# Patient Record
Sex: FEMALE | Race: WHITE | HISPANIC OR LATINO | Employment: FULL TIME | URBAN - METROPOLITAN AREA
[De-identification: names, ages, dates, MRNs, and addresses within clinical notes are randomized per-mention and may not be internally consistent; named-entity substitution may affect disease eponyms.]

---

## 2017-02-09 DIAGNOSIS — K21.9 GASTROESOPHAGEAL REFLUX DISEASE WITHOUT ESOPHAGITIS: ICD-10-CM

## 2017-02-09 RX ORDER — ESOMEPRAZOLE MAGNESIUM 40 MG/1
CAPSULE, DELAYED RELEASE ORAL
Qty: 30 CAPSULE | Refills: 0 | Status: SHIPPED | OUTPATIENT
Start: 2017-02-09 | End: 2017-03-01

## 2017-02-22 ENCOUNTER — OFFICE VISIT (OUTPATIENT)
Dept: HEPATOLOGY | Facility: CLINIC | Age: 62
End: 2017-02-22
Payer: COMMERCIAL

## 2017-02-22 VITALS
RESPIRATION RATE: 18 BRPM | TEMPERATURE: 97 F | WEIGHT: 190.5 LBS | DIASTOLIC BLOOD PRESSURE: 70 MMHG | HEART RATE: 79 BPM | HEIGHT: 66 IN | OXYGEN SATURATION: 98 % | BODY MASS INDEX: 30.62 KG/M2 | SYSTOLIC BLOOD PRESSURE: 142 MMHG

## 2017-02-22 DIAGNOSIS — K76.0 NAFLD (NONALCOHOLIC FATTY LIVER DISEASE): Primary | ICD-10-CM

## 2017-02-22 PROCEDURE — 3078F DIAST BP <80 MM HG: CPT | Mod: S$GLB,,, | Performed by: INTERNAL MEDICINE

## 2017-02-22 PROCEDURE — 99999 PR PBB SHADOW E&M-EST. PATIENT-LVL III: CPT | Mod: PBBFAC,,, | Performed by: INTERNAL MEDICINE

## 2017-02-22 PROCEDURE — 99214 OFFICE O/P EST MOD 30 MIN: CPT | Mod: S$GLB,,, | Performed by: INTERNAL MEDICINE

## 2017-02-22 PROCEDURE — 3077F SYST BP >= 140 MM HG: CPT | Mod: S$GLB,,, | Performed by: INTERNAL MEDICINE

## 2017-02-22 PROCEDURE — 1160F RVW MEDS BY RX/DR IN RCRD: CPT | Mod: S$GLB,,, | Performed by: INTERNAL MEDICINE

## 2017-02-22 RX ORDER — PENICILLIN V POTASSIUM 500 MG/1
TABLET, FILM COATED ORAL
Refills: 0 | COMMUNITY
Start: 2017-02-14 | End: 2017-03-01

## 2017-02-22 NOTE — PATIENT INSTRUCTIONS
Return to clinic in 1 year.      Please schedule fasting lab at [facility] within 1 month.      Suggest weight loss goal of 10-15 lbs for next appointment.  Also need good control of blood pressure and cholesterol.

## 2017-02-22 NOTE — PROGRESS NOTES
Hepatology Consult Note    Referring provider: No ref. provider found    Chief complaint:   No chief complaint on file.      HPI:  Rachel Conroy is a 61 y.o. female that presents to hepatology clinic for follow-up of abnormal LFTs and fatty infiltration seen on ultrasound.  She is accompanied by her daughter.  The patient is Telugu-speaking and elected for daughter to provided translation.    The patient states that she has been clinically well since her last office visit on 10/24/16.  The patient has no clinical symptoms or stigmata of chronic liver disease including jaundice, ascites formation, pruritus or GI bleeding.      Work-up shows fibroscan consistent with F0-F1 fibrosis and serologic w/u without evidence of other chronic etiology.  She reports that she has been taking Devil's claw - (hook root) for one month.  No medications that she can correlate to the time when LFTs began to rise.  She has not been on statin for management of cholesterol.  States that from last check with mildly elevated triglycerides she was recommended diet and exercise for management.      Patient Active Problem List   Diagnosis    Osteoarthritis of right knee    Migraine headache    Nail fungal infection    Hyperlipidemia    HTN (hypertension)    Muscle pain, fibromyalgia    Fatigue    Unspecified vitamin D deficiency    IBS (irritable bowel syndrome)       Past Medical History   Diagnosis Date    Arthritis     Hyperlipidemia     Hypertension     Irritable bowel syndrome     Joint pain     Knee injury     Migraine headache     Muscle pain, fibromyalgia 3/19/2014    Other physical therapy     Plantar fasciitis     Sleep apnea     Urinary incontinence        Past Surgical History   Procedure Laterality Date    Hysterectomy         Family History   Problem Relation Age of Onset    Arthritis Mother     Alzheimer's disease Mother     Migraines Neg Hx     Melanoma Neg Hx     Psoriasis Neg Hx     Lupus  Neg Hx     Eczema Neg Hx     Acne Neg Hx     Colon cancer Neg Hx     Stomach cancer Neg Hx     Esophageal cancer Neg Hx     Liver disease Neg Hx     Crohn's disease Neg Hx     Ulcerative colitis Neg Hx     Celiac disease Neg Hx        Social History     Social History    Marital status:      Spouse name: N/A    Number of children: N/A    Years of education: N/A     Occupational History    [facility] [employer]      Social History Main Topics    Smoking status: Never Smoker    Smokeless tobacco: Not on file    Alcohol use No    Drug use: No    Sexual activity: No     Other Topics Concern    Are You Pregnant Or Think You May Be? No    Breast-Feeding No     Social History Narrative   Patient has no tobacco, alcohol or illicit drug use currently or remotely.     Current Outpatient Prescriptions   Medication Sig Dispense Refill    cholecalciferol, vitamin D3, 50,000 unit capsule Take 1 capsule (50,000 Units total) by mouth once a week. 12 capsule 0    clobetasol (CLOBEX) 0.05 % shampoo APPLY EXTERNALLY TO THE AFFECTED AREA EVERY 7 DAYS 118 mL 0    diclofenac sodium (VOLTAREN) 1 % Gel Apply 2 g topically 4 (four) times daily. To painful area on the foot. 500 g 5    dicyclomine (BENTYL) 10 MG capsule Take 1 capsule (10 mg total) by mouth before meals as needed (TID PRN). 90 capsule 2    divalproex (DEPAKOTE) 250 MG EC tablet       esomeprazole (NEXIUM) 40 MG capsule TAKE 1 CAPSULE BY MOUTH EVERY MORNING 30-45 MINUTES BEFORE BREAKFAST 30 capsule 0    gabapentin (NEURONTIN) 100 MG capsule Take 1 capsule (100 mg total) by mouth every evening. 90 capsule 1    losartan (COZAAR) 100 MG tablet TAKE 1 TABLET (100 MG TOTAL) BY MOUTH ONCE DAILY. 90 tablet 3    sumatriptan (IMITREX) 100 MG tablet Take 0.5-1 tablets ( mg total) by mouth every 2 (two) hours as needed (up to 200 mg/d). 9 tablet 5     No current facility-administered medications for this visit.        Review of patient's allergies  "indicates:   Allergen Reactions    Tramadol Itching       Review of Systems   Constitutional: Negative for chills, fever and weight loss.   Eyes: Negative.    Respiratory: Negative for cough and shortness of breath.    Cardiovascular: Negative for chest pain and leg swelling.   Gastrointestinal: Positive for abdominal pain and heartburn. Negative for diarrhea, nausea and vomiting.   Genitourinary: Negative for urgency.   Musculoskeletal: Positive for joint pain and myalgias.   Skin: Negative for rash.   Neurological: Negative for focal weakness and headaches.   Endo/Heme/Allergies: Does not bruise/bleed easily.   Psychiatric/Behavioral: Negative for depression.       Vitals:    02/22/17 1422   BP: (!) 142/70   Pulse: 79   Resp: 18   Temp: 96.8 °F (36 °C)   TempSrc: Oral   SpO2: 98%   Weight: 86.4 kg (190 lb 7.6 oz)   Height: 5' 6" (1.676 m)       Physical Exam   Constitutional: She is oriented to person, place, and time. She appears well-developed and well-nourished. No distress.   HENT:   Head: Normocephalic and atraumatic.   Mouth/Throat: Oropharynx is clear and moist.   Eyes: EOM are normal. Pupils are equal, round, and reactive to light. No scleral icterus.   Neck: Normal range of motion. Neck supple. No thyromegaly present.   Cardiovascular: Normal rate, regular rhythm and normal heart sounds.  Exam reveals no gallop and no friction rub.    No murmur heard.  Pulmonary/Chest: Effort normal. No respiratory distress. She has no wheezes. She has no rales.   Abdominal: Soft. Bowel sounds are normal. She exhibits no distension. There is no tenderness.   Musculoskeletal: Normal range of motion. She exhibits no edema.   Lymphadenopathy:     She has no cervical adenopathy.   Neurological: She is alert and oriented to person, place, and time.   Skin: Skin is warm and dry. No rash noted.   Psychiatric: She has a normal mood and affect. Her behavior is normal.   Vitals reviewed.      Lab Results   Component Value Date    " ALT 55 (H) 10/24/2016    AST 31 10/24/2016    ALKPHOS 129 10/24/2016    BILITOT 0.4 10/24/2016       Lab Results   Component Value Date    WBC 5.61 06/02/2016    HGB 13.2 06/02/2016    HCT 39.7 06/02/2016    MCV 85 06/02/2016     06/02/2016       Lab Results   Component Value Date     10/24/2016    K 3.9 10/24/2016     10/24/2016    CO2 24 10/24/2016    BUN 11 10/24/2016    CREATININE 0.7 10/24/2016    CALCIUM 9.1 10/24/2016    ANIONGAP 10 10/24/2016    ESTGFRAFRICA >60.0 10/24/2016    EGFRNONAA >60.0 10/24/2016       Imaging: Abd U/S 11/2016 report reviewed; hepatomegaly with fatty infiltration     Assessment:  61 y.o. female with obesity, hypertension and hyperlipidemia with serologic w/u and imaging consistent with NAFLD.  Although there has been a minor prolonged elevation of ALT in the setting of steatosis, fibroscan does not show evidence of significant fibrosis and this is reassuring.      NAFLD:  At this time patient appears to have NAFLD with evidence of steatohepatitis and progressive fibrosis.  Therefore do not recommend biopsy.  Recommendations for weight loss and good blood pressure and cholesterol control.  Goal of 10-15 lb weight low with increased physical activity.  We will recheck fasting lipid panel within the next month and if triglycerides not improved then consideration of treatment.  She is interested in fish oil and there is no contraindication at this time.  As a non-diabetic, the patient may also benefit from vitamin E and this can be recommended following repeat labs if ALT remains elevated.  Reviewed medications with patient and daughter and no culprit medication identified.  She was encouraged to avoid herbal and OTC medications unless discussed with a physician.      Hepatitis B core positive: Patient with prior exposure to hepatitis B.  No evidence of active disease but does have risk for reactivation if immunosuppressed in the future.  Nothing to do at this time.       Patient with immunity to hepatitis A.      RTC in 1 year     Patient has asked that daughter's cell phone be used for discussion of lab and testing results [phone] (Luna) given limited English.

## 2017-02-22 NOTE — MR AVS SNAPSHOT
[facility]  [address]  [place] [address]  Phone: [phone]  Fax: [phone]                  Rachel Conroy   2017 2:20 PM   Office Visit    Descripción:  Female : 1955   Personal Médico:  Debra Finnegan MD   Departamento:  [facility]           Diagnósticos de Esta Visita        Comentarios    NAFLD (nonalcoholic fatty liver disease)    -  Primario            Lista de tareas           Citas próximas        Personal Médico Departamento Tfno del dpto    3/14/2017 3:00 PM MD [facility] [phone]      Metas (5 Years of Data)     Ninguna      Follow-Up and Disposition     Return in about 1 year (around 2018).      [facility]     [facility] - Asistencia   Enfermeras registradas de [facility] pueden ayudarle a reservar meghan yany, proveer educación para la rosemary, asesoría clínica, y otros servicios de asesoramiento.   Llame para wendy servicio gratuito a [phone].             Medicamentos           Mensaje sobre Medicamentos     Verificar los cambios y / o adiciones a saha régimen de medicación son los mismos que discutir con saha médico. Si cualquiera de estos cambios o adiciones son incorrectos, por favor notifique a saha proveedor de atención médica.        DEJAR de nixon estos medicamentos     divalproex (DEPAKOTE) 250 MG EC tablet            Verifique que la siguiente lista de medicamentos es meghan representación exacta de los medicamentos que está tomando actualmente. Si no hay ningunos reportados, la lista puede estar en arevalo. Si no es correcta, por favor póngase en contacto con saha proveedor de atención médica. Lleve esta lista con usted en shannan de emergencia.           Medicamentos Actuales     clobetasol (CLOBEX) 0.05 % shampoo APPLY EXTERNALLY TO THE AFFECTED AREA EVERY 7 DAYS    esomeprazole (NEXIUM) 40 MG capsule TAKE 1 CAPSULE BY MOUTH EVERY MORNING 30-45 MINUTES BEFORE  "BREAKFAST    losartan (COZAAR) 100 MG tablet TAKE 1 TABLET (100 MG TOTAL) BY MOUTH ONCE DAILY.    penicillin v potassium (VEETID) 500 MG tablet     sumatriptan (IMITREX) 100 MG tablet Take 0.5-1 tablets ( mg total) by mouth every 2 (two) hours as needed (up to 200 mg/d).    cholecalciferol, vitamin D3, 50,000 unit capsule Take 1 capsule (50,000 Units total) by mouth once a week.    diclofenac sodium (VOLTAREN) 1 % Gel Apply 2 g topically 4 (four) times daily. To painful area on the foot.    dicyclomine (BENTYL) 10 MG capsule Take 1 capsule (10 mg total) by mouth before meals as needed (TID PRN).    gabapentin (NEURONTIN) 100 MG capsule Take 1 capsule (100 mg total) by mouth every evening.           Información de referencia clínica           Nellie signos vitales vannesa     PS Pulso Temperatura Resp [place] [employer]    142/70 (BP Location: Left arm, Patient Position: Sitting, BP Method: Automatic) 79 96.8 °F (36 °C) (Oral) 18 5' 6" (1.676 m) 86.4 kg (190 lb 7.6 oz)    SpO2 BMI ([facility])                98% 30.74 kg/m2          Blood Pressure          Most Recent Value    BP  (!)  142/70      Alergias     A partir del:  2/22/2017        Tramadol      Vacunas     Administradas en la fecha de la visita:  2/22/2017        None      Orders Placed During Today's Visit     Exámenes/Procedimientos futuros Se espera el Vence    Hepatic function panel  2/22/2017 4/23/2018    Lipid panel  2/22/2017 4/23/2018      Instrucciones    Return to clinic in 1 year.      Please schedule fasting lab at [facility] within 1 month.      Suggest weight loss goal of 10-15 lbs for next appointment.  Also need good control of blood pressure and cholesterol.                Language Assistance Services     ATTENTION: Language assistance services are available, free of charge. Please call [phone].      ATENCIÓN: Si habla español, tiene a saha disposición servicios gratuitos de asistencia lingüística. Llame al [phone].     CHÚ Ý: N?u b?n " nói Ti?ng Vi?t, có các d?ch v? h? tr? ngôn ng? mi?n phí dành cho b?n. G?i s? [phone].         [facility] cumple con las leyes federales aplicables de derechos civiles y no discrimina por motivos de laura, color, origen nacional, edad, discapacidad, o sexo.                 Rachel Conroy   2017 2:20 PM   Office Visit    Description:  Female : 1955   Provider:  Debra Finnegan MD   Department:  [facility]           Diagnoses this Visit        Comments    NAFLD (nonalcoholic fatty liver disease)    -  Primary            To Do List           Future Appointments        Provider Department Dept Phone    3/14/2017 3:00 PM MD [facility] [phone]      Goals     None      Follow-Up and Disposition     Return in about 1 year (around 2018).      [facility]     [facility] -  Assistance  Registered nurses in the [facility] On Call Center provide clinical advisement, health education, appointment booking, and other advisory services.  Call for this free service at [phone].             Medications           Message regarding Medications     Verify the changes and/or additions to your medication regime listed below are the same as discussed with your clinician today.  If any of these changes or additions are incorrect, please notify your healthcare provider.        STOP taking these medications     divalproex (DEPAKOTE) 250 MG EC tablet            Verify that the below list of medications is an accurate representation of the medications you are currently taking.  If none reported, the list may be blank. If incorrect, please contact your healthcare provider. Carry this list with you in case of emergency.           Current Medications     clobetasol (CLOBEX) 0.05 % shampoo APPLY EXTERNALLY TO THE AFFECTED AREA EVERY 7 DAYS    esomeprazole (NEXIUM) 40 MG capsule TAKE 1 CAPSULE BY MOUTH EVERY MORNING 30-45 MINUTES  "BEFORE BREAKFAST    losartan (COZAAR) 100 MG tablet TAKE 1 TABLET (100 MG TOTAL) BY MOUTH ONCE DAILY.    penicillin v potassium (VEETID) 500 MG tablet     sumatriptan (IMITREX) 100 MG tablet Take 0.5-1 tablets ( mg total) by mouth every 2 (two) hours as needed (up to 200 mg/d).    cholecalciferol, vitamin D3, 50,000 unit capsule Take 1 capsule (50,000 Units total) by mouth once a week.    diclofenac sodium (VOLTAREN) 1 % Gel Apply 2 g topically 4 (four) times daily. To painful area on the foot.    dicyclomine (BENTYL) 10 MG capsule Take 1 capsule (10 mg total) by mouth before meals as needed (TID PRN).    gabapentin (NEURONTIN) 100 MG capsule Take 1 capsule (100 mg total) by mouth every evening.           Clinical Reference Information           Your Vitals Were     BP Pulse Temp Resp Height Weight    142/70 (BP Location: Left arm, Patient Position: Sitting, BP Method: Automatic) 79 96.8 °F (36 °C) (Oral) 18 5' 6" (1.676 m) 86.4 kg (190 lb 7.6 oz)    SpO2 BMI                98% 30.74 kg/m2          Blood Pressure          Most Recent Value    BP  (!)  142/70      Allergies as of 2/22/2017     Tramadol      Immunizations Administered on Date of Encounter - 2/22/2017     None      Orders Placed During Today's Visit     Future Labs/Procedures Expected by Expires    Hepatic function panel  2/22/2017 4/23/2018    Lipid panel  2/22/2017 4/23/2018      Instructions    Return to clinic in 1 year.      Please schedule fasting lab at [facility] within 1 month.      Suggest weight loss goal of 10-15 lbs for next appointment.  Also need good control of blood pressure and cholesterol.                Language Assistance Services     ATTENTION: Language assistance services are available, free of charge. Please call [phone].      ATENCIÓN: Si wenceslaola leo, tiene a saha disposición servicios gratuitos de asistencia lingüística. Llame al [phone].     CHÚ Ý: N?u b?n nói Ti?ng Vi?t, có các d?ch v? h? tr? ngôn " ng? mi?n phí dành cho b?n. G?i s? [phone].         [facility] complies with applicable Federal civil rights laws and does not discriminate on the basis of race, color, national origin, age, disability, or sex.

## 2017-03-01 ENCOUNTER — OFFICE VISIT (OUTPATIENT)
Dept: FAMILY MEDICINE | Facility: CLINIC | Age: 62
End: 2017-03-01
Payer: COMMERCIAL

## 2017-03-01 VITALS
TEMPERATURE: 100 F | SYSTOLIC BLOOD PRESSURE: 150 MMHG | DIASTOLIC BLOOD PRESSURE: 94 MMHG | HEART RATE: 106 BPM | OXYGEN SATURATION: 96 % | HEIGHT: 68 IN | RESPIRATION RATE: 17 BRPM | WEIGHT: 190.69 LBS | BODY MASS INDEX: 28.9 KG/M2

## 2017-03-01 DIAGNOSIS — J01.00 ACUTE NON-RECURRENT MAXILLARY SINUSITIS: Primary | ICD-10-CM

## 2017-03-01 PROCEDURE — 99999 PR PBB SHADOW E&M-EST. PATIENT-LVL III: CPT | Mod: PBBFAC,,, | Performed by: FAMILY MEDICINE

## 2017-03-01 PROCEDURE — 1160F RVW MEDS BY RX/DR IN RCRD: CPT | Mod: S$GLB,,, | Performed by: FAMILY MEDICINE

## 2017-03-01 PROCEDURE — 99214 OFFICE O/P EST MOD 30 MIN: CPT | Mod: S$GLB,,, | Performed by: FAMILY MEDICINE

## 2017-03-01 PROCEDURE — 3077F SYST BP >= 140 MM HG: CPT | Mod: S$GLB,,, | Performed by: FAMILY MEDICINE

## 2017-03-01 PROCEDURE — 3080F DIAST BP >= 90 MM HG: CPT | Mod: S$GLB,,, | Performed by: FAMILY MEDICINE

## 2017-03-01 RX ORDER — AMOXICILLIN AND CLAVULANATE POTASSIUM 875; 125 MG/1; MG/1
1 TABLET, FILM COATED ORAL 2 TIMES DAILY
Qty: 20 TABLET | Refills: 0 | Status: SHIPPED | OUTPATIENT
Start: 2017-03-01 | End: 2017-03-11

## 2017-03-01 RX ORDER — FLUTICASONE PROPIONATE 50 MCG
1 SPRAY, SUSPENSION (ML) NASAL DAILY
Qty: 1 BOTTLE | Refills: 0 | Status: SHIPPED | OUTPATIENT
Start: 2017-03-01 | End: 2017-04-11

## 2017-03-01 NOTE — PROGRESS NOTES
"Subjective:       Rachel Conroy is a 61 y.o. female here for evaluation of a cough. Onset of symptoms was several days ago. Symptoms have been unchanged since that time. The cough is dry and is aggravated by reclining position. Associated symptoms include: fever. Patient does not have a history of asthma. Patient does not have a history of environmental allergens. Patient has not traveled recently. Patient does not have a history of smoking. Patient has not had a previous chest x-ray. Patient has not had a PPD done.  +rhinorrhea  Tried - mucinex, advil, nyquil - hasn't helped.  Patient feeling very uncomfortable with facial pains.  Son is here with her for help w/ translation - language Pashto    Review of Systems  Pertinent items are noted in HPI.      Objective:      Oxygen saturation 98% on room air    BP (!) 150/94 (BP Location: Left arm, Patient Position: Sitting, BP Method: Manual)  Pulse 106  Temp 99.5 °F (37.5 °C) (Oral)   Resp 17  Ht 5' 8" (1.727 m)  Wt 86.5 kg (190 lb 11.2 oz)  SpO2 96%  BMI 29 kg/m2    General Appearance:    Alert, cooperative, no distress, appears stated age. Patient wincing the whole time due to her nasal congestion she says and body aches   Head:    Normocephalic, without obvious abnormality, atraumatic   Eyes:    PERRL, conjunctiva/corneas clear, EOM's intact   Ears:    Normal TM's and external ear canals, both ears   Nose:   Nares normal, septum midline, mucosa normal, +clear drainage, bilateral maxillary sinus tenderness   Throat:   Lips, mucosa, and tongue normal; teeth and gums normal   Neck:   Supple, symmetrical, trachea midline, no adenopathy       Lungs:     Clear to auscultation bilaterally, respirations unlabored        Heart:    Regular rate and rhythm, S1 and S2 normal, no murmur, rub   or gallop                                             Assessment:      Sinusitis      Plan:      Antibiotics per medication orders.  Antitussives per medication " orders.  Avoid exposure to tobacco smoke and fumes.  Call if shortness of breath worsens, blood in sputum, change in character of cough, development of fever or chills, inability to maintain nutrition and hydration. Avoid exposure to tobacco smoke and fumes.     Over the counter remedies:   -Increase your water intake to 64-80 oz daily   -Tylenol 500 mg 2 tablets or Ibuprofen 200 mg 2 tablets every 4-6 hours as needed for fever, headaches, sore throat, ear pain, bodyaches, and/or  nasal/sinus inflammation   -Warm salt water gargles with 1/2 cup water and 1 tablespoon salt every 4 hours   -Warm tea with honey and lemon   -Follow up with PCP in 1 week if symptoms do not resolve

## 2017-03-01 NOTE — MR AVS SNAPSHOT
[facility]  [address]  [place] [address]  Phone: [phone]                  Rachel Conroy   3/1/2017 11:20 AM   Office Visit    Descripción:  Female : 1955   Personal Médico:  Erica Mondragon MD   Departamento:  [facility]           Razón de la yany     Fever     Generalized Body Aches     Headache     Cough     Nasal Congestion           Diagnósticos de Esta Visita        Comentarios    Acute non-recurrent maxillary sinusitis    -  Primario            Lista de tareas           Citas próximas        Personal Médico Departamento Tfno del dpto    3/14/2017 3:00 PM Miranda Mcclellan MD [facility] - Rheumatology [phone]    3/22/2017 7:30 AM [facility], [place] DRAW STATION [facility] [phone]      Metas (5 Years of Data)     Ninguna      Recetas para recoger        Disp Refills Start End    fluticasone (FLONASE) 50 mcg/actuation nasal spray 1 Bottle 0 3/1/2017     1 spray by Each Nare route once daily. - Each Nare    [facility]: [place]  [facility] [facility] [address] No. de tlfo: #: [phone]       amoxicillin-clavulanate 875-125mg (AUGMENTIN) 875-125 mg per tablet 20 tablet 0 3/1/2017 3/11/2017    Take 1 tablet by mouth 2 (two) times daily. - Oral    [facility]: [place] [address] [facility] [address] No. de tlfo: #: [phone]         [facility]     [facility] [place] - Asistencia   Enfermeras registradas de [facility] pueden ayudarle a reservar meghan yany, proveer educación para la rosemary, asesoría clínica, y otros servicios de asesoramiento.   Llame para wendy servicio gratuito a [phone].             Medicamentos           Mensaje sobre Medicamentos     Verificar los cambios y / o adiciones a tovar régimen de medicación son los mismos que discutir con tovar médico. Si cualquiera de  estos cambios o adiciones son incorrectos, por favor notifique a saha proveedor de atención médica.        EMPEZAR a nixon estos medicamentos NUEVOS        Refills    fluticasone (FLONASE) 50 mcg/actuation nasal spray 0    Si spray by Each Nare route once daily.    Categoría: Normal    Vía: Each Nare    amoxicillin-clavulanate 875-125mg (AUGMENTIN) 875-125 mg per tablet 0    Sig: Take 1 tablet by mouth 2 (two) times daily.    Categoría: Normal    Vía: Oral      DEJAR de nixon estos medicamentos     penicillin v potassium (VEETID) 500 MG tablet     gabapentin (NEURONTIN) 100 MG capsule Take 1 capsule (100 mg total) by mouth every evening.    esomeprazole (NEXIUM) 40 MG capsule TAKE 1 CAPSULE BY MOUTH EVERY MORNING 30-45 MINUTES BEFORE BREAKFAST    dicyclomine (BENTYL) 10 MG capsule Take 1 capsule (10 mg total) by mouth before meals as needed (TID PRN).    diclofenac sodium (VOLTAREN) 1 % Gel Apply 2 g topically 4 (four) times daily. To painful area on the foot.    cholecalciferol, vitamin D3, 50,000 unit capsule Take 1 capsule (50,000 Units total) by mouth once a week.           Verifique que la siguiente lista de medicamentos es meghan representación exacta de los medicamentos que está tomando actualmente. Si no hay ningunos reportados, la lista puede estar en arevalo. Si no es correcta, por favor póngase en contacto con saha proveedor de atención médica. Lleve esta lista con usted en shannan de emergencia.           Medicamentos Actuales     amoxicillin-clavulanate 875-125mg (AUGMENTIN) 875-125 mg per tablet Take 1 tablet by mouth 2 (two) times daily.    clobetasol (CLOBEX) 0.05 % shampoo APPLY EXTERNALLY TO THE AFFECTED AREA EVERY 7 DAYS    fluticasone (FLONASE) 50 mcg/actuation nasal spray 1 spray by Each Nare route once daily.    losartan (COZAAR) 100 MG tablet TAKE 1 TABLET (100 MG TOTAL) BY MOUTH ONCE DAILY.    sumatriptan (IMITREX) 100 MG tablet Take 0.5-1 tablets ( mg total) by mouth every 2 (two) hours as  needed (up to 200 mg/d).           Información de referencia clínica           Nellie signos vitales vannesa     PS                   150/94 (BP Location: Left arm, Patient Position: Sitting, BP Method: Manual)           Blood Pressure          Most Recent Value    BP  (!)  150/94      Alergias     A partir del:  3/1/2017        Tramadol      Vacunas     Administradas en la fecha de la visita:  3/1/2017        None      Language Assistance Services     ATTENTION: Language assistance services are available, free of charge. Please call [phone].      ATENCIÓN: Si habla español, tiene a saha disposición servicios gratuitos de asistencia lingüística. Llame al [phone].     CHÚ Ý: N?u b?n nói Ti?ng Vi?t, có các d?ch v? h? tr? ngôn ng? mi?n phí dành cho b?n. G?i s? [phone].         [facility] cumple con las leyes federales aplicables de derechos civiles y no discrimina por motivos de laura, color, origen nacional, edad, discapacidad, o sexo.                 Rachel Anne Conroy   3/1/2017 11:20 AM   Office Visit    Description:  Female : 1955   Provider:  Erica Mondragon MD   Department:  [facility]           Reason for Visit     Fever     Generalized Body Aches     Headache     Cough     Nasal Congestion           Diagnoses this Visit        Comments    Acute non-recurrent maxillary sinusitis    -  Primary            To Do List           Future Appointments        Provider Department Dept Phone    3/14/2017 3:00 PM Miranda Mcclellan MD [facility] - Rheumatology [phone]    3/22/2017 7:30 AM LAB, [facility] DRAW STATION [facility] [phone]      Goals     None       These Medications        Disp Refills Start End    fluticasone (FLONASE) 50 mcg/actuation nasal spray 1 Bottle 0 3/1/2017     1 spray by Each Nare route once daily. - Each Nare    Pharmacy: [id number] [id number] AT [facility] OF  [address] Ph #: [phone]       amoxicillin-clavulanate 875-125mg (AUGMENTIN) 875-125 mg per tablet 20 tablet 0 3/1/2017 3/11/2017    Take 1 tablet by mouth 2 (two) times daily. - Oral    Pharmacy: [facility] [address] [place] [address] [address] AT [facility] OF [address] Ph #: [phone]         [facility]     [facility] Care Line -  Assistance  Registered nurses in the [facility] On Call Center provide clinical advisement, health education, appointment booking, and other advisory services.  Call for this free service at [phone].             Medications           Message regarding Medications     Verify the changes and/or additions to your medication regime listed below are the same as discussed with your clinician today.  If any of these changes or additions are incorrect, please notify your healthcare provider.        START taking these NEW medications        Refills    fluticasone (FLONASE) 50 mcg/actuation nasal spray 0    Si spray by Each Nare route once daily.    Class: Normal    Route: Each Nare    amoxicillin-clavulanate 875-125mg (AUGMENTIN) 875-125 mg per tablet 0    Sig: Take 1 tablet by mouth 2 (two) times daily.    Class: Normal    Route: Oral      STOP taking these medications     penicillin v potassium (VEETID) 500 MG tablet     gabapentin (NEURONTIN) 100 MG capsule Take 1 capsule (100 mg total) by mouth every evening.    esomeprazole (NEXIUM) 40 MG capsule TAKE 1 CAPSULE BY MOUTH EVERY MORNING 30-45 MINUTES BEFORE BREAKFAST    dicyclomine (BENTYL) 10 MG capsule Take 1 capsule (10 mg total) by mouth before meals as needed (TID PRN).    diclofenac sodium (VOLTAREN) 1 % Gel Apply 2 g topically 4 (four) times daily. To painful area on the foot.    cholecalciferol, vitamin D3, 50,000 unit capsule Take 1 capsule (50,000 Units total) by mouth once a week.           Verify that the below list of medications is an accurate representation of the  medications you are currently taking.  If none reported, the list may be blank. If incorrect, please contact your healthcare provider. Carry this list with you in case of emergency.           Current Medications     amoxicillin-clavulanate 875-125mg (AUGMENTIN) 875-125 mg per tablet Take 1 tablet by mouth 2 (two) times daily.    clobetasol (CLOBEX) 0.05 % shampoo APPLY EXTERNALLY TO THE AFFECTED AREA EVERY 7 DAYS    fluticasone (FLONASE) 50 mcg/actuation nasal spray 1 spray by Each Nare route once daily.    losartan (COZAAR) 100 MG tablet TAKE 1 TABLET (100 MG TOTAL) BY MOUTH ONCE DAILY.    sumatriptan (IMITREX) 100 MG tablet Take 0.5-1 tablets ( mg total) by mouth every 2 (two) hours as needed (up to 200 mg/d).           Clinical Reference Information           Your Vitals Were     BP                   150/94 (BP Location: Left arm, Patient Position: Sitting, BP Method: Manual)           Blood Pressure          Most Recent Value    BP  (!)  150/94      Allergies as of 3/1/2017     Tramadol      Immunizations Administered on Date of Encounter - 3/1/2017     None      Language Assistance Services     ATTENTION: Language assistance services are available, free of charge. Please call [phone].      ATENCIÓN: Si habla leo, tiene a saha disposición servicios gratuitos de asistencia lingüística. Llame al [phone].     COMFORT Ý: N?u b?n nói Ti?ng Vi?t, có các d?ch v? h? tr? ngôn ng? mi?n phí dành cho b?n. G?i s? [phone].         [facility] complies with applicable Federal civil rights laws and does not discriminate on the basis of race, color, national origin, age, disability, or sex.

## 2017-04-11 ENCOUNTER — OFFICE VISIT (OUTPATIENT)
Dept: GASTROENTEROLOGY | Facility: CLINIC | Age: 62
End: 2017-04-11
Payer: COMMERCIAL

## 2017-04-11 VITALS
HEIGHT: 66 IN | SYSTOLIC BLOOD PRESSURE: 131 MMHG | WEIGHT: 189.63 LBS | DIASTOLIC BLOOD PRESSURE: 78 MMHG | BODY MASS INDEX: 30.47 KG/M2 | HEART RATE: 87 BPM

## 2017-04-11 DIAGNOSIS — R10.84 GENERALIZED ABDOMINAL PAIN: ICD-10-CM

## 2017-04-11 DIAGNOSIS — K21.9 GASTROESOPHAGEAL REFLUX DISEASE WITHOUT ESOPHAGITIS: ICD-10-CM

## 2017-04-11 DIAGNOSIS — K58.0 IRRITABLE BOWEL SYNDROME WITH DIARRHEA: Primary | ICD-10-CM

## 2017-04-11 PROCEDURE — 1160F RVW MEDS BY RX/DR IN RCRD: CPT | Mod: S$GLB,,, | Performed by: NURSE PRACTITIONER

## 2017-04-11 PROCEDURE — 99214 OFFICE O/P EST MOD 30 MIN: CPT | Mod: S$GLB,,, | Performed by: NURSE PRACTITIONER

## 2017-04-11 PROCEDURE — 99999 PR PBB SHADOW E&M-EST. PATIENT-LVL III: CPT | Mod: PBBFAC,,, | Performed by: NURSE PRACTITIONER

## 2017-04-11 PROCEDURE — 3078F DIAST BP <80 MM HG: CPT | Mod: S$GLB,,, | Performed by: NURSE PRACTITIONER

## 2017-04-11 PROCEDURE — 3075F SYST BP GE 130 - 139MM HG: CPT | Mod: S$GLB,,, | Performed by: NURSE PRACTITIONER

## 2017-04-11 RX ORDER — IBUPROFEN 400 MG/1
400 TABLET ORAL EVERY 4 HOURS
COMMUNITY
End: 2017-10-11

## 2017-04-11 RX ORDER — PENICILLIN V POTASSIUM 500 MG/1
500 TABLET, FILM COATED ORAL
COMMUNITY
End: 2017-10-11

## 2017-04-11 RX ORDER — DICYCLOMINE HYDROCHLORIDE 10 MG/1
10 CAPSULE ORAL
Qty: 90 CAPSULE | Refills: 2 | Status: SHIPPED | OUTPATIENT
Start: 2017-04-11 | End: 2017-10-11

## 2017-04-11 RX ORDER — ESOMEPRAZOLE MAGNESIUM 40 MG/1
40 CAPSULE, DELAYED RELEASE ORAL
Qty: 30 CAPSULE | Refills: 2 | Status: SHIPPED | OUTPATIENT
Start: 2017-04-11 | End: 2017-04-17 | Stop reason: SDUPTHER

## 2017-04-11 NOTE — MR AVS SNAPSHOT
[facility]  [address]  [place] [address]  Phone: [phone]  Fax: [phone]                  Rachel Conroy   2017 1:30 PM   Office Visit    Descripción:  Female : 1955   Personal Médico:  Yelena Morton NP   Departamento:  [facility]           Diagnósticos de Esta Visita        Comentarios    Gastroesophageal reflux disease without esophagitis    -  Primario     Irritable bowel syndrome with diarrhea         Generalized abdominal pain                Lista de tareas           Metas (5 Years of Data)     Ninguna      Follow-Up and Disposition     Return in about 2 months (around 2017) for GERD, IBS-D.      Recetas para recoger        Disp Refills Start End    dicyclomine (BENTYL) 10 MG capsule 90 capsule 2 2017     Take 1 capsule (10 mg total) by mouth before meals as needed (TID PRN). - Oral    [facility]arm[facility]: [facility]  [facility] AT [facility] & [address] No. de tlfo: #: [phone]       esomeprazole (NEXIUM) 40 MG capsule 30 capsule 2 2017    Take 1 capsule (40 mg total) by mouth before breakfast. 30-45 minutes before breakfast - Oral    [facility]arm[facility]: [facility] [id number] [facility], [address] AT [facility] & [address] No. de tlfo: #: [phone]         [facility]     [facility] [place] [facility]eras - Asistencia   Enfermeras registradas de [facility] pueden ayudarle a reservar meghan yany, proveer educación para la rosemary, asesoría clínica, y otros servicios de asesoramiento.   Llame para wendy servicio gratuito a [phone].             Medicamentos           Mensaje sobre Medicamentos     Verificar los cambios y / o adiciones a tovar régimen de medicación son los mismos que discutir con tovar médico. Si cualquiera de estos cambios o adiciones son incorrectos, por favor notifique a tovar proveedor de atención médica.        EMPEZAR a  "nixon estos medicamentos NUEVOS        Refills    dicyclomine (BENTYL) 10 MG capsule 2    Sig: Take 1 capsule (10 mg total) by mouth before meals as needed (TID PRN).    Categoría: Normal    Vía: Oral    esomeprazole (NEXIUM) 40 MG capsule 2    Sig: Take 1 capsule (40 mg total) by mouth before breakfast. 30-45 minutes before breakfast    Categoría: Normal    Vía: Oral      DEJAR de nixon estos medicamentos     fluticasone (FLONASE) 50 mcg/actuation nasal spray 1 spray by Each Nare route once daily.    sumatriptan (IMITREX) 100 MG tablet Take 0.5-1 tablets ( mg total) by mouth every 2 (two) hours as needed (up to 200 mg/d).           Verifique que la siguiente lista de medicamentos es meghan representación exacta de los medicamentos que está tomando actualmente. Si no hay ningunos reportados, la lista puede estar en arevalo. Si no es correcta, por favor póngase en contacto con saha proveedor de atención médica. Lleve esta lista con usted en shannan de emergencia.           Medicamentos Actuales     clobetasol (CLOBEX) 0.05 % shampoo APPLY EXTERNALLY TO THE AFFECTED AREA EVERY 7 DAYS    ibuprofen (ADVIL,MOTRIN) 400 MG tablet Take 400 mg by mouth every 4 (four) hours.    losartan (COZAAR) 100 MG tablet TAKE 1 TABLET (100 MG TOTAL) BY MOUTH ONCE DAILY.    penicillin v potassium (VEETID) 500 MG tablet Take 500 mg by mouth every 8 (eight) hours.    dicyclomine (BENTYL) 10 MG capsule Take 1 capsule (10 mg total) by mouth before meals as needed (TID PRN).    esomeprazole (NEXIUM) 40 MG capsule Take 1 capsule (40 mg total) by mouth before breakfast. 30-45 minutes before breakfast           Información de referencia clínica           Nellie signos vitales vannesa     PS [place] Pes[place] BMI [place] [place]       131/78 87 5' 6" (1.676 m) 86 kg (189 lb 9.5 oz) 30.6 kg/m2       Blood Pressure          Most Recent Value    BP  131/78      Alergias     A partir del:  4/11/2017        Tramadol      Vacunas     Administradas en la fecha de la " visita:  4/11/2017        None      Instrucciones    Take Bentyl/dicyclomine 1-3 times every day for abdominal discomfort and urgent stools.  Take Nexium once daily (30-45 minutes before breakfast) for acid reflux and abdominal discomfort.  Take a daily probiotic to help normalized your bowel movements and to help with reflux, and abdominal discomfort.  Avoid known food triggers.    Avoid NSAIDS (see handout below):    NSAIDS are Non Steroidal Anti Inflammatory Drugs taken for pain.   You should stay away from these medications (unless they are advised due to cardiac health issues) as they may cause irritation and/or ulceration in the lining of your stomach.  Ask your primary care physician for an alternative medication.    Powders - BC Powder, Goody powder, Stanback    Ibuprofen which is also known as : Advil, Advil Childrens, Advil Josemanuel Strength, Advil Liquigel, Advil Migraine, Advil Pediatric, Childrens Ibuprofen Berry, Genpril, IBU, Midol IB, Midol Maximum Strength Cramp Formula, Dolgesic, Motrin Childrens, Motrin IB, Motrin Infant Drops, Motrin Josemanuel Strength, Motrin Migraine Pain, Nuprin, Migraine Liqui-gels, Ibu-Tab 200, Cap-Profen, Tab-Profen, Profen, Ibuprohm, Childrens Elixsure, IB Pro, Vicoprofen, Combunox, A-G Profen, Actiprofen, Addaprin, Advil Infants Concentrated Drops, Caldolor, Haltran, Q-Profen, Ibifon 600, Ibren, Menadol, Midol Cramps & Bodyaches, Rufen, Saleto-200, [place], Ultraprin, Uni-Pro, Wal-Profen., Famotidine and Ibuprofen can be found as : Duexis.    Aspirin which has the brand name : Arthritis Pain, Aspergum, Aspir-Low, Aspirin Lite Coat, Samir Aspirin, Bufferin, Easprin, Ecotrin, Empirin, Fasprin, Genacote, Halfprin, [place] Aspirin, [place] Aspirin, Stanback Analgesic, Tri-Buffered Aspirin, YSP Aspirin, Zorprin.    Ketoprofen which is  known as : Orudis KT, Oruvail, Actron.    Sulindac which is sold as : Clinoril.    Naproxen is one of the most known drug from NSAIDs list, and  is sold as : Aleve, Naprosyn, EC-Naprosyn, Naprelan, Anaprox, All Day Pain Relief, Aflaxen, All Day Relief, Anaprox-DS, Comfort Pac  With Naproxen,  Aleve Caplet, Aleve Easy Open Arthritis, Aleve Gelcap,  Anaprox-DS, EC-Naprosyn, Leader Naproxen Sodium, Midol Extended Relief, Naprelan 375?, Naprelan 500?, Naprelan 750?, Prevacid NapraPac 375,  Prevacid NapraPac, Naprapac, Vimovo.    Etodolac is sold under the brand name : Lodine XL, Lodine.  Fenoprofen which can be found on the market as : Nalfon, Nalfon 200.  Diclofenac which is sold as : Arthrotec, Cataflam, Voltaren, Cambia, Voltaren-XR, Zipsor.  Flurbiprofen sold as : Asaid.  Ketorolac is sold under the brand name : Sprix, Toradol, Toradol IM, Toradol IV/IM.  Piroxicam is found on the market as : Feldene.  Indomethacin which is known as : Indocin SR, Indocin, Indo-[place], Indomethegan, Indocin IV.  Mefenamic Acid sold as : Ponstel.  Meloxicam is sold under the brand name : Mobic.  Nabumetone which is sold as : Relafen.  Oxaprozin which has the brand name : Daypro.  Ketoprofen which has the brand name : Actron, Orudis KT, Orudis, Oruvail.  Meclofenamate sold as : Meclomen.  Tolmetin which can be found on the marked as : Tolectin, Tolectin 600, Tolectin DS.  Salsalate which is sold as : Disalcid.           Language Assistance Services     ATTENTION: Language assistance services are available, free of charge. Please call [phone].      ATENCIÓN: Si wenceslaola leo, tiene a saha disposición servicios gratuitos de asistencia lingüística. Llame al [phone].     CHÚ Ý: N?u b?n nói Ti?ng Vi?t, có các d?ch v? h? tr? ngôn ng? mi?n phí dành cho b?n. G?i s? [phone].         [facility] cumple con las leyes federales aplicables de derechos civiles y no discrimina por motivos de laura, color, origen nacional, edad, discapacidad, o sexo.                 Rachel Conroy   2017 1:30 PM   Office Visit    Description:  Female :  1955   Provider:  Yelena Morton NP   Department:  [facility]           Diagnoses this Visit        Comments    Gastroesophageal reflux disease without esophagitis    -  Primary     Irritable bowel syndrome with diarrhea         Generalized abdominal pain                To Do List           Goals     None      Follow-Up and Disposition     Return in about 2 months (around 6/11/2017) for GERD, IBS-D.       These Medications        Disp Refills Start End    dicyclomine (BENTYL) 10 MG capsule 90 capsule 2 4/11/2017     Take 1 capsule (10 mg total) by mouth before meals as needed (TID PRN). - Oral    Pharmacy: [id number] - [place], [place] - [address] AT [facility] Ph #: [phone]       esomeprazole (NEXIUM) 40 MG capsule 30 capsule 2 4/11/2017 4/11/2018    Take 1 capsule (40 mg total) by mouth before breakfast. 30-45 minutes before breakfast - Oral    Pharmacy: [id number] - [place], [place] - [address] AT [facility] Ph #: [phone]         [facility]     [facility] On [facility] Nurse Care Line - 24/7 Assistance  Unless otherwise directed by your provider, please contact [facility], our nurse care line that is available for 24/7 assistance.     Registered nurses in the [facility] provide: appointment scheduling, clinical advisement, health education, and other advisory services.  Call: [phone] (toll free)               Medications           Message regarding Medications     Verify the changes and/or additions to your medication regime listed below are the same as discussed with your clinician today.  If any of these changes or additions are incorrect, please notify your healthcare provider.        START taking these NEW medications        Refills    dicyclomine (BENTYL) 10 MG capsule 2    Sig: Take 1 capsule (10 mg total) by mouth before meals as needed (TID PRN).    Class: Normal     "Route: Oral    esomeprazole (NEXIUM) 40 MG capsule 2    Sig: Take 1 capsule (40 mg total) by mouth before breakfast. 30-45 minutes before breakfast    Class: Normal    Route: Oral      STOP taking these medications     fluticasone (FLONASE) 50 mcg/actuation nasal spray 1 spray by Each Nare route once daily.    sumatriptan (IMITREX) 100 MG tablet Take 0.5-1 tablets ( mg total) by mouth every 2 (two) hours as needed (up to 200 mg/d).           Verify that the below list of medications is an accurate representation of the medications you are currently taking.  If none reported, the list may be blank. If incorrect, please contact your healthcare provider. Carry this list with you in case of emergency.           Current Medications     clobetasol (CLOBEX) 0.05 % shampoo APPLY EXTERNALLY TO THE AFFECTED AREA EVERY 7 DAYS    ibuprofen (ADVIL,MOTRIN) 400 MG tablet Take 400 mg by mouth every 4 (four) hours.    losartan (COZAAR) 100 MG tablet TAKE 1 TABLET (100 MG TOTAL) BY MOUTH ONCE DAILY.    penicillin v potassium (VEETID) 500 MG tablet Take 500 mg by mouth every 8 (eight) hours.    dicyclomine (BENTYL) 10 MG capsule Take 1 capsule (10 mg total) by mouth before meals as needed (TID PRN).    esomeprazole (NEXIUM) 40 MG capsule Take 1 capsule (40 mg total) by mouth before breakfast. 30-45 minutes before breakfast           Clinical Reference Information           Your Vitals Were     BP Pulse Height Weight BMI       131/78 87 5' 6" (1.676 m) 86 kg (189 lb 9.5 oz) 30.6 kg/m2       Blood Pressure          Most Recent Value    BP  131/78      Allergies as of 4/11/2017     Tramadol      Immunizations Administered on Date of Encounter - 4/11/2017     None      Instructions    Take Bentyl/dicyclomine 1-3 times every day for abdominal discomfort and urgent stools.  Take Nexium once daily (30-45 minutes before breakfast) for acid reflux and abdominal discomfort.  Take a daily probiotic to help normalized your bowel movements " and to help with reflux, and abdominal discomfort.  Avoid known food triggers.    Avoid NSAIDS (see handout below):    NSAIDS are Non Steroidal Anti Inflammatory Drugs taken for pain.   You should stay away from these medications (unless they are advised due to cardiac health issues) as they may cause irritation and/or ulceration in the lining of your stomach.  Ask your primary care physician for an alternative medication.    Powders - BC Powder, Goody powder, Stanback    Ibuprofen which is also known as : Advil, Advil Childrens, Advil Josemanuel Strength, Advil Liquigel, Advil Migraine, Advil Pediatric, Childrens Ibuprofen Berry, Genpril, IBU, Midol IB, Midol Maximum Strength Cramp Formula, Dolgesic, Motrin Childrens, Motrin IB, Motrin Infant Drops, Motrin Josemanuel Strength, Motrin Migraine Pain, Nuprin, Migraine Liqui-gels, Ibu-Tab 200, Cap-Profen, Tab-Profen, Profen, Ibuprohm, Childrens Elixsure, IB Pro, Vicoprofen, Combunox, A-G Profen, Actiprofen, Addaprin, Advil Infants Concentrated Drops, Caldolor, Haltran, Q-Profen, Ibifon 600, Ibren, Menadol, Midol Cramps & Bodyaches, Rufen, Saleto-200, [place], Ultraprin, Uni-Pro, Wal-Profen., Famotidine and Ibuprofen can be found as : Duexis.    Aspirin which has the brand name : Arthritis Pain, Aspergum, Aspir-Low, Aspirin Lite Coat, Samir Aspirin, Bufferin, Easprin, Ecotrin, Empirin, Fasprin, Genacote, Halfprin, [place] Aspirin, [place] Aspirin, Stanback Analgesic, Tri-Buffered Aspirin, YSP Aspirin, Zorprin.    Ketoprofen which is  known as : Orudis KT, Oruvail, Actron.    Sulindac which is sold as : Clinoril.    Naproxen is one of the most known drug from NSAIDs list, and is sold as : Aleve, Naprosyn, EC-Naprosyn, Naprelan, Anaprox, All Day Pain Relief, Aflaxen, All Day Relief, Anaprox-DS, Comfort Pac  With Naproxen,  Aleve Caplet, Aleve Easy Open Arthritis, Aleve Gelcap,  Anaprox-DS, EC-Naprosyn, Leader Naproxen Sodium, Midol Extended Relief, Naprelan 375?, Naprelan  500?, Naprelan 750?, Prevacid NapraPac 375,  Prevacid NapraPac, Naprapac, Vimovo.    Etodolac is sold under the brand name : Lodine XL, Lodine.  Fenoprofen which can be found on the market as : Nalfon, Nalfon 200.  Diclofenac which is sold as : Arthrotec, Cataflam, Voltaren, Cambia, Voltaren-XR, Zipsor.  Flurbiprofen sold as : Asaid.  Ketorolac is sold under the brand name : Sprix, Toradol, Toradol IM, Toradol IV/IM.  Piroxicam is found on the market as : Feldene.  Indomethacin which is known as : Indocin SR, Indocin, Indo-[place], Indomethegan, Indocin IV.  Mefenamic Acid sold as : Ponstel.  Meloxicam is sold under the brand name : Mobic.  Nabumetone which is sold as : Relafen.  Oxaprozin which has the brand name : Daypro.  Ketoprofen which has the brand name : Actron, Orudis KT, Orudis, Oruvail.  Meclofenamate sold as : Meclomen.  Tolmetin which can be found on the marked as : Tolectin, Tolectin 600, Tolectin DS.  Salsalate which is sold as : Disalcid.           Language Assistance Services     ATTENTION: Language assistance services are available, free of charge. Please call [phone].      ATENCIÓN: Si habla español, tiene a saha disposición servicios gratuitos de asistencia lingüística. Llame al [phone].     CHÚ Ý: N?u b?n nói Ti?ng Vi?t, có các d?ch v? h? tr? ngôn ng? mi?n phí dành cho b?n. G?i s? [phone].         [facility] complies with applicable Federal civil rights laws and does not discriminate on the basis of race, color, national origin, age, disability, or sex.

## 2017-04-11 NOTE — PROGRESS NOTES
[facility] Note    Reason for Visit:  The primary encounter diagnosis was Irritable bowel syndrome with diarrhea. Diagnoses of Gastroesophageal reflux disease without esophagitis and Generalized abdominal pain were also pertinent to this visit.    PCP:   Tera Blanca       Referring MD:  No referring provider defined for this encounter.    HPI:  This is a 61 y.o. female here for f/u evaluation of abdominal pain and GERD. Ms. Conroy speaks Setswana and the  with international services was in clinic today, but pt declines and states she wants her daughter to interpret instead. She has a hx of IBS and was previously seen  by  in 2011 and also seen by me on 5/10/2016 and 9/26/2016.  She currently reports urgent, postprandial formed stools occurring twice per day. She can have loose stools about once weekly, worse with trigger foods (greasy/fried foods, coffee, pizza, beans) or increased stress. She takes bentyl 3-4 days per week with mild relief in abd pain and urgent stools. She is no longer taking a daily probiotic and did not try Ibgard as previously advised.     Abdominal pain : +bloating and olimpia mainly after dinner for most of her life with worsening in 8/2015. Worse with stress. Worse with greasy, fried food, coffee, pizza, beans. Better with lemonade and teas. + generalized abd pain w/ diarrhea. Better after BM. almost daily. Mild relief w/ bentyl as above. Better with daily PPI.  Reflux +hx GERD. Taking omeprazole once daily with good control during the day- but with retrosternal pyrosis nightly. Then stopped taking it for 1.5 months with worsening of abd discomfort and GERD.   Dysphagia/odynophagia - denies  Bowel habits - usually 2  Urgent, postprandial, formed BM/day. Will have 4 loose stools/day occurring 1 times per week jumana after eating trigger foods, eating unhealthy and during times of increased stress.   GI bleeding - denies hematochezia, hematemesis,  "melena, BRBPR, black/tarry stools, and coffee ground emesis  NSAID usage - advil 2 days per for week body aches and arthritis and ibuprofen once daily last week for tooth ache. Has not avoided as previously advised because "advil works better than tylenol."    ROS:  Constitutional: No fevers, no chills, No weight loss, no fatigue,   ENT: No allergies  CV: No chest pain, no palpitations, no perif. edema, no sob on exertion  Pulm: No cough, No shortness of breath, no wheezes, no sputum  Ophtho: No vision changes  GI: see HPI; also no nausea, no vomiting, no change in appetite  Derm: No rash  Heme: No lymphadenopathy, No bruising  MSK: + arthritis, no muscle pain, no muscle weakness  : No dysuria, No hematuria  Endo: No hot or cold intolerance  Neuro: No syncope, No seizure,       Medical History:  has a past medical history of Arthritis; Hyperlipidemia; Hypertension; Irritable bowel syndrome; Joint pain; Knee injury; Migraine headache; Muscle pain, fibromyalgia (3/19/2014); Other physical therapy; Plantar fasciitis; Sleep apnea; and Urinary incontinence.    Surgical History:  has a past surgical history that includes Hysterectomy.    Family History: family history includes Alzheimer's disease in her mother; Arthritis in her mother. There is no history of Migraines, Melanoma, Psoriasis, Lupus, Eczema, Acne, Colon cancer, Stomach cancer, Esophageal cancer, Liver disease, Crohn's disease, Ulcerative colitis, or Celiac disease..     Social History:  reports that she has never smoked. She does not have any smokeless tobacco history on file. She reports that she does not drink alcohol or use illicit drugs.    Review of patient's allergies indicates:   Allergen Reactions    Tramadol Itching       Current Outpatient Prescriptions   Medication Sig    clobetasol (CLOBEX) 0.05 % shampoo APPLY EXTERNALLY TO THE AFFECTED AREA EVERY 7 DAYS    ibuprofen (ADVIL,MOTRIN) 400 MG tablet Take 400 mg by mouth every 4 (four) hours.    " "losartan (COZAAR) 100 MG tablet TAKE 1 TABLET (100 MG TOTAL) BY MOUTH ONCE DAILY.    penicillin v potassium (VEETID) 500 MG tablet Take 500 mg by mouth every 8 (eight) hours.    dicyclomine (BENTYL) 10 MG capsule Take 1 capsule (10 mg total) by mouth before meals as needed (TID PRN).    esomeprazole (NEXIUM) 40 MG capsule Take 1 capsule (40 mg total) by mouth before breakfast. 30-45 minutes before breakfast     No current facility-administered medications for this visit.          Objective Findings:    Vital Signs:  /78  Pulse 87  Ht 5' 6" (1.676 m)  Wt 86 kg (189 lb 9.5 oz)  BMI 30.6 kg/m2  Body mass index is 30.6 kg/(m^2).    Physical Exam:  General Appearance: Well appearing in no acute distress  Head:   Normocephalic, without obvious abnormality  Eyes:    No scleral icterus, EOMI  ENT: Neck supple, Lips, mucosa, and tongue normal; teeth and gums normal  Lungs: CTA bilaterally in anterior and posterior fields, no wheezes, no crackles.  Heart: Regular rate and rhythm, S1, S2 normal, no murmurs heard  Abdomen: Soft, non tender, non distended with positive bowel sounds in all four quadrants. No hepatosplenomegaly, ascites, or mass  Extremities: no clubbing, cyanosis or edema  Skin: No rash to exposed areas  Neurologic: A&Ox4      Labs:  Lab Results   Component Value Date    WBC 5.61 2016    HGB 13.2 2016    HCT 39.7 2016     2016    CHOL 225 (H) 2016    TRIG 178 (H) 2016    HDL 66 2016    ALT 55 (H) 10/24/2016    AST 31 10/24/2016     10/24/2016    K 3.9 10/24/2016     10/24/2016    CREATININE 0.7 10/24/2016    BUN 11 10/24/2016    CO2 24 10/24/2016    TSH 1.226 2016    INR 0.9 10/24/2016    HGBA1C 5.4 2016       Imagin abd us-enlarged fatty liver  Endoscopy:      2011 EGD  - HH, gastritis. bx=WNL  2011 Colonoscopy Dr. Zhu- WNL. Repeat in 10 years  2009 EGD Dr. Zhu- erythematous gastropathy, HH. Bx=chronic, " non active gastritis  2006 EGD Dr. Humphries- LEVY  2006 colonoscopy Dr.Head- WNL. Random bx=WNL    Assessment:  1. Irritable bowel syndrome with diarrhea    2. Gastroesophageal reflux disease without esophagitis    3. Generalized abdominal pain           Recommendations:  1. IBS-D w/ bloating and abd pain prior to loose stools- avoid NSAIDs as previously advised. Handout provided once again. Pt to discuss with prescriber alternatives to treat pain. Take bentyl 1-3 times daily, may titrate if constipation ensues.  Avoid known triggers. Take a daily probiotic.  2. GERD-take RX 40 mg Nexium once daily, 30-45 min b/f breakfast- RX refilled. GERD healthy lifestyle as per handout provided.  Return in about 2 months (around 6/11/2017) for GERD, IBS-D.      Order summary:  Orders Placed This Encounter    dicyclomine (BENTYL) 10 MG capsule    esomeprazole (NEXIUM) 40 MG capsule     Visit time: 30 minutes with greater than 50% of the time spent in face to face pt  discussing the aforementioned diagnoses, recommendations,and answering pt questions.      Thank you so much for allowing me to participate in the care of Rachel Morton, APRN, FNP-C

## 2017-04-11 NOTE — PATIENT INSTRUCTIONS
Take Bentyl/dicyclomine 1-3 times every day for abdominal discomfort and urgent stools.  Take Nexium once daily (30-45 minutes before breakfast) for acid reflux and abdominal discomfort.  Take a daily probiotic to help normalized your bowel movements and to help with reflux, and abdominal discomfort.  Avoid known food triggers.    Avoid NSAIDS (see handout below):    NSAIDS are Non Steroidal Anti Inflammatory Drugs taken for pain.   You should stay away from these medications (unless they are advised due to cardiac health issues) as they may cause irritation and/or ulceration in the lining of your stomach.  Ask your primary care physician for an alternative medication.    Powders - BC Powder, Goody powder, Stanback    Ibuprofen which is also known as : Advil, Advil Childrens, Advil Josemanuel Strength, Advil Liquigel, Advil Migraine, Advil Pediatric, Childrens Ibuprofen Berry, Genpril, IBU, Midol IB, Midol Maximum Strength Cramp Formula, Dolgesic, Motrin Childrens, Motrin IB, Motrin Infant Drops, Motrin Josemanuel Strength, Motrin Migraine Pain, Nuprin, Migraine Liqui-gels, Ibu-Tab 200, Cap-Profen, Tab-Profen, Profen, Ibuprohm, Childrens Elixsure, IB Pro, Vicoprofen, Combunox, A-G Profen, Actiprofen, Addaprin, Advil Infants Concentrated Drops, Caldolor, Haltran, Q-Profen, Ibifon 600, Ibren, Menadol, Midol Cramps & Bodyaches, Rufen, Saleto-200, Steve, Ultraprin, Uni-Pro, Wal-Profen., Famotidine and Ibuprofen can be found as : Duexis.    Aspirin which has the brand name : Arthritis Pain, Aspergum, Aspir-Low, Aspirin Lite Coat, Samir Aspirin, Bufferin, Easprin, Ecotrin, Empirin, Fasprin, Genacote, Halfprin, [place] Aspirin, Schuyler Aspirin, Stanback Analgesic, Tri-Buffered Aspirin, YSP Aspirin, Zorprin.    Ketoprofen which is  known as : Orudis KT, Oruvail, Actron.    Sulindac which is sold as : Clinoril.    Naproxen is one of the most known drug from NSAIDs list, and is sold as : Aleve, Naprosyn, EC-Naprosyn, Naprelan,  Anaprox, All Day Pain Relief, Aflaxen, All Day Relief, Anaprox-DS, Comfort Pac  With Naproxen,  Aleve Caplet, Aleve Easy Open Arthritis, Aleve Gelcap,  Anaprox-DS, EC-Naprosyn, Leader Naproxen Sodium, Midol Extended Relief, Naprelan 375?, Naprelan 500?, Naprelan 750?, Prevacid NapraPac 375,  Prevacid NapraPac, Naprapac, Vimovo.    Etodolac is sold under the brand name : Lodine XL, Lodine.  Fenoprofen which can be found on the market as : Nalfon, Nalfon 200.  Diclofenac which is sold as : Arthrotec, Cataflam, Voltaren, Cambia, Voltaren-XR, Zipsor.  Flurbiprofen sold as : Asaid.  Ketorolac is sold under the brand name : Sprix, Toradol, Toradol IM, Toradol IV/IM.  Piroxicam is found on the market as : Feldene.  Indomethacin which is known as : Indocin SR, Indocin, Indo-Rodrick, Indomethegan, Indocin IV.  Mefenamic Acid sold as : Ponstel.  Meloxicam is sold under the brand name : Mobic.  Nabumetone which is sold as : Relafen.  Oxaprozin which has the brand name : Daypro.  Ketoprofen which has the brand name : Actron, Orudis KT, Orudis, Oruvail.  Meclofenamate sold as : Meclomen.  Tolmetin which can be found on the marked as : Tolectin, Tolectin 600, Tolectin DS.  Salsalate which is sold as : Disalcid.

## 2017-04-14 ENCOUNTER — PATIENT MESSAGE (OUTPATIENT)
Dept: GASTROENTEROLOGY | Facility: CLINIC | Age: 62
End: 2017-04-14

## 2017-04-17 DIAGNOSIS — K21.9 GASTROESOPHAGEAL REFLUX DISEASE WITHOUT ESOPHAGITIS: ICD-10-CM

## 2017-04-17 RX ORDER — ESOMEPRAZOLE MAGNESIUM 40 MG/1
40 CAPSULE, DELAYED RELEASE ORAL
Qty: 90 CAPSULE | Refills: 0 | Status: SHIPPED | OUTPATIENT
Start: 2017-04-17 | End: 2017-04-18 | Stop reason: SDUPTHER

## 2017-04-17 RX ORDER — LOSARTAN POTASSIUM 100 MG/1
TABLET ORAL
Qty: 90 TABLET | Refills: 0 | Status: SHIPPED | OUTPATIENT
Start: 2017-04-17 | End: 2017-07-25 | Stop reason: SDUPTHER

## 2017-04-18 DIAGNOSIS — K21.9 GASTROESOPHAGEAL REFLUX DISEASE WITHOUT ESOPHAGITIS: ICD-10-CM

## 2017-04-18 RX ORDER — ESOMEPRAZOLE MAGNESIUM 40 MG/1
40 CAPSULE, DELAYED RELEASE ORAL
Qty: 90 CAPSULE | Refills: 0 | Status: SHIPPED | OUTPATIENT
Start: 2017-04-18 | End: 2017-07-25 | Stop reason: SDUPTHER

## 2017-05-05 ENCOUNTER — OFFICE VISIT (OUTPATIENT)
Dept: INTERNAL MEDICINE | Facility: CLINIC | Age: 62
End: 2017-05-05
Payer: COMMERCIAL

## 2017-05-05 VITALS
TEMPERATURE: 98 F | SYSTOLIC BLOOD PRESSURE: 142 MMHG | DIASTOLIC BLOOD PRESSURE: 94 MMHG | BODY MASS INDEX: 30.78 KG/M2 | HEART RATE: 68 BPM | WEIGHT: 190.69 LBS

## 2017-05-05 DIAGNOSIS — M79.18 DIFFUSE MYOFASCIAL PAIN SYNDROME: ICD-10-CM

## 2017-05-05 DIAGNOSIS — I10 ESSENTIAL HYPERTENSION: Primary | ICD-10-CM

## 2017-05-05 PROCEDURE — 1160F RVW MEDS BY RX/DR IN RCRD: CPT | Mod: S$GLB,,, | Performed by: INTERNAL MEDICINE

## 2017-05-05 PROCEDURE — 99213 OFFICE O/P EST LOW 20 MIN: CPT | Mod: S$GLB,,, | Performed by: INTERNAL MEDICINE

## 2017-05-05 PROCEDURE — 3077F SYST BP >= 140 MM HG: CPT | Mod: S$GLB,,, | Performed by: INTERNAL MEDICINE

## 2017-05-05 PROCEDURE — 3080F DIAST BP >= 90 MM HG: CPT | Mod: S$GLB,,, | Performed by: INTERNAL MEDICINE

## 2017-05-05 PROCEDURE — 99999 PR PBB SHADOW E&M-EST. PATIENT-LVL III: CPT | Mod: PBBFAC,,, | Performed by: INTERNAL MEDICINE

## 2017-05-05 RX ORDER — AMLODIPINE BESYLATE 5 MG/1
5 TABLET ORAL DAILY
Qty: 30 TABLET | Refills: 6 | Status: SHIPPED | OUTPATIENT
Start: 2017-05-05 | End: 2017-12-06

## 2017-05-05 NOTE — PROGRESS NOTES
Subjective:       Patient ID: Rachel Conroy is a 61 y.o. female.    Chief Complaint: Follow-up and Hypertension    HPIMiss Rachel here today for follow up HTN. Overall doing well. She still complains of diffuse arthralgias/myalgias. She has dx of fibromyalgia and has been sen by Rheumatology for this. Their recommendations were reiterated. Nevertheless, I will obtain inflammatory markers, ESR/CRP to r/o other etiologies for her complaints.  Regarding her HTN, she is compliant with her medications. Brings a BP diary that shows BP averaging 150's/90's. She is on Losartan 100 mgs daily and discussed other medications for this.She notes headaches frequently - has hx of migraines, but these are not typical for her and likely due to her BP's. On exam, her BP was elevated and decided to add amlodipine 5 mgs daily to her regimen. Risks/benefits of this med were discussed. I asked she continue monitoring her BP's and will see her back in 1 month for follow up.  Review of Systems   All other systems reviewed and are negative.      Objective:      Physical Exam   Constitutional: She is oriented to person, place, and time. She appears well-developed and well-nourished. No distress.   HENT:   Head: Normocephalic and atraumatic.   Right Ear: External ear normal.   Left Ear: External ear normal.   Mouth/Throat: Oropharynx is clear and moist. No oropharyngeal exudate.   Eyes: Conjunctivae and EOM are normal. Pupils are equal, round, and reactive to light. Right eye exhibits no discharge. Left eye exhibits no discharge.   Neck: Normal range of motion. Neck supple. No JVD present. No thyromegaly present.   Cardiovascular: Normal rate, regular rhythm, normal heart sounds and intact distal pulses.    No murmur heard.  Pulmonary/Chest: Effort normal and breath sounds normal. No respiratory distress. She has no wheezes. She exhibits no tenderness.   Lymphadenopathy:     She has no cervical adenopathy.   Neurological: She is alert  and oriented to person, place, and time. No cranial nerve deficit. Coordination normal.   Skin: Skin is warm and dry. She is not diaphoretic. No erythema.   Psychiatric: She has a normal mood and affect. Her behavior is normal. Judgment and thought content normal.   Nursing note and vitals reviewed.      Assessment:       1. Essential hypertension    2. Diffuse myofascial pain syndrome        Plan:    1. Obtain labs - ESR/CRP.         2. Start amlodipine 5 mgs daily.         3. Continue with Losartan 100 mgs daily.         4. Call with blood work results.         5. RTC 1 month.

## 2017-05-12 RX ORDER — CLOBETASOL PROPIONATE 0.05 G/100ML
SHAMPOO TOPICAL
Qty: 118 ML | Refills: 0 | Status: SHIPPED | OUTPATIENT
Start: 2017-05-12 | End: 2017-08-28 | Stop reason: SDUPTHER

## 2017-05-23 DIAGNOSIS — J01.00 ACUTE NON-RECURRENT MAXILLARY SINUSITIS: ICD-10-CM

## 2017-05-23 RX ORDER — FLUTICASONE PROPIONATE 50 MCG
1 SPRAY, SUSPENSION (ML) NASAL DAILY
Qty: 1 BOTTLE | Refills: 0 | Status: SHIPPED | OUTPATIENT
Start: 2017-05-23 | End: 2017-07-11 | Stop reason: SDUPTHER

## 2017-06-01 ENCOUNTER — TELEPHONE (OUTPATIENT)
Dept: INTERNAL MEDICINE | Facility: CLINIC | Age: 62
End: 2017-06-01

## 2017-06-01 ENCOUNTER — LAB VISIT (OUTPATIENT)
Dept: LAB | Facility: HOSPITAL | Age: 62
End: 2017-06-01
Attending: INTERNAL MEDICINE
Payer: COMMERCIAL

## 2017-06-01 ENCOUNTER — OFFICE VISIT (OUTPATIENT)
Dept: INTERNAL MEDICINE | Facility: CLINIC | Age: 62
End: 2017-06-01
Payer: COMMERCIAL

## 2017-06-01 VITALS
BODY MASS INDEX: 31.14 KG/M2 | WEIGHT: 192.88 LBS | HEART RATE: 100 BPM | TEMPERATURE: 98 F | SYSTOLIC BLOOD PRESSURE: 148 MMHG | DIASTOLIC BLOOD PRESSURE: 80 MMHG

## 2017-06-01 DIAGNOSIS — K58.9 IRRITABLE BOWEL SYNDROME, UNSPECIFIED TYPE: ICD-10-CM

## 2017-06-01 DIAGNOSIS — Z00.00 GENERAL MEDICAL EXAM: ICD-10-CM

## 2017-06-01 DIAGNOSIS — K76.0 NAFLD (NONALCOHOLIC FATTY LIVER DISEASE): ICD-10-CM

## 2017-06-01 DIAGNOSIS — I10 ESSENTIAL HYPERTENSION: ICD-10-CM

## 2017-06-01 DIAGNOSIS — E55.9 VITAMIN D DEFICIENCY: ICD-10-CM

## 2017-06-01 DIAGNOSIS — Z00.00 ROUTINE GENERAL MEDICAL EXAMINATION AT A HEALTH CARE FACILITY: Primary | ICD-10-CM

## 2017-06-01 DIAGNOSIS — Z00.00 GENERAL MEDICAL EXAM: Primary | ICD-10-CM

## 2017-06-01 DIAGNOSIS — M79.7 MUSCLE PAIN, FIBROMYALGIA: ICD-10-CM

## 2017-06-01 LAB
25(OH)D3+25(OH)D2 SERPL-MCNC: 17 NG/ML
ALBUMIN SERPL BCP-MCNC: 3.9 G/DL
ALP SERPL-CCNC: 126 U/L
ALT SERPL W/O P-5'-P-CCNC: 105 U/L
ANION GAP SERPL CALC-SCNC: 8 MMOL/L
AST SERPL-CCNC: 53 U/L
BASOPHILS # BLD AUTO: 0.02 K/UL
BASOPHILS NFR BLD: 0.3 %
BILIRUB SERPL-MCNC: 0.6 MG/DL
BUN SERPL-MCNC: 11 MG/DL
CALCIUM SERPL-MCNC: 9 MG/DL
CHLORIDE SERPL-SCNC: 104 MMOL/L
CHOLEST/HDLC SERPL: 2.9 {RATIO}
CO2 SERPL-SCNC: 27 MMOL/L
CREAT SERPL-MCNC: 0.7 MG/DL
DIFFERENTIAL METHOD: ABNORMAL
EOSINOPHIL # BLD AUTO: 0.1 K/UL
EOSINOPHIL NFR BLD: 1.4 %
ERYTHROCYTE [DISTWIDTH] IN BLOOD BY AUTOMATED COUNT: 12.8 %
EST. GFR  (AFRICAN AMERICAN): >60 ML/MIN/1.73 M^2
EST. GFR  (NON AFRICAN AMERICAN): >60 ML/MIN/1.73 M^2
ESTIMATED AVG GLUCOSE: 111 MG/DL
GLUCOSE SERPL-MCNC: 91 MG/DL
HBA1C MFR BLD HPLC: 5.5 %
HCT VFR BLD AUTO: 41.3 %
HDL/CHOLESTEROL RATIO: 34.9 %
HDLC SERPL-MCNC: 238 MG/DL
HDLC SERPL-MCNC: 83 MG/DL
HGB BLD-MCNC: 13.7 G/DL
LDLC SERPL CALC-MCNC: 132.2 MG/DL
LYMPHOCYTES # BLD AUTO: 2.5 K/UL
LYMPHOCYTES NFR BLD: 38.3 %
MCH RBC QN AUTO: 28.9 PG
MCHC RBC AUTO-ENTMCNC: 33.2 %
MCV RBC AUTO: 87 FL
MONOCYTES # BLD AUTO: 0.4 K/UL
MONOCYTES NFR BLD: 6.9 %
NEUTROPHILS # BLD AUTO: 3.4 K/UL
NEUTROPHILS NFR BLD: 52.8 %
NONHDLC SERPL-MCNC: 155 MG/DL
PLATELET # BLD AUTO: 265 K/UL
PMV BLD AUTO: 9.1 FL
POTASSIUM SERPL-SCNC: 3.8 MMOL/L
PROT SERPL-MCNC: 7.3 G/DL
RBC # BLD AUTO: 4.74 M/UL
SODIUM SERPL-SCNC: 139 MMOL/L
TRIGL SERPL-MCNC: 114 MG/DL
TSH SERPL DL<=0.005 MIU/L-ACNC: 0.56 UIU/ML
WBC # BLD AUTO: 6.39 K/UL

## 2017-06-01 PROCEDURE — 99999 PR PBB SHADOW E&M-EST. PATIENT-LVL III: CPT | Mod: PBBFAC,,, | Performed by: INTERNAL MEDICINE

## 2017-06-01 PROCEDURE — 99214 OFFICE O/P EST MOD 30 MIN: CPT | Mod: S$GLB,,, | Performed by: INTERNAL MEDICINE

## 2017-06-01 RX ORDER — ASPIRIN 325 MG
TABLET, DELAYED RELEASE (ENTERIC COATED) ORAL
Qty: 12 CAPSULE | Refills: 0 | Status: SHIPPED | OUTPATIENT
Start: 2017-06-01 | End: 2017-12-06

## 2017-06-01 NOTE — PROGRESS NOTES
Subjective:       Patient ID: Rachel Conroy is a 61 y.o. female.    Chief Complaint: Annual Exam    HPIMiss Rachel here today for her annual exam.  During our last visit, I started her on amlodipine 5 mgs as her BP's continued to be elevated. She bring with her a BP diary that shows much improvement in her pressures. She notes her HA have improved as well. Unfortunately, her fibromyalgia is flaring up these last few weeks. I had obtained inflammatory markers last visit to determine if her pains might be inflammatory in nature. Both ESR and CRP were normal. I also provided her with copies of her blood work obtained earlier that showed elevated LFT's with AST/ALT at 53/103 respectively. Cholesterol had increased to 238 with excellent HDL fraction. TG's were normal. CBC was also normal with normal platelet count. This is important as she has NAFLD w/o evidence of fibrosis at this time. I spent a considerable amount of time discussing this issues and emphasizing on the importance ofd weight loss vis proper nutrition, exercise. She consumes large quantities of starchy foods as per her culture and I cautioned her against thsi and gave her specific recommendations for addressing this. Hopefully she will loose weight and reverse this issue. Vitamin D levels were again low at 17 and I provided rx for supplementation 50 K units weekly x 12. I will repeat levels at that time for comparison.  Review of Systems   All other systems reviewed and are negative.      Objective:      Physical Exam   Constitutional: She is oriented to person, place, and time. She appears well-developed and well-nourished. No distress.   HENT:   Head: Atraumatic.   Right Ear: External ear normal.   Left Ear: External ear normal.   Mouth/Throat: Oropharynx is clear and moist. No oropharyngeal exudate.   Neck: Neck supple. No JVD present. No thyromegaly present.   Cardiovascular: Normal rate, regular rhythm, normal heart sounds and intact distal  pulses.    Pulmonary/Chest: Effort normal and breath sounds normal. No respiratory distress. She has no wheezes.   Abdominal: Soft. Bowel sounds are normal. She exhibits no distension and no mass. There is no tenderness.   Musculoskeletal: She exhibits no edema or tenderness.   Lymphadenopathy:     She has no cervical adenopathy.   Neurological: She is alert and oriented to person, place, and time. No cranial nerve deficit.   Skin: Skin is warm. She is not diaphoretic.   Psychiatric: She has a normal mood and affect. Her behavior is normal. Judgment and thought content normal.   Vitals reviewed.      Assessment:       1. Routine general medical examination at a health care facility    2. Vitamin D deficiency    3. Muscle pain, fibromyalgia    4. Irritable bowel syndrome, unspecified type    5. Essential hypertension     6.     NAFLD.   7.     Hyperlipidemia.   8.     Counseling provided.  Plan:   1. Start vitamin D 3 50 K units weekly x 12.        2. Continue with current meds and monitor BP as before.        3. Loose weight.        4. RTC 3 months.

## 2017-06-22 ENCOUNTER — OFFICE VISIT (OUTPATIENT)
Dept: RHEUMATOLOGY | Facility: CLINIC | Age: 62
End: 2017-06-22
Payer: COMMERCIAL

## 2017-06-22 VITALS
DIASTOLIC BLOOD PRESSURE: 75 MMHG | WEIGHT: 187.69 LBS | RESPIRATION RATE: 18 BRPM | BODY MASS INDEX: 29.46 KG/M2 | SYSTOLIC BLOOD PRESSURE: 133 MMHG | HEIGHT: 67 IN | HEART RATE: 92 BPM

## 2017-06-22 DIAGNOSIS — M17.0 PRIMARY OSTEOARTHRITIS OF BOTH KNEES: Primary | ICD-10-CM

## 2017-06-22 DIAGNOSIS — M79.7 MUSCLE PAIN, FIBROMYALGIA: ICD-10-CM

## 2017-06-22 PROCEDURE — 99999 PR PBB SHADOW E&M-EST. PATIENT-LVL III: CPT | Mod: PBBFAC,,, | Performed by: INTERNAL MEDICINE

## 2017-06-22 PROCEDURE — 99214 OFFICE O/P EST MOD 30 MIN: CPT | Mod: S$GLB,,, | Performed by: INTERNAL MEDICINE

## 2017-06-22 RX ORDER — DICLOFENAC SODIUM 10 MG/G
2 GEL TOPICAL 4 TIMES DAILY
Qty: 1 TUBE | Refills: 11 | Status: SHIPPED | OUTPATIENT
Start: 2017-06-22 | End: 2018-01-19

## 2017-06-22 RX ORDER — GABAPENTIN 100 MG/1
100 CAPSULE ORAL NIGHTLY
Qty: 90 CAPSULE | Refills: 11 | Status: SHIPPED | OUTPATIENT
Start: 2017-06-22 | End: 2017-12-06

## 2017-06-22 NOTE — PROGRESS NOTES
"Subjective:       Patient ID: Rachel Conroy is a 59 y.o. female.    Chief Complaint: Joint Pain    HPI 60 yo  F with PMH of HTN, HL, hiatal hernia with GERD here for evaluation of joint pain. Patient was treated previously by Dr. Blackwood.   Pt with osteoarthritis initially, but on last visit in July 2014, had developed polyarthritis,and I did a work up for this.  From July 2014:  "Tender 16+ joints of hand, elbows, wrists, shoulders  Swollen 8 pips and wrist Right midfoot and right mtps"  ESR, CRP and antibodies were negative, as were xrays    Patient tried plaquenil but it made her sleepy. She was also supposed to be on prednisone 5 mg a day but reports it never helped her.    Reports pain in shoulders, lower back,legs and feet.  Reports pain  Is worse with prolonged sitting.  Reports pain is 7/10.Moving makes pain worse.  Reports she has pain at end of day.  Report that she has swelling in hands that is worse with using hands.  No rashes.  Reports she also has pain in hips.  Reports pain for 3 years.  Reports poor sleep.     Interval history: Patient is here for follow up. She took gabapentin 100mg po qday for a few months and has been out of it for a month.  She never feels well rested.  Reports pain in lower back and diffuse body pain.   Reports pain in shoulders, hands, and knees.  Pain level can be is 8/10 and aching. Being on her feet makes pain worse.        Past Medical History   Diagnosis Date    Arthritis     Hypertension     Migraine headache     Hyperlipidemia     Knee injury     Other physical therapy     Plantar fasciitis     Joint pain     Muscle pain, fibromyalgia 3/19/2014         Review of Systems   Constitutional: Negative for chills, diaphoresis, activity change, appetite change and fatigue.   HENT: Negative for congestion, ear discharge, ear pain, facial swelling, mouth sores, sinus pressure, sneezing, sore throat, tinnitus and trouble swallowing.    Eyes: Negative for " photophobia, pain, discharge, redness, itching and visual disturbance.   Respiratory: Negative for apnea, chest tightness, shortness of breath, wheezing and stridor.    Cardiovascular: Negative for leg swelling.   Gastrointestinal: Negative for nausea, abdominal pain, diarrhea, constipation, blood in stool, abdominal distention and anal bleeding.   Endocrine: Negative for cold intolerance and heat intolerance.   Genitourinary: Negative for dysuria and difficulty urinating.   Musculoskeletal: Positive for arthralgias. Negative for myalgias, back pain, joint swelling, gait problem, neck pain and neck stiffness.   Skin: Negative for color change, pallor, rash and wound.   Neurological: Negative for dizziness, seizures, light-headedness and numbness.   Hematological: Negative for adenopathy. Does not bruise/bleed easily.   Psychiatric/Behavioral: Negative for sleep disturbance. The patient is not nervous/anxious.                Objective:     Physical Exam   Constitutional: She is oriented to person, place, and time.   HENT:   Head: Normocephalic and atraumatic.   Right Ear: External ear normal.   Left Ear: External ear normal.   Nose: Nose normal.   Mouth/Throat: Oropharynx is clear and moist. No oropharyngeal exudate.   Eyes: Conjunctivae and EOM are normal. Pupils are equal, round, and reactive to light. Right eye exhibits no discharge. Left eye exhibits no discharge. No scleral icterus.   Neck: Neck supple. No JVD present. No thyromegaly present.   Cardiovascular: Normal rate, regular rhythm, normal heart sounds and intact distal pulses.  Exam reveals no gallop and no friction rub.    No murmur heard.  Pulmonary/Chest: Effort normal and breath sounds normal. No respiratory distress. She has no wheezes. She has no rales. She exhibits no tenderness.   Abdominal: Soft. Bowel sounds are normal. She exhibits no distension and no mass. There is no tenderness. There is no rebound and no guarding.   Lymphadenopathy:     She  has no cervical adenopathy.   Neurological: She is alert and oriented to person, place, and time. No cranial nerve deficit. Gait normal. Coordination normal.   Skin: Skin is dry. No rash noted. No erythema. No pallor.     Psychiatric: Affect and judgment normal.   Musculoskeletal: She exhibits tenderness. She exhibits no edema.   FROM of all joints including neck,spine, ankles, wrists, knees, and ankles; no joint deformities noted or effusions, erythema or warmth; no tophi or nodules noted; no crepitus; no synovitis noted in hands or feet; no nail pitting or onycholysis  Both shoulders with decreased abduction ( 160 on right and 130 on left)      Labs:  Audra-negative  Rf,ccp-negative          Arthritis survey- (2014)-  No instability seen in the cervical spine with minimal degenerative change seen.   Knees show some mild medial joint space narrowing. Tibial spines are prominent medially.    Hands and feet show no evidence of arthritis. No erosions.             Assessment:     62 yo  F with PMH of HTN, HL, hiatal hernia with GERD here for evaluation for follow up of fibromyalgia.  Of note, she had has work up that was negative for inflammatory arthritis.  She was recently diagnosed with NELL but is not using mask which I told her is causing her fibromyalgia.    Plan:   -encouraged compliance with sleep machine  -did not feel better on baclofen or flexeril  -restart gabapentin 100mg po qhs for 2 weeks  And then increase to 200mg qday  Knee xrays given knee pain   Start diclofenac gel BID  Avoid tylenol given fatty liver  Encouraged weight loss  rtc in 12 weeks      *

## 2017-07-05 ENCOUNTER — HOSPITAL ENCOUNTER (OUTPATIENT)
Dept: RADIOLOGY | Facility: HOSPITAL | Age: 62
Discharge: HOME | End: 2017-07-05
Attending: INTERNAL MEDICINE
Payer: COMMERCIAL

## 2017-07-05 DIAGNOSIS — M17.0 PRIMARY OSTEOARTHRITIS OF BOTH KNEES: ICD-10-CM

## 2017-07-05 PROCEDURE — 73562 X-RAY EXAM OF KNEE 3: CPT | Mod: 50,TC

## 2017-07-05 PROCEDURE — 73562 X-RAY EXAM OF KNEE 3: CPT | Mod: 26,50,, | Performed by: RADIOLOGY

## 2017-07-06 DIAGNOSIS — J01.00 ACUTE NON-RECURRENT MAXILLARY SINUSITIS: ICD-10-CM

## 2017-07-06 RX ORDER — FLUTICASONE PROPIONATE 50 MCG
1 SPRAY, SUSPENSION (ML) NASAL DAILY
Qty: 1 BOTTLE | Refills: 0 | Status: CANCELLED | OUTPATIENT
Start: 2017-07-06

## 2017-07-11 DIAGNOSIS — J01.00 ACUTE NON-RECURRENT MAXILLARY SINUSITIS: ICD-10-CM

## 2017-07-11 RX ORDER — FLUTICASONE PROPIONATE 50 MCG
1 SPRAY, SUSPENSION (ML) NASAL DAILY
Qty: 1 BOTTLE | Refills: 0 | Status: SHIPPED | OUTPATIENT
Start: 2017-07-11 | End: 2018-01-19

## 2017-07-12 ENCOUNTER — PATIENT MESSAGE (OUTPATIENT)
Dept: RHEUMATOLOGY | Facility: CLINIC | Age: 62
End: 2017-07-12

## 2017-07-13 ENCOUNTER — TELEPHONE (OUTPATIENT)
Dept: RHEUMATOLOGY | Facility: CLINIC | Age: 62
End: 2017-07-13

## 2017-07-13 DIAGNOSIS — M25.561 PAIN IN BOTH KNEES, UNSPECIFIED CHRONICITY: Primary | ICD-10-CM

## 2017-07-13 DIAGNOSIS — M25.562 PAIN IN BOTH KNEES, UNSPECIFIED CHRONICITY: Primary | ICD-10-CM

## 2017-07-25 DIAGNOSIS — K21.9 GASTROESOPHAGEAL REFLUX DISEASE WITHOUT ESOPHAGITIS: ICD-10-CM

## 2017-07-25 RX ORDER — ESOMEPRAZOLE MAGNESIUM 40 MG/1
CAPSULE, DELAYED RELEASE ORAL
Qty: 90 CAPSULE | Refills: 0 | Status: SHIPPED | OUTPATIENT
Start: 2017-07-25 | End: 2017-10-23 | Stop reason: SDUPTHER

## 2017-07-25 RX ORDER — LOSARTAN POTASSIUM 100 MG/1
TABLET ORAL
Qty: 90 TABLET | Refills: 0 | Status: SHIPPED | OUTPATIENT
Start: 2017-07-25 | End: 2017-10-23 | Stop reason: SDUPTHER

## 2017-07-27 ENCOUNTER — CLINICAL SUPPORT (OUTPATIENT)
Dept: REHABILITATION | Facility: HOSPITAL | Age: 62
End: 2017-07-27
Attending: INTERNAL MEDICINE
Payer: COMMERCIAL

## 2017-07-27 DIAGNOSIS — M25.561 ARTHRALGIA OF BOTH KNEES: ICD-10-CM

## 2017-07-27 DIAGNOSIS — M79.7 MUSCLE PAIN, FIBROMYALGIA: Primary | ICD-10-CM

## 2017-07-27 DIAGNOSIS — M25.562 ARTHRALGIA OF BOTH KNEES: ICD-10-CM

## 2017-07-27 PROCEDURE — 97161 PT EVAL LOW COMPLEX 20 MIN: CPT | Mod: PN

## 2017-07-27 PROCEDURE — G8979 MOBILITY GOAL STATUS: HCPCS | Mod: CK,PN

## 2017-07-27 PROCEDURE — G8978 MOBILITY CURRENT STATUS: HCPCS | Mod: CL,PN

## 2017-07-27 NOTE — PROGRESS NOTES
Physical Therapy Evaluation    Name: Rachel Conroy  Clinic Number: [id number]    Diagnosis:   Encounter Diagnoses   Name Primary?    Muscle pain, fibromyalgia Yes    Arthralgia of both knees      Physician: Miranda Mcclellan MD  Treatment Orders: PT Eval and Treat    Past Medical History:   Diagnosis Date    Arthritis     Hyperlipidemia     Hypertension     Irritable bowel syndrome     Joint pain     Knee injury     Migraine headache     Muscle pain, fibromyalgia 3/19/2014    Other physical therapy     Plantar fasciitis     Sleep apnea     Urinary incontinence      Current Outpatient Prescriptions   Medication Sig    amlodipine (NORVASC) 5 MG tablet Take 1 tablet (5 mg total) by mouth once daily.    cholecalciferol, vitamin D3, 50,000 unit capsule Mo 1 tableta por semana (cada 7 tejeda) x 12 semanas.    clobetasol (CLOBEX) 0.05 % shampoo APPLY EXTERNALLY TO THE AFFECTED AREA EVERY 7 DAYS    diclofenac sodium 1 % Gel Apply 2 g topically 4 (four) times daily.    dicyclomine (BENTYL) 10 MG capsule Take 1 capsule (10 mg total) by mouth before meals as needed (TID PRN).    esomeprazole (NEXIUM) 40 MG capsule TAKE 1 CAPSULE BY MOUTH 30-45 MINUTES BEFORE BREAKFAST    fluticasone (FLONASE) 50 mcg/actuation nasal spray 1 spray by Each Nare route once daily.    gabapentin (NEURONTIN) 100 MG capsule Take 1 capsule (100 mg total) by mouth every evening.    ibuprofen (ADVIL,MOTRIN) 400 MG tablet Take 400 mg by mouth every 4 (four) hours.    losartan (COZAAR) 100 MG tablet TAKE 1 TABLET (100 MG TOTAL) BY MOUTH ONCE DAILY.    penicillin v potassium (VEETID) 500 MG tablet Take 500 mg by mouth every 8 (eight) hours.     No current facility-administered medications for this visit.      Review of patient's allergies indicates:   Allergen Reactions    Tramadol Itching     Precautions: none    Evaluation Date: 7/27/2017   Visit # authorized: 20  Authorization period: 12/31/2017     Alexey Ramos  is a 61 y.o. female that presents to [facility] outpatient clinic secondary to bilateral osteoarthrosis . Pain started in right knee about 4 years ago and 2 months ago the pain started in left knee. The left knee hurts more when she is sitting for long periods and pain goes into the back. Patient reports that she feels stiff. Patient indicates that she tries to straighten up but she feels pain in her back. Patient indicates increase in weight in of 20 lbs in one year.     *patient's daughter present to translate during initial evaluation*     Patient c/o: intermittent symptoms that occur worse in the morning and and when she sits down   Radicular symptoms: patient indicates numbness and coldness when shes been standing at work for long periods, patient reports pain in knees at night and legs feel cold and painful, no night sweats reported  Onset: gradual   Pain Scale: Rates pain on a scale of 0-10 to be 7 at worst; 5 currently; n/a at best .  Aggravating factors: Siting for long periods, standing   Relieving factors: ice pack and medication, nerve medication (gabapentin)   Previous treatment: Patient indicates that she may have gone to physical therapy about 4 years ago when she had the original pain   Imaging: DJD present in bilateral knees according the report   Past surgical history: none  Functional deficits: cooking, bending over  Prior level of function: Pain was less and patient wouldn't get tired very quickly   Occupation: Cooks, work duties include: standing for long periods   Environment: 1 story home with 1 steps to enter, has none AD  No cultural or spiritual barriers identified to treatment or learning.  Activity level/Participation: Wants to get to the point of activity to lose some weight   Patient's goals: Have more flexibility with knees patient reports that her knees are not able to carry her body,      Objective     Observation: Patient presents with slight valgus as the knees     Range of Motion:    Knee Left active   Flexion WNL   Extension WNL     Knee Right active   Flexion WNL    Extension WNL       Lower Extremity Strength   Right LE  Left LE   Knee extension: 4-/5 Knee extension: 4+/5   Knee flexion: 4/5 Knee flexion: 5/5   Hip flexion: 4-/5 Hip flexion: 4-/5   Hip IR 4/5 Hip IR 4-/5   Hip ER 4/5 Hip ER 4-/5   Hip extension:  3+/5 Hip extension: 3+/5   Hip abduction: 4-/5 Hip abduction: 4-/5   Ankle dorsiflexion: 4+/5 Ankle dorsiflexion: 4+/5   Ankle plantarflexion: 5/5 Ankle plantarflexion: 5/5     Pain with anterior translation     Special Tests:   Right Left   Valgus Stress Test Negative  Negative   Varus Stress test Positive  Negative   Lachman's test Negative Negative       Joint Mobility: Slight crepitus felt with medial and lateral      Patellar sup./inf: WNL   Patellar med/lat:WNL    Palpation:    Crepitus: In patella femoral joint   MCL/LCL: Tenderness felt around the medial knee of the left leg    Popliteal fossa: Tenderness felt in the left knee     Sensation: intact     Flexibility:      Right Left   Ely's Test  Positive Positive    Supine 90/90 30 25   Sinan's Test Negative Negative    León Test NT NT     Functional Limitations Reports - G Codes  Category: Mobility   Intake 66% Current Status CL -    Predicted 49% Goal Status+ CK -     PT Evaluation Completed? Yes  Discussed Plan of Care with patient: Yes      HEP provided: Patient was given the following exercises in written format to be completed twice a day   Standing rectus stretch 5 times 20 seconds  Seated hamstring stretch  LAQ 2 sets of 10 reps   Instructed pt. Regarding: Proper technique with all exercises. Pt demo good understanding of the education provided. Rachel demonstrated good return demonstration of activities.      Assessment     This is a 61 y.o. female referred to outpatient physical therapy and presents with a medical diagnosis of pain in bilateral knees and demonstrates limitations as described in the  problem list. Patient will benefit from physcial therapy services in order to maximize pain free and/or functional use of bilateral knees. Patient reports discomfort present in her back as well that may originate from the pain in her knees due to weakness and decreased confidence in the strength in her legs to support her. Patient demonstrates limitations in flexibility creating additional compression in the knee joint increasing the likelihood of further joint degeneration. Patient will benefit from physcial therapy services in order to maximize pain free functional independence. Patient will benefit from exercises to stretch quad, hip flexors, and hamstrings.The following goals were discussed with the patient and patient is in agreement with them as to be addressed in the treatment plan. Patient was given a HEP consisting of exercises listed above to increase joint mobility with stretches and strengthen supporting muscles around the knee. Patient verbally understood the instructions as they were given and demonstrated proper form and technique during therapy. Patient was advised to perform these exercises free of pain, and to stop performing them if pain occurs. Patient would benefit from skilled PT to address above stated problems, as well as, achieve pt goals within a timely manner. Patient has set realistic goals and has verbalized good understanding and agreement with reported diagnosis, prognosis and treatment. Patient demonstrates no additional cultural, spiritual or educational need and currently has no barriers to learning.      History  Co-morbidities and personal factors that may impact the plan of care Examination  Body Structures and Functions, activity limitations and participation restrictions that may impact the plan of care Clinical Presentation   Decision Making/ Complexity Score   Co-morbidities:   Arthritis   Hyperlipidemia   Hypertension   Irritable bowel syndrome   Migraine headache  fibromyalgia   Sleep apnea     Personal Factors:   Previous therapy   Body Regions: Bilateral knees     Body Systems: Musculoskeletal     Activity limitations: Standing, stooping    Participation Restrictions: none       Stable       Low      Prognosis: Good    Anticipated barriers to physical therapy: none    Medical necessity is demonstrated by the following IMPAIRMENTS/PROBLEM LIST:   1) Increase in pain level limiting function    2) Decrease in flexibility    3) Decreased LE strength    4) Joint laxity    5)Lack of HEP    GOALS: Short Term Goals:  3 weeks  1. Patient will be able to report decreased in resting bilateral knee pain  <   / =  3  /10  to increase tolerance for sitting activities.    2. Patient will be able to report an improvement in ability to stand at work by 50% in order to increase ability to perform work duties.   3. Patient will demonstrate an increased MMT in bilateral knees to 4/5  to increase tolerance for standing and work activities.   4. Patient will be able to demonstrate an increase in 5 degrees of hamstring flexibility on bilateral LE to improve mobility and function .   5. Patient will be able to tolerate HEP to improve ROM and independence with ADL's    Long Term Goals: 6 weeks  1. Patient will be able to report decreased in resting bilateral knee pain  <   / =  1  /10  to increase tolerance for sitting activities.    2. Patient will be able to report an improvement in ability to stand at work by 80% in order to increase ability to perform work duties.   3. Patient will demonstrate an increased MMT in bilateral knees to 4+/5  to increase tolerance for standing and work activities.   4. Patient will be able to demonstrate an increase in 10 degrees of hamstring flexibility on bilateral LE to improve mobility and function   5. Patient will report at CK level (At least 40 percent but less than 60 percent) on LEFS  to demonstrate increase in LE function with every day tasks.   6. Patient  to be Independent with HEP to improve ROM and independence with ADL's        Plan     Pt will be treated by physical therapy 1-3 times a week for 6 weeks for Pt Education, HEP, therapeutic exercises, neuromuscular re-education, joint mobilizations, modalities prn to achieve established goals. Rachel may at times be seen by a PTA as part of the Rehab Team.     Cont PT for  6 weeks.     I certify the need for these services furnished under this plan of treatment and while under my care.______________________________ Physician/Referring Practitioner  Date of Signature      Tony Kirk, PT  7/28/2017

## 2017-07-28 PROBLEM — M25.562 ARTHRALGIA OF BOTH KNEES: Status: ACTIVE | Noted: 2017-07-28

## 2017-07-28 PROBLEM — M25.561 ARTHRALGIA OF BOTH KNEES: Status: ACTIVE | Noted: 2017-07-28

## 2017-07-28 NOTE — PLAN OF CARE
Physical Therapy Evaluation    Name: Rachel Conroy  Clinic Number: [id number]    Diagnosis:   Encounter Diagnoses   Name Primary?    Muscle pain, fibromyalgia Yes    Arthralgia of both knees      Physician: Miranda Mcclellan MD  Treatment Orders: PT Eval and Treat    Past Medical History:   Diagnosis Date    Arthritis     Hyperlipidemia     Hypertension     Irritable bowel syndrome     Joint pain     Knee injury     Migraine headache     Muscle pain, fibromyalgia 3/19/2014    Other physical therapy     Plantar fasciitis     Sleep apnea     Urinary incontinence      Current Outpatient Prescriptions   Medication Sig    amlodipine (NORVASC) 5 MG tablet Take 1 tablet (5 mg total) by mouth once daily.    cholecalciferol, vitamin D3, 50,000 unit capsule Mo 1 tableta por semana (cada 7 tejeda) x 12 semanas.    clobetasol (CLOBEX) 0.05 % shampoo APPLY EXTERNALLY TO THE AFFECTED AREA EVERY 7 DAYS    diclofenac sodium 1 % Gel Apply 2 g topically 4 (four) times daily.    dicyclomine (BENTYL) 10 MG capsule Take 1 capsule (10 mg total) by mouth before meals as needed (TID PRN).    esomeprazole (NEXIUM) 40 MG capsule TAKE 1 CAPSULE BY MOUTH 30-45 MINUTES BEFORE BREAKFAST    fluticasone (FLONASE) 50 mcg/actuation nasal spray 1 spray by Each Nare route once daily.    gabapentin (NEURONTIN) 100 MG capsule Take 1 capsule (100 mg total) by mouth every evening.    ibuprofen (ADVIL,MOTRIN) 400 MG tablet Take 400 mg by mouth every 4 (four) hours.    losartan (COZAAR) 100 MG tablet TAKE 1 TABLET (100 MG TOTAL) BY MOUTH ONCE DAILY.    penicillin v potassium (VEETID) 500 MG tablet Take 500 mg by mouth every 8 (eight) hours.     No current facility-administered medications for this visit.      Review of patient's allergies indicates:   Allergen Reactions    Tramadol Itching     Precautions: none    Evaluation Date: 7/27/2017   Visit # authorized: 20  Authorization period: 12/31/2017     Alexey Ramos  is a 61 y.o. female that presents to [facility] outpatient clinic secondary to bilateral osteoarthrosis . Pain started in right knee about 4 years ago and 2 months ago the pain started in left knee. The left knee hurts more when she is sitting for long periods and pain goes into the back. Patient reports that she feels stiff. Patient indicates that she tries to straighten up but she feels pain in her back. Patient indicates increase in weight in of 20 lbs in one year.     *patient's daughter present to translate during initial evaluation*     Patient c/o: intermittent symptoms that occur worse in the morning and and when she sits down   Radicular symptoms: patient indicates numbness and coldness when shes been standing at work for long periods, patient reports pain in knees at night and legs feel cold and painful, no night sweats reported  Onset: gradual   Pain Scale: Rates pain on a scale of 0-10 to be 7 at worst; 5 currently; n/a at best .  Aggravating factors: Siting for long periods, standing   Relieving factors: ice pack and medication, nerve medication (gabapentin)   Previous treatment: Patient indicates that she may have gone to physical therapy about 4 years ago when she had the original pain   Imaging: DJD present in bilateral knees according the report   Past surgical history: none  Functional deficits: cooking, bending over  Prior level of function: Pain was less and patient wouldn't get tired very quickly   Occupation: Cooks, work duties include: standing for long periods   Environment: 1 story home with 1 steps to enter, has none AD  No cultural or spiritual barriers identified to treatment or learning.  Activity level/Participation: Wants to get to the point of activity to lose some weight   Patient's goals: Have more flexibility with knees patient reports that her knees are not able to carry her body,      Objective     Observation: Patient presents with slight valgus as the knees     Range of Motion:    Knee Left active   Flexion WNL   Extension WNL     Knee Right active   Flexion WNL    Extension WNL       Lower Extremity Strength   Right LE  Left LE   Knee extension: 4-/5 Knee extension: 4+/5   Knee flexion: 4/5 Knee flexion: 5/5   Hip flexion: 4-/5 Hip flexion: 4-/5   Hip IR 4/5 Hip IR 4-/5   Hip ER 4/5 Hip ER 4-/5   Hip extension:  3+/5 Hip extension: 3+/5   Hip abduction: 4-/5 Hip abduction: 4-/5   Ankle dorsiflexion: 4+/5 Ankle dorsiflexion: 4+/5   Ankle plantarflexion: 5/5 Ankle plantarflexion: 5/5     Pain with anterior translation     Special Tests:   Right Left   Valgus Stress Test Negative  Negative   Varus Stress test Positive  Negative   Lachman's test Negative Negative       Joint Mobility: Slight crepitus felt with medial and lateral      Patellar sup./inf: WNL   Patellar med/lat:WNL    Palpation:    Crepitus: In patella femoral joint   MCL/LCL: Tenderness felt around the medial knee of the left leg    Popliteal fossa: Tenderness felt in the left knee     Sensation: intact     Flexibility:      Right Left   Ely's Test  Positive Positive    Supine 90/90 30 25   Sinan's Test Negative Negative    León Test NT NT     Functional Limitations Reports - G Codes  Category: Mobility   Intake 66% Current Status CL -    Predicted 49% Goal Status+ CK -     PT Evaluation Completed? Yes  Discussed Plan of Care with patient: Yes      HEP provided: Patient was given the following exercises in written format to be completed twice a day   Standing rectus stretch 5 times 20 seconds  Seated hamstring stretch  LAQ 2 sets of 10 reps   Instructed pt. Regarding: Proper technique with all exercises. Pt demo good understanding of the education provided. Rachel demonstrated good return demonstration of activities.      Assessment     This is a 61 y.o. female referred to outpatient physical therapy and presents with a medical diagnosis of pain in bilateral knees and demonstrates limitations as described in the  problem list. Patient will benefit from physcial therapy services in order to maximize pain free and/or functional use of bilateral knees. Patient reports discomfort present in her back as well that may originate from the pain in her knees due to weakness and decreased confidence in the strength in her legs to support her. Patient demonstrates limitations in flexibility creating additional compression in the knee joint increasing the likelihood of further joint degeneration. Patient will benefit from physcial therapy services in order to maximize pain free functional independence. Patient will benefit from exercises to stretch quad, hip flexors, and hamstrings.The following goals were discussed with the patient and patient is in agreement with them as to be addressed in the treatment plan. Patient was given a HEP consisting of exercises listed above to increase joint mobility with stretches and strengthen supporting muscles around the knee. Patient verbally understood the instructions as they were given and demonstrated proper form and technique during therapy. Patient was advised to perform these exercises free of pain, and to stop performing them if pain occurs. Patient would benefit from skilled PT to address above stated problems, as well as, achieve pt goals within a timely manner. Patient has set realistic goals and has verbalized good understanding and agreement with reported diagnosis, prognosis and treatment. Patient demonstrates no additional cultural, spiritual or educational need and currently has no barriers to learning.      History  Co-morbidities and personal factors that may impact the plan of care Examination  Body Structures and Functions, activity limitations and participation restrictions that may impact the plan of care Clinical Presentation   Decision Making/ Complexity Score   Co-morbidities:   Arthritis   Hyperlipidemia   Hypertension   Irritable bowel syndrome   Migraine headache  fibromyalgia   Sleep apnea     Personal Factors:   Previous therapy   Body Regions: Bilateral knees     Body Systems: Musculoskeletal     Activity limitations: Standing, stooping    Participation Restrictions: none       Stable       Low      Prognosis: Good    Anticipated barriers to physical therapy: none    Medical necessity is demonstrated by the following IMPAIRMENTS/PROBLEM LIST:   1) Increase in pain level limiting function    2) Decrease in flexibility    3) Decreased LE strength    4) Joint laxity    5)Lack of HEP    GOALS: Short Term Goals:  3 weeks  1. Patient will be able to report decreased in resting bilateral knee pain  <   / =  3  /10  to increase tolerance for sitting activities.    2. Patient will be able to report an improvement in ability to stand at work by 50% in order to increase ability to perform work duties.   3. Patient will demonstrate an increased MMT in bilateral knees to 4/5  to increase tolerance for standing and work activities.   4. Patient will be able to demonstrate an increase in 5 degrees of hamstring flexibility on bilateral LE to improve mobility and function .   5. Patient will be able to tolerate HEP to improve ROM and independence with ADL's    Long Term Goals: 6 weeks  1. Patient will be able to report decreased in resting bilateral knee pain  <   / =  1  /10  to increase tolerance for sitting activities.    2. Patient will be able to report an improvement in ability to stand at work by 80% in order to increase ability to perform work duties.   3. Patient will demonstrate an increased MMT in bilateral knees to 4+/5  to increase tolerance for standing and work activities.   4. Patient will be able to demonstrate an increase in 10 degrees of hamstring flexibility on bilateral LE to improve mobility and function   5. Patient will report at CK level (At least 40 percent but less than 60 percent) on LEFS  to demonstrate increase in LE function with every day tasks.   6. Patient  to be Independent with HEP to improve ROM and independence with ADL's        Plan     Pt will be treated by physical therapy 1-3 times a week for 6 weeks for Pt Education, HEP, therapeutic exercises, neuromuscular re-education, joint mobilizations, modalities prn to achieve established goals. Rachel may at times be seen by a PTA as part of the Rehab Team.     Cont PT for  6 weeks.     I certify the need for these services furnished under this plan of treatment and while under my care._________  Miranda Mcclellan MD 7/28/2017_____________________ Physician/Referring Practitioner  Date of Signature      Tony Kirk PT  7/28/2017

## 2017-08-04 ENCOUNTER — CLINICAL SUPPORT (OUTPATIENT)
Dept: REHABILITATION | Facility: HOSPITAL | Age: 62
End: 2017-08-04
Attending: INTERNAL MEDICINE
Payer: COMMERCIAL

## 2017-08-04 DIAGNOSIS — M25.562 ARTHRALGIA OF BOTH KNEES: ICD-10-CM

## 2017-08-04 DIAGNOSIS — M17.11 OSTEOARTHRITIS OF RIGHT KNEE, UNSPECIFIED OSTEOARTHRITIS TYPE: ICD-10-CM

## 2017-08-04 DIAGNOSIS — M25.561 ARTHRALGIA OF BOTH KNEES: ICD-10-CM

## 2017-08-04 DIAGNOSIS — M79.7 MUSCLE PAIN, FIBROMYALGIA: ICD-10-CM

## 2017-08-04 PROCEDURE — 97110 THERAPEUTIC EXERCISES: CPT | Mod: PN

## 2017-08-04 NOTE — PROGRESS NOTES
Physical Therapy Daily Note   Name: Rachel Conroy  Clinic Number: [id number]    Diagnosis:   Encounter Diagnoses   Name Primary?    Arthralgia of both knees     Muscle pain, fibromyalgia     Osteoarthritis of right knee, unspecified osteoarthritis type      Physician: Miranda Mcclellan MD  Treatment Orders: PT Eval and Treat    Precautions: none  Visit #: 2 of 20   Eval date: 7/27/2017    Subjective   Pt reports: Patient indicates that the stretches at home are going well. Patient indicated discomfort during SLR exercise that was relived     Pain Scale:  6/10       Objective   **patient's daughter present to translate**    Time In: 0800  Time Out:: 0900  Total Treatment time: 50 minutes (1:1 with PT for 50 minutes of treatment session)     Patient began exercise program on bike  for 5 minutes.    Patient performed the following therapeutic exercises for 45 minutes in order to strengthen knees and reduce pain symptoms:  Prone quad stretch 3 times 30 seconds  Supine hamstring stretch 3 times 30 seconds   Quad sets 20 reps with 5 second holds   HL hip flexion 2 sets of 15 reps   SLR 2 sets of 10 reps (unable to tolerate)   SAQ 2 sets of 15 reps  Isometric hip adduction 2 minutes 5 sec holds  Bridges 2 sets of 15 reps   SL clams 2 sets of 10 2 sets of 10 reps   Seated Hamstring curl 2 sets of 15 reps   Standing mini squats 10 reps     Written Home Exercises Provided: Patient educated to continue with previously issued HEP in order to complement therapy sessions. Exercises included in program included standing quad stretch, seated hamstring stretch and LAQs. Patient's program will be added to next visit including quad sets, HL marching, Isometric hip adduction, Bridges, and SL clams   Exercises were reviewed and Rachel was able to demonstrate them prior to the end of the session. Patient received a written copy of exercises to perform at home. Rachel  demonstrated good  understanding of the education provided.     Assessment     Rachel is progressing well towards her goals. Patient demonstrates accuracy in her HEP exercises demonstrated and performed in clinic today. Patient was unable to tolerate SLR activity with fully extended leg and required a shorter lever arm to perform exercise. Patient was able to complete without any problems once it was modified. Patient was able to complete full program today but will probably indicate some soreness following today's session. Patient will be given additional exercises in order to improve strength due to patient's schedule she is unable to come more than 1 day a week. Patient was able to tolerate therapy program today well and was encouraged to engage in HEP program at home. Patient will continue to benefit from skilled PT services in order to address strength and endurance limitations and education on HEP and body mechanics.     Pt's spiritual, cultural and educational needs considered and pt agreeable to plan of care and goals.    Prognosis: Good    Anticipated barriers to physical therapy: none    Medical necessity is demonstrated by the following IMPAIRMENTS/PROBLEM LIST:   1) Increase in pain level limiting function    2) Decrease in flexibility    3) Decreased LE strength    4) Joint laxity    5)Lack of HEP    GOALS: Short Term Goals:  3 weeks  1. Patient will be able to report decreased in resting bilateral knee pain  <   / =  3  /10  to increase tolerance for sitting activities.    2. Patient will be able to report an improvement in ability to stand at work by 50% in order to increase ability to perform work duties.   3. Patient will demonstrate an increased MMT in bilateral knees to 4/5  to increase tolerance for standing and work activities.   4. Patient will be able to demonstrate an increase in 5 degrees of hamstring flexibility on bilateral LE to improve mobility and function .   5. Patient will be able to  tolerate HEP to improve ROM and independence with ADL's    Long Term Goals: 6 weeks  1. Patient will be able to report decreased in resting bilateral knee pain  <   / =  1  /10  to increase tolerance for sitting activities.    2. Patient will be able to report an improvement in ability to stand at work by 80% in order to increase ability to perform work duties.   3. Patient will demonstrate an increased MMT in bilateral knees to 4+/5  to increase tolerance for standing and work activities.   4. Patient will be able to demonstrate an increase in 10 degrees of hamstring flexibility on bilateral LE to improve mobility and function   5. Patient will report at CK level (At least 40 percent but less than 60 percent) on LEFS  to demonstrate increase in LE function with every day tasks.   6. Patient to be Independent with HEP to improve ROM and independence with ADL's    Plan   Continue with established Plan of Care towards PT goals.       Tony CASEY Reagan, PT  8/4/2017

## 2017-08-29 RX ORDER — CLOBETASOL PROPIONATE 0.05 G/100ML
SHAMPOO TOPICAL
Qty: 118 ML | Refills: 0 | Status: SHIPPED | OUTPATIENT
Start: 2017-08-29 | End: 2017-11-14 | Stop reason: SDUPTHER

## 2017-09-04 DIAGNOSIS — E55.9 VITAMIN D DEFICIENCY: ICD-10-CM

## 2017-09-05 RX ORDER — CHOLECALCIFEROL (VITAMIN D3) 1250 MCG
CAPSULE ORAL
Qty: 12 CAPSULE | Refills: 0 | OUTPATIENT
Start: 2017-09-05

## 2017-09-22 ENCOUNTER — OFFICE VISIT (OUTPATIENT)
Dept: RHEUMATOLOGY | Facility: CLINIC | Age: 62
End: 2017-09-22
Payer: COMMERCIAL

## 2017-09-22 VITALS
HEIGHT: 67 IN | HEART RATE: 89 BPM | SYSTOLIC BLOOD PRESSURE: 126 MMHG | RESPIRATION RATE: 18 BRPM | BODY MASS INDEX: 28.88 KG/M2 | DIASTOLIC BLOOD PRESSURE: 78 MMHG | WEIGHT: 184 LBS

## 2017-09-22 DIAGNOSIS — M15.9 PRIMARY OSTEOARTHRITIS INVOLVING MULTIPLE JOINTS: Primary | ICD-10-CM

## 2017-09-22 DIAGNOSIS — G89.29 CHRONIC PAIN OF BOTH KNEES: ICD-10-CM

## 2017-09-22 DIAGNOSIS — M25.561 CHRONIC PAIN OF BOTH KNEES: ICD-10-CM

## 2017-09-22 DIAGNOSIS — M25.562 CHRONIC PAIN OF BOTH KNEES: ICD-10-CM

## 2017-09-22 PROCEDURE — 99999 PR PBB SHADOW E&M-EST. PATIENT-LVL IV: CPT | Mod: PBBFAC,,, | Performed by: INTERNAL MEDICINE

## 2017-09-22 PROCEDURE — 3078F DIAST BP <80 MM HG: CPT | Mod: S$GLB,,, | Performed by: INTERNAL MEDICINE

## 2017-09-22 PROCEDURE — 99214 OFFICE O/P EST MOD 30 MIN: CPT | Mod: S$GLB,,, | Performed by: INTERNAL MEDICINE

## 2017-09-22 PROCEDURE — 3074F SYST BP LT 130 MM HG: CPT | Mod: S$GLB,,, | Performed by: INTERNAL MEDICINE

## 2017-09-22 PROCEDURE — 3008F BODY MASS INDEX DOCD: CPT | Mod: S$GLB,,, | Performed by: INTERNAL MEDICINE

## 2017-09-22 NOTE — PROGRESS NOTES
"Subjective:       Patient ID: Rachel Conroy is a 59 y.o. female.    Chief Complaint: Joint Pain    HPI 60 yo  F with PMH of HTN, HL, hiatal hernia with GERD here for evaluation of joint pain. Patient was treated previously by Dr. Blackwood.   Pt with osteoarthritis initially, but on last visit in July 2014, had developed polyarthritis,and I did a work up for this.  From July 2014:  "Tender 16+ joints of hand, elbows, wrists, shoulders  Swollen 8 pips and wrist Right midfoot and right mtps"  ESR, CRP and antibodies were negative, as were xrays    Patient tried plaquenil but it made her sleepy. She was also supposed to be on prednisone 5 mg a day but reports it never helped her.    Reports pain in shoulders, lower back,legs and feet.  Reports pain  Is worse with prolonged sitting.  Reports pain is 7/10.Moving makes pain worse.  Reports she has pain at end of day.  Report that she has swelling in hands that is worse with using hands.  No rashes.  Reports she also has pain in hips.  Reports pain for 3 years.  Reports poor sleep.     Interval history: Patient is here for follow up. She took gabapentin 100mg po qhsShe never feels well rested.  Reports pain in lower back and diffuse body pain.   Reports pain in shoulders, hands, and knees.  Pain level can be is 8/10 and aching. Being on her feet makes pain worse.        Past Medical History   Diagnosis Date    Arthritis     Hypertension     Migraine headache     Hyperlipidemia     Knee injury     Other physical therapy     Plantar fasciitis     Joint pain     Muscle pain, fibromyalgia 3/19/2014         Review of Systems   Constitutional: Negative for chills, diaphoresis, activity change, appetite change and fatigue.   HENT: Negative for congestion, ear discharge, ear pain, facial swelling, mouth sores, sinus pressure, sneezing, sore throat, tinnitus and trouble swallowing.    Eyes: Negative for photophobia, pain, discharge, redness, itching and visual " disturbance.   Respiratory: Negative for apnea, chest tightness, shortness of breath, wheezing and stridor.    Cardiovascular: Negative for leg swelling.   Gastrointestinal: Negative for nausea, abdominal pain, diarrhea, constipation, blood in stool, abdominal distention and anal bleeding.   Endocrine: Negative for cold intolerance and heat intolerance.   Genitourinary: Negative for dysuria and difficulty urinating.   Musculoskeletal: Positive for arthralgias. Negative for myalgias, back pain, joint swelling, gait problem, neck pain and neck stiffness.   Skin: Negative for color change, pallor, rash and wound.   Neurological: Negative for dizziness, seizures, light-headedness and numbness.   Hematological: Negative for adenopathy. Does not bruise/bleed easily.   Psychiatric/Behavioral: Negative for sleep disturbance. The patient is not nervous/anxious.                Objective:     Physical Exam   Constitutional: She is oriented to person, place, and time.   HENT:   Head: Normocephalic and atraumatic.   Right Ear: External ear normal.   Left Ear: External ear normal.   Nose: Nose normal.   Mouth/Throat: Oropharynx is clear and moist. No oropharyngeal exudate.   Eyes: Conjunctivae and EOM are normal. Pupils are equal, round, and reactive to light. Right eye exhibits no discharge. Left eye exhibits no discharge. No scleral icterus.   Neck: Neck supple. No JVD present. No thyromegaly present.   Cardiovascular: Normal rate, regular rhythm, normal heart sounds and intact distal pulses.  Exam reveals no gallop and no friction rub.    No murmur heard.  Pulmonary/Chest: Effort normal and breath sounds normal. No respiratory distress. She has no wheezes. She has no rales. She exhibits no tenderness.   Abdominal: Soft. Bowel sounds are normal. She exhibits no distension and no mass. There is no tenderness. There is no rebound and no guarding.   Lymphadenopathy:     She has no cervical adenopathy.   Neurological: She is alert  and oriented to person, place, and time. No cranial nerve deficit. Gait normal. Coordination normal.   Skin: Skin is dry. No rash noted. No erythema. No pallor.     Psychiatric: Affect and judgment normal.   Musculoskeletal: She exhibits tenderness. She exhibits no edema.   FROM of all joints including neck,spine, ankles, wrists, knees, and ankles; no joint deformities noted or effusions, erythema or warmth; no tophi or nodules noted; no crepitus; no synovitis noted in hands or feet; no nail pitting or onycholysis  Both shoulders with decreased abduction ( 160 on right and 130 on left)      Labs:  Audra-negative  Rf,ccp-negative          Arthritis survey- (2014)-  No instability seen in the cervical spine with minimal degenerative change seen.   Knees show some mild medial joint space narrowing. Tibial spines are prominent medially.    Hands and feet show no evidence of arthritis. No erosions.             Assessment:     61 yo  F with PMH of HTN, HL, hiatal hernia with GERD here for evaluation for follow up of fibromyalgia.  Of note, she had has work up that was negative for inflammatory arthritis.  She was recently diagnosed with NELL but is not using mask which I told her is causing her fibromyalgia.   Tramadol gives her itching.    Plan:   -encouraged compliance with sleep machine  -did not feel better on baclofen or flexeril  -continue  gabapentin 100mg po qhs  Avoid tylenol given fatty liver  Encouraged weight loss  Ortho referral for her knee pain  PMR referral for diffuse muscular pain   rtc in 12 weeks      *

## 2017-10-11 ENCOUNTER — HOSPITAL ENCOUNTER (OUTPATIENT)
Dept: RADIOLOGY | Facility: HOSPITAL | Age: 62
Discharge: HOME | End: 2017-10-11
Attending: PHYSICAL MEDICINE & REHABILITATION
Payer: COMMERCIAL

## 2017-10-11 ENCOUNTER — INITIAL CONSULT (OUTPATIENT)
Dept: PHYSICAL MEDICINE AND REHAB | Facility: CLINIC | Age: 62
End: 2017-10-11
Payer: COMMERCIAL

## 2017-10-11 ENCOUNTER — OFFICE VISIT (OUTPATIENT)
Dept: GASTROENTEROLOGY | Facility: CLINIC | Age: 62
End: 2017-10-11
Payer: COMMERCIAL

## 2017-10-11 ENCOUNTER — LAB VISIT (OUTPATIENT)
Dept: LAB | Facility: HOSPITAL | Age: 62
End: 2017-10-11
Attending: INTERNAL MEDICINE
Payer: COMMERCIAL

## 2017-10-11 ENCOUNTER — TELEPHONE (OUTPATIENT)
Dept: INTERNAL MEDICINE | Facility: CLINIC | Age: 62
End: 2017-10-11

## 2017-10-11 VITALS
SYSTOLIC BLOOD PRESSURE: 110 MMHG | HEART RATE: 61 BPM | HEIGHT: 67 IN | BODY MASS INDEX: 28.61 KG/M2 | WEIGHT: 182.31 LBS | DIASTOLIC BLOOD PRESSURE: 65 MMHG

## 2017-10-11 VITALS
SYSTOLIC BLOOD PRESSURE: 135 MMHG | BODY MASS INDEX: 28.37 KG/M2 | DIASTOLIC BLOOD PRESSURE: 79 MMHG | HEART RATE: 66 BPM | HEIGHT: 67 IN | WEIGHT: 180.75 LBS

## 2017-10-11 DIAGNOSIS — E55.9 VITAMIN D DEFICIENCY: Primary | ICD-10-CM

## 2017-10-11 DIAGNOSIS — K58.0 IRRITABLE BOWEL SYNDROME WITH DIARRHEA: Primary | ICD-10-CM

## 2017-10-11 DIAGNOSIS — M25.561 ARTHRALGIA OF BOTH KNEES: ICD-10-CM

## 2017-10-11 DIAGNOSIS — G89.29 CHRONIC PAIN OF MULTIPLE JOINTS: ICD-10-CM

## 2017-10-11 DIAGNOSIS — M79.7 MUSCLE PAIN, FIBROMYALGIA: Primary | ICD-10-CM

## 2017-10-11 DIAGNOSIS — Z51.81 ENCOUNTER FOR MONITORING LONG-TERM PROTON PUMP INHIBITOR THERAPY: ICD-10-CM

## 2017-10-11 DIAGNOSIS — K62.5 BRBPR (BRIGHT RED BLOOD PER RECTUM): ICD-10-CM

## 2017-10-11 DIAGNOSIS — Z79.899 ENCOUNTER FOR MONITORING LONG-TERM PROTON PUMP INHIBITOR THERAPY: ICD-10-CM

## 2017-10-11 DIAGNOSIS — M25.562 ARTHRALGIA OF BOTH KNEES: ICD-10-CM

## 2017-10-11 DIAGNOSIS — E55.9 VITAMIN D DEFICIENCY: ICD-10-CM

## 2017-10-11 DIAGNOSIS — M25.50 CHRONIC PAIN OF MULTIPLE JOINTS: ICD-10-CM

## 2017-10-11 DIAGNOSIS — M79.7 MUSCLE PAIN, FIBROMYALGIA: ICD-10-CM

## 2017-10-11 LAB — 25(OH)D3+25(OH)D2 SERPL-MCNC: 38 NG/ML

## 2017-10-11 PROCEDURE — 72080 X-RAY EXAM THORACOLMB 2/> VW: CPT | Mod: TC

## 2017-10-11 PROCEDURE — 82306 VITAMIN D 25 HYDROXY: CPT

## 2017-10-11 PROCEDURE — 99204 OFFICE O/P NEW MOD 45 MIN: CPT | Mod: S$GLB,,, | Performed by: PHYSICAL MEDICINE & REHABILITATION

## 2017-10-11 PROCEDURE — 99214 OFFICE O/P EST MOD 30 MIN: CPT | Mod: S$GLB,,, | Performed by: NURSE PRACTITIONER

## 2017-10-11 PROCEDURE — 36415 COLL VENOUS BLD VENIPUNCTURE: CPT

## 2017-10-11 PROCEDURE — 72080 X-RAY EXAM THORACOLMB 2/> VW: CPT | Mod: 26,,, | Performed by: RADIOLOGY

## 2017-10-11 PROCEDURE — 99999 PR PBB SHADOW E&M-EST. PATIENT-LVL III: CPT | Mod: PBBFAC,,, | Performed by: PHYSICAL MEDICINE & REHABILITATION

## 2017-10-11 PROCEDURE — 99999 PR PBB SHADOW E&M-EST. PATIENT-LVL III: CPT | Mod: PBBFAC,,, | Performed by: NURSE PRACTITIONER

## 2017-10-11 RX ORDER — PREGABALIN 50 MG/1
50 CAPSULE ORAL 3 TIMES DAILY
Qty: 90 CAPSULE | Refills: 5 | Status: SHIPPED | OUTPATIENT
Start: 2017-10-11 | End: 2017-12-06

## 2017-10-11 RX ORDER — DULOXETIN HYDROCHLORIDE 30 MG/1
30 CAPSULE, DELAYED RELEASE ORAL DAILY
Qty: 30 CAPSULE | Refills: 11 | Status: SHIPPED | OUTPATIENT
Start: 2017-10-11 | End: 2018-01-19

## 2017-10-11 NOTE — PROGRESS NOTES
[facility] Note    Reason for Visit:  The primary encounter diagnosis was Irritable bowel syndrome with diarrhea. Diagnoses of BRBPR (bright red blood per rectum) and Encounter for monitoring long-term proton pump inhibitor therapy were also pertinent to this visit.    PCP:   Tera Blanca       Referring MD:  No referring provider defined for this encounter.    HPI:  This is a 62 y.o. female here for f/u evaluation of IBS and GERD. Ms. Conroy speaks [place]. Interpretation via international services offered free of charge but declines and states she wants her daughter to interpret instead. She has a hx of IBS and was previously seen  by  in 2011. Her most recent GI visit was with me in 4/2017.    She currently reports the IBS is worse with increased stress at home and work x 1 month. lately with gen abd pain after diarrhea-3 loose BM/day.  Will have one formed BM/day if not nervous or stressed. knows s/s worse with stress. Never seen psyche for stress/anxiety. Was taking bentyl 2-3 times daily =cosntipation, so decreased to once daily with mild improvement in abd pain.  has been taking it since last GI visit although may have taken in every other day at times. Not on probiotic. Not taking fiber supplement. Has not tried ibgard.     Nexium helps control GERD well.    Small-moderated amt BRBPR onTP only. occurring 2 days per week. Worse with straining or having multiple BMs per day. For the last month.     Abdominal pain : +bloating and olimpia mainly after dinner for most of her life. Worse with stress. Worse with greasy, fried food, coffee, pizza, beans. Better with lemonade and teas. + generalized abd pain w/ diarrhea. Better after BM. almost daily. Mild relief w/ bentyl as above. Better with daily PPI.  Reflux +hx GERD. Taking Nexium with good control.  Dysphagia/odynophagia - denies  GI bleeding - denies hematochezia, hematemesis, melena, BRBPR, black/tarry stools, and  coffee ground emesis    ROS:  Constitutional: No fevers, no chills, No weight loss, no fatigue,   ENT: No allergies  CV: No chest pain, no palpitations, no perif. edema, no sob on exertion  Pulm: No cough, No shortness of breath, no wheezes, no sputum  Ophtho: No vision changes  GI: see HPI; also no nausea, no vomiting, no change in appetite  Derm: No rash  Heme: No lymphadenopathy, No bruising  MSK: + arthritis, no muscle pain, no muscle weakness  : No dysuria, No hematuria  Endo: No hot or cold intolerance  Neuro: No syncope, No seizure,       Medical History:  has a past medical history of Arthritis; Hyperlipidemia; Hypertension; Irritable bowel syndrome; Joint pain; Knee injury; Migraine headache; Muscle pain, fibromyalgia (3/19/2014); Other physical therapy; Plantar fasciitis; Sleep apnea; and Urinary incontinence.    Surgical History:  has a past surgical history that includes Hysterectomy.    Family History: family history includes Alzheimer's disease in her mother; Arthritis in her mother..     Social History:  reports that she has never smoked. She has never used smokeless tobacco. She reports that she does not drink alcohol or use drugs.    Review of patient's allergies indicates:   Allergen Reactions    Tramadol Itching       Current Outpatient Prescriptions   Medication Sig    amlodipine (NORVASC) 5 MG tablet Take 1 tablet (5 mg total) by mouth once daily.    cholecalciferol, vitamin D3, 50,000 unit capsule Mo 1 tableta por semana (cada 7 tejeda) x 12 semanas.    clobetasol (CLOBEX) 0.05 % shampoo APPLY EXTERNALLY TO THE AFFECTED AREA EVERY 7 DAYS    esomeprazole (NEXIUM) 40 MG capsule TAKE 1 CAPSULE BY MOUTH 30-45 MINUTES BEFORE BREAKFAST    fluticasone (FLONASE) 50 mcg/actuation nasal spray 1 spray by Each Nare route once daily.    gabapentin (NEURONTIN) 100 MG capsule Take 1 capsule (100 mg total) by mouth every evening.    losartan (COZAAR) 100 MG tablet TAKE 1 TABLET (100 MG TOTAL) BY  "MOUTH ONCE DAILY.    diclofenac sodium 1 % Gel Apply 2 g topically 4 (four) times daily.    duloxetine (CYMBALTA) 30 MG capsule Take 1 capsule (30 mg total) by mouth once daily.    eluxadoline (VIBERZI) 75 mg Tab Take 75 mg by mouth 2 (two) times daily.    pregabalin (LYRICA) 50 MG capsule Take 1 capsule (50 mg total) by mouth 3 (three) times daily.     No current facility-administered medications for this visit.          Objective Findings:    Vital Signs:  /79   Pulse 66   Ht 5' 7" (1.702 m)   Wt 82 kg (180 lb 12.4 oz)   BMI 28.31 kg/m²   Body mass index is 28.31 kg/m².    Physical Exam:  General Appearance: Well appearing in no acute distress  Head:   Normocephalic, without obvious abnormality  Eyes:    No scleral icterus, EOMI  ENT: Neck supple, Lips, mucosa, and tongue normal; teeth and gums normal  Lungs: CTA bilaterally in anterior and posterior fields, no wheezes, no crackles.  Heart: Regular rate and rhythm, S1, S2 normal, no murmurs heard  Abdomen: Soft, non tender, non distended with positive bowel sounds in all four quadrants. No hepatosplenomegaly, ascites, or mass  Extremities: no clubbing, cyanosis or edema  Skin: No rash to exposed areas  Neurologic: A&Ox4      Labs:  Lab Results   Component Value Date    WBC 6.39 2017    HGB 13.7 2017    HCT 41.3 2017     2017    CHOL 238 (H) 2017    TRIG 114 2017    HDL 83 (H) 2017     (H) 2017    AST 53 (H) 2017     2017    K 3.8 2017     2017    CREATININE 0.7 2017    BUN 11 2017    CO2 27 2017    TSH 0.558 2017    INR 0.9 10/24/2016    HGBA1C 5.5 2017       Imagin2016 liver us w doppler-fatty, enlarged liver  2016 retroperitoneal us-left renal cyst   abd us-enlarged fatty liver  Endoscopy:       EGD  - HH, gastritis. bx=WNL   Colonoscopy Dr. Zhu- LEVY. Repeat in 10 years   EGD Dr." Zhu- erythematous gastropathy, HH. Bx=chronic, non active gastritis  2006 EGD Dr. Humphries- WNL  2006 colonoscopy Dr.Head- WN. Random bx=WNL    Assessment:  1. Irritable bowel syndrome with diarrhea    2. BRBPR (bright red blood per rectum)    3. Encounter for monitoring long-term proton pump inhibitor therapy           Recommendations:  1. IBS-D w/ bloating and abd pain prior to loose stools. Fiber and probiotics as per handouts provided. Still has gall bladder. Try viberzi as Rx. States she does not drink any ETOH.  2. BRBPR-colonoscopy for further evaluation.  3. Long term PPI use-Pt made aware of slightly increased risk of CAP, C diff infections and decreased mag, vit D and Vit B- 12 levels with long term PPI use. Yearly Mag, B 12 and Vit D levels recomended. Routine bone mineral densities per PCP recommended. Pt instructed to contact PCP for s/s of lung infection (fever, chills, CP, SOB, and/or productive cough) or GI infection (profuse, watery diarrhea with or without rectal bleeding, severe abd pain,and/or fever). Vitamin D monitored per PCP. Vit B 12, Mag today. Wean PPI to lowest dose necessary to control s/s as per handout provided.    Return in about 1 month (around 11/11/2017).      Order summary:  Orders Placed This Encounter    Vitamin B12    Magnesium    eluxadoline (VIBERZI) 75 mg Tab    Case request GI: COLONOSCOPY     Visit time: 30 minutes with greater than 50% of the time spent in face to face pt  discussing the aforementioned diagnoses, recommendations,and answering pt questions.      Thank you so much for allowing me to participate in the care of Rachel Morton, APRN, FNP-C

## 2017-10-11 NOTE — LETTER
October 15, 2017      Miranda Mcclellan MD  [address]  [facility] [address]           [facility]  [address]  [facility] [address]  Phone: [phone]          Patient: Rachel Conroy   MR Number: [id number]   YOB: 1955   Date of Visit: 10/11/2017       Dear Dr. Miranda Mcclellan:    Thank you for referring Rachel Conroy to me for evaluation. Attached you will find relevant portions of my assessment and plan of care.    If you have questions, please do not hesitate to call me. I look forward to following Rachel Conroy along with you.    Sincerely,    Pricilla Alcaraz MD    Enclosure  CC:  No Recipients    If you would like to receive this communication electronically, please contact [email or web address] or [phone] to request more information on [employer] Link access.    For providers and/or their staff who would like to refer a patient to [facility], please contact us through our one-stop-shop provider referral line, [facility], at [phone].    If you feel you have received this communication in error or would no longer like to receive these types of communications, please e-mail [email or web address]

## 2017-10-11 NOTE — PATIENT INSTRUCTIONS
HIGH FIBER KEY POINTS:  1. Goal of 20-25 grams of fiber each day for women, 30-35 grams each day for men. Slowly build up to this goal as you may experience gas and bloating at first.  2. Take a fiber supplement to help reach this goal: Metamucil, Citrucel, Fibercon, Konsyl, or Psyllium  3. For Constipation: Finely ground psyllium husk (with or without flavoring or Additives)   - To start: 1 teaspoon once a day in the morning with 8 oz of liquid    followed by an additional 8 ounce glass of water   - After a week: add a second teaspoon in the middle of the day   - After two weeks: add a third teaspoon at bedtime   - Follow each dose with another glass of water. Can add a splash of juice or lemonade for taste.  3. Drink 6-8 glasses of water a day.  4. Try to do plant fiber (fruits and vegetables) over processed fibers (cereals and breads)     HIGH FIBER DIET  Fiber is present in all fruits, vegetables, cereals and grains. Fiber passes through the body undigested. A high fiber diet helps food move through the intestinal tract. The added bulk is helpful in preventing constipation. In persons with diverticulosis it serves to clean out the pouches along the colon wall while preventing new ones from forming. A high fiber diet may reduce the risk of colon cancer, decreases blood cholesterol and prevents high blood sugar in diabetics.    The foods listed below are high in fiber and should be included in your diet. If you are not used to high fiber foods, start with 1 or 2 foods from this list. Every 3-4 days add a new one to your diet until you are eating 4 high fiber foods per day. This should give you 20-35 Gm of fiber/day. It is also important to drink a lot of water when you are on this diet (6-8 glasses a day). Water causes the fiber to swell and increases the benefit.  Add Fiber to Your Diet   Adding fiber to your diet can help relieve constipation by making stools softer and easier to pass. To increase your fiber  intake, your doctor may recommend a bulking agent, such as psyllium. This is a high-fiber supplement available at most grocery and drugstores. Eating more fiber-rich foods will also help. There are two types of fiber:   Insoluble fiber is the main ingredient in bulking agents. Its also found in foods such as wheat bran, whole-grain breads, fresh fruits, and vegetables.   Soluble fiber is found in foods such as oat bran. Although soluble fiber is good for you, it may not ease constipation as much as foods high in insoluble fiber.    FOODS HIGH IN DIETARY FIBER:  BREADS: Made with 100% whole wheat flour; Ramón, wheat or rye crackers; tortillas, bran muffins. Whole grains, such as wheat bran, corn bran, and brown rice.  CEREALS: Whole grain cereal with bran (Chex, Raisin Bran, Corn Bran), oatmeal, rolled oats, granola, wheat flakes, brown rice  NUTS: Any nuts  FRUITS: All fresh fruits along with edible skins, (bananas, citrus fruit, mangoes, pears, prunes, raisins, apples, pineapple, apricot, melon, jams and marmalades), fruit juices (especially prune juice)  VEGETABLES: All types, preferably raw or lightly cooked: especially, celery, eggplant, potatoes,spinach, broccoli, brussel sprouts, winter squash, carrots, cauliflower, soybeans, lentils, fresh and dried beans of all kinds  OTHER: Popcorn, any spices.   Nuts and legumes, especially peanuts, lentils, and kidney beans.  Easy Ways to Add Fiber   The tips below offer some simple ways to add more high-fiber foods to your meals.   Start your day with a high-fiber breakfast. Eat a wheat bran cereal along with a sliced banana. Or, try peanut butter on whole-wheat toast.   Eat carrot sticks for snacks. Theyre easy to prepare, taste great, and are low in calories.   Use whole-grain breads instead of white bread for sandwiches.   Eat fruits for treats. Try an apple and some raisins instead of a candy bar.  Vegetables  Artichoke, cooked 10.3  Asparagus - 2.8  Beans -  2  Broccoli, boiled 1 cup - 5.1  [place] sprouts, cooked 1 cup - 4.1  Carrots - boiled 2.5, raw 4  Celery - 1  Corn - 4  Corn on the Cob - 5.9  Lettuce - 1  Peas (canned) - 4  Peas (dried) - 7.9  Green peas (cooked) - 8.8  Potato, with skin, baked - 3.0  Spinach - 4  Sweet potato - 3.4  Turnip greens, boiled 1 cup - 5.0  Sweet corn, cooked  1 cup  4.0  Tomato paste -1/4 cup -2.7  Yam - 2.7  Legumes, Nuts, and Seeds  Split peas, cooked  1 cup  16.3  Lentils, cooked 1 cup 15.6  Black beans, cooked 1 cup 15.0  Black eyed peas (1/2 cup) 4.2  Chick peas (1/2 cup) 5  Lima beans, cooked 1 cup  13.2  Baked beans, vegetarian, canned, cooked 1 cup 10.4  Sunflower seed kernels 1/4 cup 3.9  Almonds 1 ounce (23 nuts)  3.5  Pistachio nuts 1 ounce (49 nuts) 2.9  Pecans 1 ounce (19 halves) 2.7  Beans (lima,kidney,baked) - 10 (1/2 cup)  Refried beans -12 (1cup)  Lentils - 8  Soybeans (1/2 cup) 5  Fruit  Raspberries  1 cup  8.0  Pear, with skin - 5.5  Apple, with skin 4.4 (Juice = 0)  Banana - 3.1  Orange - 3.1  Strawberries (halves) 1 cup - 3.0  Figs, dried 2 medium - 1.6  Raisins 1 ounce (60 raisins) -1.0  Grapefruit - 1.4  Peach - 2  Kiwi - 5  Brijesh - 3.7  Pineapple - 2  Cereal, Grains, and Pasta  Spaghetti, whole-wheat, cooked 1 cup 6.3  Barley, pearled, cooked 1 cup 6.0  Bran flakes 3/4 cup 5.3  Oat bran muffin 1 medium 5.2  Oatmeal, instant, cooked 1 cup 4.0  Popcorn, air-popped 3 cups 3.5  Brown rice, cooked  1 cup  3.5  Bread, rye 1 slice 1.9, pumpernickel - 2.1  Bagel - 1.6  Bread, whole-wheat or multigrain  1 slice 1.9  Fiber One - 14  100% Bran - 13  Raisin Bran - 3.5  All Bran Extra Fiber - 13         Probiotics      For IBS and bloating, we typically recommend Align, VSL#3, or [employer], which can typically be found without a prescription at the pharmacy. Give this at least  a month to work, but can take up to 3 months to repopulate your intestines, so be patient!     VSL#3 is a potent probiotic which can be  "found in pill form (try [employer] for best price, or [employer].com). $52 for a 2 month supply. The sachets are for ulcerative colitis and require a prescription.    If you go on the internet, a reputable/powerful probiotic appears to be Super Shield from [employer] at: http://www.bluerockholistics.Cloudwear/product/pross.asp  You can also choose PB 8 which can be found at [employer] or online.    For diarrhea from travel or antibiotics, we typically recommend Culturelle or Florastor (especially for diarrhea from C diff infection).         General information:  Pick one that has at least seven strains, and five billion CFU (colony forming units).    Products containing probiotics have flooded the market in recent years. As more people seek natural or non-drug ways to maintain their health, manufacturers have responded by offering probiotics in everything from yogurt to chocolate and granola bars to powders and capsules.    Although probiotics have been around for generations - think of the "live active cultures"in several brands of yogurt - the sheer number of products with probiotics now available may overwhelm even the most conscientious of shoppers. In some respects, the industry has grown faster than the research and scientists and doctors are calling for more studies to help determine which probiotics are beneficial and which might be a waste of money.    What Are Probiotics?  Probiotics are living microscopic organisms, or micro-organisms, that scientific research has shown to benefit your health. Most often they are bacteria, but they may also be other organisms such as yeasts. In some cases they are similar, or the same, as the good bacteria already in your body, particularly those in your gut.    The most common probiotic bacteria come from two groups, Lactobacillus or Bifidobacterium, although it is important to remember that there are many other types of bacteria that are also classified as probiotics. Each " group of bacteria has different species and each species has different strains. This is important to remember because different strains have different benefits for different parts of your body. For example, Lactobacillus casei Shirota has been shown to support the immune system and to help food move through the gut, but Lactobacillus bulgaricus may help relieve symptoms of lactose intolerance, a condition in which people cannot digest the lactose found in most milk and dairy products. In general, not all probiotics are the same, and they dont all work the same way.    Scientists are still sorting out exactly how probiotics work. They may:       Boost your immune system by producing antibodies for certain viruses.  Produce substances that prevent infection.  Prevent harmful bacteria from attaching to the gut wall and growing there.  Send signals to your cells to strengthen the mucus in your intestine and help it act as a barrier against infection.  Inhibit or destroy toxins released by certain bad bacteria that can make you sick.  Produce B vitamins necessary for metabolizing the food you eat, warding off anemia caused by deficiencies in B-6 and B-12, and maintaining healthy skin and a healthy nervous system.      Common Uses  Probiotics are most often used to promote digestive health. Because there are different kinds of probiotics, it is important to find the right one for the specific health benefit you seek. Researchers are still studying which probiotic should be used for which health or disease state. Nevertheless, probiotics have been shown to help regulate the movement of food through the intestine. They also may help treat digestive disease, something of much interest to gastroenterologists. Some of the most common uses for probiotics include the treatment of the following:    Irritable Bowel Syndrome    Irritable bowel syndrome (IBS) is a disorder of movement in the gut. People who have IBS may have  diarrhea, constipation or alternating bouts of both. IBS is not caused by injury or illness. Often the only way doctors can diagnose it is to rule out other conditions through testing.    Probiotics, particularly Bifidobacterium infantis, Sacchromyces boulardii, Lactobacillus plantarum and combination probiotics may help regulate how often people with IBS have bowel movements. Probiotics may also help relieve bloating from gas. Bifidobacterium infantis 70551, Lactobacillus plantarum 299V or Bifidobacterium bifidum [id number] have been shown to help regulate bowel movements and relieve bloating, pain and gas.    Inflammatory Bowel Disease    Though some of the symptoms are the same, inflammatory bowel disease (IBD) is different from IBS because in IBD, the intestines become inflamed. Unlike IBS, IBD is a disorder of the immune system. Symptoms include abdominal cramps, pain, diarrhea, weight loss and blood in your stools. In Crohns disease, ulcers may develop anywhere in your intestine including both the large and small bowels. In ulcerative colitis, inflammation only involves the large intestine. Bouts of inflammation may come and go, but in some cases, prescription medication is needed to keep inflammation in check.        Some studies suggest that probiotics may help decrease inflammation and delay the next bout of disease. Ulcerative colitis seems to respond better to probiotics than Crohns disease does. So far, E. coli Nissle, and a mixture of several strains of Lactobacillus, Bifidobacterium and Streptococcus may be most beneficial. Research is continuing to determine which probiotics are best to treat IBD.    Infectious Diarrhea    Infectious diarrhea is caused by bacteria, viruses or parasites. There is evidence that probiotics such as Lactobacillus rhamnosus and Lactobacillus casei may be particularly helpful in treating diarrhea caused by rotavirus, which often affects babies and small children. Several  strains of Lactobacillus and a strain of the yeast Saccharomyces boulardii may help treat and shorten the course of infectious diarrhea.    Antibiotic-Related Diarrhea  Take Lactobacillus rhamnosus GG and/or Saccharomyces boulardii (Culturelle and/or Florastor) six hours after each dose of antibiotics. Increase the dose to 10 billion CFUs per day and continue for one to two weeks after you stop taking the antibiotic.    Sometimes taking an antibiotic can cause infectious diarrhea by reducing the number of good microorganisms in your gut. Then bacteria that normally do not give you any trouble can grow out of control. One such bacterium is Clostridium difficile, which is a major cause of diarrhea in hospitalized patients and people in long-term care facilities like nursing homes. The trouble with Clostridium difficile is that it tends to come back, but there is evidence that taking probiotics such as Saccharomyces boulardii may help prevent this. There is also evidence that taking probiotics when you first start taking an antibiotic may help prevent antibiotic-related diarrhea in the first place.    Travelers Diarrhea    Its possible to get infectious diarrhea when you travel and your body is exposed to new, normally harmless bacteria (travelers diarrhea). Most studies show that probiotics are not very effective in preventing or treating travelers diarrhea in adults. Taking Saccharomyces boulardii weeks before your trip may help prevent travelers diarrhea, which usually comes from ingesting food or water thats been contaminated with bacteria.    Other Uses    Other potential uses for probiotics include maintaining a healthy mouth, preventing and treating certain skin conditions like eczema, promoting health in the urinary tract and vagina, and preventing allergies (especially in children). There is not as much research about these uses as there is about the benefits of probiotics for your digestive system,  and studies have had mixed results. If you have eczema ?Lactobacillus rhamnosus  and Lactobacillus fermentum VRI-003 PCC have been shown to help treat those itchy, scaly skin rashes -- especially in children.If you have a cold ?Some research suggests that Bifidobacterium animalis lactis Bi-07 and Lactobacillus acidophilus NCFM can help reduce the duration and severity of the common cold and flu by enhancing the bodys production of antibodies. If you have a vaginal infection ?Lactobacillus acidophilus, Lactobacillus rhamnosus GR-1 and Lactobacillus reuteri RC-14 have been shown to help prevent and clear up bacterial vaginosis and urinary tract infections in some individuals. Researchers point to Lactobacillus reuteri RC-14 and Lactobacillus rhamnosus GR-1 as the most effective stains to protect against yeast infections as theyre especially adept at colonizing the vaginal environment and fighting off unwelcome bacteria and fungi. If you have bad breath, gingivitis or periodontitis ?A probiotic lozenge or mouthwash might be your best bet. Lactobacillus reuteri LR-1 or LR-2 promote oral health by binding to teeth and gums, preventing plaque formation in the mouth. Research has demonstrated the ability of Weissella cibaria to freshen breath by inhibiting the production of sulfur compounds in the mouth.    Are Probiotics Safe?  It is generally thought that most probiotics are safe, although it is not yet known if they are safe for people with severely impaired immune systems. They may be taken by people without a diagnosed digestive problem. Their safety is evident since they have a long history of use in dairy foods like yogurt, cheese and milk.    However, you should talk to your doctor before adding these or any other probiotics to your diet. Probiotics might not be appropriate for seniors. Some probiotics may interfere with or interact with medications. Your doctor will be able to help you determine if  probiotics are right for you based on your medical history.    Research about the use of probiotics in children has grown in recent years. Although studies have shown that probiotics may help to treat infectious diarrhea in babies and small children, researchers are unsure whether probiotics are particularly helpful for children with Crohns disease or other types of inflammatory bowel disease. Ask your childs pediatrician about probiotics before giving them to your child.    The exception here is breastfeeding. Breast milk stimulates the growth of normal gut organisms that are important for a babys digestive health and developing immune system. That is one reason why doctors strongly encourage mothers to breastfeed their babies.    Overall, scientists agree that more research is necessary before they can make blanket statements about the safety of probiotics in general or about individual groups and strains. Future studies will show whether probiotics can be used to treat diseases, are safe to use for a long time, and if it is possible to take too many probiotics or mix them in inappropriate ways. These studies will also guide us as to which probiotics to use for different disorders.    Keep in mind that probiotics are considered dietary supplements and are not FDA-regulated like drugs. They are not standardized, meaning they are made in different ways by different companies and have different additives. How well a probiotic works may differ from brand to brand and even from batch to batch within the same brand. Probiotics also vary tremendously in their cost, and cost does not necessarily reflect higher quality.    Side effects may vary, too. The most common are gas and bloating. These are usually mild and temporary. More serious side effects include allergic reactions, either to the probiotics themselves or to other ingredients in the food or supplement.    Choosing a Probiotic  Probiotics are available in  yogurt and other dairy products, chocolate and granola bars, juices, powders, and capsules. You can purchase them at your local supermarket or health food store as well as on the Internet. Here are some tips to help you choose.    Check the label. The more information there is on the label, the better. Ideally, the label will tell you the probiotics group, species and strain, and how many of the microorganisms will still be alive on the use-by date. Although some products guarantee how many organisms were present at the time it was manufactured, often it is less clear how many organisms are present when these products are actually consumed.    Call the . Unfortunately, many labels dont say exactly which strain is in the product; many list only the group and the species, such as Lactobacillus acidophilus or Bifidobacterium lactis. If youre planning to take a probiotic for a specific condition, call the company and find out exactly which strains its products contain and what research they have done to support their health claims. You may be able to find this information on their Web site, as well.    Beware of the Internet. If you order products from the Internet, make sure you know the company from which you are ordering. There are plenty of scammers out there who are willing to send you fake products labeled as probiotics. At best, the ingredients could be harmless, like garlic powder. At worst, they could be laced with powerful herbs, prescription medications or illegal drugs. Some companies may simply take your money and disappear.    Stick to well-established companies and companies you know. The longer a company has been around, the more likely its products have been tested and studied repeatedly and the bigger the reputation the company has to protect. Some manufacturers that have been making products with probiotics for a while are [employer], [employer], [employer], [employer], [employer], [employer], and [employer].    Storage    One final note: Remember to store your probiotic according to package instructions and make sure the product has a sell-by or expiration date. Probiotics are living organisms. Even if they are dried and dormant, like in a powder or capsule, they must be stored properly or they will die. Some require refrigeration whereas others do not. They also have a shelf-life, so make sure you use them before the expiration date on the package.    How to wean Nexium/esomeprazole  If you abruptly stop taking a PPI (if you have been using it for 12 weeks or more), you will likely experience rebound acid reflux as your stomach acid pumps reactivate. Therefore, it is important to wean off of your PPI slowly and let your body readjust. Wean off in this manner:    1. Take your PPI every other day for 2 weeks. Alternately try half dose daily for a week, then every other day for a week.  2. Then take your PPI every 3rd day for 1 week.  3. Then take your PPI just once a week.  4. Try to stop altogether, and use as/if needed    If symptoms return, go back to the lowest dose necessary to control your symptoms (Good control means symptoms of reflux occurring less than twice per week).     If you will be on the medication daily you should follow up in GI clinic once a year.    *You may try to control your symptoms of reflux with a GERD healthy lifestyle and with alternative, more holistic options as detailed below:    GERD Healthy Lifestyle:    Remain upright for at least 3 hours after eating.    Elevate the head of the bead about 6 inches.  Some patients place cinder blocks under the head of the bed for elevation.    Avoid foods that you have noticed make your symptoms worse.    Set a weight loss goal of 10% of your body weight. (For example, if you weigh 150 lbs, your goal should be to loose 15 lbs).    You may take over the counter Zantac/ranitadine as directed, as needed for  breakthrough symptoms.     Change what you eat:  Eat smaller meals  Eat slowly and chew thoroughly until food is almost liquid  Cut down on junk carbohydrates such as sugar and white flour  Use herbs in your cooking  Eat more raw foods (more than 10 ingredients is not a raw food)  Avoid trans fats and partially hydrogenated oils  Eat more fish and switch to grass fed beef  Switch your cooking oil to macadamia nut or olive oil  Watch extremes of salt intake (too high or too low is bad)    Change these habits:  Stop smoking  Eat dinner earlier (3-4 hours before lying down to sleep)  Exercise (but wait 2 hours after eating)  Drink more water (between meals)    Worst Foods for Acid Reflux  Chocolate (milk chocolate worse than dark chocolate)  Soda (all carbonated beverages)  Alcohol (beer, liquor, wine)  Fried foods  Denson, sausage, ribs  Cream sauce  Fatty meats (beef)  Butter, margarine, lard, shortening  Coffee, tea  Mint   High fat nuts  Hot sauces and pepper  Citrus fruit/juices  Maybe bad foods (Everyone is unique)  Tomatoes  Garlic  Onion  Nuts (macadamia nuts)  Apples (especially green)  Cucumber  Green peppers  Spicy food  Some herbal teas      Alternative measures to control GERD    Take these supplements:  Multi vitamin  Probiotic  Fish oil    All natural immediate relief:  Chew 2-3 soft probiotic capsules - Dr. Puente's Probiotics 12 Plus  Chew chewable DGL licorice tablet  Chew papaya tablet with high protein meal - [facility]  Drink 2 ounces of aloe vera juice  Swedish bitters  Prelief- reduce the acid in food to keep it form burning sensitive tissue  Iberogast  [place]  Drink Chamomile Tea  Teaspoon of baking soda in water  Spoonful of vinegar in water      All natural ulcer healers:  Zinc carnosine - 75.5 mg with food twice a day x 8 weeks   Amado Aragon - $8 for 60 pills  DGL (deglycyrrhizinated licorice) - 2 tablets before meals. Heals stomach lining   [employer] brand, Enzymatic  therapy brand.  Aloe Vera juice  - 2 to 8 ounces a day   Tank or [place]

## 2017-10-13 ENCOUNTER — PATIENT MESSAGE (OUTPATIENT)
Dept: GASTROENTEROLOGY | Facility: CLINIC | Age: 62
End: 2017-10-13

## 2017-10-15 NOTE — PROGRESS NOTES
Subjective:       Patient ID: Rachel Conroy is a 62 y.o. female.    Chief Complaint: Shoulder Pain (bilateral) and Fibromyalgia    HPI    is 63 y/o female who is coming first time to clinic for chronic back and multiple joints pain for last 3 years.  Referred by .   She has PMH significant for HTN, HL, hiatal hernia with GERD, and OA multi joint, with pain, dx with fibromyalgia pain since 2014.  She had a work up that was sero negative for  inflammatory arthritis ( ESR, CRP and antibodies were negative, as were xrays).  Patient tried plaquenil but it made her sleepy. She was also supposed to be on prednisone 5 mg a day but reports it never helped her.     Today, she complains about:  #1 bilateral shoulder pain,  #2 back pain   # 3 knee pain, has appointment with Ortho.     #1 shoulder pain, Lt > Rt shoulder.   Current pain is 5, the worst pain is 7/10.  She cannot lift up L>R shoulder, w/o pain, she needs a time to dress or comb her hair.   Pain is inside joint, not in muscles.   She states that she cannot lift arm/because of pain not because of arm weakness.   Pain is deep ache and soreness, no sharp pain, no neuropathic: tingling burning pain.  No significant neck pain.   Pain is associated with joint stiffness, jumana early in morning.    #2 back pain  Lenght: pain is chronic pain. Length > 4 yrs.   No past, recent injury, falls.  Intensity:  CURRENT 5 /10,  AVERAGE  Pain 4-5 /10. at BEST /10 , At WORST  7-8/10 on the WORST day.   Location: pain is localized across lower back.    Radiation: no radiation to buttocks, nor to legs.   Timing : constant day/night pain , associated with morning stiffness, worse with activity, in evening  QUALITY:  It is a dull ache, soreness,   No neuropathic : burning, tingling, numbness,   No arm/leg weakness.   Worsening factors: walking, prolong standing,being on her feet makes pain worse.  Alleviating factors:laying , rest.  Symptoms interfere with daily  activity, sleeping and work.   Current medications: gabapentin 100 mg, cannot tolerate higher dose of medication.  Failed medications: Tramadol, gives her itching, NSAIDs do not help.  Prior procedures. none  PT/OT:none  Patient denies night fever/night sweats, bowel incontinence, significant weight loss and significant motor weakness ( red flags).  Patient denies any suicidal or homicidal ideations.     She was recently diagnosed with NELL but is not using mask, she never feels well rested.which can contribute to her fibromyalgia.  Tramadol gives her itching.  She is here for evaluation and treatment of her diffuse pain.    Past Medical History:   Diagnosis Date    Arthritis     Hyperlipidemia     Hypertension     Irritable bowel syndrome     Joint pain     Knee injury     Migraine headache     Muscle pain, fibromyalgia 3/19/2014    Other physical therapy     Plantar fasciitis     Sleep apnea     Urinary incontinence        Past Surgical History:   Procedure Laterality Date    HYSTERECTOMY         Family History   Problem Relation Age of Onset    Arthritis Mother     Alzheimer's disease Mother     Migraines Neg Hx     Melanoma Neg Hx     Psoriasis Neg Hx     Lupus Neg Hx     Eczema Neg Hx     Acne Neg Hx     Colon cancer Neg Hx     Stomach cancer Neg Hx     Esophageal cancer Neg Hx     Liver disease Neg Hx     Crohn's disease Neg Hx     Ulcerative colitis Neg Hx     Celiac disease Neg Hx        Social History     Social History    Marital status:      Spouse name: N/A    Number of children: N/A    Years of education: N/A     Occupational History    [facility] [employer]      Social History Main Topics    Smoking status: Never Smoker    Smokeless tobacco: Never Used    Alcohol use No    Drug use: No    Sexual activity: No     Other Topics Concern    Are You Pregnant Or Think You May Be? No    Breast-Feeding No     Social History Narrative    None       Current Outpatient  Prescriptions   Medication Sig Dispense Refill    amlodipine (NORVASC) 5 MG tablet Take 1 tablet (5 mg total) by mouth once daily. 30 tablet 6    cholecalciferol, vitamin D3, 50,000 unit capsule Mo 1 tableta por semana (cada 7 tejeda) x 12 semanas. 12 capsule 0    clobetasol (CLOBEX) 0.05 % shampoo APPLY EXTERNALLY TO THE AFFECTED AREA EVERY 7 DAYS 118 mL 0    diclofenac sodium 1 % Gel Apply 2 g topically 4 (four) times daily. 1 Tube 11    duloxetine (CYMBALTA) 30 MG capsule Take 1 capsule (30 mg total) by mouth once daily. 30 capsule 11    eluxadoline (VIBERZI) 75 mg Tab Take 75 mg by mouth 2 (two) times daily. 60 tablet 1    esomeprazole (NEXIUM) 40 MG capsule TAKE 1 CAPSULE BY MOUTH 30-45 MINUTES BEFORE BREAKFAST 90 capsule 0    fluticasone (FLONASE) 50 mcg/actuation nasal spray 1 spray by Each Nare route once daily. 1 Bottle 0    gabapentin (NEURONTIN) 100 MG capsule Take 1 capsule (100 mg total) by mouth every evening. 90 capsule 11    losartan (COZAAR) 100 MG tablet TAKE 1 TABLET (100 MG TOTAL) BY MOUTH ONCE DAILY. 90 tablet 0    pregabalin (LYRICA) 50 MG capsule Take 1 capsule (50 mg total) by mouth 3 (three) times daily. 90 capsule 5     No current facility-administered medications for this visit.        Review of patient's allergies indicates:   Allergen Reactions    Tramadol Itching       Review of Systems   Constitutional: Negative for appetite change, chills, fatigue, fever and unexpected weight change.   HENT: Negative for drooling, trouble swallowing and voice change.    Eyes: Negative for pain and visual disturbance.   Respiratory: Negative for shortness of breath and wheezing.    Cardiovascular: Negative for chest pain and palpitations.   Gastrointestinal: Negative for abdominal distention, abdominal pain, constipation and diarrhea.   Genitourinary: Negative for difficulty urinating.   Musculoskeletal: Positive for myalgias. Negative for arthralgias, back pain, gait problem, joint  swelling and neck stiffness.               Skin: Negative for color change and rash.   Neurological: Negative for dizziness, facial asymmetry, speech difficulty, weakness, light-headedness and numbness. Headaches:     Hematological: Negative for adenopathy.   Psychiatric/Behavioral: Negative for behavioral problems, confusion and sleep disturbance. The patient is not nervous/anxious.            Objective:      Physical Exam      PHYSICAL EXAM:  GENERAL: The patient is alert, oriented, pleasant.   HEENT: PERRLA  NECK: supple, no masses, JVP normal.  CV: S1S2, RRR, no murmurs, rales.  LUNGS: CTA bilateral.   ABDOMEN: soft, non-tender, non distended, bowel sounds nl.  EXTREMITIES: no edema, +2 pulses DP, b/l  SKIN: no skin rash, no skin breakdowns.  MUSCULOSKELETAL:   Gait is normal, she is able to walk on toes/heels w/o difficulties.  Cervical spine: full AROM in cervical spine, flexion to 60, extension  to 0,   side bending and rotation  to 30-35 degrees without pain,b/l.   No paravertebral cervical musculature tenderness, b/l.  Min  tenderness in upper trapezius muscles, b/l.  Thoracic spine, full active ROM in all directions, no bony/paravertebral muscle tenderness.   Lumbar spine, full range of motion in all planes, flexion to 90 degrees , ext. 0.  Side bending and rotation to 35-40 degrees, b/l.  Negative facet loading b/l.  Straight leg raising Negative bilaterally.   Full range of motion in all joints x4 extremities, except for Both shoulders with decreased abduction ( 160 on right and 130 on left).  B/l knee: FROM, flex >120, ext 0. No edema, erythema, + crepitus b/l.   + tenderness at medial plato line.  No laxity, negative ant/post drawer test,   No joint deformities noted or effusions, erythema or warmth; no tophi or nodules noted;   no crepitus; no synovitis noted in hands or feet; no nail pitting or onycholysis  Muscle strength 5/5 throughout x4 extremities.   No  joint laxity throughout x4  extremities.  Positive 14/18 tender points for fibromyalgia.  NEUROLOGIC: Cranial nerves II through XII intact.   Deep tendon reflexes is normal, +2 in the upper and lower extremities bilaterally.   Muscle tone is normal.   Sensory is intact to light touch and pinprick throughout x4 extremities.     Labs:  Audra-negative  Rf,ccp-negative    Arthritis survey ( 2014)    No instability seen in the cervical spine with minimal degenerative change seen.   Knees show some mild medial joint space narrowing. Tibial spines are prominent medially.     Assessment:       1. Muscle pain, fibromyalgia    2. Chronic pain of multiple joints    3. Arthralgia of both knees        Plan:       Muscle pain, fibromyalgia  -     pregabalin (LYRICA) 50 MG capsule; Take 1 capsule (50 mg total) by mouth 3 (three) times daily.  Dispense: 90 capsule; Refill: 5  -     duloxetine (CYMBALTA) 30 MG capsule; Take 1 capsule (30 mg total) by mouth once daily.  Dispense: 30 capsule; Refill: 11  -     X-Ray Thoracolumbar Spine AP Lateral; Future    Chronic pain of multiple joints  -     pregabalin (LYRICA) 50 MG capsule; Take 1 capsule (50 mg total) by mouth 3 (three) times daily.  Dispense: 90 capsule; Refill: 5  -     duloxetine (CYMBALTA) 30 MG capsule; Take 1 capsule (30 mg total) by mouth once daily.  Dispense: 30 capsule; Refill: 11  -     X-Ray Thoracolumbar Spine AP Lateral; Future    Arthralgia of both knees  -     pregabalin (LYRICA) 50 MG capsule; Take 1 capsule (50 mg total) by mouth 3 (three) times daily.  Dispense: 90 capsule; Refill: 5  -     duloxetine (CYMBALTA) 30 MG capsule; Take 1 capsule (30 mg total) by mouth once daily.  Dispense: 30 capsule; Refill: 11  -     X-Ray Thoracolumbar Spine AP Lateral; Future      RTC in 6 weeks.     Total time spent face to face with patient was 45 minutes.   More than 50% of that time was spent in counseling on diagnosis , prognosis and treatment options.   I also caunsel patient  on common and most  usual side effect of prescribed medications.    I reviewed Primary care , and other specialty's notes to better coordinate patient's  care.   All questions were answered, and patient voiced understanding.

## 2017-10-16 DIAGNOSIS — Z12.11 SPECIAL SCREENING FOR MALIGNANT NEOPLASMS, COLON: Primary | ICD-10-CM

## 2017-10-16 RX ORDER — POLYETHYLENE GLYCOL 3350, SODIUM SULFATE ANHYDROUS, SODIUM BICARBONATE, SODIUM CHLORIDE, POTASSIUM CHLORIDE 236; 22.74; 6.74; 5.86; 2.97 G/4L; G/4L; G/4L; G/4L; G/4L
4 POWDER, FOR SOLUTION ORAL ONCE
Qty: 4000 ML | Refills: 0 | Status: SHIPPED | OUTPATIENT
Start: 2017-10-16 | End: 2017-10-16

## 2017-10-23 DIAGNOSIS — K21.9 GASTROESOPHAGEAL REFLUX DISEASE WITHOUT ESOPHAGITIS: ICD-10-CM

## 2017-10-23 RX ORDER — LOSARTAN POTASSIUM 100 MG/1
TABLET ORAL
Qty: 90 TABLET | Refills: 0 | Status: SHIPPED | OUTPATIENT
Start: 2017-10-23 | End: 2018-01-23 | Stop reason: SDUPTHER

## 2017-10-23 RX ORDER — ESOMEPRAZOLE MAGNESIUM 40 MG/1
CAPSULE, DELAYED RELEASE ORAL
Qty: 90 CAPSULE | Refills: 0 | Status: SHIPPED | OUTPATIENT
Start: 2017-10-23 | End: 2018-01-23 | Stop reason: SDUPTHER

## 2017-11-09 ENCOUNTER — ANESTHESIA EVENT (OUTPATIENT)
Dept: ENDOSCOPY | Facility: HOSPITAL | Age: 62
End: 2017-11-09
Payer: COMMERCIAL

## 2017-11-09 ENCOUNTER — SURGERY (OUTPATIENT)
Age: 62
End: 2017-11-09

## 2017-11-09 ENCOUNTER — ANESTHESIA (OUTPATIENT)
Dept: ENDOSCOPY | Facility: HOSPITAL | Age: 62
End: 2017-11-09
Payer: COMMERCIAL

## 2017-11-09 ENCOUNTER — HOSPITAL ENCOUNTER (OUTPATIENT)
Facility: HOSPITAL | Age: 62
Discharge: HOME | End: 2017-11-09
Attending: INTERNAL MEDICINE | Admitting: INTERNAL MEDICINE
Payer: COMMERCIAL

## 2017-11-09 VITALS
DIASTOLIC BLOOD PRESSURE: 79 MMHG | OXYGEN SATURATION: 98 % | RESPIRATION RATE: 20 BRPM | HEART RATE: 82 BPM | BODY MASS INDEX: 28.88 KG/M2 | SYSTOLIC BLOOD PRESSURE: 146 MMHG | WEIGHT: 184 LBS | HEIGHT: 67 IN | TEMPERATURE: 98 F

## 2017-11-09 DIAGNOSIS — K62.5 BRBPR (BRIGHT RED BLOOD PER RECTUM): Primary | ICD-10-CM

## 2017-11-09 PROCEDURE — 25000003 PHARM REV CODE 250: Performed by: NURSE ANESTHETIST, CERTIFIED REGISTERED

## 2017-11-09 PROCEDURE — 27201012 HC FORCEPS, HOT/COLD, DISP: Performed by: INTERNAL MEDICINE

## 2017-11-09 PROCEDURE — 37000009 HC ANESTHESIA EA ADD 15 MINS: Performed by: INTERNAL MEDICINE

## 2017-11-09 PROCEDURE — 25000003 PHARM REV CODE 250: Performed by: INTERNAL MEDICINE

## 2017-11-09 PROCEDURE — 37000008 HC ANESTHESIA 1ST 15 MINUTES: Performed by: INTERNAL MEDICINE

## 2017-11-09 PROCEDURE — 45380 COLONOSCOPY AND BIOPSY: CPT | Performed by: INTERNAL MEDICINE

## 2017-11-09 PROCEDURE — D9220A PRA ANESTHESIA: Mod: ANES,,, | Performed by: ANESTHESIOLOGY

## 2017-11-09 PROCEDURE — 88305 TISSUE EXAM BY PATHOLOGIST: CPT | Mod: 26,,, | Performed by: PATHOLOGY

## 2017-11-09 PROCEDURE — 45380 COLONOSCOPY AND BIOPSY: CPT | Mod: ,,, | Performed by: INTERNAL MEDICINE

## 2017-11-09 PROCEDURE — 63600175 PHARM REV CODE 636 W HCPCS: Performed by: NURSE ANESTHETIST, CERTIFIED REGISTERED

## 2017-11-09 PROCEDURE — D9220A PRA ANESTHESIA: Mod: CRNA,,, | Performed by: NURSE ANESTHETIST, CERTIFIED REGISTERED

## 2017-11-09 PROCEDURE — 88305 TISSUE EXAM BY PATHOLOGIST: CPT | Performed by: PATHOLOGY

## 2017-11-09 RX ORDER — PROPOFOL 10 MG/ML
VIAL (ML) INTRAVENOUS CONTINUOUS PRN
Status: DISCONTINUED | OUTPATIENT
Start: 2017-11-09 | End: 2017-11-09

## 2017-11-09 RX ORDER — PROPOFOL 10 MG/ML
VIAL (ML) INTRAVENOUS
Status: DISCONTINUED | OUTPATIENT
Start: 2017-11-09 | End: 2017-11-09

## 2017-11-09 RX ORDER — LIDOCAINE HYDROCHLORIDE 10 MG/ML
INJECTION, SOLUTION INTRAVENOUS
Status: DISCONTINUED | OUTPATIENT
Start: 2017-11-09 | End: 2017-11-09

## 2017-11-09 RX ORDER — SODIUM CHLORIDE 9 MG/ML
INJECTION, SOLUTION INTRAVENOUS CONTINUOUS
Status: DISCONTINUED | OUTPATIENT
Start: 2017-11-09 | End: 2017-11-09 | Stop reason: HOSPADM

## 2017-11-09 RX ADMIN — PROPOFOL 100 MCG/KG/MIN: 10 INJECTION, EMULSION INTRAVENOUS at 10:11

## 2017-11-09 RX ADMIN — SODIUM CHLORIDE: 0.9 INJECTION, SOLUTION INTRAVENOUS at 09:11

## 2017-11-09 RX ADMIN — SODIUM CHLORIDE: 0.9 INJECTION, SOLUTION INTRAVENOUS at 10:11

## 2017-11-09 RX ADMIN — PROPOFOL: 10 INJECTION, EMULSION INTRAVENOUS at 10:11

## 2017-11-09 RX ADMIN — LIDOCAINE HYDROCHLORIDE 50 MG: 10 INJECTION, SOLUTION INTRAVENOUS at 10:11

## 2017-11-09 RX ADMIN — PROPOFOL 50 MG: 10 INJECTION, EMULSION INTRAVENOUS at 10:11

## 2017-11-09 NOTE — H&P
Short Stay Endoscopy History and Physical    PCP - Tera Blanca MD    Procedure - Colonoscopy  ASA - per anesthesia  Mallampati - per anesthesia  History of Anesthesia problems - no  Family history Anesthesia problems - no   Plan of anesthesia - General / MAC    HPI:  This is a 62 y.o. female here for evaluation of : rectal bleeding and most recent endoscopic exam 7 years ago.  Normal colon in 2011.  Here for BRBPR.  Mild diffuse abdominal pain.  Hx of diarrhea in past.      ROS:  Constitutional: No fevers, chills, No weight loss  CV: No chest pain  Pulm: No cough, No shortness of breath  GI: see HPI  Derm: No rash    Medical History:  has a past medical history of Arthritis; Hyperlipidemia; Hypertension; Irritable bowel syndrome; Joint pain; Knee injury; Migraine headache; Muscle pain, fibromyalgia (3/19/2014); Other physical therapy; Plantar fasciitis; Sleep apnea; and Urinary incontinence.    Surgical History:  has a past surgical history that includes Hysterectomy.    Family History: family history includes Alzheimer's disease in her mother; Arthritis in her mother.. Otherwise no colon cancer, inflammatory bowel disease, or GI malignancies.    Social History:  reports that she has never smoked. She has never used smokeless tobacco. She reports that she does not drink alcohol or use drugs.    Review of patient's allergies indicates:   Allergen Reactions    Tramadol Itching       Medications:   Prescriptions Prior to Admission   Medication Sig Dispense Refill Last Dose    cholecalciferol, vitamin D3, 50,000 unit capsule Mo 1 tableta por semana (cada 7 tejeda) x 12 semanas. 12 capsule 0 Past Week at Unknown time    duloxetine (CYMBALTA) 30 MG capsule Take 1 capsule (30 mg total) by mouth once daily. 30 capsule 11 Past Week at Unknown time    eluxadoline (VIBERZI) 75 mg Tab Take 75 mg by mouth 2 (two) times daily. 60 tablet 1 Past Week at Unknown time    esomeprazole (NEXIUM) 40 MG capsule TAKE 1  CAPSULE BY MOUTH 30-45 MINUTES BEFORE BREAKFAST 90 capsule 0 Past Week at Unknown time    fluticasone (FLONASE) 50 mcg/actuation nasal spray 1 spray by Each Nare route once daily. 1 Bottle 0 Past Week at Unknown time    gabapentin (NEURONTIN) 100 MG capsule Take 1 capsule (100 mg total) by mouth every evening. 90 capsule 11 Past Week at Unknown time    losartan (COZAAR) 100 MG tablet TAKE 1 TABLET (100 MG TOTAL) BY MOUTH ONCE DAILY. 90 tablet 0 11/9/2017 at Unknown time    pregabalin (LYRICA) 50 MG capsule Take 1 capsule (50 mg total) by mouth 3 (three) times daily. 90 capsule 5 Past Week at Unknown time    amlodipine (NORVASC) 5 MG tablet Take 1 tablet (5 mg total) by mouth once daily. 30 tablet 6 Taking    clobetasol (CLOBEX) 0.05 % shampoo APPLY EXTERNALLY TO THE AFFECTED AREA EVERY 7 DAYS 118 mL 0 Taking    diclofenac sodium 1 % Gel Apply 2 g topically 4 (four) times daily. 1 Tube 11          Physical Exam:    Vital Signs:   Vitals:    11/09/17 0906   BP: (!) 153/71   Pulse: 83   Resp: 16   Temp: 98.2 °F (36.8 °C)       General Appearance: Well appearing in no acute distress  Eyes:    No scleral icterus  ENT: Neck supple, Lips, mucosa, and tongue normal; teeth and gums normal  Lungs: CTA bilaterally  Heart:  S1, S2 normal, no murmurs heard  Abdomen: Soft, non tender, non distended with positive bowel sounds. No hepatosplenomegaly, ascites, or mass.  Extremities: 2+ pulses, no clubbing, cyanosis or edema  Skin: No rash      Labs:  Lab Results   Component Value Date    WBC 6.39 06/01/2017    HGB 13.7 06/01/2017    HCT 41.3 06/01/2017     06/01/2017    CHOL 238 (H) 06/01/2017    TRIG 114 06/01/2017    HDL 83 (H) 06/01/2017     (H) 06/01/2017    AST 53 (H) 06/01/2017     06/01/2017    K 3.8 06/01/2017     06/01/2017    CREATININE 0.7 06/01/2017    BUN 11 06/01/2017    CO2 27 06/01/2017    TSH 0.558 06/01/2017    INR 0.9 10/24/2016    HGBA1C 5.5 06/01/2017       I have explained the  risks and benefits of endoscopy procedures to the patient including but not limited to bleeding, perforation, infection, and death.      Yonis Esquivel MD

## 2017-11-09 NOTE — ANESTHESIA POSTPROCEDURE EVALUATION
"Anesthesia Post Evaluation    Patient: Rachel Conroy    Procedure(s) Performed: Procedure(s) (LRB):  COLONOSCOPY (N/A)    Final Anesthesia Type: general  Patient location during evaluation: PACU  Patient participation: Yes- Able to Participate  Level of consciousness: awake and alert and oriented  Pain management: adequate  Airway patency: patent  PONV status at discharge: No PONV  Anesthetic complications: no      Cardiovascular status: blood pressure returned to baseline and hemodynamically stable  Respiratory status: unassisted  Hydration status: euvolemic  Follow-up not needed.        Visit Vitals  BP (!) 146/79   Pulse 82   Temp 36.8 °C (98.3 °F) (Oral)   Resp 20   Ht 5' 7" (1.702 m)   Wt 83.5 kg (184 lb)   SpO2 98%   Breastfeeding? No   BMI 28.82 kg/m²       Pain/Shahzad Score: Pain Assessment Performed: Yes (11/9/2017 11:46 AM)  Presence of Pain: denies (11/9/2017 11:46 AM)  Pain Rating Prior to Med Admin: 0 (11/9/2017  9:03 AM)  Shahzad Score: 10 (11/9/2017 11:46 AM)      "

## 2017-11-09 NOTE — ANESTHESIA PREPROCEDURE EVALUATION
11/09/2017  Rachel Conroy is a 62 y.o., female.  Patient Active Problem List   Diagnosis    Osteoarthritis of right knee    Migraine headache    Nail fungal infection    Hyperlipidemia    HTN (hypertension)    Muscle pain, fibromyalgia    Fatigue    Unspecified vitamin D deficiency    IBS (irritable bowel syndrome)    Arthralgia of both knees    Chronic pain of multiple joints     Past Medical History:   Diagnosis Date    Arthritis     Hyperlipidemia     Hypertension     Irritable bowel syndrome     Joint pain     Knee injury     Migraine headache     Muscle pain, fibromyalgia 3/19/2014    Other physical therapy     Plantar fasciitis     Sleep apnea     Urinary incontinence        Anesthesia Evaluation    I have reviewed the Patient Summary Reports.    I have reviewed the Nursing Notes.   I have reviewed the Medications.     Review of Systems      Physical Exam  General:  Well nourished    Airway/Jaw/Neck:  Airway Findings: Mouth Opening: Normal General Airway Assessment: Adult  Mallampati: II  Improves to II with phonation.  Jaw/Neck Findings:  Limited Ability to Prognath  Neck ROM: Normal ROM     Eyes/Ears/Nose:  Eyes/Ears/Nose Findings:    Dental:  Dental Findings: In tact   Chest/Lungs:  Chest/Lungs Findings: Clear to auscultation, Normal Respiratory Rate     Heart/Vascular:  Heart Findings: Rate: Normal  Rhythm: Regular Rhythm  Sounds: Normal     Abdomen:  Abdomen Findings:  Normal     Musculoskeletal:  Musculoskeletal Findings:    Skin:  Skin Findings:     Mental Status:  Mental Status Findings:  Cooperative, Alert and Oriented         Anesthesia Plan  Type of Anesthesia, risks & benefits discussed:  Anesthesia Type:  general, MAC  Patient's Preference:   Intra-op Monitoring Plan:   Intra-op Monitoring Plan Comments:   Post Op Pain Control Plan:   Post Op Pain Control Plan  Comments:   Induction:   IV  Beta Blocker:  Patient is not currently on a Beta-Blocker (No further documentation required).       Informed Consent: Patient understands risks and agrees with Anesthesia plan.  Questions answered. Anesthesia consent signed with patient.  ASA Score: 2     Day of Surgery Review of History & Physical:    H&P update referred to the surgeon.         Ready For Surgery From Anesthesia Perspective.

## 2017-11-09 NOTE — TRANSFER OF CARE
"Anesthesia Transfer of Care Note    Patient: Rachel Conroy    Procedure(s) Performed: Procedure(s) (LRB):  COLONOSCOPY (N/A)    Patient location: PACU    Anesthesia Type: general    Transport from OR: Transported from OR on room air with adequate spontaneous ventilation    Post pain: adequate analgesia    Post assessment: no apparent anesthetic complications    Post vital signs: stable    Level of consciousness: awake    Nausea/Vomiting: no nausea/vomiting    Complications: none    Transfer of care protocol was followed      Last vitals:   Visit Vitals  BP (!) 153/71 (BP Location: Left arm, Patient Position: Lying)   Pulse 83   Temp 36.8 °C (98.2 °F) (Temporal)   Resp 16   Ht 5' 7" (1.702 m)   Wt 83.5 kg (184 lb)   SpO2 98%   Breastfeeding? No   BMI 28.82 kg/m²     "

## 2017-11-09 NOTE — PATIENT INSTRUCTIONS
Discharge Summary/Instructions after an Endoscopic Procedure  Patient Name: Rachel Conroy  Patient MRN: [id number]  Patient YOB: 1955 Thursday, November 09, 2017  Zulma Harrell MD  RESTRICTIONS:  During your procedure today, you received medications for sedation.  These   medications may affect your judgment, balance and coordination.  Therefore,   for 24 hours, you have the following restrictions:   - DO NOT drive a car, operate machinery, make legal/financial decisions,   sign important papers or drink alcohol.    ACTIVITY:  The following day: return to full activity including work, except no heavy   lifting, straining or running for 3 days if polyps were removed.  DIET:  Eat and drink normally unless instructed otherwise.     TREATMENT FOR COMMON SIDE EFFECTS:  - Mild abdominal pain, belching, bloating or excessive gas: rest, eat   lightly and use a heating pad.  - Sore Throat: treat with throat lozenges and/or gargle with warm salt   water.  SYMPTOMS TO WATCH FOR AND REPORT TO YOUR PHYSICIAN:  1. Abdominal pain or bloating, other than gas cramps.  2. Chest pain.  3. Back pain.  4. Chills or fever occurring within 24 hours after the procedure.  5. Rectal bleeding, which would show as bright red, maroon, or black stools.   (A tablespoon of blood from the rectum is not serious, especially if   hemorrhoids are present.)  6. Vomiting.  7. Weakness or dizziness.  8. Because air was used during the procedure, expelling large amounts of air   from your rectum or belching is normal.  9. If a bowel prep was taken, you may not have a bowel movement for 1-3   days.  This is normal.  GO DIRECTLY TO THE NEAREST EMERGENCY ROOM IF YOU HAVE ANY OF THE FOLLOWING:      Difficulty breathing  Chills and/or fever over 101 F   Persistent vomiting and/or vomiting blood   Severe abdominal pain   Severe chest pain   Black, tarry stools   Bleeding- more than one tablespoon   Any other symptom or condition that you may feel  needs urgent attention  Your doctor recommends these additional instructions:  If any biopsies were taken, your doctor s clinic will contact you in 1 to 2   weeks with any results.  You are being discharged to home.   Resume your previous diet.   Continue your present medications.   We are waiting for your pathology results.   Your physician has recommended a repeat colonoscopy in 10 years for   screening purposes.   Return to your referring physician as previously scheduled.   The findings and recommendations have been discussed with you.  For questions, problems or results please call your physician - Zulma Harrell MD at Work:  [phone].  [facility] [place], EMERGENCY ROOM PHONE NUMBER: [phone]  IF A COMPLICATION OR EMERGENCY SITUATION ARISES AND YOU ARE UNABLE TO REACH   YOUR PHYSICIAN - GO DIRECTLY TO THE EMERGENCY ROOM.  Zulma Harrell MD  11/9/2017 11:16:06 AM  This report has been verified and signed electronically.

## 2017-11-09 NOTE — DISCHARGE INSTRUCTIONS

## 2017-11-14 ENCOUNTER — OFFICE VISIT (OUTPATIENT)
Dept: INTERNAL MEDICINE | Facility: CLINIC | Age: 62
End: 2017-11-14
Payer: COMMERCIAL

## 2017-11-14 VITALS
SYSTOLIC BLOOD PRESSURE: 136 MMHG | HEART RATE: 88 BPM | WEIGHT: 181.63 LBS | DIASTOLIC BLOOD PRESSURE: 90 MMHG | BODY MASS INDEX: 28.44 KG/M2 | TEMPERATURE: 98 F

## 2017-11-14 DIAGNOSIS — I10 ESSENTIAL HYPERTENSION: Primary | ICD-10-CM

## 2017-11-14 DIAGNOSIS — L30.9 DERMATITIS: ICD-10-CM

## 2017-11-14 DIAGNOSIS — R00.2 PALPITATIONS: ICD-10-CM

## 2017-11-14 PROCEDURE — 99999 PR PBB SHADOW E&M-EST. PATIENT-LVL III: CPT | Mod: PBBFAC,,, | Performed by: INTERNAL MEDICINE

## 2017-11-14 PROCEDURE — 99213 OFFICE O/P EST LOW 20 MIN: CPT | Mod: S$GLB,,, | Performed by: INTERNAL MEDICINE

## 2017-11-14 RX ORDER — METOPROLOL SUCCINATE 25 MG/1
TABLET, EXTENDED RELEASE ORAL
Qty: 90 TABLET | Refills: 1 | Status: SHIPPED | OUTPATIENT
Start: 2017-11-14 | End: 2018-07-17

## 2017-11-14 RX ORDER — CLOBETASOL PROPIONATE 0.05 G/100ML
SHAMPOO TOPICAL
Qty: 118 ML | Refills: 1 | Status: SHIPPED | OUTPATIENT
Start: 2017-11-14 | End: 2018-04-10 | Stop reason: SDUPTHER

## 2017-11-14 NOTE — PROGRESS NOTES
Subjective:       Patient ID: Rachel Conroy is a 62 y.o. female.    Chief Complaint: Follow-up and Hypertension    HPIMiss Conroy here today for follow up. Overall doing better. She reports hx Iof palpitations on occassions. She admits to being under stress due to family issues and work, Has elderly mother with advanced Alzheimer's that she cares for at home. She notes occassional episodes of palpitations and elevated BP. Regarding her HTN, she is doing better as she has lost some weight and is exercising. Nevertheless, under stress she experiences the above sx's. On exam today, her BP was improved, but her pulse is on the high side, documented as well in her BP diary which she brought. I decided to add small dose of metoprolol XL 25 mgs daily; risks/benefits discussed. She will monitor her BP.   She recently underwent colonoscopy which was normal; biopsies are pending to r/o microscopic colitis given her sx's. I suspect she has IBS and not IBD; discussed the difference between the two. Will treat accordingly.  Review of Systems   Constitutional: Negative for fatigue.   Respiratory: Negative for cough, shortness of breath and wheezing.    Cardiovascular: Positive for palpitations. Negative for chest pain and leg swelling.   Neurological: Negative for dizziness, light-headedness, numbness and headaches.       Objective:      Physical Exam   Constitutional: She is oriented to person, place, and time. She appears well-developed and well-nourished. No distress.   Neck: Normal range of motion. Neck supple. No JVD present. No thyromegaly present.   Cardiovascular: Normal rate, regular rhythm, normal heart sounds and intact distal pulses.    No murmur heard.  Pulmonary/Chest: Effort normal and breath sounds normal. No respiratory distress. She has no wheezes. She exhibits no tenderness.   Musculoskeletal: Normal range of motion. She exhibits no edema.   Lymphadenopathy:     She has no cervical adenopathy.    Neurological: She is alert and oriented to person, place, and time. No cranial nerve deficit. Coordination normal.   Skin: She is not diaphoretic.   Psychiatric: She has a normal mood and affect. Her behavior is normal.   Vitals reviewed.      Assessment:       1. Essential hypertension    2. Dermatitis    3. Palpitations        Plan:    1. As above.         2. RTC 1 month.

## 2017-11-16 ENCOUNTER — TELEPHONE (OUTPATIENT)
Dept: ENDOSCOPY | Facility: HOSPITAL | Age: 62
End: 2017-11-16

## 2017-12-03 ENCOUNTER — TELEPHONE (OUTPATIENT)
Dept: GASTROENTEROLOGY | Facility: CLINIC | Age: 62
End: 2017-12-03

## 2017-12-03 DIAGNOSIS — K38.8 MASS OF APPENDIX: Primary | ICD-10-CM

## 2017-12-04 NOTE — TELEPHONE ENCOUNTER
Plsl let pt know that the pathology from her colonoscopy was negative.  I would like to check Ct of the abdomen to further evaluate her appendix, because we found a nodule inside her appendix during colonoscopy. She will need bmp for CT.   She should fu brandi Morton as scheduled.

## 2017-12-06 ENCOUNTER — OFFICE VISIT (OUTPATIENT)
Dept: GASTROENTEROLOGY | Facility: CLINIC | Age: 62
End: 2017-12-06
Payer: COMMERCIAL

## 2017-12-06 VITALS
WEIGHT: 183 LBS | HEART RATE: 76 BPM | SYSTOLIC BLOOD PRESSURE: 137 MMHG | BODY MASS INDEX: 28.72 KG/M2 | HEIGHT: 67 IN | DIASTOLIC BLOOD PRESSURE: 72 MMHG

## 2017-12-06 DIAGNOSIS — K58.0 IRRITABLE BOWEL SYNDROME WITH DIARRHEA: Primary | ICD-10-CM

## 2017-12-06 PROCEDURE — 99214 OFFICE O/P EST MOD 30 MIN: CPT | Mod: S$GLB,,, | Performed by: NURSE PRACTITIONER

## 2017-12-06 PROCEDURE — 99999 PR PBB SHADOW E&M-EST. PATIENT-LVL III: CPT | Mod: PBBFAC,,, | Performed by: NURSE PRACTITIONER

## 2017-12-06 NOTE — PROGRESS NOTES
[facility] Note    Reason for Visit:  The encounter diagnosis was Irritable bowel syndrome with diarrhea.    PCP:   Tera Blanca       Referring MD:  No referring provider defined for this encounter.    HPI:  This is a 62 y.o. female here for f/u evaluation of IBS and GERD. Ms. Conroy speaks Irish. Interpretation via international services offered free of charge but declines and states she wants her daughter to interpret instead. She has a hx of IBS and was previously seen  by  in 2011. Her most recent GI visit was with me in 10/2017.  Subsequent colonoscopy w/ bx revealed a submucosal nodule at the appendiceal orrifice w/ plans for a f/u CT per endoscopist. The colonic bx were negative. She tried viberzi 72 mg BID, but this lead to constipation (was not able to have BM). She then decreased the dose to once daily, with some improvement in constipation, but still feelings of incomplete evacuation. She is currently taking one tablet viberzi daily and has 2 small volume bristol type 5 BM/day with straining. abd pain has not gotten better. No longer taking bentyl. abd pain id not get worse after stopping bentyl. Has not tried ibgard. Takes probiotic every day and takes 2 gummy fibers/day.       Anxiety is still occurring. Makes GI s/s worse. Is not seeing psyche for anxiety. Did s/w pcp about it. Was RX Cymbalta for fibromyalgia, but  to increased BP and palpitations so stopped. Takes lyrica PRN for fibromyalgia.       No longer with BRBPR.     Abdominal pain : no improvement in abd discomfort. +bloating and olimpia mainly after dinner for most of her life. Worse with stress. Worse with greasy, fried food, coffee, pizza, beans. Better with lemonade and teas. + RUQ abd pain worse after eating for two months. Better after BM.  daily. 5/10.  Reflux +hx GERD. Taking Nexium with good control.  Dysphagia/odynophagia - denies  GI bleeding - denies hematochezia, hematemesis,  melena, BRBPR, black/tarry stools, and coffee ground emesis    ROS:  Constitutional: No fevers, no chills, No weight loss, no fatigue,   ENT: No allergies  CV: No chest pain, no palpitations, no perif. edema, no sob on exertion  Pulm: No cough, No shortness of breath, no wheezes, no sputum  Ophtho: No vision changes  GI: see HPI; also no nausea, no vomiting, no change in appetite  Derm: No rash  Heme: No lymphadenopathy, No bruising  MSK: + arthritis, no muscle pain, no muscle weakness  : No dysuria, No hematuria  Endo: No hot or cold intolerance  Neuro: No syncope, No seizure,       Medical History:  has a past medical history of Arthritis; GERD (gastroesophageal reflux disease); Hyperlipidemia; Hypertension; Irritable bowel syndrome; Joint pain; Knee injury; Migraine headache; Muscle pain, fibromyalgia (3/19/2014); Other physical therapy; Plantar fasciitis; Sleep apnea; and Urinary incontinence.    Surgical History:  has a past surgical history that includes Hysterectomy and Colonoscopy (N/A, 11/9/2017).    Family History: family history includes Alzheimer's disease in her mother; Arthritis in her mother..     Social History:  reports that she has never smoked. She has never used smokeless tobacco. She reports that she does not drink alcohol or use drugs.    Review of patient's allergies indicates:   Allergen Reactions    Tramadol Itching       Current Outpatient Prescriptions   Medication Sig    amlodipine (NORVASC) 5 MG tablet Take 1 tablet (5 mg total) by mouth once daily.    clobetasol (CLOBEX) 0.05 % shampoo APPLY EXTERNALLY TO THE AFFECTED AREA EVERY 7 DAYS    esomeprazole (NEXIUM) 40 MG capsule TAKE 1 CAPSULE BY MOUTH 30-45 MINUTES BEFORE BREAKFAST    fluticasone (FLONASE) 50 mcg/actuation nasal spray 1 spray by Each Nare route once daily.    losartan (COZAAR) 100 MG tablet TAKE 1 TABLET (100 MG TOTAL) BY MOUTH ONCE DAILY.    metoprolol succinate (TOPROL-XL) 25 MG 24 hr tablet Mo 1 tableta al  "isidoro.    diclofenac sodium 1 % Gel Apply 2 g topically 4 (four) times daily.    duloxetine (CYMBALTA) 30 MG capsule Take 1 capsule (30 mg total) by mouth once daily.    rifAXIMin (XIFAXAN) 550 mg Tab Take 1 tablet (550 mg total) by mouth 3 (three) times daily.     No current facility-administered medications for this visit.          Objective Findings:    Vital Signs:  /72   Pulse 76   Ht 5' 7" (1.702 m)   Wt 83 kg (182 lb 15.7 oz)   BMI 28.66 kg/m²   Body mass index is 28.66 kg/m².    Physical Exam:  General Appearance: Well appearing in no acute distress  Head:   Normocephalic, without obvious abnormality  Eyes:    No scleral icterus, EOMI  ENT: Neck supple, Lips, mucosa, and tongue normal; teeth and gums normal  Lungs: CTA bilaterally in anterior and posterior fields, no wheezes, no crackles.  Heart: Regular rate and rhythm, S1, S2 normal, no murmurs heard  Abdomen: Soft, non tender, non distended with positive bowel sounds in all four quadrants. No hepatosplenomegaly, ascites, or mass  Extremities: no clubbing, cyanosis or edema  Skin: No rash to exposed areas  Neurologic: A&Ox4      Labs:  Lab Results   Component Value Date    WBC 6.39 2017    HGB 13.7 2017    HCT 41.3 2017     2017    CHOL 238 (H) 2017    TRIG 114 2017    HDL 83 (H) 2017     (H) 2017    AST 53 (H) 2017     2017    K 3.8 2017     2017    CREATININE 0.7 2017    BUN 11 2017    CO2 27 2017    TSH 0.558 2017    INR 0.9 10/24/2016    HGBA1C 5.5 2017       Imagin2016 liver us w doppler-fatty, enlarged liver  2016 retroperitoneal us-left renal cyst   abd us-enlarged fatty liver  Endoscopy:    2017 colon Dr. Harrell-GPTTI. Submucosal nodule at appendiceal orifice (-). Vascular nodule in transverse colon. Non bleeding int hemorrhoids. Colon bx(-). Rpt 10 yrs.   EGD  - HH, gastritis. " bx=WNL  2011 Colonoscopy Dr. Zhu- LEVY. Repeat in 10 years  2009 EGD Dr. Zhu- erythematous gastropathy, . Bx=chronic, non active gastritis  2006 EGD Dr. Humphries- DEIDREL  2006 colonoscopy Dr.Head- LEVY. Random bx=WNL    Assessment:  1. Irritable bowel syndrome with diarrhea           Recommendations:  1. IBS-D- w/ bloating and increased gas. Unable to tolerated low dose viberzi d/t constipation. Also with new onset RUQ abd pain since starting viberzi. D/c viberzi and try Xifaxan as RX instead.  sadgas dietary and lifestyle recs as per handout provided. CT for colonoscopy findings as previously ordered per endoscopist.     Return in about 3 months (around 3/6/2018) for IBS.      Order summary:  Orders Placed This Encounter    rifAXIMin (XIFAXAN) 550 mg Tab     Visit time: 30 minutes with greater than 50% of the time spent in face to face pt  discussing the aforementioned diagnoses, recommendations,and answering pt questions.      Thank you so much for allowing me to participate in the care of Rachel Morton, APRN, FNP-C

## 2017-12-06 NOTE — PATIENT INSTRUCTIONS
"Stop Viberzi, and take Xifaxan as prescribed.        What to Eat and What Not to Eat (to reduce bloating and gas)    Avoid drinking from straws  Avoid eating excessively fast  Avoid eating excessively slow  Avoid gum chewing         Drinks  Cut out: Dairy, soda (regular and diet), sports drinks, fruit drinks, alcohol, caffeine, soy milk, carbonated beverages, kombucha    Drink: Water (1-2 Liters a day), coconut water, herbal tea, green juices (kale, spinach, green apple), smoothies (berries, bananas, amond milk, ice, flax seed).  Drink at the end of the meal; not during.         Food    Eliminate:  Eliminate all of these foods and ingredients for 10 days. Once the bloating has reduced, can add back one at a time to see which ones your body can tolerate.    Soy  Artificial sweeteners - sugar alcohols, splenda, aspartame  Dairy  Gluten  Alcohol  Sugar - no added sugars (glucose, fructose, maltose, dextrose, corn and maple syrup, honey, agave, stevia)    Limit:  Beans, broccoli, cabbage  Caffeine (1 small cup of coffee or tea a day)  Fatty meals  Meat  Processed foods (if it comes in a box, has more than 10 listed ingredients, has an expiration date)  Salt    Eat:  50% of daily intake should be food eaten in its natural, raw state.  Leafy greens - Kale, spinach, chard, collards, parsley, turnip and mustard greens, arugula, bok samira, carolina lettuce  Fiber - favor plant based sources, not processed fibers (cereals, fiber in a box)  Papaya and Pineapple  Brightly colored produce - strawberries, blueberries, bell peppers, lemon, spinach      Adapted from "Gutbliss" by Dr. Gogo Bridges        "

## 2017-12-18 ENCOUNTER — HOSPITAL ENCOUNTER (OUTPATIENT)
Dept: RADIOLOGY | Facility: HOSPITAL | Age: 62
Discharge: HOME | End: 2017-12-18
Attending: INTERNAL MEDICINE
Payer: COMMERCIAL

## 2017-12-18 DIAGNOSIS — K38.8 MASS OF APPENDIX: ICD-10-CM

## 2017-12-18 PROCEDURE — 25500020 PHARM REV CODE 255: Performed by: INTERNAL MEDICINE

## 2017-12-18 PROCEDURE — 74177 CT ABD & PELVIS W/CONTRAST: CPT | Mod: TC

## 2017-12-18 PROCEDURE — 74177 CT ABD & PELVIS W/CONTRAST: CPT | Mod: 26,,, | Performed by: RADIOLOGY

## 2017-12-18 RX ADMIN — IOHEXOL 75 ML: 350 INJECTION, SOLUTION INTRAVENOUS at 11:12

## 2017-12-18 RX ADMIN — IOHEXOL 15 ML: 300 INJECTION, SOLUTION INTRAVENOUS at 11:12

## 2017-12-28 ENCOUNTER — TELEPHONE (OUTPATIENT)
Dept: GASTROENTEROLOGY | Facility: CLINIC | Age: 62
End: 2017-12-28

## 2017-12-28 DIAGNOSIS — K38.8 MASS OF APPENDIX: Primary | ICD-10-CM

## 2017-12-28 NOTE — TELEPHONE ENCOUNTER
Spoke with patient and patient asked if I could call her daughter to give the results.    Called Daughter's phone and no answer.    Will call back tomorrow.

## 2017-12-28 NOTE — TELEPHONE ENCOUNTER
CT  There is slight dilation of the appendix lumen at its origin on the cecum which is nonspecific may be due to   mucous or recent postbiopsy changes.  To correlate with most recent colonoscopy biopsy results.  There is no inflammatory changes of the appendix.  Benign left adrenal adenoma.  Left kidney cyst.  Hysterectomy.    Pls let pt know that the CT shows a slight dialation of the appendix, but does not show any obvious mass or cancer. Sometimes the appendix can become filled with mucous (appendiceal mucoceles).  These are usually not pre-cancerous, but in rare cases can develop into cancer. I would like to ref to Dr. Farias to help decide if we need to remove her appendix.  I am also sending a msg to our advance endoscopy team to see if endoscopic ultrasound would be helpful.  We will contact her to schedule this if it is indicated. Let me know if she has any questions.

## 2017-12-29 ENCOUNTER — TELEPHONE (OUTPATIENT)
Dept: GASTROENTEROLOGY | Facility: CLINIC | Age: 62
End: 2017-12-29

## 2017-12-29 NOTE — TELEPHONE ENCOUNTER
From disc w Dr. Astudillo   EUS can be attempted but will be difficult in cecum.  If no surgery planned, can repeat CT in 1 year        Pls let pt know that I communicated w our advance team (Dr Astudillo) and I would like to first see what Dr. Farias says.

## 2018-01-02 NOTE — TELEPHONE ENCOUNTER
Spoke with patient's daughter.    (patient consented)    Results reviewed and patient's daughter verbalized understanding.

## 2018-01-02 NOTE — TELEPHONE ENCOUNTER
Spoke with patient's daughter ( patient consented)    Scheduled appointment with Dr. Farias for 1/17/18.    Appointment confirmed and appointment slip mailed.

## 2018-01-15 ENCOUNTER — OFFICE VISIT (OUTPATIENT)
Dept: SURGERY | Facility: CLINIC | Age: 63
End: 2018-01-15
Payer: COMMERCIAL

## 2018-01-15 VITALS
TEMPERATURE: 99 F | DIASTOLIC BLOOD PRESSURE: 68 MMHG | SYSTOLIC BLOOD PRESSURE: 139 MMHG | BODY MASS INDEX: 27.94 KG/M2 | WEIGHT: 178 LBS | HEART RATE: 80 BPM | HEIGHT: 67 IN

## 2018-01-15 DIAGNOSIS — D37.3 APPENDICEAL TUMOR: Primary | ICD-10-CM

## 2018-01-15 PROCEDURE — 99204 OFFICE O/P NEW MOD 45 MIN: CPT | Mod: S$GLB,,, | Performed by: SURGERY

## 2018-01-15 PROCEDURE — 99999 PR PBB SHADOW E&M-EST. PATIENT-LVL III: CPT | Mod: PBBFAC,,, | Performed by: SURGERY

## 2018-01-15 NOTE — PROGRESS NOTES
History & Physical    SUBJECTIVE:     History of Present Illness:  Patient is a 62 y.o. female presents with submucosal mass at appendiceal orifice.  Pt with IBS and has intermittent diarrhea and constipation but this has improved with some diet changes.  No melena or PRBPR.  Presents from GI to discuss appendiceal mass.      Chief Complaint   Patient presents with    Consult     appendix mass       Review of patient's allergies indicates:   Allergen Reactions    Tramadol Itching       Current Outpatient Prescriptions   Medication Sig Dispense Refill    amlodipine (NORVASC) 5 MG tablet Take 1 tablet (5 mg total) by mouth once daily. 30 tablet 6    clobetasol (CLOBEX) 0.05 % shampoo APPLY EXTERNALLY TO THE AFFECTED AREA EVERY 7 DAYS 118 mL 1    diclofenac sodium 1 % Gel Apply 2 g topically 4 (four) times daily. 1 Tube 11    duloxetine (CYMBALTA) 30 MG capsule Take 1 capsule (30 mg total) by mouth once daily. 30 capsule 11    esomeprazole (NEXIUM) 40 MG capsule TAKE 1 CAPSULE BY MOUTH 30-45 MINUTES BEFORE BREAKFAST 90 capsule 0    fluticasone (FLONASE) 50 mcg/actuation nasal spray 1 spray by Each Nare route once daily. 1 Bottle 0    losartan (COZAAR) 100 MG tablet TAKE 1 TABLET (100 MG TOTAL) BY MOUTH ONCE DAILY. 90 tablet 0    metoprolol succinate (TOPROL-XL) 25 MG 24 hr tablet Mo 1 tableta al isidoro. 90 tablet 1     No current facility-administered medications for this visit.        Past Medical History:   Diagnosis Date    Arthritis     GERD (gastroesophageal reflux disease)     Hyperlipidemia     Hypertension     Irritable bowel syndrome     Joint pain     Knee injury     Migraine headache     Muscle pain, fibromyalgia 3/19/2014    Other physical therapy     Plantar fasciitis     Sleep apnea     Urinary incontinence      Past Surgical History:   Procedure Laterality Date    COLONOSCOPY N/A 11/9/2017    Procedure: COLONOSCOPY;  Surgeon: Mariaelena Harrell MD;  Location: [facility] ([address] [place] [address]);   "Service: Endoscopy;  Laterality: N/A;    HYSTERECTOMY       Family History   Problem Relation Age of Onset    Arthritis Mother     Alzheimer's disease Mother     Migraines Neg Hx     Melanoma Neg Hx     Psoriasis Neg Hx     Lupus Neg Hx     Eczema Neg Hx     Acne Neg Hx     Colon cancer Neg Hx     Stomach cancer Neg Hx     Esophageal cancer Neg Hx     Liver disease Neg Hx     Crohn's disease Neg Hx     Ulcerative colitis Neg Hx     Celiac disease Neg Hx      Social History   Substance Use Topics    Smoking status: Never Smoker    Smokeless tobacco: Never Used    Alcohol use No        Review of Systems:  Review of Systems   Constitutional: Negative for activity change, appetite change, chills, diaphoresis, fatigue and fever.   HENT: Negative for congestion, postnasal drip, rhinorrhea, sinus pressure, sneezing, sore throat and tinnitus.    Eyes: Negative for photophobia, pain, discharge, redness, itching and visual disturbance.   Respiratory: Negative for apnea, cough, choking, chest tightness, shortness of breath, wheezing and stridor.    Cardiovascular: Negative for chest pain, palpitations and leg swelling.   Gastrointestinal: Positive for constipation and diarrhea. Negative for abdominal distention, abdominal pain, nausea and vomiting.   Musculoskeletal: Negative for arthralgias, back pain and joint swelling.   Skin: Negative for color change, pallor and rash.   Neurological: Negative for dizziness, facial asymmetry, light-headedness, numbness and headaches.   Hematological: Negative for adenopathy.   Psychiatric/Behavioral: Negative for agitation, behavioral problems, confusion and decreased concentration.       OBJECTIVE:     Vital Signs (Most Recent)  Temp: 98.6 °F (37 °C) (01/15/18 1324)  Pulse: 80 (01/15/18 1324)  BP: 139/68 (01/15/18 1324)  5' 7" (1.702 m)  80.7 kg (178 lb)     Physical Exam:  Physical Exam   Constitutional: She is oriented to person, place, and time. She appears " well-developed and well-nourished.   HENT:   Head: Normocephalic and atraumatic.   Right Ear: External ear normal.   Left Ear: External ear normal.   Eyes: Conjunctivae and EOM are normal. Right eye exhibits no discharge. Left eye exhibits no discharge. No scleral icterus.   Neck: Normal range of motion. Neck supple. No thyromegaly present.   Cardiovascular: Normal rate, regular rhythm and normal heart sounds.  Exam reveals no gallop and no friction rub.    No murmur heard.  Pulmonary/Chest: Effort normal and breath sounds normal. No respiratory distress. She has no wheezes. She has no rales.   Abdominal: Soft. Bowel sounds are normal. She exhibits no distension and no mass. There is no tenderness. There is no rebound and no guarding. No hernia.   Musculoskeletal: Normal range of motion. She exhibits no edema or tenderness.   Neurological: She is alert and oriented to person, place, and time.   Skin: Skin is warm and dry. No rash noted. No erythema. No pallor.   Psychiatric: She has a normal mood and affect. Her behavior is normal. Judgment and thought content normal.       Diagnostic Results:  CT: Overall normal.  Mild dilation  Colonsocopy: Apeendiceal mass    ASSESSMENT/PLAN:     Appendiceal mass    PLAN:Plan     Will discuss with GI to consider EUS with possible biopsy.  Will decide further plans after this.  Contact with questions.         Lg Farias MD

## 2018-01-15 NOTE — LETTER
January 15, 2018      Mariaelena Harrell MD  [address] [facility] [address]           [facility]  [address]  [place] [address]  Phone: [phone]          Patient: Rachel Conroy   MR Number: [id number]   YOB: 1955   Date of Visit: 1/15/2018       Dear Dr. Mariaelena Harrell:    Thank you for referring Rachel Conroy to me for evaluation. Attached you will find relevant portions of my assessment and plan of care.    If you have questions, please do not hesitate to call me. I look forward to following Rachel Conroy along with you.    Sincerely,    Lg Farias Jr., MD    Enclosure  CC:  No Recipients    If you would like to receive this communication electronically, please contact [email or web address] or [phone] to request more information on [employer] Link access.    For providers and/or their staff who would like to refer a patient to [facility], please contact us through our one-stop-shop provider referral line, [facility], at [phone].    If you feel you have received this communication in error or would no longer like to receive these types of communications, please e-mail [email or web address]

## 2018-01-19 ENCOUNTER — OFFICE VISIT (OUTPATIENT)
Dept: FAMILY MEDICINE | Facility: CLINIC | Age: 63
End: 2018-01-19
Payer: COMMERCIAL

## 2018-01-19 VITALS
TEMPERATURE: 98 F | WEIGHT: 179 LBS | BODY MASS INDEX: 28.09 KG/M2 | HEIGHT: 67 IN | SYSTOLIC BLOOD PRESSURE: 126 MMHG | HEART RATE: 78 BPM | OXYGEN SATURATION: 97 % | DIASTOLIC BLOOD PRESSURE: 80 MMHG

## 2018-01-19 DIAGNOSIS — L29.9 ITCHY SKIN: Primary | ICD-10-CM

## 2018-01-19 PROCEDURE — 99213 OFFICE O/P EST LOW 20 MIN: CPT | Mod: S$GLB,,, | Performed by: NURSE PRACTITIONER

## 2018-01-19 PROCEDURE — 99999 PR PBB SHADOW E&M-EST. PATIENT-LVL IV: CPT | Mod: PBBFAC,,, | Performed by: NURSE PRACTITIONER

## 2018-01-19 RX ORDER — HYDROXYZINE HYDROCHLORIDE 25 MG/1
25 TABLET, FILM COATED ORAL
Qty: 60 TABLET | Refills: 0 | Status: SHIPPED | OUTPATIENT
Start: 2018-01-19 | End: 2018-07-17

## 2018-01-19 RX ORDER — RIFAXIMIN 550 MG/1
TABLET ORAL
Refills: 0 | COMMUNITY
Start: 2017-12-07 | End: 2018-01-19

## 2018-01-19 NOTE — PATIENT INSTRUCTIONS
Eucerin lotion or cetaphil lotion or cream    What is Atopic Dermatitis?  Atopic dermatitis (also called eczema) causes chronic skin irritation. It is often found in infants, teens, and adults. This disease often runs in families (is genetic). It may also be linked to allergies, such as hay fever and sometimes asthma. Patches of skin become dry, red, itchy, and scaly. In older adults, abnormally dry skin is often called xerosis. Sometimes eczema is only on the hands or feet. It often improves when the skin is well hydrated. It gets worse when the skin is dry. You can help control symptoms by practicing good self-care. Avoid anything that causes flare-ups (such as sunburn or vigorous scratching).  Where do you have symptoms?  Atopic dermatitis symptoms can appear anywhere on the body. But in most cases they vary based on the persons age. In infants, irritation is often seen on the cheeks, chin, near the mouth, and under the eyelids. In children ages 2 through 10, skin folds, such as the backs of the knees, or in the arm crease, are most often affected. In children 11 and older and in adults, symptoms can affect many areas.  What triggers symptoms?  Symptoms flare because of many things. These include skin dryness, scratching, stress, harsh soaps, and irritants such as dust or wool. Try to avoid anything that causes flare-ups.  Recognizing what causes flare-ups  To figure out what causes atopic dermatitis to flare, keep a list of things that seem to affect your skin. Start by filling in the spaces below. Then keep writing them down in a notebook or diary. The things that affect each person vary. So keep your own list and try to avoid your triggers.    Date Last Reviewed: 2/1/2017  © 0546-5953 [employer]. [address] [place] [address], [place] [address]. All rights reserved. This information is not intended as a substitute for professional medical care. Always follow your healthcare professional's  instructions.        ¿Qué es la dermatitis atópica?  La dermatitis atópica (también llamada eczema) causa la irritación crónica de la piel y es frecuente en bebés, adolescentes y adultos. Esta enfermedad, que a menudo es de origen genético (afecta a varias personas de la misma maryana), está vinculada a alergias leo la fiebre del heno y al asma en algunas ocasiones. Ciertas zonas de la piel se resecan, enrojecen, pican y se descaman. En los adultos mayores, a la piel anormalmente seca se le suele llamar xerosis. A veces, el eczema se limita a las sedrick o los pies y generalmente mejora cuando la piel está mio hidratada. Empeora cuando está seca. Usted puede ayudar a controlar los síntomas con buenas medidas de cuidado personal. Evite también todos aquellos factores que causan las erupciones (leo quemaduras de sol o rascarse con fuerza).  ¿Dónde aparecen los síntomas?  Los síntomas de la dermatitis atópica pueden aparecer en cualquier parte del cuerpo. Sin embargo, en la mayoría de los casos, varían según la edad del paciente. En los bebés, la irritación aparece principalmente en las mejillas, la barbilla, cerca de la boca y debajo de los párpados. Entre los 2 y los 10 años de edad, el eczema tiende a afectar más a ciertos pliegues de la piel, leo la parte posterior de las rodillas, o el pliegue del codo. En los niños mayores de 11 años y en los adultos, los síntomas suelen afectar muchas zonas.  ¿Qué desencadena los síntomas?  Las erupciones de la dermatitis atópica surgen por muchos factores, leo la resequedad, el rascado, el estrés, los jabones agresivos y ciertos alérgenos, leo el polvo y la agusto. Trate de evitar cualquier factor que cause meghan erupción.  Cómo reconocer las causas de las erupciones  Para determinar las causas de las erupciones de la dermatitis atópica, blake meghan lista de los factores que parecen afectar saha piel. Comience por llenar los espacios siguientes, y luego siga anotándolos en un  cuaderno o diario. Cada persona se ve afectada por factores distintos; por esto es importante que blake saha propia lista y trate de evitar los que le afectan a usted. Un buen punto de chris para el tratamiento de cualquier persona que tenga la piel seca es humectarla todos los días.    Date Last Reviewed: 5/7/2015  © 5300-1175 [employer]. [address] [place] [id number]. Todos los derechos reservados. Esta información no pretende sustituir la atención médica profesional. Sólo saha médico puede diagnosticar y tratar un problema de rosemary.

## 2018-01-19 NOTE — PROGRESS NOTES
Patient Name: Rachel Conroy    : 1955  MRN: [id number]    Subjective:  Rachel is a 62 y.o. female who presents today with daughter  for     1. Generalized itching to trunk and groin area, no rash, no sob, no throat irritation, no nasal congestion, feels well. She started lyrica 1 month ago and thought it was related to this and stopped it 8 days ago with persistent symptoms. She recently started a new protein diet as well call ideal protein but this was started after the itching started. She also recently started on some vitamin supplements-mtv, omega 3 fish oil, potassium, calcium and magnesium about 2 weeks ago and believes this was also started after the itching started. She had a CT with contrast 12/3/17 and recent biopsy as well of appendiceal tumor. The weather has been cold and she has been using the heater as well. She is taking claritin  and using calamine without improvement,     Past Medical History  Past Medical History:   Diagnosis Date    Arthritis     GERD (gastroesophageal reflux disease)     Hyperlipidemia     Hypertension     Irritable bowel syndrome     Joint pain     Knee injury     Migraine headache     Muscle pain, fibromyalgia 3/19/2014    Other physical therapy     Plantar fasciitis     Sleep apnea     Urinary incontinence        Past Surgical History  Past Surgical History:   Procedure Laterality Date    COLONOSCOPY N/A 2017    Procedure: COLONOSCOPY;  Surgeon: Mariaelena Harrell MD;  Location: [address]);  Service: Endoscopy;  Laterality: N/A;    HYSTERECTOMY         Family History  Family History   Problem Relation Age of Onset    Arthritis Mother     Alzheimer's disease Mother     Migraines Neg Hx     Melanoma Neg Hx     Psoriasis Neg Hx     Lupus Neg Hx     Eczema Neg Hx     Acne Neg Hx     Colon cancer Neg Hx     Stomach cancer Neg Hx     Esophageal cancer Neg Hx     Liver disease Neg Hx     Crohn's disease Neg Hx     Ulcerative  "colitis Neg Hx     Celiac disease Neg Hx        Social History  Social History     Social History    Marital status:      Spouse name: N/A    Number of children: N/A    Years of education: N/A     Occupational History    [employer]      Social History Main Topics    Smoking status: Never Smoker    Smokeless tobacco: Never Used    Alcohol use No    Drug use: No    Sexual activity: No     Other Topics Concern    Are You Pregnant Or Think You May Be? No    Breast-Feeding No     Social History Narrative    No narrative on file       Allergies  Review of patient's allergies indicates:   Allergen Reactions    Tramadol Itching    -reviewed and updated      Medications  Reviewed and updated.   Current Outpatient Prescriptions   Medication Sig Dispense Refill    clobetasol (CLOBEX) 0.05 % shampoo APPLY EXTERNALLY TO THE AFFECTED AREA EVERY 7 DAYS 118 mL 1    esomeprazole (NEXIUM) 40 MG capsule TAKE 1 CAPSULE BY MOUTH 30-45 MINUTES BEFORE BREAKFAST 90 capsule 0    losartan (COZAAR) 100 MG tablet TAKE 1 TABLET (100 MG TOTAL) BY MOUTH ONCE DAILY. 90 tablet 0    metoprolol succinate (TOPROL-XL) 25 MG 24 hr tablet Mo 1 tableta al isidoro. 90 tablet 1    amlodipine (NORVASC) 5 MG tablet Take 1 tablet (5 mg total) by mouth once daily. 30 tablet 6    hydrOXYzine HCl (ATARAX) 25 MG tablet Take 1 tablet (25 mg total) by mouth every 6 to 8 hours as needed for Itching. (caution sleepiness) 60 tablet 0     No current facility-administered medications for this visit.          Review of Systems   Constitutional: Negative for chills, fever and malaise/fatigue.   HENT: Negative for sore throat.    Respiratory: Negative for shortness of breath.    Cardiovascular: Negative for chest pain.   Skin: Positive for itching. Negative for rash.         Physical Exam  /80 (BP Location: Right arm, Patient Position: Sitting, BP Method: Small (Manual))   Pulse 78   Temp 98.2 °F (36.8 °C)   Ht 5' 7" (1.702 m)   " Wt 81.2 kg (179 lb)   SpO2 97%   BMI 28.04 kg/m²   Physical Exam   Constitutional: She is oriented to person, place, and time. She appears well-developed. No distress.   HENT:   Head: Normocephalic.   Right Ear: Tympanic membrane normal.   Left Ear: Tympanic membrane normal.   Nose: Nose normal.   Eyes: Conjunctivae and EOM are normal.   Cardiovascular: Normal rate, regular rhythm and normal heart sounds.    Pulmonary/Chest: Effort normal and breath sounds normal.   Neurological: She is alert and oriented to person, place, and time.   Ambulatory, normal gait   Skin: Skin is warm. No rash noted. She is not diaphoretic. No erythema.   Psychiatric: She has a normal mood and affect. Her behavior is normal.         Assessment/Plan:  Rachel Conroy is a 62 y.o. female who presents today for :    Itchy skin  Multiple possible etiology, no obvious red flags for significant allergic reaction at this time, I recommend lotioning well with hypoallergenic lotion or cream such as eucerin or cetaphil. Trial atarax. Discontinuing supplements except for mtv, calcium-vitamin d, avoid hot baths/showers. Consider trial low dose steroid if symptoms persist.   -     hydrOXYzine HCl (ATARAX) 25 MG tablet; Take 1 tablet (25 mg total) by mouth every 6 to 8 hours as needed for Itching. (caution sleepiness)  Dispense: 60 tablet; Refill: 0        Follow-up if symptoms worsen or fail to improve.

## 2018-01-20 ENCOUNTER — TELEPHONE (OUTPATIENT)
Dept: GASTROENTEROLOGY | Facility: CLINIC | Age: 63
End: 2018-01-20

## 2018-01-20 DIAGNOSIS — K63.9 NODULE OF COLON: Primary | ICD-10-CM

## 2018-01-23 DIAGNOSIS — K21.9 GASTROESOPHAGEAL REFLUX DISEASE WITHOUT ESOPHAGITIS: ICD-10-CM

## 2018-01-23 RX ORDER — LOSARTAN POTASSIUM 100 MG/1
TABLET ORAL
Qty: 90 TABLET | Refills: 0 | Status: SHIPPED | OUTPATIENT
Start: 2018-01-23 | End: 2018-05-10 | Stop reason: SDUPTHER

## 2018-01-23 RX ORDER — ESOMEPRAZOLE MAGNESIUM 40 MG/1
CAPSULE, DELAYED RELEASE ORAL
Qty: 90 CAPSULE | Refills: 0 | Status: SHIPPED | OUTPATIENT
Start: 2018-01-23 | End: 2018-05-10 | Stop reason: SDUPTHER

## 2018-02-05 ENCOUNTER — TELEPHONE (OUTPATIENT)
Dept: GASTROENTEROLOGY | Facility: CLINIC | Age: 63
End: 2018-02-05

## 2018-02-06 ENCOUNTER — TELEPHONE (OUTPATIENT)
Dept: GASTROENTEROLOGY | Facility: CLINIC | Age: 63
End: 2018-02-06

## 2018-02-15 ENCOUNTER — TELEPHONE (OUTPATIENT)
Dept: GASTROENTEROLOGY | Facility: CLINIC | Age: 63
End: 2018-02-15

## 2018-02-16 ENCOUNTER — TELEPHONE (OUTPATIENT)
Dept: ENDOSCOPY | Facility: HOSPITAL | Age: 63
End: 2018-02-16

## 2018-02-16 DIAGNOSIS — Z12.11 SPECIAL SCREENING FOR MALIGNANT NEOPLASMS, COLON: Primary | ICD-10-CM

## 2018-02-16 RX ORDER — POLYETHYLENE GLYCOL 3350, SODIUM SULFATE ANHYDROUS, SODIUM BICARBONATE, SODIUM CHLORIDE, POTASSIUM CHLORIDE 236; 22.74; 6.74; 5.86; 2.97 G/4L; G/4L; G/4L; G/4L; G/4L
4 POWDER, FOR SOLUTION ORAL ONCE
Qty: 4000 ML | Refills: 0 | Status: SHIPPED | OUTPATIENT
Start: 2018-02-16 | End: 2018-02-16

## 2018-02-16 NOTE — TELEPHONE ENCOUNTER
Patient's daughter, Luna, called back.  Instructions discussed and mailed for Colonoscopy/LEUS scheduled 2/26/18 at 1100.

## 2018-02-16 NOTE — TELEPHONE ENCOUNTER
Called patient's daughter, Luna, about Colonoscopy/LEUS scheduled 2/26/18 at 1100.  Left voicemail requesting call back.

## 2018-02-19 ENCOUNTER — OFFICE VISIT (OUTPATIENT)
Dept: HEPATOLOGY | Facility: CLINIC | Age: 63
End: 2018-02-19
Payer: COMMERCIAL

## 2018-02-19 ENCOUNTER — LAB VISIT (OUTPATIENT)
Dept: LAB | Facility: HOSPITAL | Age: 63
End: 2018-02-19
Attending: INTERNAL MEDICINE
Payer: COMMERCIAL

## 2018-02-19 VITALS
HEIGHT: 67 IN | SYSTOLIC BLOOD PRESSURE: 121 MMHG | HEART RATE: 82 BPM | BODY MASS INDEX: 27.68 KG/M2 | OXYGEN SATURATION: 97 % | DIASTOLIC BLOOD PRESSURE: 65 MMHG | RESPIRATION RATE: 18 BRPM | WEIGHT: 176.38 LBS | TEMPERATURE: 96 F

## 2018-02-19 DIAGNOSIS — K76.0 NAFLD (NONALCOHOLIC FATTY LIVER DISEASE): ICD-10-CM

## 2018-02-19 DIAGNOSIS — K75.81 NASH (NONALCOHOLIC STEATOHEPATITIS): ICD-10-CM

## 2018-02-19 DIAGNOSIS — K75.81 NASH (NONALCOHOLIC STEATOHEPATITIS): Primary | ICD-10-CM

## 2018-02-19 LAB
ALBUMIN SERPL BCP-MCNC: 3.9 G/DL
ALBUMIN SERPL BCP-MCNC: 3.9 G/DL
ALP SERPL-CCNC: 133 U/L
ALP SERPL-CCNC: 133 U/L
ALT SERPL W/O P-5'-P-CCNC: 22 U/L
ALT SERPL W/O P-5'-P-CCNC: 22 U/L
AST SERPL-CCNC: 23 U/L
AST SERPL-CCNC: 23 U/L
BILIRUB DIRECT SERPL-MCNC: 0.2 MG/DL
BILIRUB DIRECT SERPL-MCNC: 0.2 MG/DL
BILIRUB SERPL-MCNC: 0.4 MG/DL
BILIRUB SERPL-MCNC: 0.4 MG/DL
CHOLEST SERPL-MCNC: 205 MG/DL
CHOLEST/HDLC SERPL: 2.5 {RATIO}
HDLC SERPL-MCNC: 81 MG/DL
HDLC SERPL: 39.5 %
LDLC SERPL CALC-MCNC: 106.4 MG/DL
NONHDLC SERPL-MCNC: 124 MG/DL
PROT SERPL-MCNC: 7.4 G/DL
PROT SERPL-MCNC: 7.4 G/DL
TRIGL SERPL-MCNC: 88 MG/DL

## 2018-02-19 PROCEDURE — 3008F BODY MASS INDEX DOCD: CPT | Mod: S$GLB,,, | Performed by: INTERNAL MEDICINE

## 2018-02-19 PROCEDURE — 80061 LIPID PANEL: CPT

## 2018-02-19 PROCEDURE — 99999 PR PBB SHADOW E&M-EST. PATIENT-LVL IV: CPT | Mod: PBBFAC,,, | Performed by: INTERNAL MEDICINE

## 2018-02-19 PROCEDURE — 99214 OFFICE O/P EST MOD 30 MIN: CPT | Mod: S$GLB,,, | Performed by: INTERNAL MEDICINE

## 2018-02-19 PROCEDURE — 80076 HEPATIC FUNCTION PANEL: CPT

## 2018-02-19 PROCEDURE — 36415 COLL VENOUS BLD VENIPUNCTURE: CPT

## 2018-02-19 RX ORDER — AMLODIPINE BESYLATE 10 MG/1
TABLET ORAL
COMMUNITY
Start: 2018-02-12 | End: 2018-05-10 | Stop reason: SDUPTHER

## 2018-02-19 NOTE — PATIENT INSTRUCTIONS
We will obtain liver tests today.    Continue current lifestyle changes and physical activity.    If liver tests are not improving with weight loss, would consider liver biopsy.    Abdominal pain may be related to gallbladder.     If symptoms worsen, would proceed with HIDA scan.    Return to clinic in 6 months

## 2018-02-19 NOTE — PROGRESS NOTES
Hepatology Follow-up Note    Chief complaint: elevated liver tests     HPI:  Rachel Conroy is a 62 y.o. female that presents to hepatology clinic for follow-up of fatty liver.  Accompanied by her daughter who provides translation.    The patient was last seen in clinic on 2/22/2017.  Since that time she reports doing well.  She has not experienced clinical symptoms of chronic liver disease.  She reports improved diet and weight loss.  Review of office weights shows decrease of 16 lbs over the last year.  Also noted to have improvement in triglycerides.  No evidence of diabetes and blood pressure well controlled on amlodipine.  Despite these positive lifestyle measures, last set of LFTs from 6/2017 show increase.      The patient also reports intermittent abdominal pain.  Reported as RUQ following po intake and lasting for approximately 30 minutes with spontaneous resolution.  Reports mild to moderate pain when it occurs.  Patient has not had evidence of cholelithiasis on prior abdominal imaging.      Patient Active Problem List   Diagnosis    Osteoarthritis of right knee    Migraine headache    Nail fungal infection    Hyperlipidemia    HTN (hypertension)    Muscle pain, fibromyalgia    Fatigue    Unspecified vitamin D deficiency    IBS (irritable bowel syndrome)    Arthralgia of both knees    Chronic pain of multiple joints    BRBPR (bright red blood per rectum)       Past Medical History:   Diagnosis Date    Arthritis     GERD (gastroesophageal reflux disease)     Hyperlipidemia     Hypertension     Irritable bowel syndrome     Joint pain     Knee injury     Migraine headache     Muscle pain, fibromyalgia 3/19/2014    Other physical therapy     Plantar fasciitis     Sleep apnea     Urinary incontinence        Past Surgical History:   Procedure Laterality Date    COLONOSCOPY N/A 11/9/2017    Procedure: COLONOSCOPY;  Surgeon: Mariaelena Harrell MD;  Location: [facility] ([address] [place] [address]);   Service: Endoscopy;  Laterality: N/A;    HYSTERECTOMY         Family History   Problem Relation Age of Onset    Arthritis Mother     Alzheimer's disease Mother     Migraines Neg Hx     Melanoma Neg Hx     Psoriasis Neg Hx     Lupus Neg Hx     Eczema Neg Hx     Acne Neg Hx     Colon cancer Neg Hx     Stomach cancer Neg Hx     Esophageal cancer Neg Hx     Liver disease Neg Hx     Crohn's disease Neg Hx     Ulcerative colitis Neg Hx     Celiac disease Neg Hx        Social History     Social History    Marital status:      Spouse name: N/A    Number of children: N/A    Years of education: N/A     Occupational History    [employer]      Social History Main Topics    Smoking status: Never Smoker    Smokeless tobacco: Never Used    Alcohol use No    Drug use: No    Sexual activity: No     Other Topics Concern    Are You Pregnant Or Think You May Be? No    Breast-Feeding No     Social History Narrative    No narrative on file   Patient has no tobacco, alcohol or illicit drug use currently or remotely.     Current Outpatient Prescriptions   Medication Sig Dispense Refill    amlodipine (NORVASC) 5 MG tablet Take 1 tablet (5 mg total) by mouth once daily. 30 tablet 6    clobetasol (CLOBEX) 0.05 % shampoo APPLY EXTERNALLY TO THE AFFECTED AREA EVERY 7 DAYS 118 mL 1    esomeprazole (NEXIUM) 40 MG capsule TAKE 1 CAPSULE BY MOUTH 30-45 MINUTES BEFORE BREAKFAST 90 capsule 0    hydrOXYzine HCl (ATARAX) 25 MG tablet Take 1 tablet (25 mg total) by mouth every 6 to 8 hours as needed for Itching. (caution sleepiness) 60 tablet 0    losartan (COZAAR) 100 MG tablet TAKE 1 TABLET (100 MG TOTAL) BY MOUTH ONCE DAILY. 90 tablet 0    metoprolol succinate (TOPROL-XL) 25 MG 24 hr tablet Mo 1 tableta al isidoro. 90 tablet 1     No current facility-administered medications for this visit.        Review of patient's allergies indicates:   Allergen Reactions    Tramadol Itching       Review of Systems  "  Constitutional: Negative for chills, fever and weight loss.   Eyes: Negative.    Respiratory: Negative for cough and shortness of breath.    Cardiovascular: Negative for chest pain and leg swelling.   Gastrointestinal: Positive for abdominal pain. Negative for diarrhea, heartburn, nausea and vomiting.   Genitourinary: Negative for urgency.   Musculoskeletal: Positive for joint pain. Negative for myalgias.   Skin: Negative for rash.   Neurological: Negative for focal weakness and headaches.   Endo/Heme/Allergies: Does not bruise/bleed easily.   Psychiatric/Behavioral: Negative for depression.       Vitals:    02/19/18 1406   BP: 121/65   Pulse: 82   Resp: 18   Temp: 96.4 °F (35.8 °C)   TempSrc: Oral   SpO2: 97%   Weight: 80 kg (176 lb 5.9 oz)   Height: 5' 7" (1.702 m)       Physical Exam   Constitutional: She is oriented to person, place, and time. She appears well-developed and well-nourished. No distress.   HENT:   Head: Normocephalic and atraumatic.   Mouth/Throat: Oropharynx is clear and moist.   Eyes: EOM are normal. Pupils are equal, round, and reactive to light. No scleral icterus.   Neck: Normal range of motion. Neck supple. No thyromegaly present.   Cardiovascular: Normal rate, regular rhythm and normal heart sounds.  Exam reveals no gallop and no friction rub.    No murmur heard.  Pulmonary/Chest: Effort normal. No respiratory distress. She has no wheezes. She has no rales.   Abdominal: Soft. Bowel sounds are normal. She exhibits no distension. There is no tenderness.   Musculoskeletal: Normal range of motion. She exhibits no edema.   Lymphadenopathy:     She has no cervical adenopathy.   Neurological: She is alert and oriented to person, place, and time.   Skin: Skin is warm and dry. No rash noted.   Psychiatric: She has a normal mood and affect. Her behavior is normal.   Vitals reviewed.      Lab Results   Component Value Date     (H) 06/01/2017    AST 53 (H) 06/01/2017    ALKPHOS 126 06/01/2017 "    BILITOT 0.6 06/01/2017       Lab Results   Component Value Date    WBC 6.39 06/01/2017    HGB 13.7 06/01/2017    HCT 41.3 06/01/2017    MCV 87 06/01/2017     06/01/2017       Lab Results   Component Value Date     12/18/2017    K 4.4 12/18/2017     12/18/2017    CO2 28 12/18/2017    BUN 12 12/18/2017    CREATININE 0.7 12/18/2017    CALCIUM 9.2 12/18/2017    ANIONGAP 7 (L) 12/18/2017    ESTGFRAFRICA >60 12/18/2017    EGFRNONAA >60 12/18/2017       Imaging: Abd U/S 11/2016 and CT scan report reviewed     Assessment:  62 y.o. female with elevated liver tests, most concerning for GRAHAM based on risk factors.  Patient has made positive lifestyle modifications as discussed and achieved significant weight loss.      NAFLD:  Patient encouraged to continue current lifestyle changes.  Will recheck liver tests today.  If not improved, then would consider liver biopsy to exclude another cause for chronic liver disease given negative serologic work-up.  If improved or normalized, patient should continue current measures.      RTC in 1 year     Patient has asked that daughter's cell phone be used for discussion of lab and testing results [phone] (Luna) given limited English.

## 2018-02-20 ENCOUNTER — TELEPHONE (OUTPATIENT)
Dept: HEPATOLOGY | Facility: CLINIC | Age: 63
End: 2018-02-20

## 2018-02-20 ENCOUNTER — PATIENT MESSAGE (OUTPATIENT)
Dept: HEPATOLOGY | Facility: CLINIC | Age: 63
End: 2018-02-20

## 2018-02-20 NOTE — TELEPHONE ENCOUNTER
----- Message from Debra Finnegan MD sent at 2/20/2018  6:43 AM CST -----  Please inform the patient that her liver tests are normal.  This is consistent with non-alcoholic fatty liver disease and correlates with weight loss.  Continue current lifestyle measures and return to clinic in 12 months

## 2018-02-26 ENCOUNTER — HOSPITAL ENCOUNTER (OUTPATIENT)
Facility: HOSPITAL | Age: 63
Discharge: HOME | End: 2018-02-26
Attending: INTERNAL MEDICINE | Admitting: INTERNAL MEDICINE
Payer: COMMERCIAL

## 2018-02-26 ENCOUNTER — ANESTHESIA (OUTPATIENT)
Dept: ENDOSCOPY | Facility: HOSPITAL | Age: 63
End: 2018-02-26
Payer: COMMERCIAL

## 2018-02-26 ENCOUNTER — ANESTHESIA EVENT (OUTPATIENT)
Dept: ENDOSCOPY | Facility: HOSPITAL | Age: 63
End: 2018-02-26
Payer: COMMERCIAL

## 2018-02-26 ENCOUNTER — SURGERY (OUTPATIENT)
Age: 63
End: 2018-02-26

## 2018-02-26 VITALS
TEMPERATURE: 98 F | OXYGEN SATURATION: 99 % | DIASTOLIC BLOOD PRESSURE: 61 MMHG | HEIGHT: 67 IN | BODY MASS INDEX: 27.31 KG/M2 | HEART RATE: 67 BPM | SYSTOLIC BLOOD PRESSURE: 120 MMHG | WEIGHT: 174 LBS | RESPIRATION RATE: 18 BRPM

## 2018-02-26 DIAGNOSIS — K63.9 NODULE OF COLON: Primary | ICD-10-CM

## 2018-02-26 PROCEDURE — 37000009 HC ANESTHESIA EA ADD 15 MINS: Performed by: INTERNAL MEDICINE

## 2018-02-26 PROCEDURE — 25000003 PHARM REV CODE 250: Performed by: INTERNAL MEDICINE

## 2018-02-26 PROCEDURE — 63600175 PHARM REV CODE 636 W HCPCS: Performed by: NURSE ANESTHETIST, CERTIFIED REGISTERED

## 2018-02-26 PROCEDURE — 45391 COLONOSCOPY W/ENDOSCOPE US: CPT | Mod: ,,, | Performed by: INTERNAL MEDICINE

## 2018-02-26 PROCEDURE — D9220A PRA ANESTHESIA: Mod: 33,CRNA,, | Performed by: NURSE ANESTHETIST, CERTIFIED REGISTERED

## 2018-02-26 PROCEDURE — 45391 COLONOSCOPY W/ENDOSCOPE US: CPT | Performed by: INTERNAL MEDICINE

## 2018-02-26 PROCEDURE — 37000008 HC ANESTHESIA 1ST 15 MINUTES: Performed by: INTERNAL MEDICINE

## 2018-02-26 PROCEDURE — D9220A PRA ANESTHESIA: Mod: 33,ANES,, | Performed by: ANESTHESIOLOGY

## 2018-02-26 PROCEDURE — 25000003 PHARM REV CODE 250: Performed by: NURSE ANESTHETIST, CERTIFIED REGISTERED

## 2018-02-26 RX ORDER — SODIUM CHLORIDE 9 MG/ML
INJECTION, SOLUTION INTRAVENOUS CONTINUOUS
Status: DISCONTINUED | OUTPATIENT
Start: 2018-02-26 | End: 2018-02-26 | Stop reason: HOSPADM

## 2018-02-26 RX ORDER — GLYCOPYRROLATE 0.2 MG/ML
INJECTION INTRAMUSCULAR; INTRAVENOUS
Status: DISCONTINUED | OUTPATIENT
Start: 2018-02-26 | End: 2018-02-26

## 2018-02-26 RX ORDER — LIDOCAINE HCL/PF 100 MG/5ML
SYRINGE (ML) INTRAVENOUS
Status: DISCONTINUED | OUTPATIENT
Start: 2018-02-26 | End: 2018-02-26

## 2018-02-26 RX ORDER — FENTANYL CITRATE 50 UG/ML
25 INJECTION, SOLUTION INTRAMUSCULAR; INTRAVENOUS EVERY 5 MIN PRN
Status: DISCONTINUED | OUTPATIENT
Start: 2018-02-26 | End: 2018-02-26 | Stop reason: HOSPADM

## 2018-02-26 RX ORDER — PROPOFOL 10 MG/ML
VIAL (ML) INTRAVENOUS CONTINUOUS PRN
Status: DISCONTINUED | OUTPATIENT
Start: 2018-02-26 | End: 2018-02-26

## 2018-02-26 RX ORDER — MEPERIDINE HYDROCHLORIDE 50 MG/ML
12.5 INJECTION INTRAMUSCULAR; INTRAVENOUS; SUBCUTANEOUS ONCE AS NEEDED
Status: DISCONTINUED | OUTPATIENT
Start: 2018-02-26 | End: 2018-02-26 | Stop reason: HOSPADM

## 2018-02-26 RX ORDER — PROPOFOL 10 MG/ML
VIAL (ML) INTRAVENOUS
Status: DISCONTINUED | OUTPATIENT
Start: 2018-02-26 | End: 2018-02-26

## 2018-02-26 RX ADMIN — LIDOCAINE HYDROCHLORIDE 80 MG: 20 INJECTION, SOLUTION INTRAVENOUS at 11:02

## 2018-02-26 RX ADMIN — PROPOFOL 80 MG: 10 INJECTION, EMULSION INTRAVENOUS at 11:02

## 2018-02-26 RX ADMIN — GLYCOPYRROLATE 0.1 MG: 0.2 INJECTION, SOLUTION INTRAMUSCULAR; INTRAVENOUS at 11:02

## 2018-02-26 RX ADMIN — SODIUM CHLORIDE: 0.9 INJECTION, SOLUTION INTRAVENOUS at 10:02

## 2018-02-26 RX ADMIN — PROPOFOL 200 MCG/KG/MIN: 10 INJECTION, EMULSION INTRAVENOUS at 11:02

## 2018-02-26 NOTE — PLAN OF CARE
Procedure reviewed with patient and daughter. Daughter translated for patient. Verbalized understanding.

## 2018-02-26 NOTE — PROVATION PATIENT INSTRUCTIONS
Discharge Summary/Instructions after an Endoscopic Procedure  Patient Name: Rachel Conroy  Patient MRN: [id number]  Patient YOB: 1955 Monday, February 26, 2018  Nicola Zhu MD  RESTRICTIONS:  During your procedure today, you received medications for sedation.  These   medications may affect your judgment, balance and coordination.  Therefore,   for 24 hours, you have the following restrictions:   - DO NOT drive a car, operate machinery, make legal/financial decisions,   sign important papers or drink alcohol.    ACTIVITY:  The following day: return to full activity including work, except no heavy   lifting, straining or running for 3 days if polyps were removed.  DIET:  Eat and drink normally unless instructed otherwise.     TREATMENT FOR COMMON SIDE EFFECTS:  - Mild abdominal pain, nausea, belching, bloating or excessive gas:  rest,   eat lightly and use a heating pad.  - Sore Throat: treat with throat lozenges and/or gargle with warm salt   water.  - Because air was used during the procedure, expelling large amounts of air   from your rectum or belching is normal.  - If a bowel prep was taken, you may not have a bowel movement for 1-3 days.    This is normal.  SYMPTOMS TO WATCH FOR AND REPORT TO YOUR PHYSICIAN:  1. Abdominal pain or bloating, other than gas cramps.  2. Chest pain.  3. Back pain.  4. Signs of infection such as: chills or fever occurring within 24 hours   after the procedure.  5. Rectal bleeding, which would show as bright red, maroon, or black stools.   (A tablespoon of blood from the rectum is not serious, especially if   hemorrhoids are present.)  6. Vomiting.  7. Weakness or dizziness.  GO DIRECTLY TO THE NEAREST EMERGENCY ROOM IF YOU HAVE ANY OF THE FOLLOWING:      Difficulty breathing  Chills and/or fever over 101 F   Persistent vomiting and/or vomiting blood   Severe abdominal pain   Severe chest pain   Black, tarry stools   Bleeding- more than one tablespoon   Any other  symptom or condition that you feel may need urgent attention  Your doctor recommends these additional instructions:  If any biopsies were taken, your doctors clinic will contact you in 1 to 2   weeks with any results.  None  For questions, problems or results please call your physician - Nicola Zhu MD at Work:  [phone].  [facility], EMERGENCY ROOM PHONE NUMBER: [phone]  IF A COMPLICATION OR EMERGENCY SITUATION ARISES AND YOU ARE UNABLE TO REACH   YOUR PHYSICIAN - GO DIRECTLY TO THE EMERGENCY ROOM.  Nicola Zhu MD  2/26/2018 11:40:33 AM  This report has been verified and signed electronically.

## 2018-02-26 NOTE — ANESTHESIA POSTPROCEDURE EVALUATION
"Anesthesia Post Evaluation    Patient: Rachel Conroy    Procedure(s) Performed: Procedure(s) (LRB):  COLONOSCOPY (N/A)  ULTRASOUND-ENDOSCOPIC-LOWER (N/A)    Final Anesthesia Type: general  Patient location during evaluation: PACU  Patient participation: Yes- Able to Participate  Level of consciousness: awake and alert  Post-procedure vital signs: reviewed and stable  Pain management: adequate  Airway patency: patent  PONV status at discharge: No PONV  Anesthetic complications: no      Cardiovascular status: blood pressure returned to baseline  Respiratory status: spontaneous ventilation and room air  Hydration status: euvolemic  Follow-up not needed.        Visit Vitals  /61   Pulse 67   Temp 36.7 °C (98.1 °F) (Temporal)   Resp 18   Ht 5' 7" (1.702 m)   Wt 78.9 kg (174 lb)   SpO2 99%   Breastfeeding? No   BMI 27.25 kg/m²       Pain/Shahzad Score: Pain Assessment Performed: Yes (2/26/2018 12:14 PM)  Presence of Pain: denies (2/26/2018 12:14 PM)  Shahzad Score: 9 (2/26/2018 11:28 AM)      "

## 2018-02-26 NOTE — ANESTHESIA PREPROCEDURE EVALUATION
02/26/2018  Rachel Conroy is a 62 y.o., female.    Anesthesia Evaluation    I have reviewed the Patient Summary Reports.    I have reviewed the Nursing Notes.      Review of Systems  Anesthesia Hx:  No problems with previous Anesthesia    Hematology/Oncology:  Hematology Normal   Oncology Normal     EENT/Dental:EENT/Dental Normal   Cardiovascular:   Hypertension    Pulmonary:   Sleep Apnea    Renal/:  Renal/ Normal     Hepatic/GI:   GERD    Musculoskeletal:   Arthritis     Neurological:   Headaches    Endocrine:  Endocrine Normal    Dermatological:  Skin Normal    Psych:  Psychiatric Normal           Physical Exam  General:  Well nourished    Airway/Jaw/Neck:  Airway Findings: Mouth Opening: Normal Tongue: Normal  General Airway Assessment: Adult  Mallampati: II  TM Distance: Normal, at least 6 cm        Eyes/Ears/Nose:  EYES/EARS/NOSE FINDINGS: Normal    Chest/Lungs:  Chest/Lungs Clear    Heart/Vascular:  Heart Findings: Normal Heart murmur: negative Vascular Findings: Normal    Abdomen:  Abdomen Findings: Normal    Musculoskeletal:  Musculoskeletal Findings: Normal   Skin:  Skin Findings: Normal    Mental Status:  Mental Status Findings: Normal        Anesthesia Plan  Type of Anesthesia, risks & benefits discussed:  Anesthesia Type:  general  Patient's Preference:   Intra-op Monitoring Plan:   Intra-op Monitoring Plan Comments:   Post Op Pain Control Plan:   Post Op Pain Control Plan Comments:   Induction:   IV  Beta Blocker:  Patient is not currently on a Beta-Blocker (No further documentation required).       Informed Consent: Patient understands risks and agrees with Anesthesia plan.  Questions answered. Anesthesia consent signed with patient.  ASA Score: 2     Day of Surgery Review of History & Physical:    H&P update referred to the surgeon.         Ready For Surgery From Anesthesia  Perspective.

## 2018-02-26 NOTE — H&P
History & Physical - Short Stay  Gastroenterology      SUBJECTIVE:     Procedure: EUS    Chief Complaint/Indication for Procedure: Cecal nodule    History of Present Illness:  Patient is a 62 y.o. female presents with appendiceal orifice nodule here for EUS.   Stated some RUQ pain. No RLQ pain      Colonoscopy 11/9/2017    Submucosal nodule at the appendiceal orifice.                         Biopsied.                        - Vascular nodule in the transverse colon.                        - Non-bleeding internal hemorrhoids.                        - The entire examined colon is normal. Biopsied.                        - The examination was otherwise normal on direct                         and retroflexion views.  Pathology     1. COLON, APPENDIX, RULE OUT SUBMUCOSAL LESION (BIOPSY):  -Colonic mucosa with no significant pathologic changes  -No submucosa present for evaluation  2. COLON, RIGHT AND LEFT (BIOPSIES):  -Colonic mucosa with no significant pathologic changes  -Negative for active inflammation  -No features of microscopic colitis      CT scan 12/28/2017    There is slight dilation of the appendix lumen at its origin on the cecum which is nonspecific may be due to   mucous or recent post biopsy changes.  To correlate with most recent colonoscopy biopsy results.  There is no inflammatory changes of the appendix.  Benign left adrenal adenoma.  Left kidney cyst.  Hysterectomy.        PTA Medications   Medication Sig    amLODIPine (NORVASC) 10 MG tablet     clobetasol (CLOBEX) 0.05 % shampoo APPLY EXTERNALLY TO THE AFFECTED AREA EVERY 7 DAYS    esomeprazole (NEXIUM) 40 MG capsule TAKE 1 CAPSULE BY MOUTH 30-45 MINUTES BEFORE BREAKFAST    hydrOXYzine HCl (ATARAX) 25 MG tablet Take 1 tablet (25 mg total) by mouth every 6 to 8 hours as needed for Itching. (caution sleepiness)    losartan (COZAAR) 100 MG tablet TAKE 1 TABLET (100 MG TOTAL) BY MOUTH ONCE DAILY.    metoprolol succinate (TOPROL-XL) 25 MG 24 hr  tablet Mo 1 tableta al isidoro.       Review of patient's allergies indicates:   Allergen Reactions    Tramadol Itching        Past Medical History:   Diagnosis Date    Arthritis     GERD (gastroesophageal reflux disease)     Hyperlipidemia     Hypertension     Irritable bowel syndrome     Joint pain     Knee injury     Migraine headache     Muscle pain, fibromyalgia 3/19/2014    Other physical therapy     Plantar fasciitis     Sleep apnea     Urinary incontinence      Past Surgical History:   Procedure Laterality Date    COLONOSCOPY N/A 11/9/2017    Procedure: COLONOSCOPY;  Surgeon: Mariaelena Harrell MD;  Location: [address]);  Service: Endoscopy;  Laterality: N/A;    HYSTERECTOMY       Family History   Problem Relation Age of Onset    Arthritis Mother     Alzheimer's disease Mother     Migraines Neg Hx     Melanoma Neg Hx     Psoriasis Neg Hx     Lupus Neg Hx     Eczema Neg Hx     Acne Neg Hx     Colon cancer Neg Hx     Stomach cancer Neg Hx     Esophageal cancer Neg Hx     Liver disease Neg Hx     Crohn's disease Neg Hx     Ulcerative colitis Neg Hx     Celiac disease Neg Hx      Social History   Substance Use Topics    Smoking status: Never Smoker    Smokeless tobacco: Never Used    Alcohol use No       Review of Systems:  Gastrointestinal: positive for abdominal pain    OBJECTIVE:     Vital Signs (Most Recent)       Physical Exam:  General: well developed, well nourished  Lungs:  clear to auscultation bilaterally and normal respiratory effort  Heart: regular rate, S1, S2 normal  Abdomen: soft, non-tender non-distented; bowel sounds normal; no masses,  no organomegaly    Laboratory  CBC: No results for input(s): WBC, RBC, HGB, HCT, PLT, MCV, MCH, MCHC in the last 168 hours.  CMP:   Recent Labs  Lab 02/19/18  1500   ALBUMIN 3.9  3.9   PROT 7.4  7.4   ALKPHOS 133  133   ALT 22  22   AST 23  23   BILITOT 0.4  0.4     Coagulation: No results for input(s): LABPROT, INR,  APTT in the last 168 hours.      Diagnostic Results:      ASSESSMENT/PLAN:     Cecal nodule    Plan: EUS    Anesthesia Plan: MAC    ASA Grade: ASA 2 - Patient with mild systemic disease with no functional limitations      The impression and plan was discussed in detail with the patient and family. All questions have been answered and the patient voices understanding of our plan at this point. The risk of the procedure was discussed in detail which includes but not limited to bleeding, infection, perforation in some cases requiring surgery with its spectrum of complications.

## 2018-03-08 ENCOUNTER — TELEPHONE (OUTPATIENT)
Dept: SURGERY | Facility: CLINIC | Age: 63
End: 2018-03-08

## 2018-03-08 NOTE — TELEPHONE ENCOUNTER
Spoke with pt daughter, Luna.  Scheduled pt to see Dr. Farias on 3/21/18 @ 3555.  appt reminder mailed.

## 2018-03-08 NOTE — TELEPHONE ENCOUNTER
----- Message from Lg Farias Jr., MD sent at 3/1/2018  3:40 PM CST -----  Can we have her see us back.  We can set her up for cecectomy.

## 2018-03-21 ENCOUNTER — OFFICE VISIT (OUTPATIENT)
Dept: SURGERY | Facility: CLINIC | Age: 63
End: 2018-03-21
Payer: COMMERCIAL

## 2018-03-21 VITALS
BODY MASS INDEX: 26.87 KG/M2 | HEART RATE: 86 BPM | HEIGHT: 67 IN | WEIGHT: 171.19 LBS | DIASTOLIC BLOOD PRESSURE: 64 MMHG | TEMPERATURE: 98 F | SYSTOLIC BLOOD PRESSURE: 139 MMHG

## 2018-03-21 DIAGNOSIS — K63.89 CECUM MASS: Primary | ICD-10-CM

## 2018-03-21 PROCEDURE — 99213 OFFICE O/P EST LOW 20 MIN: CPT | Mod: S$GLB,,, | Performed by: SURGERY

## 2018-03-21 PROCEDURE — 99999 PR PBB SHADOW E&M-EST. PATIENT-LVL III: CPT | Mod: PBBFAC,,, | Performed by: SURGERY

## 2018-03-21 PROCEDURE — 3078F DIAST BP <80 MM HG: CPT | Mod: CPTII,S$GLB,, | Performed by: SURGERY

## 2018-03-21 PROCEDURE — 3075F SYST BP GE 130 - 139MM HG: CPT | Mod: CPTII,S$GLB,, | Performed by: SURGERY

## 2018-03-21 RX ORDER — LIDOCAINE HYDROCHLORIDE 10 MG/ML
1 INJECTION, SOLUTION EPIDURAL; INFILTRATION; INTRACAUDAL; PERINEURAL ONCE
Status: CANCELLED | OUTPATIENT
Start: 2018-03-21 | End: 2018-03-21

## 2018-03-21 NOTE — PROGRESS NOTES
History & Physical    SUBJECTIVE:     History of Present Illness:  Patient is a 62 y.o. female presents with submucosal mass at appendiceal orifice.  Pt with IBS and has intermittent diarrhea and constipation but this has improved with some diet changes.  No melena or PRBPR.  Presents from GI to fu appendiceal mass.  Has had a coloscopy with us since last visit.  Mass appeared to be cystic in nature.  Presents to discuss cecectomy.        Review of patient's allergies indicates:   Allergen Reactions    Tramadol Itching       Current Outpatient Prescriptions   Medication Sig Dispense Refill    amLODIPine (NORVASC) 10 MG tablet       clobetasol (CLOBEX) 0.05 % shampoo APPLY EXTERNALLY TO THE AFFECTED AREA EVERY 7 DAYS 118 mL 1    esomeprazole (NEXIUM) 40 MG capsule TAKE 1 CAPSULE BY MOUTH 30-45 MINUTES BEFORE BREAKFAST 90 capsule 0    hydrOXYzine HCl (ATARAX) 25 MG tablet Take 1 tablet (25 mg total) by mouth every 6 to 8 hours as needed for Itching. (caution sleepiness) 60 tablet 0    losartan (COZAAR) 100 MG tablet TAKE 1 TABLET (100 MG TOTAL) BY MOUTH ONCE DAILY. 90 tablet 0    metoprolol succinate (TOPROL-XL) 25 MG 24 hr tablet Mo 1 tableta al isidoro. 90 tablet 1     No current facility-administered medications for this visit.        Past Medical History:   Diagnosis Date    Arthritis     GERD (gastroesophageal reflux disease)     Hyperlipidemia     Hypertension     Irritable bowel syndrome     Joint pain     Knee injury     Migraine headache     Muscle pain, fibromyalgia 3/19/2014    Other physical therapy     Plantar fasciitis     Sleep apnea     Urinary incontinence      Past Surgical History:   Procedure Laterality Date    COLONOSCOPY N/A 11/9/2017    Procedure: COLONOSCOPY;  Surgeon: Mariaelena Harrell MD;  Location: [facility] (4TH FLR);  Service: Endoscopy;  Laterality: N/A;    COLONOSCOPY N/A 2/26/2018    Procedure: COLONOSCOPY;  Surgeon: Nicola Zhu MD;  Location: [facility] (2ND  "[facility]);  Service: Endoscopy;  Laterality: N/A;    HYSTERECTOMY       Family History   Problem Relation Age of Onset    Arthritis Mother     Alzheimer's disease Mother     Migraines Neg Hx     Melanoma Neg Hx     Psoriasis Neg Hx     Lupus Neg Hx     Eczema Neg Hx     Acne Neg Hx     Colon cancer Neg Hx     Stomach cancer Neg Hx     Esophageal cancer Neg Hx     Liver disease Neg Hx     Crohn's disease Neg Hx     Ulcerative colitis Neg Hx     Celiac disease Neg Hx      Social History   Substance Use Topics    Smoking status: Never Smoker    Smokeless tobacco: Never Used    Alcohol use No        Review of Systems:  Review of Systems   Constitutional: Negative for activity change, appetite change, chills, diaphoresis, fatigue and fever.   HENT: Negative for congestion, postnasal drip, rhinorrhea, sinus pressure, sneezing, sore throat and tinnitus.    Eyes: Negative for photophobia, pain, discharge, redness, itching and visual disturbance.   Respiratory: Negative for apnea, cough, choking, chest tightness, shortness of breath, wheezing and stridor.    Cardiovascular: Negative for chest pain, palpitations and leg swelling.   Gastrointestinal: Negative for abdominal distention, abdominal pain, constipation, diarrhea, nausea and vomiting.   Musculoskeletal: Negative for arthralgias, back pain and joint swelling.   Skin: Negative for color change, pallor and rash.   Neurological: Negative for dizziness, facial asymmetry, light-headedness, numbness and headaches.   Hematological: Negative for adenopathy.   Psychiatric/Behavioral: Negative for agitation, behavioral problems, confusion and decreased concentration.       OBJECTIVE:     Vital Signs (Most Recent)  Temp: 97.8 °F (36.6 °C) (03/21/18 1324)  Pulse: 86 (03/21/18 1324)  BP: 139/64 (03/21/18 1324)  5' 7" (1.702 m)  77.7 kg (171 lb 3 oz)     Physical Exam:  Physical Exam   Constitutional: She is oriented to person, place, and time. She appears " well-developed and well-nourished.   HENT:   Head: Normocephalic and atraumatic.   Right Ear: External ear normal.   Left Ear: External ear normal.   Eyes: Conjunctivae and EOM are normal. Right eye exhibits no discharge. Left eye exhibits no discharge. No scleral icterus.   Neck: Normal range of motion. Neck supple.   Cardiovascular: Normal rate, regular rhythm and normal heart sounds.  Exam reveals no gallop and no friction rub.    No murmur heard.  Pulmonary/Chest: Effort normal and breath sounds normal. No respiratory distress. She has no wheezes. She has no rales.   Abdominal: Soft. Bowel sounds are normal. She exhibits no distension and no mass. There is no tenderness. There is no rebound and no guarding. No hernia.   Musculoskeletal: Normal range of motion. She exhibits no edema or tenderness.   Neurological: She is alert and oriented to person, place, and time.   Skin: Skin is warm and dry. No rash noted. No erythema. No pallor.   Psychiatric: She has a normal mood and affect. Her behavior is normal. Judgment and thought content normal.         Diagnostic Results:  Lower EUS: Impression:           - An oval intramural (subepithelial) lesion was                         found in the appendiceal orifice. The lesion was                         anechoic and appear to connect with the appendix.                         The lesion measured approximately 14.4 mm by 9.3                         mm. This lesion appear to be cystic without any                         solid component.    ASSESSMENT/PLAN:     Cecal Mass    PLAN:Plan     To OR for lap cecectomy vs ileocecectomy  Consent obtained         Lg Farias MD

## 2018-04-04 ENCOUNTER — HOSPITAL ENCOUNTER (OUTPATIENT)
Dept: CARDIOLOGY | Facility: CLINIC | Age: 63
Discharge: HOME | End: 2018-04-04
Payer: COMMERCIAL

## 2018-04-04 DIAGNOSIS — K63.89 CECUM MASS: ICD-10-CM

## 2018-04-04 PROCEDURE — 93000 ELECTROCARDIOGRAM COMPLETE: CPT | Mod: S$GLB,,, | Performed by: INTERNAL MEDICINE

## 2018-04-10 DIAGNOSIS — L30.9 DERMATITIS: ICD-10-CM

## 2018-04-11 RX ORDER — CLOBETASOL PROPIONATE 0.05 G/100ML
SHAMPOO TOPICAL
Qty: 118 ML | Refills: 0 | Status: SHIPPED | OUTPATIENT
Start: 2018-04-11 | End: 2018-06-06 | Stop reason: SDUPTHER

## 2018-04-13 ENCOUNTER — TELEPHONE (OUTPATIENT)
Dept: SURGERY | Facility: CLINIC | Age: 63
End: 2018-04-13

## 2018-04-16 ENCOUNTER — ANESTHESIA (OUTPATIENT)
Dept: SURGERY | Facility: HOSPITAL | Age: 63
End: 2018-04-16
Payer: COMMERCIAL

## 2018-04-16 ENCOUNTER — ANESTHESIA EVENT (OUTPATIENT)
Dept: SURGERY | Facility: HOSPITAL | Age: 63
End: 2018-04-16
Payer: COMMERCIAL

## 2018-04-16 ENCOUNTER — HOSPITAL ENCOUNTER (OUTPATIENT)
Facility: HOSPITAL | Age: 63
Discharge: HOME | End: 2018-04-16
Attending: SURGERY | Admitting: SURGERY
Payer: COMMERCIAL

## 2018-04-16 VITALS
BODY MASS INDEX: 26.68 KG/M2 | RESPIRATION RATE: 16 BRPM | SYSTOLIC BLOOD PRESSURE: 114 MMHG | WEIGHT: 170 LBS | HEART RATE: 75 BPM | HEIGHT: 67 IN | OXYGEN SATURATION: 97 % | DIASTOLIC BLOOD PRESSURE: 47 MMHG | TEMPERATURE: 98 F

## 2018-04-16 DIAGNOSIS — K63.89 CECUM MASS: Primary | ICD-10-CM

## 2018-04-16 PROCEDURE — 25000003 PHARM REV CODE 250: Performed by: SURGERY

## 2018-04-16 PROCEDURE — 36000711: Performed by: SURGERY

## 2018-04-16 PROCEDURE — 27201423 OPTIME MED/SURG SUP & DEVICES STERILE SUPPLY: Performed by: SURGERY

## 2018-04-16 PROCEDURE — 25000003 PHARM REV CODE 250: Performed by: NURSE ANESTHETIST, CERTIFIED REGISTERED

## 2018-04-16 PROCEDURE — S0028 INJECTION, FAMOTIDINE, 20 MG: HCPCS | Performed by: NURSE ANESTHETIST, CERTIFIED REGISTERED

## 2018-04-16 PROCEDURE — D9220A PRA ANESTHESIA: Mod: CRNA,,, | Performed by: NURSE ANESTHETIST, CERTIFIED REGISTERED

## 2018-04-16 PROCEDURE — 71000015 HC POSTOP RECOV 1ST HR: Performed by: SURGERY

## 2018-04-16 PROCEDURE — 37000008 HC ANESTHESIA 1ST 15 MINUTES: Performed by: SURGERY

## 2018-04-16 PROCEDURE — 88304 TISSUE EXAM BY PATHOLOGIST: CPT | Mod: 26,,, | Performed by: PATHOLOGY

## 2018-04-16 PROCEDURE — 25000003 PHARM REV CODE 250

## 2018-04-16 PROCEDURE — 71000033 HC RECOVERY, INTIAL HOUR: Performed by: SURGERY

## 2018-04-16 PROCEDURE — 63600175 PHARM REV CODE 636 W HCPCS: Performed by: SURGERY

## 2018-04-16 PROCEDURE — 37000009 HC ANESTHESIA EA ADD 15 MINS: Performed by: SURGERY

## 2018-04-16 PROCEDURE — 63600175 PHARM REV CODE 636 W HCPCS: Performed by: ANESTHESIOLOGY

## 2018-04-16 PROCEDURE — 71000039 HC RECOVERY, EACH ADD'L HOUR: Performed by: SURGERY

## 2018-04-16 PROCEDURE — 36000710: Performed by: SURGERY

## 2018-04-16 PROCEDURE — 44204 LAPARO PARTIAL COLECTOMY: CPT | Mod: 52,,, | Performed by: SURGERY

## 2018-04-16 PROCEDURE — 88304 TISSUE EXAM BY PATHOLOGIST: CPT | Performed by: PATHOLOGY

## 2018-04-16 PROCEDURE — 63600175 PHARM REV CODE 636 W HCPCS: Performed by: NURSE ANESTHETIST, CERTIFIED REGISTERED

## 2018-04-16 PROCEDURE — D9220A PRA ANESTHESIA: Mod: ANES,,, | Performed by: ANESTHESIOLOGY

## 2018-04-16 PROCEDURE — S0030 INJECTION, METRONIDAZOLE: HCPCS | Performed by: NURSE ANESTHETIST, CERTIFIED REGISTERED

## 2018-04-16 RX ORDER — ROCURONIUM BROMIDE 10 MG/ML
INJECTION, SOLUTION INTRAVENOUS
Status: DISCONTINUED | OUTPATIENT
Start: 2018-04-16 | End: 2018-04-16

## 2018-04-16 RX ORDER — DEXAMETHASONE SODIUM PHOSPHATE 100 MG/10ML
INJECTION INTRAMUSCULAR; INTRAVENOUS
Status: DISCONTINUED | OUTPATIENT
Start: 2018-04-16 | End: 2018-04-16

## 2018-04-16 RX ORDER — ONDANSETRON 2 MG/ML
INJECTION INTRAMUSCULAR; INTRAVENOUS
Status: DISCONTINUED | OUTPATIENT
Start: 2018-04-16 | End: 2018-04-16

## 2018-04-16 RX ORDER — SODIUM CHLORIDE 9 MG/ML
INJECTION, SOLUTION INTRAVENOUS CONTINUOUS PRN
Status: DISCONTINUED | OUTPATIENT
Start: 2018-04-16 | End: 2018-04-16

## 2018-04-16 RX ORDER — OXYCODONE HYDROCHLORIDE 5 MG/1
10 TABLET ORAL EVERY 4 HOURS PRN
Status: DISCONTINUED | OUTPATIENT
Start: 2018-04-16 | End: 2018-04-16 | Stop reason: HOSPADM

## 2018-04-16 RX ORDER — MIDAZOLAM HYDROCHLORIDE 1 MG/ML
INJECTION, SOLUTION INTRAMUSCULAR; INTRAVENOUS
Status: DISCONTINUED | OUTPATIENT
Start: 2018-04-16 | End: 2018-04-16

## 2018-04-16 RX ORDER — ACETAMINOPHEN 10 MG/ML
INJECTION, SOLUTION INTRAVENOUS
Status: DISCONTINUED | OUTPATIENT
Start: 2018-04-16 | End: 2018-04-16

## 2018-04-16 RX ORDER — LIDOCAINE HYDROCHLORIDE AND EPINEPHRINE 10; 10 MG/ML; UG/ML
INJECTION, SOLUTION INFILTRATION; PERINEURAL
Status: DISCONTINUED | OUTPATIENT
Start: 2018-04-16 | End: 2018-04-16 | Stop reason: HOSPADM

## 2018-04-16 RX ORDER — FENTANYL CITRATE 50 UG/ML
25 INJECTION, SOLUTION INTRAMUSCULAR; INTRAVENOUS EVERY 5 MIN PRN
Status: COMPLETED | OUTPATIENT
Start: 2018-04-16 | End: 2018-04-16

## 2018-04-16 RX ORDER — METOCLOPRAMIDE HYDROCHLORIDE 5 MG/ML
10 INJECTION INTRAMUSCULAR; INTRAVENOUS EVERY 10 MIN PRN
Status: DISCONTINUED | OUTPATIENT
Start: 2018-04-16 | End: 2018-04-16 | Stop reason: HOSPADM

## 2018-04-16 RX ORDER — HYDROCODONE BITARTRATE AND ACETAMINOPHEN 5; 325 MG/1; MG/1
1 TABLET ORAL EVERY 6 HOURS PRN
Qty: 21 TABLET | Refills: 0 | Status: SHIPPED | OUTPATIENT
Start: 2018-04-16 | End: 2018-07-02

## 2018-04-16 RX ORDER — KETAMINE HYDROCHLORIDE 100 MG/ML
INJECTION, SOLUTION INTRAMUSCULAR; INTRAVENOUS
Status: DISCONTINUED | OUTPATIENT
Start: 2018-04-16 | End: 2018-04-16

## 2018-04-16 RX ORDER — KETOROLAC TROMETHAMINE 30 MG/ML
INJECTION, SOLUTION INTRAMUSCULAR; INTRAVENOUS
Status: DISCONTINUED | OUTPATIENT
Start: 2018-04-16 | End: 2018-04-16

## 2018-04-16 RX ORDER — LIDOCAINE HYDROCHLORIDE 10 MG/ML
1 INJECTION, SOLUTION EPIDURAL; INFILTRATION; INTRACAUDAL; PERINEURAL ONCE
Status: COMPLETED | OUTPATIENT
Start: 2018-04-16 | End: 2018-04-16

## 2018-04-16 RX ORDER — FENTANYL CITRATE 50 UG/ML
INJECTION, SOLUTION INTRAMUSCULAR; INTRAVENOUS
Status: DISCONTINUED | OUTPATIENT
Start: 2018-04-16 | End: 2018-04-16

## 2018-04-16 RX ORDER — OXYCODONE HYDROCHLORIDE 5 MG/1
5 TABLET ORAL EVERY 4 HOURS PRN
Status: DISCONTINUED | OUTPATIENT
Start: 2018-04-16 | End: 2018-04-16 | Stop reason: HOSPADM

## 2018-04-16 RX ORDER — PROPOFOL 10 MG/ML
VIAL (ML) INTRAVENOUS
Status: DISCONTINUED | OUTPATIENT
Start: 2018-04-16 | End: 2018-04-16

## 2018-04-16 RX ORDER — FENTANYL CITRATE 50 UG/ML
25 INJECTION, SOLUTION INTRAMUSCULAR; INTRAVENOUS EVERY 5 MIN PRN
Status: DISCONTINUED | OUTPATIENT
Start: 2018-04-16 | End: 2018-04-16 | Stop reason: HOSPADM

## 2018-04-16 RX ORDER — GLYCOPYRROLATE 0.2 MG/ML
INJECTION INTRAMUSCULAR; INTRAVENOUS
Status: DISCONTINUED | OUTPATIENT
Start: 2018-04-16 | End: 2018-04-16

## 2018-04-16 RX ORDER — BUPIVACAINE HYDROCHLORIDE 2.5 MG/ML
INJECTION, SOLUTION EPIDURAL; INFILTRATION; INTRACAUDAL
Status: DISCONTINUED | OUTPATIENT
Start: 2018-04-16 | End: 2018-04-16 | Stop reason: HOSPADM

## 2018-04-16 RX ORDER — LIDOCAINE HCL/PF 100 MG/5ML
SYRINGE (ML) INTRAVENOUS
Status: DISCONTINUED | OUTPATIENT
Start: 2018-04-16 | End: 2018-04-16

## 2018-04-16 RX ORDER — FAMOTIDINE 10 MG/ML
INJECTION INTRAVENOUS
Status: DISCONTINUED | OUTPATIENT
Start: 2018-04-16 | End: 2018-04-16

## 2018-04-16 RX ORDER — CEFAZOLIN SODIUM 1 G/3ML
2 INJECTION, POWDER, FOR SOLUTION INTRAMUSCULAR; INTRAVENOUS
Status: COMPLETED | OUTPATIENT
Start: 2018-04-16 | End: 2018-04-16

## 2018-04-16 RX ORDER — METRONIDAZOLE 500 MG/100ML
INJECTION, SOLUTION INTRAVENOUS
Status: DISCONTINUED | OUTPATIENT
Start: 2018-04-16 | End: 2018-04-16

## 2018-04-16 RX ORDER — NEOSTIGMINE METHYLSULFATE 1 MG/ML
INJECTION, SOLUTION INTRAVENOUS
Status: DISCONTINUED | OUTPATIENT
Start: 2018-04-16 | End: 2018-04-16

## 2018-04-16 RX ADMIN — MIDAZOLAM HYDROCHLORIDE 2 MG: 1 INJECTION, SOLUTION INTRAMUSCULAR; INTRAVENOUS at 08:04

## 2018-04-16 RX ADMIN — LIDOCAINE HYDROCHLORIDE 10 MG: 10 INJECTION, SOLUTION EPIDURAL; INFILTRATION; INTRACAUDAL; PERINEURAL at 08:04

## 2018-04-16 RX ADMIN — SODIUM CHLORIDE, SODIUM GLUCONATE, SODIUM ACETATE, POTASSIUM CHLORIDE, MAGNESIUM CHLORIDE, SODIUM PHOSPHATE, DIBASIC, AND POTASSIUM PHOSPHATE: .53; .5; .37; .037; .03; .012; .00082 INJECTION, SOLUTION INTRAVENOUS at 10:04

## 2018-04-16 RX ADMIN — ACETAMINOPHEN 1000 MG: 10 INJECTION, SOLUTION INTRAVENOUS at 09:04

## 2018-04-16 RX ADMIN — KETAMINE HYDROCHLORIDE 20 MG: 100 INJECTION, SOLUTION, CONCENTRATE INTRAMUSCULAR; INTRAVENOUS at 09:04

## 2018-04-16 RX ADMIN — FENTANYL CITRATE 25 MCG: 50 INJECTION INTRAMUSCULAR; INTRAVENOUS at 11:04

## 2018-04-16 RX ADMIN — ROCURONIUM BROMIDE 40 MG: 10 INJECTION, SOLUTION INTRAVENOUS at 09:04

## 2018-04-16 RX ADMIN — LIDOCAINE HYDROCHLORIDE 50 MG: 20 INJECTION, SOLUTION INTRAVENOUS at 09:04

## 2018-04-16 RX ADMIN — ONDANSETRON 4 MG: 2 INJECTION INTRAMUSCULAR; INTRAVENOUS at 10:04

## 2018-04-16 RX ADMIN — KETOROLAC TROMETHAMINE 30 MG: 30 INJECTION, SOLUTION INTRAMUSCULAR; INTRAVENOUS at 10:04

## 2018-04-16 RX ADMIN — NEOSTIGMINE METHYLSULFATE 4 MG: 1 INJECTION INTRAVENOUS at 10:04

## 2018-04-16 RX ADMIN — DEXAMETHASONE SODIUM PHOSPHATE 8 MG: 10 INJECTION INTRAMUSCULAR; INTRAVENOUS at 09:04

## 2018-04-16 RX ADMIN — FENTANYL CITRATE 25 MCG: 50 INJECTION INTRAMUSCULAR; INTRAVENOUS at 12:04

## 2018-04-16 RX ADMIN — FAMOTIDINE 20 MG: 10 INJECTION, SOLUTION INTRAVENOUS at 09:04

## 2018-04-16 RX ADMIN — FENTANYL CITRATE 50 MCG: 50 INJECTION, SOLUTION INTRAMUSCULAR; INTRAVENOUS at 09:04

## 2018-04-16 RX ADMIN — GLYCOPYRROLATE 0.4 MG: 0.2 INJECTION, SOLUTION INTRAMUSCULAR; INTRAVENOUS at 10:04

## 2018-04-16 RX ADMIN — METRONIDAZOLE 500 MG: 500 SOLUTION INTRAVENOUS at 09:04

## 2018-04-16 RX ADMIN — PROPOFOL 150 MG: 10 INJECTION, EMULSION INTRAVENOUS at 09:04

## 2018-04-16 RX ADMIN — CEFAZOLIN 2 G: 330 INJECTION, POWDER, FOR SOLUTION INTRAMUSCULAR; INTRAVENOUS at 09:04

## 2018-04-16 RX ADMIN — SODIUM CHLORIDE: 0.9 INJECTION, SOLUTION INTRAVENOUS at 08:04

## 2018-04-16 RX ADMIN — OXYCODONE HYDROCHLORIDE 10 MG: 5 TABLET ORAL at 11:04

## 2018-04-16 NOTE — TRANSFER OF CARE
"Anesthesia Transfer of Care Note    Patient: Rachel Conroy    Procedure(s) Performed: Procedure(s) (LRB):  CECECTOMY-LAPAROSCOPIC, poss. ileocectomy (N/A)    Patient location: PACU    Anesthesia Type: general    Transport from OR: Transported from OR on 6-10 L/min O2 by face mask with adequate spontaneous ventilation    Post pain: adequate analgesia    Post assessment: no apparent anesthetic complications    Post vital signs: stable    Level of consciousness: confused    Nausea/Vomiting: no nausea/vomiting    Complications: none          Last vitals:   Visit Vitals  /65 (BP Location: Right arm, Patient Position: Lying)   Pulse 69   Temp 36.6 °C (97.8 °F) (Temporal)   Resp 18   Ht 5' 7" (1.702 m)   Wt 77.1 kg (170 lb)   SpO2 100%   Breastfeeding? No   BMI 26.63 kg/m²     "

## 2018-04-16 NOTE — ANESTHESIA POSTPROCEDURE EVALUATION
"Anesthesia Post Evaluation    Patient: Rachel Conroy    Procedure(s) Performed: Procedure(s) (LRB):  CECECTOMY-LAPAROSCOPIC, poss. ileocectomy (N/A)    Final Anesthesia Type: general  Patient location during evaluation: PACU  Patient participation: Yes- Able to Participate  Level of consciousness: awake and alert and oriented  Post-procedure vital signs: reviewed and stable  Pain management: adequate  Airway patency: patent  PONV status at discharge: No PONV  Anesthetic complications: no      Cardiovascular status: blood pressure returned to baseline and hemodynamically stable  Respiratory status: unassisted and spontaneous ventilation  Hydration status: euvolemic  Follow-up not needed.        Visit Vitals  BP (!) 94/51   Pulse 71   Temp 36.6 °C (97.8 °F) (Temporal)   Resp 15   Ht 5' 7" (1.702 m)   Wt 77.1 kg (170 lb)   SpO2 96%   Breastfeeding? No   BMI 26.63 kg/m²       Pain/Shahzad Score: Pain Assessment Performed: Yes (4/16/2018 12:10 PM)  Presence of Pain: complains of pain/discomfort (4/16/2018 12:10 PM)  Pain Rating Prior to Med Admin: 5 (4/16/2018 12:50 PM)  Shahzad Score: 9 (4/16/2018 12:10 PM)      "

## 2018-04-16 NOTE — ANESTHESIA PREPROCEDURE EVALUATION
04/16/2018  Rachel Conroy is a 62 y.o., female.    Anesthesia Evaluation    I have reviewed the Patient Summary Reports.     I have reviewed the Medications.     Review of Systems  Anesthesia Hx:  No problems with previous Anesthesia Denies Hx of Anesthetic complications  History of prior surgery of interest to airway management or planning: Previous anesthesia: General Denies Family Hx of Anesthesia complications.   Denies Personal Hx of Anesthesia complications.   Social:  Non-Smoker    Hematology/Oncology:  Hematology Normal      Current/Recent Cancer.   EENT/Dental:EENT/Dental Normal   Cardiovascular:   Hypertension, well controlled Denies CABG/stent.  Denies Dysrhythmias.   Denies Angina. hyperlipidemia    Pulmonary:   Denies Asthma.  Denies Shortness of breath.  Denies Recent URI. Sleep Apnea    Renal/:  Renal/ Normal     Hepatic/GI:   GERD    Musculoskeletal:   Arthritis     Neurological:   Headaches    Endocrine:  Endocrine Normal    Psych:  Psychiatric Normal           Physical Exam  General:  Well nourished, Obesity    Airway/Jaw/Neck:  Airway Findings: Mouth Opening: Normal Tongue: Normal  General Airway Assessment: Adult  Mallampati: II  TM Distance: Normal, at least 6 cm  Jaw/Neck Findings:  Neck ROM: Normal ROM      Dental:  Dental Findings: In tact   Chest/Lungs:  Chest/Lungs Findings: Normal Respiratory Rate     Heart/Vascular:  Heart Findings: Rate: Normal  Rhythm: Regular Rhythm     Abdomen:  Abdomen Findings: Normal    Musculoskeletal:  Musculoskeletal Findings: Normal   Skin:  Skin Findings: Normal    Mental Status:  Mental Status Findings:  Cooperative, Alert and Oriented         Anesthesia Plan  Type of Anesthesia, risks & benefits discussed:  Anesthesia Type:  general  Patient's Preference: General  Intra-op Monitoring Plan: standard ASA monitors  Intra-op Monitoring Plan  Comments:   Post Op Pain Control Plan: per primary service following discharge from PACU and IV/PO Opioids PRN  Post Op Pain Control Plan Comments:   Induction:   IV  Beta Blocker:  Patient is on a Beta-Blocker and has received one dose within the past 24 hours (No further documentation required).       Informed Consent: Patient understands risks and agrees with Anesthesia plan.  Questions answered. Anesthesia consent signed with patient.  ASA Score: 2     Day of Surgery Review of History & Physical:    H&P update referred to the surgeon.     Anesthesia Plan Notes: Patient understands english, but Citizen of [place] is her primary language. Interpretor present for the entire interview and for the obtainign of informed consent for anesthesia.    BRIGHT Ortiz.        Ready For Surgery From Anesthesia Perspective.

## 2018-04-16 NOTE — BRIEF OP NOTE
[facility]  Brief Operative Note     SUMMARY     Surgery Date: 4/16/2018     Surgeon(s) and Role:     * Lg Farias Jr., MD - Primary     * Jose Calloway MD - Resident - Assisting      Pre-op Diagnosis:  Cecum mass [K63.9]    Post-op Diagnosis:  Post-Op Diagnosis Codes:     * Cecum mass [K63.9]    Procedure(s) (LRB):  CECECTOMY-LAPAROSCOPIC, poss. ileocectomy (N/A)    Anesthesia: General    Description of the findings of the procedure: laparoscopic appendectomy and cecectomy    Findings/Key Components: specimen opened on back table, cystic mass present, grossly appeared to have removed all of the mass, cystic fluid present and sent to pathology as well    Estimated Blood Loss: minimal         Specimens:   Specimen (12h ago through future)    Start     Ordered    04/16/18 1031  Specimen to Pathology - Surgery  Once     Comments:  [id number] opened on back table-perm      04/16/18 1054          Discharge Note    SUMMARY     Admit Date: 4/16/2018    Discharge Date and Time:  04/16/2018     Hospital Course (synopsis of major diagnoses, care, treatment, and services provided during the course of the hospital stay): The patient underwent Procedure(s) (LRB):  CECECTOMY-LAPAROSCOPIC, poss. ileocectomy (N/A). The patient tolerated the outpatient procedure without issue and was discharged home.       Final Diagnosis: Post-Op Diagnosis Codes:     * Cecum mass [K63.9]    Disposition: Home or Self Care    Follow Up/Patient Instructions:     Medications:  Reconciled Home Medications:      Medication List      START taking these medications    hydrocodone-acetaminophen 5-325mg 5-325 mg per tablet  Commonly known as:  NORCO  Take 1 tablet by mouth every 6 (six) hours as needed.        CONTINUE taking these medications    amLODIPine 10 MG tablet  Commonly known as:  NORVASC     clobetasol 0.05 % shampoo  Commonly known as:  CLOBEX  APPLY EXTERNALLY TO THE AFFECTED AREA EVERY 7 DAYS      esomeprazole 40 MG capsule  Commonly known as:  NEXIUM  TAKE 1 CAPSULE BY MOUTH 30-45 MINUTES BEFORE BREAKFAST     hydrOXYzine HCl 25 MG tablet  Commonly known as:  ATARAX  Take 1 tablet (25 mg total) by mouth every 6 to 8 hours as needed for Itching. (caution sleepiness)     losartan 100 MG tablet  Commonly known as:  COZAAR  TAKE 1 TABLET (100 MG TOTAL) BY MOUTH ONCE DAILY.     metoprolol succinate 25 MG 24 hr tablet  Commonly known as:  TOPROL-XL  Mo 1 tableta al isidoro.            Discharge Procedure Orders  Diet general     Other restrictions (specify):   Scheduling Instructions: May resume diet as tolerated.   No heavy lifting or strenuous exercise until cleared by physician.  May shower in 48 hours after surgery; no baths or submerging in water (swimming,etc) for at least 2 weeks  Keep incision clean with soap and water.  If present leave steri strips (white strips) in place they will fall off on their own  Monitor for temp > 101.4, bleeding, redness, purulent drainage, or any extreme pain. If any occur, please call MD or go to ER.  Please resume all home meds and take newly prescribed meds.  For pain control you will be prescribed narcotic pain medication, please take with food and with a stool softener. You may find over the counter medication is enough to control your pain.   Follow up with Dr. Farias in 2 weeks in clinic for post-op check. If no appointment made by 1 week, please call clinic.     Call MD for:  temperature >100.4     Call MD for:  persistent nausea and vomiting     Call MD for:  severe uncontrolled pain     Call MD for:  difficulty breathing, headache or visual disturbances     No dressing needed       Follow-up Information     Schedule an appointment as soon as possible for a visit with Lg Farias Jr, MD.    Specialties:  General Surgery, Bariatrics  Why:  For wound re-check  Contact information:  [facility] [id number] [phone]

## 2018-04-16 NOTE — INTERVAL H&P NOTE
The patient has been examined and the H&P has been reviewed:    I concur with the findings and no changes have occurred since H&P was written.    Anesthesia/Surgery risks, benefits and alternative options discussed and understood by patient/family.          Active Hospital Problems    Diagnosis  POA    Cecum mass [K63.9]  Yes      Resolved Hospital Problems    Diagnosis Date Resolved POA   No resolved problems to display.

## 2018-04-16 NOTE — H&P (VIEW-ONLY)
History & Physical    SUBJECTIVE:     History of Present Illness:  Patient is a 62 y.o. female presents with submucosal mass at appendiceal orifice.  Pt with IBS and has intermittent diarrhea and constipation but this has improved with some diet changes.  No melena or PRBPR.  Presents from GI to fu appendiceal mass.  Has had a coloscopy with us since last visit.  Mass appeared to be cystic in nature.  Presents to discuss cecectomy.        Review of patient's allergies indicates:   Allergen Reactions    Tramadol Itching       Current Outpatient Prescriptions   Medication Sig Dispense Refill    amLODIPine (NORVASC) 10 MG tablet       clobetasol (CLOBEX) 0.05 % shampoo APPLY EXTERNALLY TO THE AFFECTED AREA EVERY 7 DAYS 118 mL 1    esomeprazole (NEXIUM) 40 MG capsule TAKE 1 CAPSULE BY MOUTH 30-45 MINUTES BEFORE BREAKFAST 90 capsule 0    hydrOXYzine HCl (ATARAX) 25 MG tablet Take 1 tablet (25 mg total) by mouth every 6 to 8 hours as needed for Itching. (caution sleepiness) 60 tablet 0    losartan (COZAAR) 100 MG tablet TAKE 1 TABLET (100 MG TOTAL) BY MOUTH ONCE DAILY. 90 tablet 0    metoprolol succinate (TOPROL-XL) 25 MG 24 hr tablet Mo 1 tableta al isidoro. 90 tablet 1     No current facility-administered medications for this visit.        Past Medical History:   Diagnosis Date    Arthritis     GERD (gastroesophageal reflux disease)     Hyperlipidemia     Hypertension     Irritable bowel syndrome     Joint pain     Knee injury     Migraine headache     Muscle pain, fibromyalgia 3/19/2014    Other physical therapy     Plantar fasciitis     Sleep apnea     Urinary incontinence      Past Surgical History:   Procedure Laterality Date    COLONOSCOPY N/A 11/9/2017    Procedure: COLONOSCOPY;  Surgeon: Mariaelena Harrell MD;  Location: [facility] (4TH FLR);  Service: Endoscopy;  Laterality: N/A;    COLONOSCOPY N/A 2/26/2018    Procedure: COLONOSCOPY;  Surgeon: Nicola Zhu MD;  Location: [facility] (2ND  "[facility]);  Service: Endoscopy;  Laterality: N/A;    HYSTERECTOMY       Family History   Problem Relation Age of Onset    Arthritis Mother     Alzheimer's disease Mother     Migraines Neg Hx     Melanoma Neg Hx     Psoriasis Neg Hx     Lupus Neg Hx     Eczema Neg Hx     Acne Neg Hx     Colon cancer Neg Hx     Stomach cancer Neg Hx     Esophageal cancer Neg Hx     Liver disease Neg Hx     Crohn's disease Neg Hx     Ulcerative colitis Neg Hx     Celiac disease Neg Hx      Social History   Substance Use Topics    Smoking status: Never Smoker    Smokeless tobacco: Never Used    Alcohol use No        Review of Systems:  Review of Systems   Constitutional: Negative for activity change, appetite change, chills, diaphoresis, fatigue and fever.   HENT: Negative for congestion, postnasal drip, rhinorrhea, sinus pressure, sneezing, sore throat and tinnitus.    Eyes: Negative for photophobia, pain, discharge, redness, itching and visual disturbance.   Respiratory: Negative for apnea, cough, choking, chest tightness, shortness of breath, wheezing and stridor.    Cardiovascular: Negative for chest pain, palpitations and leg swelling.   Gastrointestinal: Negative for abdominal distention, abdominal pain, constipation, diarrhea, nausea and vomiting.   Musculoskeletal: Negative for arthralgias, back pain and joint swelling.   Skin: Negative for color change, pallor and rash.   Neurological: Negative for dizziness, facial asymmetry, light-headedness, numbness and headaches.   Hematological: Negative for adenopathy.   Psychiatric/Behavioral: Negative for agitation, behavioral problems, confusion and decreased concentration.       OBJECTIVE:     Vital Signs (Most Recent)  Temp: 97.8 °F (36.6 °C) (03/21/18 1324)  Pulse: 86 (03/21/18 1324)  BP: 139/64 (03/21/18 1324)  5' 7" (1.702 m)  77.7 kg (171 lb 3 oz)     Physical Exam:  Physical Exam   Constitutional: She is oriented to person, place, and time. She appears " well-developed and well-nourished.   HENT:   Head: Normocephalic and atraumatic.   Right Ear: External ear normal.   Left Ear: External ear normal.   Eyes: Conjunctivae and EOM are normal. Right eye exhibits no discharge. Left eye exhibits no discharge. No scleral icterus.   Neck: Normal range of motion. Neck supple.   Cardiovascular: Normal rate, regular rhythm and normal heart sounds.  Exam reveals no gallop and no friction rub.    No murmur heard.  Pulmonary/Chest: Effort normal and breath sounds normal. No respiratory distress. She has no wheezes. She has no rales.   Abdominal: Soft. Bowel sounds are normal. She exhibits no distension and no mass. There is no tenderness. There is no rebound and no guarding. No hernia.   Musculoskeletal: Normal range of motion. She exhibits no edema or tenderness.   Neurological: She is alert and oriented to person, place, and time.   Skin: Skin is warm and dry. No rash noted. No erythema. No pallor.   Psychiatric: She has a normal mood and affect. Her behavior is normal. Judgment and thought content normal.         Diagnostic Results:  Lower EUS: Impression:           - An oval intramural (subepithelial) lesion was                         found in the appendiceal orifice. The lesion was                         anechoic and appear to connect with the appendix.                         The lesion measured approximately 14.4 mm by 9.3                         mm. This lesion appear to be cystic without any                         solid component.    ASSESSMENT/PLAN:     Cecal Mass    PLAN:Plan     To OR for lap cecectomy vs ileocecectomy  Consent obtained         Lg Farias MD

## 2018-04-16 NOTE — TRANSFER OF CARE
"Anesthesia Transfer of Care Note    Patient: Rachel Conroy    Procedure(s) Performed: Procedure(s) (LRB):  CECECTOMY-LAPAROSCOPIC, poss. ileocectomy (N/A)    Transfer of care protocol was followed      Last vitals:   Visit Vitals  /65 (BP Location: Right arm, Patient Position: Lying)   Pulse 69   Temp 36.6 °C (97.8 °F) (Temporal)   Resp 18   Ht 5' 7" (1.702 m)   Wt 77.1 kg (170 lb)   SpO2 100%   Breastfeeding? No   BMI 26.63 kg/m²     "

## 2018-04-16 NOTE — PLAN OF CARE
Discharge instructions reviewed with pt and daughter, verbalized understanding, all questions answered.  Vitals stable, consents in chart, dressings clean, dry, and intact.  Pt has paperwork that needs to be filled out by MD prior to discharge, Dr. Jose Calloway paged.  Pt still needs to void prior to discharge, pt aware.  IV to be removed prior to discharge

## 2018-04-17 NOTE — OP NOTE
DATE OF PROCEDURE:  04/16/2018    SERVICE:  General Surgery.    SURGEON:  Lg Farias Jr., M.D.    ASSISTANT:  Jose Calloway M.D. (RES).    PREOPERATIVE DIAGNOSIS:  Cecal mass.    POSTOPERATIVE DIAGNOSIS:  Cecal mass.    PROCEDURE:  Laparoscopic cecectomy.    ANESTHESIA:  General endotracheal and local.    DESCRIPTION OF PROCEDURE:  The patient was taken to the operating room, placed   under general anesthesia and prepped and draped in a sterile fashion.  At this   time, an infraumbilical incision was made after infiltrating with local   anesthetic.  This was carried down with electrocautery and blunt dissection to   the fascia.  This was grasped on either side, elevated and opened sharply.  At   this time, we were through anterior and posterior fascia and we had gained   intra-abdominal access.  At this time, a #0 Vicryl suture was placed in a   figure-of-eight fashion in the fascia and a 12-mm port was placed in the   abdominal cavity.  At this time, pneumoperitoneum was obtained.  Endoscope was   placed.  At this time under inspection, there were no signs of significant   abnormalities.  Further ports were placed including a suprapubic 5 mm port and   the left lower quadrant 5 mm port.  The patient was then placed in Trendelenburg   and tilted to the left.  The ascending colon was identified and run down to the   cecum and the appendix.  There was felt to be a small mass right at the   appendiceal orifice and the cecum.  There were several centimeters of cecum from   the ileum and we felt that there was plenty of room between the ileum and the   mass to where we would be able to complete a cecectomy without having to do an   ileocecectomy.  At this time, we opened the peritoneum just below the ileocecal   junction and bluntly divided through the mesentery to the other side of the   cecum without difficulty.  This space was opened.  Then, using a white load   stapler, we placed across the base of the cecum  just below the ileocecal valve   and fired the stapler.  One further load was needed to get across the base of   the cecum, but this was done without difficulty.  We then elevated the appendix   and cecum and transected the mesentery with a white load stapler as well.  We   inspected the staple line and there were no signs of any bleeding or other   abnormalities.  At this time, the appendix and cecum were placed into an   EndoCatch bag and removed from the infraumbilical port.  We then on the   backtable opened the appendix and noted to be a cystic mass present and   completely excised.  This mass was fluid filled and seemed to communicate with   the appendix itself.  We did open this cystic structure on the backtable for   inspection, but prior to this, there had been no violation of the wall of the   mass.  Once complete, we turned back to the laparoscopic view, evaluated again   the staple lines.  No signs of bleeding or abnormalities.  The ports were then   removed.  There was noted to be a small continued defect even after tying down   the #0 Vicryl suture that in place at the beginning of the procedure and further   #0 Vicryls were placed to close the posterior fascia and the anterior fascia.    Once this was done, the area was irrigated.  The skin of all 3 port sites were   closed with 4-0 Monocryl suture and Mastisol and Steri-Strips were placed.  The   patient was allowed to awake from general anesthesia and transferred to bed for   transfer to Recovery.    COMPLICATIONS:  None.    SPONGE COUNT:  Correct.    BLOOD LOSS:  15 mL.    FLUIDS:  Per Anesthesia.    BLOOD GIVEN:  None.    DRAINS:  None.    SPECIMENS:  Cecum and appendix.    CONDITION OF PATIENT:  Good.    I was present for the entire procedure.      SHANAE  dd: 04/17/2018 07:12:55 (CDT)  td: 04/17/2018 09:27:20 (CDT)  Doc ID   #[id number]  Job ID #[id number]    CC:

## 2018-04-30 ENCOUNTER — OFFICE VISIT (OUTPATIENT)
Dept: SURGERY | Facility: CLINIC | Age: 63
End: 2018-04-30
Payer: COMMERCIAL

## 2018-04-30 VITALS
HEIGHT: 67 IN | DIASTOLIC BLOOD PRESSURE: 62 MMHG | SYSTOLIC BLOOD PRESSURE: 132 MMHG | TEMPERATURE: 98 F | WEIGHT: 170 LBS | BODY MASS INDEX: 26.68 KG/M2 | HEART RATE: 75 BPM

## 2018-04-30 DIAGNOSIS — Z09 POSTOP CHECK: Primary | ICD-10-CM

## 2018-04-30 PROCEDURE — 99024 POSTOP FOLLOW-UP VISIT: CPT | Mod: S$GLB,,, | Performed by: SURGERY

## 2018-04-30 PROCEDURE — 99999 PR PBB SHADOW E&M-EST. PATIENT-LVL III: CPT | Mod: PBBFAC,,, | Performed by: SURGERY

## 2018-04-30 RX ORDER — CIPROFLOXACIN 500 MG/1
TABLET ORAL
COMMUNITY
Start: 2018-04-11 | End: 2018-07-02

## 2018-04-30 NOTE — PROGRESS NOTES
HPI:  The patient is status post-lap cecectomy for a cystic mass of the cecum.  Doing well.  Mild RLQ pain but improving.  Also with some umbilical incision pain that is improving.  No fevers/chills.    PHYSICAL EXAM:  Physical Exam     In NAD  Incision s/c/i    Path: Acute appendicitis    ASSESSMENT:    The patient is doing well after surgery.     PLAN:    Follow up PRN.  Activity: light duty for 4 weeks        Lg Farias MD

## 2018-05-10 DIAGNOSIS — I10 ESSENTIAL HYPERTENSION: Primary | ICD-10-CM

## 2018-05-10 DIAGNOSIS — K21.9 GASTROESOPHAGEAL REFLUX DISEASE WITHOUT ESOPHAGITIS: ICD-10-CM

## 2018-05-10 RX ORDER — AMLODIPINE BESYLATE 10 MG/1
TABLET ORAL
Qty: 90 TABLET | Refills: 2 | Status: SHIPPED | OUTPATIENT
Start: 2018-05-10 | End: 2018-07-02

## 2018-05-10 RX ORDER — ESOMEPRAZOLE MAGNESIUM 40 MG/1
CAPSULE, DELAYED RELEASE ORAL
Qty: 90 CAPSULE | Refills: 0 | Status: SHIPPED | OUTPATIENT
Start: 2018-05-10 | End: 2018-08-11 | Stop reason: SDUPTHER

## 2018-05-10 RX ORDER — LOSARTAN POTASSIUM 100 MG/1
TABLET ORAL
Qty: 90 TABLET | Refills: 2 | Status: SHIPPED | OUTPATIENT
Start: 2018-05-10 | End: 2019-01-25 | Stop reason: ALTCHOICE

## 2018-05-28 ENCOUNTER — PATIENT MESSAGE (OUTPATIENT)
Dept: ADMINISTRATIVE | Facility: OTHER | Age: 63
End: 2018-05-28

## 2018-06-06 DIAGNOSIS — L30.9 DERMATITIS: ICD-10-CM

## 2018-06-06 RX ORDER — CLOBETASOL PROPIONATE 0.05 G/100ML
SHAMPOO TOPICAL
Qty: 118 ML | Refills: 0 | Status: SHIPPED | OUTPATIENT
Start: 2018-06-06 | End: 2018-09-06 | Stop reason: SDUPTHER

## 2018-07-02 ENCOUNTER — OFFICE VISIT (OUTPATIENT)
Dept: GASTROENTEROLOGY | Facility: CLINIC | Age: 63
End: 2018-07-02
Payer: COMMERCIAL

## 2018-07-02 VITALS
BODY MASS INDEX: 26.89 KG/M2 | DIASTOLIC BLOOD PRESSURE: 64 MMHG | WEIGHT: 171.31 LBS | HEART RATE: 87 BPM | SYSTOLIC BLOOD PRESSURE: 102 MMHG | HEIGHT: 67 IN

## 2018-07-02 DIAGNOSIS — K58.0 IRRITABLE BOWEL SYNDROME WITH DIARRHEA: Primary | ICD-10-CM

## 2018-07-02 PROCEDURE — 3078F DIAST BP <80 MM HG: CPT | Mod: CPTII,S$GLB,, | Performed by: NURSE PRACTITIONER

## 2018-07-02 PROCEDURE — 99999 PR PBB SHADOW E&M-EST. PATIENT-LVL III: CPT | Mod: PBBFAC,,, | Performed by: NURSE PRACTITIONER

## 2018-07-02 PROCEDURE — 3008F BODY MASS INDEX DOCD: CPT | Mod: CPTII,S$GLB,, | Performed by: NURSE PRACTITIONER

## 2018-07-02 PROCEDURE — 99213 OFFICE O/P EST LOW 20 MIN: CPT | Mod: S$GLB,,, | Performed by: NURSE PRACTITIONER

## 2018-07-02 PROCEDURE — 3074F SYST BP LT 130 MM HG: CPT | Mod: CPTII,S$GLB,, | Performed by: NURSE PRACTITIONER

## 2018-07-02 NOTE — LETTER
July 2, 2018      Tera Blanca MD  [address] [facility] [address]           [facility]  [address]  [place] [address]  Phone: [phone]  Fax: [phone]          Patient: Rachel Conroy   MR Number: [id number]   YOB: 1955   Date of Visit: 7/2/2018       Dear Dr. Tera Blanca:    Thank you for referring Rachel Conroy to me for evaluation. Attached you will find relevant portions of my assessment and plan of care.    If you have questions, please do not hesitate to call me. I look forward to following Rachel Conroy along with you.    Sincerely,    Yelena Morton, NP    Enclosure  CC:  No Recipients    If you would like to receive this communication electronically, please contact [email or web address] or [phone] to request more information on [employer] Link access.    For providers and/or their staff who would like to refer a patient to [facility], please contact us through our one-stop-shop provider referral line, [facility], at [phone].    If you feel you have received this communication in error or would no longer like to receive these types of communications, please e-mail [email or web address]

## 2018-07-02 NOTE — PROGRESS NOTES
[facility] Note    Reason for Visit:  The encounter diagnosis was Irritable bowel syndrome with diarrhea.    PCP:   Tera Blanca       Referring MD:  Tera Blanca Md  [address] [place] [address]    HPI:  This is a 62 y.o. female here for f/u evaluation of IBS  Ms. Conroy speaks Greenlandic. Interpretation via international services offered free of charge but declines and states she wants her daughter to interpret instead. She has a hx of IBS and was previously seen  by  in 2011. Her most recent GI visit was with me in 12/2017.  Previous colonoscopy w/ bx revealed a submucosal nodule at the appendiceal orifice, CT showed dilation of cecum/appendix, subsequent colon w/ EUS revealed cystic lesion at appendix. She had the lesion removed per general surgery (path showing cystic lesion) and has recovered well.     bowel movements have been normal. 2 bristol type 4 BM/day. Loose if eating greasy foods. xifaxan may have been to expensive, so pt did not pick it up from the pharmacy. No longer taking viberzi. No longer taking gummy fibers.    Gas and bloating have improved. Only with mild gas and bloating. Is eating healthier. ruq abd resolved since being off viberzi.     Anxiety is still occurring. Makes GI s/s worse. Is not seeing psyche for anxiety. Did s/w pcp about it. Was RX Cymbalta for fibromyalgia, but  to increased BP and palpitations so stopped. Takes lyrica PRN for fibromyalgia.     Reflux +hx GERD. Taking Nexium with good control.  Dysphagia/odynophagia - denies  GI bleeding - denies hematochezia, hematemesis, melena, BRBPR, black/tarry stools, and coffee ground emesis    ROS:  Constitutional: No fevers, no chills, No weight loss, no fatigue,   ENT: No allergies  CV: No chest pain, no palpitations, no perif. edema, no sob on exertion  Pulm: No cough, No shortness of breath, no wheezes, no sputum  Ophtho: No vision changes  GI: see HPI; also no  "nausea, no vomiting, no change in appetite  Derm: No rash  Heme: No lymphadenopathy, No bruising  MSK: + arthritis, no muscle pain, no muscle weakness  : No dysuria, No hematuria  Endo: No hot or cold intolerance  Neuro: No syncope, No seizure,       Medical History:  has a past medical history of Arthritis; GERD (gastroesophageal reflux disease); Hyperlipidemia; Hypertension; Irritable bowel syndrome; Joint pain; Knee injury; Migraine headache; Muscle pain, fibromyalgia (3/19/2014); Other physical therapy; Plantar fasciitis; Sleep apnea; and Urinary incontinence.    Surgical History:  has a past surgical history that includes Hysterectomy; Colonoscopy (N/A, 11/9/2017); Colonoscopy (N/A, 2/26/2018); and Appendectomy (2018).    Family History: family history includes Alzheimer's disease in her mother; Arthritis in her mother..     Social History:  reports that she has never smoked. She has never used smokeless tobacco. She reports that she does not drink alcohol or use drugs.    Review of patient's allergies indicates:   Allergen Reactions    Tramadol Itching       Current Outpatient Prescriptions   Medication Sig    clobetasol (CLOBEX) 0.05 % shampoo APPLY EXTERNALLY TO THE AFFECTED AREA EVERY 7 DAYS    esomeprazole (NEXIUM) 40 MG capsule TAKE 1 CAPSULE BY MOUTH 30-45 MINUTES BEFORE BREAKFAST    hydrOXYzine HCl (ATARAX) 25 MG tablet Take 1 tablet (25 mg total) by mouth every 6 to 8 hours as needed for Itching. (caution sleepiness)    losartan (COZAAR) 100 MG tablet TAKE 1 TABLET (100 MG TOTAL) BY MOUTH ONCE DAILY.    metoprolol succinate (TOPROL-XL) 25 MG 24 hr tablet Mo 1 tableta al isidoro.     No current facility-administered medications for this visit.          Objective Findings:    Vital Signs:  /64   Pulse 87   Ht 5' 7" (1.702 m)   Wt 77.7 kg (171 lb 4.8 oz)   BMI 26.83 kg/m²   Body mass index is 26.83 kg/m².    Physical Exam:  General Appearance: Well appearing in no acute distress  Head:   " Normocephalic, without obvious abnormality  Eyes:    No scleral icterus, EOMI  ENT: Neck supple, Lips, mucosa, and tongue normal; teeth and gums normal  Lungs: CTA bilaterally in anterior and posterior fields, no wheezes, no crackles.  Heart: Regular rate and rhythm, S1, S2 normal, no murmurs heard  Abdomen: Soft, non tender, non distended with positive bowel sounds in all four quadrants. No hepatosplenomegaly, ascites, or mass  Extremities: no clubbing, cyanosis or edema  Skin: No rash to exposed areas  Neurologic: A&Ox4      Labs:  Lab Results   Component Value Date    WBC 5.91 04/04/2018    HGB 13.7 04/04/2018    HCT 42.3 04/04/2018     04/04/2018    CHOL 205 (H) 02/19/2018    TRIG 88 02/19/2018    HDL 81 (H) 02/19/2018    ALT 22 02/19/2018    ALT 22 02/19/2018    AST 23 02/19/2018    AST 23 02/19/2018     04/04/2018    K 4.4 04/04/2018     04/04/2018    CREATININE 0.6 04/04/2018    BUN 15 04/04/2018    CO2 29 04/04/2018    TSH 0.558 06/01/2017    INR 0.9 10/24/2016    HGBA1C 5.5 06/01/2017       Imaging:  CT abd/pelvis 12/18/17: slight dilation of appendix lumen. Benign left adrenal adenoma. Left kidney cyst. S/p hysterectomy.  11/2016 liver us w doppler-fatty, enlarged liver  9/2016 retroperitoneal us-left renal cyst  2011 abd us-enlarged fatty liver  Endoscopy:    lower EUS 2/26/18: oval intramural lesion in the appendiceal orifice. appears to be cystic.   11/9/2017 colon Dr. Harrell-GPTTI. Submucosal nodule at appendiceal orifice (-). Vascular nodule in transverse colon. Non bleeding int hemorrhoids. Colon bx(-). Rpt 10 yrs.  2011 EGD  - HH, gastritis. bx=WNL  2011 Colonoscopy Dr. Zhu- LEVY. Repeat in 10 years  2009 EGD Dr. Zhu- erythematous gastropathy, HH. Bx=chronic, non active gastritis  2006 EGD Dr. Humphries- WNL  2006 colonoscopy Dr.Head- LEVY. Random bx=WNL    Assessment:  1. Irritable bowel syndrome with diarrhea           Recommendations:  1. IBS-D- some improvement. abd  bloating/gas only occurring on occasion (jumana when eating greasy foods), bowel movements have been normal. Use IBgard PRN. We discussed the role anxiety plays in IBS. Pt encouraged to s/w PCP or seek therapist to treat anxiety as this can help with IBS. Handouts provided.     Follow-up in about 3 months (around 10/2/2018) for IBS.      Order summary:     Visit time: 30 minutes with greater than 50% of the time spent in face to face pt  discussing the aforementioned diagnoses, recommendations,and answering pt questions.      Thank you so much for allowing me to participate in the care of Rachel Morton, APRN, FNP-C

## 2018-07-16 DIAGNOSIS — Z12.39 SCREENING FOR BREAST CANCER: Primary | ICD-10-CM

## 2018-07-17 ENCOUNTER — OFFICE VISIT (OUTPATIENT)
Dept: SLEEP MEDICINE | Facility: CLINIC | Age: 63
End: 2018-07-17
Payer: COMMERCIAL

## 2018-07-17 VITALS
DIASTOLIC BLOOD PRESSURE: 80 MMHG | SYSTOLIC BLOOD PRESSURE: 118 MMHG | HEIGHT: 67 IN | HEART RATE: 76 BPM | WEIGHT: 171.06 LBS | BODY MASS INDEX: 26.85 KG/M2

## 2018-07-17 DIAGNOSIS — I10 ESSENTIAL HYPERTENSION: ICD-10-CM

## 2018-07-17 DIAGNOSIS — G47.33 OBSTRUCTIVE SLEEP APNEA: Primary | ICD-10-CM

## 2018-07-17 PROCEDURE — 99999 PR PBB SHADOW E&M-EST. PATIENT-LVL III: CPT | Mod: PBBFAC,,, | Performed by: NURSE PRACTITIONER

## 2018-07-17 PROCEDURE — 3079F DIAST BP 80-89 MM HG: CPT | Mod: CPTII,S$GLB,, | Performed by: NURSE PRACTITIONER

## 2018-07-17 PROCEDURE — 3008F BODY MASS INDEX DOCD: CPT | Mod: CPTII,S$GLB,, | Performed by: NURSE PRACTITIONER

## 2018-07-17 PROCEDURE — 99213 OFFICE O/P EST LOW 20 MIN: CPT | Mod: S$GLB,,, | Performed by: NURSE PRACTITIONER

## 2018-07-17 PROCEDURE — 3074F SYST BP LT 130 MM HG: CPT | Mod: CPTII,S$GLB,, | Performed by: NURSE PRACTITIONER

## 2018-07-17 RX ORDER — SUMATRIPTAN SUCCINATE 100 MG/1
50-100 TABLET ORAL
Qty: 9 TABLET | Refills: 1 | Status: SHIPPED | OUTPATIENT
Start: 2018-07-17 | End: 2019-06-21 | Stop reason: SDUPTHER

## 2018-07-17 NOTE — PROGRESS NOTES
"SLEEP CLINIC FOLLOW-UP NOTE:     Rachel Conroy  returns for management of obstructive sleep apnea and CPAP equipment check. She has been using "sometimes" her apap 8-16cm. Got new machine ~ 30d ago but having leaks from mask bothersome, feels mask is too big. Not sleeping well.  Uses nightly but may remove mask. Ready to resume consistent use. Return of HA w/o consistent use. No snoring presumably. Feels tired in am and disrupted sleep    Feels diffuse pressure/sinus congestion. HA daily, worse the past 2 mos. Worse with lying down. Aleve prn      CPAP interrogation: no machine today    HST: Aug '15: AHI 9 (RDI 26)  Findings on this study suggest that the degree of sleep disordered breathing is in the moderate severity range, when RDI is measured. Also, there was noted of frequent episodes of what appeared to be prolonged exhalation, lasting 10-15 seconds, during periods of the night. This could be a manifestation of central apnea, but could not be further investigated by this testing modality    ROS: In addition to sleep symptoms, 18# loss, +occasional sinus congestion,  otherwise a balance review of systems is negative.   Interim- appendectomy   AST/ALT normal 2/2018    PHYSICAL EXAM:   /80   Pulse 76   Ht 5' 7" (1.702 m)   Wt 77.6 kg (171 lb 1.2 oz)   BMI 26.79 kg/m²   W/N, W/D well groomed      IMPRESSION:   1. Obstructive sleep apnea, mild-mod, CPAP adherent with symptomatic improvement, needs new mask    Medical co-morbidities: HTN (stable),migraines, fibromyalgia    PLAN:   Discussed importance of continued CPAP compliance as well as the potential ramifications of untreated sleep apnea, which could include daytime sleepiness, hypertension, heart disease, and/or stroke. Discussed criteria for requalification study or titration studies.     apap 8-14cm resume consistent use. NEe mask consider [facility] prn supplies.   Continue to improve sleep/mask on time. Resume chin strap for optimal " therapy/reduce oral drying. [facility] prn supplies.     Resume prn imitrex 100mg 1/2-1 tab +_ NSAID

## 2018-07-31 ENCOUNTER — OFFICE VISIT (OUTPATIENT)
Dept: INTERNAL MEDICINE | Facility: CLINIC | Age: 63
End: 2018-07-31
Payer: COMMERCIAL

## 2018-07-31 ENCOUNTER — LAB VISIT (OUTPATIENT)
Dept: LAB | Facility: HOSPITAL | Age: 63
End: 2018-07-31
Attending: INTERNAL MEDICINE
Payer: COMMERCIAL

## 2018-07-31 VITALS
WEIGHT: 175.69 LBS | BODY MASS INDEX: 27.52 KG/M2 | SYSTOLIC BLOOD PRESSURE: 130 MMHG | DIASTOLIC BLOOD PRESSURE: 80 MMHG | HEART RATE: 80 BPM

## 2018-07-31 DIAGNOSIS — Z00.00 ROUTINE GENERAL MEDICAL EXAMINATION AT A HEALTH CARE FACILITY: ICD-10-CM

## 2018-07-31 DIAGNOSIS — I10 HYPERTENSION, UNSPECIFIED TYPE: ICD-10-CM

## 2018-07-31 DIAGNOSIS — R39.9 LOWER URINARY TRACT SYMPTOMS (LUTS): ICD-10-CM

## 2018-07-31 DIAGNOSIS — Z00.00 ROUTINE GENERAL MEDICAL EXAMINATION AT A HEALTH CARE FACILITY: Primary | ICD-10-CM

## 2018-07-31 LAB
BACTERIA #/AREA URNS AUTO: NORMAL /HPF
BILIRUB UR QL STRIP: NEGATIVE
CLARITY UR REFRACT.AUTO: CLEAR
COLOR UR AUTO: YELLOW
GLUCOSE UR QL STRIP: NEGATIVE
HGB UR QL STRIP: NEGATIVE
KETONES UR QL STRIP: NEGATIVE
LEUKOCYTE ESTERASE UR QL STRIP: ABNORMAL
MICROSCOPIC COMMENT: NORMAL
NITRITE UR QL STRIP: NEGATIVE
PH UR STRIP: 6 [PH] (ref 5–8)
PROT UR QL STRIP: NEGATIVE
RBC #/AREA URNS AUTO: 0 /HPF (ref 0–4)
SP GR UR STRIP: 1.02 (ref 1–1.03)
SQUAMOUS #/AREA URNS AUTO: 0 /HPF
URN SPEC COLLECT METH UR: ABNORMAL
UROBILINOGEN UR STRIP-ACNC: NEGATIVE EU/DL
WBC #/AREA URNS AUTO: 1 /HPF (ref 0–5)

## 2018-07-31 PROCEDURE — 3008F BODY MASS INDEX DOCD: CPT | Mod: CPTII,S$GLB,, | Performed by: INTERNAL MEDICINE

## 2018-07-31 PROCEDURE — 3079F DIAST BP 80-89 MM HG: CPT | Mod: CPTII,S$GLB,, | Performed by: INTERNAL MEDICINE

## 2018-07-31 PROCEDURE — 99999 PR PBB SHADOW E&M-EST. PATIENT-LVL III: CPT | Mod: PBBFAC,,, | Performed by: INTERNAL MEDICINE

## 2018-07-31 PROCEDURE — 81001 URINALYSIS AUTO W/SCOPE: CPT

## 2018-07-31 PROCEDURE — 3075F SYST BP GE 130 - 139MM HG: CPT | Mod: CPTII,S$GLB,, | Performed by: INTERNAL MEDICINE

## 2018-07-31 PROCEDURE — 99214 OFFICE O/P EST MOD 30 MIN: CPT | Mod: S$GLB,,, | Performed by: INTERNAL MEDICINE

## 2018-08-07 DIAGNOSIS — E55.9 VITAMIN D DEFICIENCY: Primary | ICD-10-CM

## 2018-08-07 RX ORDER — ASPIRIN 325 MG
TABLET, DELAYED RELEASE (ENTERIC COATED) ORAL
Qty: 12 CAPSULE | Refills: 0 | Status: SHIPPED | OUTPATIENT
Start: 2018-08-07 | End: 2020-01-29

## 2018-08-07 NOTE — PROGRESS NOTES
Subjective:       Patient ID: Rachel Conroy is a 62 y.o. female.    Chief Complaint: Annual Exam    REDMikaruna Ramos is here today for her annual exam. Overall doing well this time and had no specific complaints. She has lost some weight as a result of changes she made to her diet and feels well. She is s/p appendectomy recently and has done well since then. She is taking her medications as indicated. She was encouraged to continue with her weight loss, add some exercise to her regimen and continue with her medications for now.   Review of Systems   Constitutional: Negative for activity change, fatigue and unexpected weight change.   Respiratory: Negative for cough, shortness of breath and wheezing.    Cardiovascular: Negative for chest pain, palpitations and leg swelling.   Gastrointestinal: Negative for abdominal distention, abdominal pain and blood in stool.   Genitourinary: Positive for frequency. Negative for difficulty urinating.   Musculoskeletal: Negative.    Neurological: Negative for dizziness, weakness, light-headedness and headaches.   Hematological: Negative.        Objective:      Physical Exam   Constitutional: She is oriented to person, place, and time. She appears well-developed and well-nourished.   HENT:   Head: Normocephalic and atraumatic.   Right Ear: External ear normal.   Left Ear: External ear normal.   Mouth/Throat: Oropharynx is clear and moist. No oropharyngeal exudate.   Eyes: Conjunctivae and EOM are normal. Pupils are equal, round, and reactive to light. Right eye exhibits no discharge. Left eye exhibits no discharge. No scleral icterus.   Neck: Normal range of motion. Neck supple. No JVD present. No thyromegaly present.   Cardiovascular: Normal rate, regular rhythm, normal heart sounds and intact distal pulses.    No murmur heard.  Pulmonary/Chest: Effort normal and breath sounds normal. No respiratory distress. She has no wheezes. She exhibits no tenderness.   Abdominal: Soft.  Bowel sounds are normal. She exhibits no distension and no mass. There is no tenderness.   Musculoskeletal: Normal range of motion. She exhibits no edema or tenderness.   Lymphadenopathy:     She has no cervical adenopathy.   Neurological: She is alert and oriented to person, place, and time. No cranial nerve deficit. Coordination normal.   Skin: Skin is warm and dry. No rash noted. She is not diaphoretic. No erythema.   Psychiatric: She has a normal mood and affect. Her behavior is normal. Judgment and thought content normal.   Nursing note and vitals reviewed.      Assessment:       1. Routine general medical examination at a health care facility    2. Hypertension, unspecified type     3.     Hx of IBS - stable.  Plan:    1. Obtain blood work.         2. Continue with current medications.         3. Call with results.         4. RTC 1 year or sooner if needed.

## 2018-08-11 DIAGNOSIS — K21.9 GASTROESOPHAGEAL REFLUX DISEASE WITHOUT ESOPHAGITIS: ICD-10-CM

## 2018-08-13 RX ORDER — ESOMEPRAZOLE MAGNESIUM 40 MG/1
CAPSULE, DELAYED RELEASE ORAL
Qty: 90 CAPSULE | Refills: 0 | Status: SHIPPED | OUTPATIENT
Start: 2018-08-13 | End: 2018-12-01 | Stop reason: SDUPTHER

## 2018-09-06 DIAGNOSIS — L30.9 DERMATITIS: ICD-10-CM

## 2018-09-07 RX ORDER — CLOBETASOL PROPIONATE 0.05 G/100ML
SHAMPOO TOPICAL
Qty: 118 ML | Refills: 0 | Status: SHIPPED | OUTPATIENT
Start: 2018-09-07 | End: 2018-10-29 | Stop reason: SDUPTHER

## 2018-10-11 DIAGNOSIS — J01.00 ACUTE NON-RECURRENT MAXILLARY SINUSITIS: ICD-10-CM

## 2018-10-12 RX ORDER — FLUTICASONE PROPIONATE 50 MCG
SPRAY, SUSPENSION (ML) NASAL
Qty: 16 ML | Refills: 0 | Status: SHIPPED | OUTPATIENT
Start: 2018-10-12 | End: 2019-01-08 | Stop reason: SDUPTHER

## 2018-10-29 DIAGNOSIS — L30.9 DERMATITIS: ICD-10-CM

## 2018-10-30 RX ORDER — CLOBETASOL PROPIONATE 0.05 G/100ML
SHAMPOO TOPICAL
Qty: 118 ML | Refills: 0 | Status: SHIPPED | OUTPATIENT
Start: 2018-10-30 | End: 2019-02-10 | Stop reason: SDUPTHER

## 2018-12-01 DIAGNOSIS — K21.9 GASTROESOPHAGEAL REFLUX DISEASE WITHOUT ESOPHAGITIS: ICD-10-CM

## 2018-12-03 RX ORDER — ESOMEPRAZOLE MAGNESIUM 40 MG/1
CAPSULE, DELAYED RELEASE ORAL
Qty: 90 CAPSULE | Refills: 0 | Status: SHIPPED | OUTPATIENT
Start: 2018-12-03 | End: 2019-11-11 | Stop reason: SDUPTHER

## 2018-12-23 DIAGNOSIS — J01.00 ACUTE NON-RECURRENT MAXILLARY SINUSITIS: ICD-10-CM

## 2018-12-23 RX ORDER — FLUTICASONE PROPIONATE 50 MCG
SPRAY, SUSPENSION (ML) NASAL
Qty: 16 ML | Refills: 0 | Status: CANCELLED | OUTPATIENT
Start: 2018-12-23

## 2018-12-28 DIAGNOSIS — J01.00 ACUTE NON-RECURRENT MAXILLARY SINUSITIS: ICD-10-CM

## 2018-12-28 RX ORDER — FLUTICASONE PROPIONATE 50 MCG
1 SPRAY, SUSPENSION (ML) NASAL DAILY
Qty: 16 ML | Refills: 0 | Status: CANCELLED | OUTPATIENT
Start: 2018-12-28

## 2019-01-02 DIAGNOSIS — J01.00 ACUTE NON-RECURRENT MAXILLARY SINUSITIS: ICD-10-CM

## 2019-01-02 RX ORDER — FLUTICASONE PROPIONATE 50 MCG
1 SPRAY, SUSPENSION (ML) NASAL DAILY
Qty: 16 ML | Refills: 0 | Status: CANCELLED | OUTPATIENT
Start: 2019-01-02

## 2019-01-08 DIAGNOSIS — J01.00 ACUTE NON-RECURRENT MAXILLARY SINUSITIS: ICD-10-CM

## 2019-01-08 RX ORDER — FLUTICASONE PROPIONATE 50 MCG
1 SPRAY, SUSPENSION (ML) NASAL DAILY
Qty: 16 ML | Refills: 0 | Status: SHIPPED | OUTPATIENT
Start: 2019-01-08 | End: 2019-02-18 | Stop reason: SDUPTHER

## 2019-01-25 ENCOUNTER — TELEPHONE (OUTPATIENT)
Dept: INTERNAL MEDICINE | Facility: CLINIC | Age: 64
End: 2019-01-25

## 2019-01-25 DIAGNOSIS — I10 ESSENTIAL HYPERTENSION: Primary | ICD-10-CM

## 2019-01-25 RX ORDER — OLMESARTAN MEDOXOMIL 40 MG/1
TABLET ORAL
Qty: 90 TABLET | Refills: 3 | Status: SHIPPED | OUTPATIENT
Start: 2019-01-25 | End: 2019-02-18 | Stop reason: SDUPTHER

## 2019-01-26 DIAGNOSIS — I10 ESSENTIAL HYPERTENSION: ICD-10-CM

## 2019-01-28 RX ORDER — LOSARTAN POTASSIUM 100 MG/1
TABLET ORAL
Qty: 90 TABLET | Refills: 2 | Status: SHIPPED | OUTPATIENT
Start: 2019-01-28 | End: 2019-02-18 | Stop reason: ALTCHOICE

## 2019-01-28 RX ORDER — AMLODIPINE BESYLATE 10 MG/1
TABLET ORAL
Qty: 90 TABLET | Refills: 2 | Status: SHIPPED | OUTPATIENT
Start: 2019-01-28 | End: 2019-08-30 | Stop reason: SDUPTHER

## 2019-02-10 DIAGNOSIS — L30.9 DERMATITIS: ICD-10-CM

## 2019-02-12 RX ORDER — CLOBETASOL PROPIONATE 0.05 G/100ML
SHAMPOO TOPICAL
Qty: 118 ML | Refills: 0 | Status: SHIPPED | OUTPATIENT
Start: 2019-02-12 | End: 2019-04-15 | Stop reason: SDUPTHER

## 2019-02-18 ENCOUNTER — OFFICE VISIT (OUTPATIENT)
Dept: INTERNAL MEDICINE | Facility: CLINIC | Age: 64
End: 2019-02-18
Payer: COMMERCIAL

## 2019-02-18 VITALS
WEIGHT: 179.81 LBS | HEART RATE: 72 BPM | DIASTOLIC BLOOD PRESSURE: 82 MMHG | BODY MASS INDEX: 28.16 KG/M2 | SYSTOLIC BLOOD PRESSURE: 132 MMHG

## 2019-02-18 DIAGNOSIS — E55.9 VITAMIN D DEFICIENCY: ICD-10-CM

## 2019-02-18 DIAGNOSIS — J01.00 ACUTE NON-RECURRENT MAXILLARY SINUSITIS: ICD-10-CM

## 2019-02-18 DIAGNOSIS — I10 HYPERTENSION, UNSPECIFIED TYPE: Primary | ICD-10-CM

## 2019-02-18 DIAGNOSIS — I10 ESSENTIAL HYPERTENSION: ICD-10-CM

## 2019-02-18 DIAGNOSIS — E78.5 HYPERLIPIDEMIA, UNSPECIFIED HYPERLIPIDEMIA TYPE: ICD-10-CM

## 2019-02-18 DIAGNOSIS — M79.7 FIBROMYALGIA: ICD-10-CM

## 2019-02-18 PROCEDURE — 99999 PR PBB SHADOW E&M-EST. PATIENT-LVL III: ICD-10-PCS | Mod: PBBFAC,,, | Performed by: INTERNAL MEDICINE

## 2019-02-18 PROCEDURE — 3079F PR MOST RECENT DIASTOLIC BLOOD PRESSURE 80-89 MM HG: ICD-10-PCS | Mod: CPTII,S$GLB,, | Performed by: INTERNAL MEDICINE

## 2019-02-18 PROCEDURE — 3075F PR MOST RECENT SYSTOLIC BLOOD PRESS GE 130-139MM HG: ICD-10-PCS | Mod: CPTII,S$GLB,, | Performed by: INTERNAL MEDICINE

## 2019-02-18 PROCEDURE — 3008F BODY MASS INDEX DOCD: CPT | Mod: CPTII,S$GLB,, | Performed by: INTERNAL MEDICINE

## 2019-02-18 PROCEDURE — 3079F DIAST BP 80-89 MM HG: CPT | Mod: CPTII,S$GLB,, | Performed by: INTERNAL MEDICINE

## 2019-02-18 PROCEDURE — 3008F PR BODY MASS INDEX (BMI) DOCUMENTED: ICD-10-PCS | Mod: CPTII,S$GLB,, | Performed by: INTERNAL MEDICINE

## 2019-02-18 PROCEDURE — 3075F SYST BP GE 130 - 139MM HG: CPT | Mod: CPTII,S$GLB,, | Performed by: INTERNAL MEDICINE

## 2019-02-18 PROCEDURE — 99214 OFFICE O/P EST MOD 30 MIN: CPT | Mod: S$GLB,,, | Performed by: INTERNAL MEDICINE

## 2019-02-18 PROCEDURE — 99999 PR PBB SHADOW E&M-EST. PATIENT-LVL III: CPT | Mod: PBBFAC,,, | Performed by: INTERNAL MEDICINE

## 2019-02-18 PROCEDURE — 99214 PR OFFICE/OUTPT VISIT, EST, LEVL IV, 30-39 MIN: ICD-10-PCS | Mod: S$GLB,,, | Performed by: INTERNAL MEDICINE

## 2019-02-18 RX ORDER — FLUTICASONE PROPIONATE 50 MCG
1 SPRAY, SUSPENSION (ML) NASAL DAILY
Qty: 16 ML | Refills: 3 | Status: SHIPPED | OUTPATIENT
Start: 2019-02-18 | End: 2020-01-29

## 2019-02-18 RX ORDER — OLMESARTAN MEDOXOMIL 40 MG/1
TABLET ORAL
Qty: 90 TABLET | Refills: 3 | Status: SHIPPED | OUTPATIENT
Start: 2019-02-18 | End: 2020-03-24 | Stop reason: SDUPTHER

## 2019-02-18 RX ORDER — GABAPENTIN 100 MG/1
CAPSULE ORAL
Qty: 90 CAPSULE | Refills: 2 | Status: SHIPPED | OUTPATIENT
Start: 2019-02-18 | End: 2019-08-08 | Stop reason: SDUPTHER

## 2019-02-18 NOTE — PROGRESS NOTES
Subjective:       Patient ID: Rachel Conroy is a 63 y.o. female.    Chief Complaint: Follow-up and Hypertension    HPIMiss Conroy is  Here today for follow up HTN and other issues. I recently switched her BP med to olmesartan vs Diovan which she had been on. Recent reports regarding contamination of certain lots of this med prompted this decision. She is tolerating it well and BP diary shows numbers oscillating between 126-140/60-90. Her HR is often high, but she reports much stress at home caring for her 98 y/o mother with advanced Alzheimer's. Nevertheless, she is tolerating the med well and will continue with the olmesartan in AM and amlodipine in he evening.   We also discussed her hx of arthralgias due to OA as well fibromyalgia. She was given rx for gabapentin at bedtime, but has not taken it for fears of side effects. I reassured her about the low dose she is taking and the benefit she has noted when she takes it. I gave her a refill of gabapentin 100 mgs at bedtime. I again emphasized the importance of exercise in this regard.  Review of Systems   All other systems reviewed and are negative.      Objective:      Physical Exam   Constitutional: She is oriented to person, place, and time. She appears well-developed and well-nourished. No distress.   HENT:   Head: Normocephalic and atraumatic.   Right Ear: External ear normal.   Left Ear: External ear normal.   Mouth/Throat: Oropharynx is clear and moist. No oropharyngeal exudate.   Neck: Normal range of motion. Neck supple. No thyromegaly present.   Cardiovascular: Normal rate, regular rhythm, normal heart sounds and intact distal pulses.   Pulmonary/Chest: Effort normal and breath sounds normal. No respiratory distress. She has no wheezes.   Abdominal: Soft. Bowel sounds are normal. She exhibits no distension and no mass. There is no tenderness.   Musculoskeletal: Normal range of motion. She exhibits no edema or tenderness.   Lymphadenopathy:      She has no cervical adenopathy.   Neurological: She is alert and oriented to person, place, and time. No cranial nerve deficit.   Skin: Skin is warm. She is not diaphoretic.   Psychiatric: She has a normal mood and affect. Her behavior is normal.   Nursing note and vitals reviewed.      Assessment:       1. Hypertension, unspecified type    2. Vitamin D deficiency    3. Hyperlipidemia, unspecified hyperlipidemia type    4. Essential hypertension    5. Fibromyalgia    6. Acute non-recurrent maxillary sinusitis        Plan:    1. Obtain blood work.         2. Refill gabapentin/olmesartan as requested.         3. Monitor BP; keep diary.         4. Call with results.         5. RTC 3 months

## 2019-04-15 DIAGNOSIS — L30.9 DERMATITIS: ICD-10-CM

## 2019-04-16 RX ORDER — CLOBETASOL PROPIONATE 0.05 G/100ML
SHAMPOO TOPICAL
Qty: 118 ML | Refills: 0 | Status: SHIPPED | OUTPATIENT
Start: 2019-04-16 | End: 2019-07-04 | Stop reason: SDUPTHER

## 2019-06-21 RX ORDER — SUMATRIPTAN SUCCINATE 100 MG/1
TABLET ORAL
Qty: 9 TABLET | Refills: 0 | Status: SHIPPED | OUTPATIENT
Start: 2019-06-21 | End: 2020-01-24

## 2019-07-04 DIAGNOSIS — L30.9 DERMATITIS: ICD-10-CM

## 2019-07-08 RX ORDER — CLOBETASOL PROPIONATE 0.05 G/100ML
SHAMPOO TOPICAL
Qty: 118 ML | Refills: 0 | Status: SHIPPED | OUTPATIENT
Start: 2019-07-08 | End: 2019-07-11 | Stop reason: SDUPTHER

## 2019-07-11 DIAGNOSIS — L30.9 DERMATITIS: ICD-10-CM

## 2019-07-11 RX ORDER — CLOBETASOL PROPIONATE 0.05 G/100ML
SHAMPOO TOPICAL
Qty: 118 ML | Refills: 0 | Status: SHIPPED | OUTPATIENT
Start: 2019-07-11 | End: 2019-11-16 | Stop reason: SDUPTHER

## 2019-08-08 ENCOUNTER — PATIENT OUTREACH (OUTPATIENT)
Dept: ADMINISTRATIVE | Facility: OTHER | Age: 64
End: 2019-08-08

## 2019-08-08 ENCOUNTER — OFFICE VISIT (OUTPATIENT)
Dept: INTERNAL MEDICINE | Facility: CLINIC | Age: 64
End: 2019-08-08
Payer: COMMERCIAL

## 2019-08-08 DIAGNOSIS — E78.5 HYPERLIPIDEMIA, UNSPECIFIED HYPERLIPIDEMIA TYPE: ICD-10-CM

## 2019-08-08 DIAGNOSIS — R00.2 PALPITATIONS: ICD-10-CM

## 2019-08-08 DIAGNOSIS — E55.9 VITAMIN D DEFICIENCY: ICD-10-CM

## 2019-08-08 DIAGNOSIS — I10 ESSENTIAL HYPERTENSION: Primary | ICD-10-CM

## 2019-08-08 DIAGNOSIS — I10 ESSENTIAL HYPERTENSION: ICD-10-CM

## 2019-08-08 DIAGNOSIS — K21.9 GASTROESOPHAGEAL REFLUX DISEASE, ESOPHAGITIS PRESENCE NOT SPECIFIED: ICD-10-CM

## 2019-08-08 DIAGNOSIS — Z00.00 ROUTINE GENERAL MEDICAL EXAMINATION AT A HEALTH CARE FACILITY: Primary | ICD-10-CM

## 2019-08-08 DIAGNOSIS — Z12.31 ENCOUNTER FOR SCREENING MAMMOGRAM FOR MALIGNANT NEOPLASM OF BREAST: Primary | ICD-10-CM

## 2019-08-08 DIAGNOSIS — M79.7 FIBROMYALGIA: ICD-10-CM

## 2019-08-08 PROCEDURE — 99999 PR PBB SHADOW E&M-EST. PATIENT-LVL II: ICD-10-PCS | Mod: PBBFAC,,, | Performed by: INTERNAL MEDICINE

## 2019-08-08 PROCEDURE — 99214 PR OFFICE/OUTPT VISIT, EST, LEVL IV, 30-39 MIN: ICD-10-PCS | Mod: S$GLB,,, | Performed by: INTERNAL MEDICINE

## 2019-08-08 PROCEDURE — 99214 OFFICE O/P EST MOD 30 MIN: CPT | Mod: S$GLB,,, | Performed by: INTERNAL MEDICINE

## 2019-08-08 PROCEDURE — 99999 PR PBB SHADOW E&M-EST. PATIENT-LVL II: CPT | Mod: PBBFAC,,, | Performed by: INTERNAL MEDICINE

## 2019-08-08 RX ORDER — GABAPENTIN 100 MG/1
CAPSULE ORAL
Qty: 90 CAPSULE | Refills: 2 | Status: SHIPPED | OUTPATIENT
Start: 2019-08-08 | End: 2019-10-25 | Stop reason: SDUPTHER

## 2019-08-20 NOTE — TELEPHONE ENCOUNTER
----- Message from Leena Gonzalez sent at 2/5/2018 11:26 AM CST -----  Contact: Pt #[phone]  Pt states she was returning the nurse phone call back #[phone]   Called patient's wife, left voicemail to return call to clinic.     Called [facility], relayed provider message below. Faxed updated orders to [facility] at [phone]    Ibeth Saba RN  Triage Nurse

## 2019-08-23 VITALS — SYSTOLIC BLOOD PRESSURE: 124 MMHG | HEART RATE: 86 BPM | DIASTOLIC BLOOD PRESSURE: 80 MMHG

## 2019-08-23 DIAGNOSIS — R00.2 PALPITATIONS: Primary | ICD-10-CM

## 2019-08-23 NOTE — PROGRESS NOTES
"Subjective:       Patient ID: Rachel Conroy is a 63 y.o. female.    Chief Complaint: Annual Exam    HPIVery pleasant lady originally from [facility] here for her annual exam. Overall doing well, but reports noting palpitations, elevated HR especially during times of stress. She has hx of HTN and some degree of anxiety that "flares up" periodically. She denies any cardiac sx's associated with these events, but will obtain a Holter monitor to look into these. She also has a hx of arthritis and fibromyalgia which also is a stressor for her. She takes no medications for this and I gave her a trial of low dose gabapentin at bedtime and see how she does. Risks/benefits were discussed. Otherwise doing well regarding her other medical issues - HTN, GERD.  Review of Systems   All other systems reviewed and are negative.      Objective:      Physical Exam   Constitutional: She is oriented to person, place, and time. She appears well-developed and well-nourished. No distress.   HENT:   Head: Normocephalic and atraumatic.   Right Ear: External ear normal.   Left Ear: External ear normal.   Mouth/Throat: Oropharynx is clear and moist. No oropharyngeal exudate.   Eyes: Pupils are equal, round, and reactive to light. Conjunctivae and EOM are normal. Right eye exhibits no discharge. Left eye exhibits no discharge. No scleral icterus.   Neck: Normal range of motion. Neck supple. No JVD present. No thyromegaly present.   Cardiovascular: Normal rate, regular rhythm, normal heart sounds and intact distal pulses.   No murmur heard.  Pulmonary/Chest: Effort normal and breath sounds normal. No respiratory distress. She has no wheezes. She exhibits no tenderness.   Abdominal: Soft. Bowel sounds are normal. She exhibits no distension and no mass. There is no tenderness.   Musculoskeletal: Normal range of motion. She exhibits no edema or tenderness.   Lymphadenopathy:     She has no cervical adenopathy.   Neurological: She is alert " and oriented to person, place, and time. No cranial nerve deficit. Coordination normal.   Skin: Skin is warm and dry. No rash noted. She is not diaphoretic. No erythema.   Psychiatric: She has a normal mood and affect. Her behavior is normal. Judgment and thought content normal.   Nursing note and vitals reviewed.      Assessment:       1. Routine general medical examination at a health care facility    2. Fibromyalgia     3.     HTN - controlled.   4.     GERD - on PI's.  Plan:    1. Obtain blood work.         2. Obtain 48 hour Holter.         3. Trial of gabapentin 100 mgs at bedtime.         4. RTC for review.

## 2019-08-30 DIAGNOSIS — I10 ESSENTIAL HYPERTENSION: ICD-10-CM

## 2019-08-30 RX ORDER — AMLODIPINE BESYLATE 10 MG/1
10 TABLET ORAL DAILY
Qty: 90 TABLET | Refills: 2 | Status: SHIPPED | OUTPATIENT
Start: 2019-08-30 | End: 2020-01-29

## 2019-09-16 ENCOUNTER — PATIENT MESSAGE (OUTPATIENT)
Dept: RHEUMATOLOGY | Facility: CLINIC | Age: 64
End: 2019-09-16

## 2019-09-20 ENCOUNTER — TELEPHONE (OUTPATIENT)
Dept: CARDIOLOGY | Facility: CLINIC | Age: 64
End: 2019-09-20

## 2019-09-20 ENCOUNTER — PATIENT OUTREACH (OUTPATIENT)
Dept: ADMINISTRATIVE | Facility: OTHER | Age: 64
End: 2019-09-20

## 2019-09-20 DIAGNOSIS — Z12.31 ENCOUNTER FOR SCREENING MAMMOGRAM FOR MALIGNANT NEOPLASM OF BREAST: Primary | ICD-10-CM

## 2019-09-20 DIAGNOSIS — R00.2 PALPITATIONS: Primary | ICD-10-CM

## 2019-09-27 ENCOUNTER — CLINICAL SUPPORT (OUTPATIENT)
Dept: CARDIOLOGY | Facility: HOSPITAL | Age: 64
End: 2019-09-27
Attending: INTERNAL MEDICINE
Payer: COMMERCIAL

## 2019-09-27 DIAGNOSIS — R00.2 PALPITATIONS: ICD-10-CM

## 2019-09-27 PROCEDURE — 93227 XTRNL ECG REC<48 HR R&I: CPT | Mod: ,,, | Performed by: INTERNAL MEDICINE

## 2019-09-27 PROCEDURE — 93226 XTRNL ECG REC<48 HR SCAN A/R: CPT

## 2019-09-27 PROCEDURE — 93227 HOLTER MONITOR - 48 HOUR (CUPID ONLY): ICD-10-PCS | Mod: ,,, | Performed by: INTERNAL MEDICINE

## 2019-10-04 ENCOUNTER — PATIENT OUTREACH (OUTPATIENT)
Dept: ADMINISTRATIVE | Facility: OTHER | Age: 64
End: 2019-10-04

## 2019-10-10 LAB
OHS CV EVENT MONITOR DAY: 0
OHS CV HOLTER LENGTH DECIMAL HOURS: 48
OHS CV HOLTER LENGTH HOURS: 48
OHS CV HOLTER LENGTH MINUTES: 0

## 2019-10-15 ENCOUNTER — PATIENT OUTREACH (OUTPATIENT)
Dept: ADMINISTRATIVE | Facility: OTHER | Age: 64
End: 2019-10-15

## 2019-10-17 ENCOUNTER — OFFICE VISIT (OUTPATIENT)
Dept: CARDIOLOGY | Facility: CLINIC | Age: 64
End: 2019-10-17
Payer: COMMERCIAL

## 2019-10-17 ENCOUNTER — HOSPITAL ENCOUNTER (OUTPATIENT)
Dept: CARDIOLOGY | Facility: CLINIC | Age: 64
Discharge: HOME | End: 2019-10-17
Payer: COMMERCIAL

## 2019-10-17 VITALS
HEART RATE: 76 BPM | HEIGHT: 67 IN | WEIGHT: 190.06 LBS | SYSTOLIC BLOOD PRESSURE: 133 MMHG | BODY MASS INDEX: 29.83 KG/M2 | DIASTOLIC BLOOD PRESSURE: 63 MMHG

## 2019-10-17 DIAGNOSIS — E78.5 HYPERLIPIDEMIA, UNSPECIFIED HYPERLIPIDEMIA TYPE: ICD-10-CM

## 2019-10-17 DIAGNOSIS — R00.2 PALPITATIONS: Primary | ICD-10-CM

## 2019-10-17 DIAGNOSIS — R00.2 PALPITATIONS: ICD-10-CM

## 2019-10-17 DIAGNOSIS — Z91.89 AT RISK FOR CORONARY ARTERY DISEASE: ICD-10-CM

## 2019-10-17 DIAGNOSIS — I10 ESSENTIAL HYPERTENSION: ICD-10-CM

## 2019-10-17 PROCEDURE — 99203 PR OFFICE/OUTPT VISIT, NEW, LEVL III, 30-44 MIN: ICD-10-PCS | Mod: S$GLB,,, | Performed by: INTERNAL MEDICINE

## 2019-10-17 PROCEDURE — 3075F SYST BP GE 130 - 139MM HG: CPT | Mod: CPTII,S$GLB,, | Performed by: INTERNAL MEDICINE

## 2019-10-17 PROCEDURE — 99203 OFFICE O/P NEW LOW 30 MIN: CPT | Mod: S$GLB,,, | Performed by: INTERNAL MEDICINE

## 2019-10-17 PROCEDURE — 93010 ELECTROCARDIOGRAM REPORT: CPT | Mod: S$GLB,,, | Performed by: INTERNAL MEDICINE

## 2019-10-17 PROCEDURE — 93005 EKG 12-LEAD: ICD-10-PCS | Mod: S$GLB,,, | Performed by: INTERNAL MEDICINE

## 2019-10-17 PROCEDURE — 99999 PR PBB SHADOW E&M-EST. PATIENT-LVL IV: CPT | Mod: PBBFAC,,, | Performed by: INTERNAL MEDICINE

## 2019-10-17 PROCEDURE — 99999 PR PBB SHADOW E&M-EST. PATIENT-LVL IV: ICD-10-PCS | Mod: PBBFAC,,, | Performed by: INTERNAL MEDICINE

## 2019-10-17 PROCEDURE — 93010 EKG 12-LEAD: ICD-10-PCS | Mod: S$GLB,,, | Performed by: INTERNAL MEDICINE

## 2019-10-17 PROCEDURE — 3078F DIAST BP <80 MM HG: CPT | Mod: CPTII,S$GLB,, | Performed by: INTERNAL MEDICINE

## 2019-10-17 PROCEDURE — 3008F PR BODY MASS INDEX (BMI) DOCUMENTED: ICD-10-PCS | Mod: CPTII,S$GLB,, | Performed by: INTERNAL MEDICINE

## 2019-10-17 PROCEDURE — 3078F PR MOST RECENT DIASTOLIC BLOOD PRESSURE < 80 MM HG: ICD-10-PCS | Mod: CPTII,S$GLB,, | Performed by: INTERNAL MEDICINE

## 2019-10-17 PROCEDURE — 3008F BODY MASS INDEX DOCD: CPT | Mod: CPTII,S$GLB,, | Performed by: INTERNAL MEDICINE

## 2019-10-17 PROCEDURE — 93005 ELECTROCARDIOGRAM TRACING: CPT | Mod: S$GLB,,, | Performed by: INTERNAL MEDICINE

## 2019-10-17 PROCEDURE — 3075F PR MOST RECENT SYSTOLIC BLOOD PRESS GE 130-139MM HG: ICD-10-PCS | Mod: CPTII,S$GLB,, | Performed by: INTERNAL MEDICINE

## 2019-10-17 RX ORDER — CHLORTHALIDONE 25 MG/1
TABLET ORAL
Qty: 30 TABLET | Refills: 11 | Status: SHIPPED | OUTPATIENT
Start: 2019-10-17 | End: 2020-02-24

## 2019-10-17 RX ORDER — CHOLECALCIFEROL (VITAMIN D3) 25 MCG
1000 TABLET ORAL DAILY
COMMUNITY
End: 2020-05-01 | Stop reason: SDUPTHER

## 2019-10-17 NOTE — PATIENT INSTRUCTIONS
Add 1/2 tablet of chlorthalidone to your regimen  Let us know if palpitations worsen as we discussed  .Mediteranian diet recommended with sodium ( salt) restriction  Graded exercise for 30 minutes a day at least 5 days a week suggested.  It is recommended that  blood pressure be checked 3-4 times a week and a log  maintained brought with you the next visit.

## 2019-10-17 NOTE — PROGRESS NOTES
"Subjective:   Patient ID:  Rachel Conroy is a 64 y.o. female who presents for evaluation of Palpitations      HPI:   Rachel Conroy presents for evaluation of palpitations.Through her daughter who interprets, the patient has noted periodic " palpitations that last seconds. Feels a brief " fluttering" that feels like a hard beat in her upper sternum. No racing off the heart. Had a recent Holter monitor that demonstrated rare PACs and rare PVCs. No tachyarrhythmia. Occasional " SOB" but no limiting LARKIN. Rachel Conroy denies  presyncope , or syncope. Rachel Conroy has hypertension not well controlled with readings of 140-175/ at home. Occasional orthostatic sxs when awakening in the A:M. Rachel Conroy has dyslipidemia with a 5.4% 10 year risk..    Review of Systems   Constitution: Negative for malaise/fatigue, weight gain and weight loss.   Eyes: Negative for blurred vision.   Cardiovascular: Positive for palpitations. Negative for chest pain, claudication, cyanosis, dyspnea on exertion, irregular heartbeat, leg swelling, near-syncope, orthopnea, paroxysmal nocturnal dyspnea and syncope.   Respiratory: Negative for cough, shortness of breath and wheezing.    Musculoskeletal: Negative for falls and myalgias.   Gastrointestinal: Positive for constipation. Negative for abdominal pain, heartburn, nausea and vomiting.        IBS   Genitourinary: Negative for nocturia.   Neurological: Negative for brief paralysis, dizziness, focal weakness, headaches, numbness, paresthesias and weakness.   Psychiatric/Behavioral: Negative for altered mental status.       Current Outpatient Medications   Medication Sig    amLODIPine (NORVASC) 10 MG tablet Take 1 tablet (10 mg total) by mouth once daily. (Patient taking differently: Take 10 mg by mouth as needed. )    clobetasol (CLOBEX) 0.05 % shampoo APPLY EXTERNALLY TO THE AFFECTED AREA EVERY 7 DAYS    esomeprazole (NEXIUM) 40 MG capsule TAKE 1 " "CAPSULE BY MOUTH 30-45 MINUTES BEFORE BREAKFAST (Patient taking differently: as needed. )    fluticasone (FLONASE) 50 mcg/actuation nasal spray 1 spray (50 mcg total) by Each Nare route once daily.    gabapentin (NEURONTIN) 100 MG capsule Mo 1 tableta al acostarse para dolor de cuerpo.    olmesartan (BENICAR) 40 MG tablet Mo 1 tableta por isidoro.    sumatriptan (IMITREX) 100 MG tablet TAKE 1/2 TO 1 TABLET BY MOUTH EVERY 2 HOURS AS NEEDED FOR MIGRAINE    vitamin D (VITAMIN D3) 1000 units Tab Take 1,000 Units by mouth once daily.    chlorthalidone (HYGROTEN) 25 MG Tab 1/2 tablet daily    cholecalciferol, vitamin D3, 50,000 unit capsule Mo 1 tableta por semana x 12 semanas. (Patient not taking: Reported on 10/17/2019)     No current facility-administered medications for this visit.      Objective:   Physical Exam   Constitutional: She is oriented to person, place, and time. She appears well-developed. No distress.   /63 (BP Location: Left arm, Patient Position: Sitting, BP Method: Medium (Manual))   Pulse 76   Ht 5' 7" (1.702 m)   Wt 86.2 kg (190 lb 0.6 oz)   BMI 29.76 kg/m²    HENT:   Head: Normocephalic.   Eyes: Pupils are equal, round, and reactive to light. Conjunctivae are normal. No scleral icterus.   Neck: Neck supple. No JVD present. No thyromegaly present.   Cardiovascular: Normal rate, regular rhythm, normal heart sounds and intact distal pulses. PMI is not displaced. Exam reveals no gallop and no friction rub.   No murmur heard.  Pulmonary/Chest: Effort normal and breath sounds normal. No respiratory distress. She has no wheezes. She has no rales.   Abdominal: Soft. She exhibits no distension. There is no splenomegaly or hepatomegaly. There is no tenderness.   Musculoskeletal: She exhibits no edema or tenderness.   Gait normal   Neurological: She is alert and oriented to person, place, and time.   Skin: Skin is warm and dry. She is not diaphoretic.   Psychiatric: She has a normal mood " and affect. Her behavior is normal.       Lab Results   Component Value Date     08/08/2019    K 4.4 08/08/2019     08/08/2019    CO2 28 08/08/2019    BUN 11 08/08/2019    CREATININE 0.6 08/08/2019    GLU 92 08/08/2019    HGBA1C 5.1 07/31/2018    MG 2.1 10/11/2017    AST 25 08/08/2019    ALT 38 08/08/2019    ALBUMIN 3.9 08/08/2019    PROT 7.2 08/08/2019    BILITOT 0.5 08/08/2019    WBC 5.24 08/08/2019    HGB 13.9 08/08/2019    HCT 43.1 08/08/2019    MCV 88 08/08/2019     08/08/2019    TSH 0.806 08/08/2019    CHOL 242 (H) 08/08/2019    HDL 79 (H) 08/08/2019    LDLCALC 130.8 08/08/2019    TRIG 161 (H) 08/08/2019       Assessment:     1. Palpitations ; Clinically appears to be symptomatic ectopy   2. Essential hypertension : Not ideal control   3. Hyperlipidemia, unspecified hyperlipidemia type : 5.4% 10 year risk   4. At risk for coronary artery disease        Plan:     Rachel was seen today for palpitations.    Diagnoses and all orders for this visit:    Palpitations    Essential hypertension  -     chlorthalidone (HYGROTEN) 25 MG Tab; 1/2 tablet daily ( added)  -     Basic metabolic panel; Future; Expected date: 10/31/2019    Hyperlipidemia, unspecified hyperlipidemia type  -     CT Cardiac Scoring; Future; Expected date: 10/17/2019  .Mediter[place] diet recommended, discussed, and given a copy  At risk for coronary artery disease  -     CT Cardiac Scoring; Future; Expected date: 10/17/2019    Other orders  -     vitamin D (VITAMIN D3) 1000 units Tab; Take 1,000 Units by mouth once daily.

## 2019-10-23 ENCOUNTER — PATIENT OUTREACH (OUTPATIENT)
Dept: ADMINISTRATIVE | Facility: OTHER | Age: 64
End: 2019-10-23

## 2019-10-25 ENCOUNTER — OFFICE VISIT (OUTPATIENT)
Dept: RHEUMATOLOGY | Facility: CLINIC | Age: 64
End: 2019-10-25
Payer: COMMERCIAL

## 2019-10-25 ENCOUNTER — PATIENT MESSAGE (OUTPATIENT)
Dept: RHEUMATOLOGY | Facility: CLINIC | Age: 64
End: 2019-10-25

## 2019-10-25 VITALS
BODY MASS INDEX: 30.49 KG/M2 | SYSTOLIC BLOOD PRESSURE: 111 MMHG | DIASTOLIC BLOOD PRESSURE: 68 MMHG | HEIGHT: 67 IN | WEIGHT: 194.25 LBS | HEART RATE: 79 BPM

## 2019-10-25 DIAGNOSIS — M79.7 FIBROMYALGIA: ICD-10-CM

## 2019-10-25 PROCEDURE — 99215 OFFICE O/P EST HI 40 MIN: CPT | Mod: S$GLB,,, | Performed by: INTERNAL MEDICINE

## 2019-10-25 PROCEDURE — 3078F DIAST BP <80 MM HG: CPT | Mod: CPTII,S$GLB,, | Performed by: INTERNAL MEDICINE

## 2019-10-25 PROCEDURE — 3008F PR BODY MASS INDEX (BMI) DOCUMENTED: ICD-10-PCS | Mod: CPTII,S$GLB,, | Performed by: INTERNAL MEDICINE

## 2019-10-25 PROCEDURE — 3078F PR MOST RECENT DIASTOLIC BLOOD PRESSURE < 80 MM HG: ICD-10-PCS | Mod: CPTII,S$GLB,, | Performed by: INTERNAL MEDICINE

## 2019-10-25 PROCEDURE — 3074F SYST BP LT 130 MM HG: CPT | Mod: CPTII,S$GLB,, | Performed by: INTERNAL MEDICINE

## 2019-10-25 PROCEDURE — 3008F BODY MASS INDEX DOCD: CPT | Mod: CPTII,S$GLB,, | Performed by: INTERNAL MEDICINE

## 2019-10-25 PROCEDURE — 99215 PR OFFICE/OUTPT VISIT, EST, LEVL V, 40-54 MIN: ICD-10-PCS | Mod: S$GLB,,, | Performed by: INTERNAL MEDICINE

## 2019-10-25 PROCEDURE — 99999 PR PBB SHADOW E&M-EST. PATIENT-LVL III: ICD-10-PCS | Mod: PBBFAC,,, | Performed by: INTERNAL MEDICINE

## 2019-10-25 PROCEDURE — 3074F PR MOST RECENT SYSTOLIC BLOOD PRESSURE < 130 MM HG: ICD-10-PCS | Mod: CPTII,S$GLB,, | Performed by: INTERNAL MEDICINE

## 2019-10-25 PROCEDURE — 99999 PR PBB SHADOW E&M-EST. PATIENT-LVL III: CPT | Mod: PBBFAC,,, | Performed by: INTERNAL MEDICINE

## 2019-10-25 RX ORDER — GABAPENTIN 100 MG/1
300 CAPSULE ORAL NIGHTLY
Qty: 90 CAPSULE | Refills: 0 | Status: SHIPPED | OUTPATIENT
Start: 2019-10-25 | End: 2020-05-01 | Stop reason: SDUPTHER

## 2019-10-25 RX ORDER — MELOXICAM 15 MG/1
15 TABLET ORAL DAILY
Qty: 30 TABLET | Refills: 5 | Status: SHIPPED | OUTPATIENT
Start: 2019-10-25 | End: 2020-04-06 | Stop reason: SDUPTHER

## 2019-10-25 NOTE — PROGRESS NOTES
"HPI 60 yo  F with PMH of HTN, HL, hiatal hernia with GERD here for evaluation of joint pain. Patient was treated previously by Dr. Blackwood.   Pt with osteoarthritis initially, but on last visit in July 2014, had developed polyarthritis,and I did a work up for this.  From July 2014:  "Tender 16+ joints of hand, elbows, wrists, shoulders  Swollen 8 pips and wrist Right midfoot and right mtps"  ESR, CRP and antibodies were negative, as were xrays     Patient tried plaquenil but it made her sleepy. She was also supposed to be on prednisone 5 mg a day but reports it never helped her.    Reports pain in shoulders, lower back,legs and feet.  Reports pain  Is worse with prolonged sitting.  Reports pain is 7/10.Moving makes pain worse.  Reports she has pain at end of day.  Report that she has swelling in hands that is worse with using hands.  No rashes.  Reports she also has pain in hips.  Reports pain for 3 years.  Reports poor sleep.      Interval history: Patient is here for follow up. She took gabapentin 100mg po qhs.  Reports pain in her legs at night, especially in cold. She has pain in shoulders and hips. Warm bath improves her pain. On occasion, she will take aleve with some improvement.   Pain level can be is 6/10 aching.   Being on her feet makes pain worse.              Past Medical History   Diagnosis Date    Arthritis      Hypertension      Migraine headache      Hyperlipidemia      Knee injury      Other physical therapy      Plantar fasciitis      Joint pain      Muscle pain, fibromyalgia 3/19/2014            Review of Systems   Constitutional: Negative for chills, diaphoresis, activity change, appetite change and fatigue.   HENT: Negative for congestion, ear discharge, ear pain, facial swelling, mouth sores, sinus pressure, sneezing, sore throat, tinnitus and trouble swallowing.    Eyes: Negative for photophobia, pain, discharge, redness, itching and visual disturbance.   Respiratory: Negative for " apnea, chest tightness, shortness of breath, wheezing and stridor.    Cardiovascular: Negative for leg swelling.   Gastrointestinal: Negative for nausea, abdominal pain, diarrhea, constipation, blood in stool, abdominal distention and anal bleeding.   Endocrine: Negative for cold intolerance and heat intolerance.   Genitourinary: Negative for dysuria and difficulty urinating.   Musculoskeletal: Positive for arthralgias. Negative for myalgias, back pain, joint swelling, gait problem, neck pain and neck stiffness.   Skin: Negative for color change, pallor, rash and wound.   Neurological: Negative for dizziness, seizures, light-headedness and numbness.   Hematological: Negative for adenopathy. Does not bruise/bleed easily.   Psychiatric/Behavioral: Negative for sleep disturbance. The patient is not nervous/anxious.                    Objective:      Physical Exam   Constitutional: She is oriented to person, place, and time.   HENT:   Head: Normocephalic and atraumatic.   Right Ear: External ear normal.   Left Ear: External ear normal.   Nose: Nose normal.   Mouth/Throat: Oropharynx is clear and moist. No oropharyngeal exudate.   Eyes: Conjunctivae and EOM are normal. Pupils are equal, round, and reactive to light. Right eye exhibits no discharge. Left eye exhibits no discharge. No scleral icterus.   Neck: Neck supple. No JVD present. No thyromegaly present.   Cardiovascular: Normal rate, regular rhythm, normal heart sounds and intact distal pulses.  Exam reveals no gallop and no friction rub.    No murmur heard.  Pulmonary/Chest: Effort normal and breath sounds normal. No respiratory distress. She has no wheezes. She has no rales. She exhibits no tenderness.   Abdominal: Soft. Bowel sounds are normal. She exhibits no distension and no mass. There is no tenderness. There is no rebound and no guarding.   Lymphadenopathy:     She has no cervical adenopathy.   Neurological: She is alert and oriented to person, place, and  time. No cranial nerve deficit. Gait normal. Coordination normal.   Skin: Skin is dry. No rash noted. No erythema. No pallor.     Psychiatric: Affect and judgment normal.   Musculoskeletal: She exhibits tenderness. She exhibits no edema.   FROM of all joints including neck,spine, ankles, wrists, knees, and ankles; no joint deformities noted or effusions, erythema or warmth; no tophi or nodules noted; no crepitus; no synovitis noted in hands or feet; no nail pitting or onycholysis  Both shoulders with decreased abduction ( 160 on right and 130 on left)      Labs:  Audra-negative  Rf,ccp-negative              Arthritis survey- (2014)-  No instability seen in the cervical spine with minimal degenerative change seen.   Knees show some mild medial joint space narrowing. Tibial spines are prominent medially.    Hands and feet show no evidence of arthritis. No erosions.                Assessment:     65 yo  F with PMH of HTN, HL, hiatal hernia with GERD, NELL, GRAHAM with hepatosplenomegaly here  for follow up of fibromyalgia. Has not been seen in 2 years   Tramadol gives her itching. She comes in with worsening myalgias from fibromyalgia.  She has worsening arthralgias from DJD so will start meloxicam.     Plan:   -encouraged compliance with sleep machine  -increase  gabapentin from 100mg po qhs to 300mg po qhs  Start meloxicam 15 mg po qday prn ( she will not take it more than 4 times a week)  Avoid tylenol given fatty liver  Encouraged weight loss  Consider PMR referral  Consider adding muscle relaxant to bedtime routine  Start aspercreme with lidocaine TID PRN    rtc in 12 weeks        *        Not recorded

## 2019-10-30 ENCOUNTER — HOSPITAL ENCOUNTER (OUTPATIENT)
Dept: RADIOLOGY | Facility: HOSPITAL | Age: 64
Discharge: HOME | End: 2019-10-30
Attending: INTERNAL MEDICINE
Payer: COMMERCIAL

## 2019-10-30 DIAGNOSIS — E78.5 HYPERLIPIDEMIA, UNSPECIFIED HYPERLIPIDEMIA TYPE: ICD-10-CM

## 2019-10-30 DIAGNOSIS — Z91.89 AT RISK FOR CORONARY ARTERY DISEASE: ICD-10-CM

## 2019-10-30 PROCEDURE — 75571 CT CALCIUM SCORING CARDIAC: ICD-10-PCS | Mod: 26,,, | Performed by: RADIOLOGY

## 2019-10-30 PROCEDURE — 75571 CT HRT W/O DYE W/CA TEST: CPT | Mod: TC

## 2019-10-30 PROCEDURE — 75571 CT HRT W/O DYE W/CA TEST: CPT | Mod: 26,,, | Performed by: RADIOLOGY

## 2019-11-11 DIAGNOSIS — K21.9 GASTROESOPHAGEAL REFLUX DISEASE WITHOUT ESOPHAGITIS: ICD-10-CM

## 2019-11-13 RX ORDER — ESOMEPRAZOLE MAGNESIUM 40 MG/1
40 CAPSULE, DELAYED RELEASE ORAL
Qty: 90 CAPSULE | Refills: 0 | Status: SHIPPED | OUTPATIENT
Start: 2019-11-13 | End: 2020-05-01 | Stop reason: SDUPTHER

## 2019-11-13 NOTE — TELEPHONE ENCOUNTER
Spoke w pt at first who handed the phone to a male to schedule next available as she speaks [place]de[place].

## 2019-11-16 DIAGNOSIS — L30.9 DERMATITIS: ICD-10-CM

## 2019-11-18 RX ORDER — CLOBETASOL PROPIONATE 0.05 G/100ML
SHAMPOO TOPICAL
Qty: 118 ML | Refills: 0 | Status: SHIPPED | OUTPATIENT
Start: 2019-11-18 | End: 2020-01-10

## 2019-12-31 ENCOUNTER — PATIENT OUTREACH (OUTPATIENT)
Dept: ADMINISTRATIVE | Facility: OTHER | Age: 64
End: 2019-12-31

## 2020-01-09 DIAGNOSIS — L30.9 DERMATITIS: ICD-10-CM

## 2020-01-10 RX ORDER — CLOBETASOL PROPIONATE 0.05 G/100ML
SHAMPOO TOPICAL
Qty: 118 ML | Refills: 0 | Status: SHIPPED | OUTPATIENT
Start: 2020-01-10 | End: 2020-11-18 | Stop reason: CLARIF

## 2020-01-21 ENCOUNTER — PATIENT OUTREACH (OUTPATIENT)
Dept: ADMINISTRATIVE | Facility: OTHER | Age: 65
End: 2020-01-21

## 2020-01-23 ENCOUNTER — OFFICE VISIT (OUTPATIENT)
Dept: GASTROENTEROLOGY | Facility: CLINIC | Age: 65
End: 2020-01-23
Payer: COMMERCIAL

## 2020-01-23 VITALS
HEIGHT: 67 IN | HEART RATE: 77 BPM | WEIGHT: 192 LBS | DIASTOLIC BLOOD PRESSURE: 83 MMHG | SYSTOLIC BLOOD PRESSURE: 158 MMHG | BODY MASS INDEX: 30.13 KG/M2

## 2020-01-23 DIAGNOSIS — K58.0 IRRITABLE BOWEL SYNDROME WITH DIARRHEA: Primary | ICD-10-CM

## 2020-01-23 DIAGNOSIS — K21.9 GERD WITHOUT ESOPHAGITIS: ICD-10-CM

## 2020-01-23 DIAGNOSIS — K44.9 HIATAL HERNIA: ICD-10-CM

## 2020-01-23 PROCEDURE — 3077F PR MOST RECENT SYSTOLIC BLOOD PRESSURE >= 140 MM HG: ICD-10-PCS | Mod: CPTII,S$GLB,, | Performed by: NURSE PRACTITIONER

## 2020-01-23 PROCEDURE — 99214 PR OFFICE/OUTPT VISIT, EST, LEVL IV, 30-39 MIN: ICD-10-PCS | Mod: S$GLB,,, | Performed by: NURSE PRACTITIONER

## 2020-01-23 PROCEDURE — 3008F PR BODY MASS INDEX (BMI) DOCUMENTED: ICD-10-PCS | Mod: CPTII,S$GLB,, | Performed by: NURSE PRACTITIONER

## 2020-01-23 PROCEDURE — 3008F BODY MASS INDEX DOCD: CPT | Mod: CPTII,S$GLB,, | Performed by: NURSE PRACTITIONER

## 2020-01-23 PROCEDURE — 3079F DIAST BP 80-89 MM HG: CPT | Mod: CPTII,S$GLB,, | Performed by: NURSE PRACTITIONER

## 2020-01-23 PROCEDURE — 99999 PR PBB SHADOW E&M-EST. PATIENT-LVL III: CPT | Mod: PBBFAC,,, | Performed by: NURSE PRACTITIONER

## 2020-01-23 PROCEDURE — 3077F SYST BP >= 140 MM HG: CPT | Mod: CPTII,S$GLB,, | Performed by: NURSE PRACTITIONER

## 2020-01-23 PROCEDURE — 99999 PR PBB SHADOW E&M-EST. PATIENT-LVL III: ICD-10-PCS | Mod: PBBFAC,,, | Performed by: NURSE PRACTITIONER

## 2020-01-23 PROCEDURE — 99214 OFFICE O/P EST MOD 30 MIN: CPT | Mod: S$GLB,,, | Performed by: NURSE PRACTITIONER

## 2020-01-23 PROCEDURE — 3079F PR MOST RECENT DIASTOLIC BLOOD PRESSURE 80-89 MM HG: ICD-10-PCS | Mod: CPTII,S$GLB,, | Performed by: NURSE PRACTITIONER

## 2020-01-23 RX ORDER — HYOSCYAMINE SULFATE 0.12 MG/1
0.12 TABLET SUBLINGUAL
Qty: 90 TABLET | Refills: 6 | Status: SHIPPED | OUTPATIENT
Start: 2020-01-23 | End: 2020-10-05

## 2020-01-23 NOTE — PROGRESS NOTES
[facility] Consultation Note    Reason for Consult:  The primary encounter diagnosis was Irritable bowel syndrome with diarrhea. Diagnoses of Hiatal hernia and GERD without esophagitis were also pertinent to this visit.    PCP:   Tera Blanca   [address] / [place] [address]    Referring MD:  Tera Blanca Md  [address] [place] [address]    Initial History of Present Illness (HPI):  This is a 64 y.o. female here for evaluation of IBS-D.  Established patient with DEACON Morton.  Last seen in clinic July 2018.  I did review this visit.  She is new to me.  She has had a several year history of IBS.  When she eats anything she has urgency with diarrhea for many years. Treatments tried: imodium helps. Viberzi caused constipation in 2017.  Bentyl helped in the past she has not had any for a while.    Heartburn and gas. Especially at night. Happens every night. Does eat spicy, sugary foods late at night. Taking nexium every morning before breakfast. Denies n/v, dysphagia.  NSAID usage - Aleve occasionally      ROS:  Constitutional: No fevers, chills, No weight loss  ENT:  Denies sore throat  CV: No chest pain, no palpitation  Pulm: No cough, No shortness of breath  Ophtho: + vision changes  GI: see HPI  Derm: No rash, no itching  Heme: No easy bruising  MSK: + significant arthritis  : No dysuria, No hematuria  Neuro: No syncope, No seizure  Psych: No uncontrolled anxiety, No uncontrolled depression    Medical History:  has a past medical history of Arthritis, GERD (gastroesophageal reflux disease), Hyperlipidemia, Hypertension, Irritable bowel syndrome, Joint pain, Knee injury, Migraine headache, Muscle pain, fibromyalgia (3/19/2014), Other physical therapy, Plantar fasciitis, Sleep apnea, and Urinary incontinence.    Surgical History:  has a past surgical history that includes Hysterectomy; Colonoscopy (N/A, 11/9/2017); Colonoscopy (N/A, 2/26/2018); and  "Appendectomy (2018).    Family History: family history includes Alzheimer's disease in her mother; Arthritis in her mother..     Social History:  reports that she has never smoked. She has never used smokeless tobacco. She reports that she does not drink alcohol or use drugs.    Review of patient's allergies indicates:   Allergen Reactions    Tramadol Itching       Medication List with Changes/Refills   New Medications    HYOSCYAMINE (LEVSIN/SL) 0.125 MG SUBL    Place 1 tablet (0.125 mg total) under the tongue 3 (three) times daily before meals.   Current Medications    AMLODIPINE (NORVASC) 10 MG TABLET    Take 1 tablet (10 mg total) by mouth once daily.    CHLORTHALIDONE (HYGROTEN) 25 MG TAB    1/2 tablet daily    CHOLECALCIFEROL, VITAMIN D3, 50,000 UNIT CAPSULE    Mo 1 tableta por semana x 12 semanas.    CLOBETASOL (CLOBEX) 0.05 % SHAMPOO    APPLY EXTERNALLY TO THE AFFECTED AREA EVERY 7 DAYS    ESOMEPRAZOLE (NEXIUM) 40 MG CAPSULE    Take 1 capsule (40 mg total) by mouth before breakfast.    FLUTICASONE (FLONASE) 50 MCG/ACTUATION NASAL SPRAY    1 spray (50 mcg total) by Each Nare route once daily.    GABAPENTIN (NEURONTIN) 100 MG CAPSULE    Take 3 capsules (300 mg total) by mouth every evening.    MELOXICAM (MOBIC) 15 MG TABLET    Take 1 tablet (15 mg total) by mouth once daily.    OLMESARTAN (BENICAR) 40 MG TABLET    Mo 1 tableta por isidoro.    SUMATRIPTAN (IMITREX) 100 MG TABLET    TAKE 1/2 TO 1 TABLET BY MOUTH EVERY 2 HOURS AS NEEDED FOR MIGRAINE    VITAMIN D (VITAMIN D3) 1000 UNITS TAB    Take 1,000 Units by mouth once daily.         Objective Findings:    Vital Signs:  BP (!) 158/83 (BP Location: Left arm)   Pulse 77   Ht 5' 7" (1.702 m)   Wt 87.1 kg (192 lb 0.3 oz)   BMI 30.07 kg/m²   Body mass index is 30.07 kg/m².    Physical Exam:  General Appearance: Well appearing in no acute distress  Eyes:    No scleral icterus  ENT:  No lesions or masses   Lungs: CTA bilaterally, no wheezes, no rhonchi, no " rales  Heart:  S1, S2 normal, no murmurs heard  Abdomen:  Non distended, soft, no guarding, no rebound, no tenderness, no appreciated ascites, no bruits, no hepatosplenomegaly,  Musculoskeletal:  No major joint deformities  Skin: No petechiae or rash on exposed skin areas  Neurologic:  Alert and oriented x4  Psychiatric:  Normal speech mentation and affect    Labs:  Lab Results   Component Value Date    WBC 5.24 08/08/2019    HGB 13.9 08/08/2019    HCT 43.1 08/08/2019     08/08/2019    CHOL 242 (H) 08/08/2019    TRIG 161 (H) 08/08/2019    HDL 79 (H) 08/08/2019    ALT 38 08/08/2019    AST 25 08/08/2019     10/30/2019    K 4.0 10/30/2019     10/30/2019    CREATININE 0.7 10/30/2019    BUN 12 10/30/2019    CO2 29 10/30/2019    TSH 0.806 08/08/2019    INR 0.9 10/24/2016    HGBA1C 5.1 07/31/2018           Imaging reviewed:    Endoscopy reviewed:    Lower EUS February 2018 An oval intramural (subepithelial) lesion was                         found in the appendiceal orifice. The lesion was                         anechoic and appear to connect with the appendix.                         The lesion measured approximately 14.4 mm by 9.3                         mm. This lesion appear to be cystic without any                         solid component.  Colonoscopy 11/2017 - Submucosal nodule at the appendiceal orifice.                         Biopsied.                        - Vascular nodule in the transverse colon.                        - Non-bleeding internal hemorrhoids.                        - The entire examined colon is normal. Biopsied.                        - The examination was otherwise normal on direct                         and retroflexion views.                        - The examined portion of the ileum was normal.  EGD in 2011.  Hiatal hernia and gastritis.  Normal duodenum.  Colonoscopy in 2011 normal colon no specimens removed.    Medical Decision Making:    Assessment:    Rachel Buck  Marycarmen is a 64 y.o. female here for:    1. Irritable bowel syndrome with diarrhea    2. Hiatal hernia    3. GERD without esophagitis         Recommendations:  1. Levsin 3 times a day before meals  2.  Continue Nexium in the morning.  Start Pepcid at night.  GERD lifestyle and diet modifications per AVS.  3.  ACG IBS check list discussed and provided to pt  4. Low Fodmap diet w/ dietician  5. If no improvement w/ reflux at f/u will order EGD    Follow up in about 3 months (around 4/23/2020).      Order summary:  Orders Placed This Encounter    hyoscyamine (LEVSIN/SL) 0.125 mg Subl         Thank you for allowing me to participate in the care of Rachel Conroy    Jamilah Harris, CHENP-C

## 2020-01-23 NOTE — LETTER
January 23, 2020      Tera Blanca MD  [address]  [place] [address]           [facility]  [address]  [facility] [address]  Phone: [phone]  Fax: [phone]          Patient: Rachel Conroy   MR Number: [id number]   YOB: 1955   Date of Visit: 1/23/2020       Dear Dr. Tera Blanca:    Thank you for referring Rachel Conroy to me for evaluation. Attached you will find relevant portions of my assessment and plan of care.    If you have questions, please do not hesitate to call me. I look forward to following Rachel Conroy along with you.    Sincerely,    Jamilah Harris, NP    Enclosure  CC:  No Recipients    If you would like to receive this communication electronically, please contact [email or web address] or [phone] to request more information on [employer] access.    For providers and/or their staff who would like to refer a patient to [facility], please contact us through our one-stop-shop provider referral line, [facility] [place], at [phone].    If you feel you have received this communication in error or would no longer like to receive these types of communications, please e-mail [email or web address]

## 2020-01-23 NOTE — PATIENT INSTRUCTIONS
Treatment Names Currently Using Have Tried in the Past Wish to Discuss   General IBS Treatments    Diet Modifications   Low-FODMAP diet        Lifestyle Modifications   Exercise        Psychologic and Behavioral Therapies   Cognitive Behavioral Therapy       Hypnosis       Psychodynamic Psychotherapy        Over-the-counter Medicine   Soluble fiber (e.g. Psyllium, Ispaghula Husk)       Probiotics (e.g. Align, VSL#3)       Peppermint Oil (e.g. IBgard, Colpermin)        Prescription Medicine   Dicyclomine (Bentyl)       Hyoscyamine (Levsin)       Tricyclic Antidepressants (e.g. Elavil, Pamelor, Norpramin)       SSRIs (e.g. Celexa, Lexapro, Prozac, Zoloft)       SNRIs (e.g. Cymbalta, Effexor)       IBS with Constipation    Over-the-counter Medicine   Soluble fiber (e.g. Psyllium, Ispaghula Husk)       Magnesium oxide (Milk of Magnesia)       Polyethylene GlycolEG (Miralax)       Senna (Senokot)       Bisacodyl (Dulcolax)        Prescription Medicine   Lubiprostone (Amitiza)       Linaclotide (Linzess)       Plecanatide (Trulance)       Tegaserod (Zelnorm)       IBS with Diarrhea    Over-the-counter Medicine   Loperamide (Imodium)        Prescription Medicine   Alosetron (Lotronex)       Ondansetron (Zofran)       Eluxadoline (Viberzi)       Rifaximin (Xifaxan)         This table is copied directly from the American College of Gastroenterology web site and can be found at https://gi.org/patients/ibs-treatment-checklist/              The following foods have been identified as being high in FODMAPs:   THESE ARE FOODS TO AVOID  Fruits    Apples  Apricots  Blackberries  Cherries  Grapefruit  Brijesh  Nectarines  Peaches  Pears  Plums and prunes  Pomegranates  Watermelon  High concentration of fructose from canned fruit, dried fruit or fruit juice  Grains    Barley  Couscous  [place]ro  Rye  Semolina  Wheat  Lactose-Containing Foods    Buttermilk  Cream  Custard  Ice cream  Margarine  Milk (cow, goat, sheep)  Soft cheese,  including cottage cheese and ricotta  Yogurt (regular and Greek)  Dairy Substitutes    Oat milk (although a 1/8 serving is considered low-FODMAP)  Soy milk ([place]  Legumes    Baked beans  Black-eyed peas  Butter beans  [place]  Lentils  Kidney beans  Lima beans  Soybeans  Split peas  Sweeteners    Agave  Fructose  High fructose corn syrup  Honey  Isomalt  Maltitol  Mannitol  Molasses  Sorbitol  Xylitol  Vegetables    Artichokes  Asparagus  Beets  [place] sprouts  Cauliflower  Celery  Garlic  Leeks  Mushrooms  Okra  Onions  Peas  Scallions (white parts)  Shallots  Snow peas  Sugar snap peas          The following foods have been identified as being low in FODMAPs:  THESE ARE FOODS THAT ARE OKAY TO EAT  Fruits    Avocado (limit 1/8 of whole)  Banana  Blueberry  Cantaloupe  Grapes  Honeydew melon  Kiwi  Lemon  Lime  Mandarin oranges  Olives  Orange  Papaya  Plantain  Pineapple  Raspberry  Rhubarb  Strawberry  Tangelo  Sweeteners    Artificial sweeteners that do not end in -ol  Brown sugar  Glucose  Maple syrup  Powdered sugar  Sugar (sucrose)  Dairy and Alternatives    [place] milk  Coconut milk (limit 1/2 cup)  Hemp milk  Rice milk  Butter  Certain cheeses, such as  brie, camembert, mozzarella, Parmesan  Lactose-free products, such as lactose-free milk, ice cream, and yogurt  Vegetables    Arugula (rocket lettuce)  Bamboo shoots  Bell peppers  Broccoli  Bok samira  Carrots  Celeriac  [place]ague greens  Common Cabbage  Corn (half a cob)  Eggplant  Endive  Fennel  Green beans  Kale  Lettuce  Parsley  Parsnip  Potato  Radicchio   Scallions (green parts only)  Spinach, baby  Squash  Sweet potato  Swiss chard  Tomato  Turnip  Water chestnut  Zucchini  Grains    Amaranth  Brown rice  Bulgur wheat (limit to 1/4 cup cooked)  Oats  Gluten-free products  Quinoa  Spelt products  Nuts    Almonds (limit 10)  Brazil nuts  Hazelnuts (limit 10)  Macadamia nuts  Peanuts  Pecan  Pine  nuts  Walnuts  Seeds    Joby  Dariusz  Pumpkin  Sesame  Sunflower  Protein Sources    Beef  Chicken  Eggs  Fish  Lamb  Pork  Shellfish  Tofu and tempeh  Turkey        WAIT THREE HOURS BEFORE EATING A LAYING DOWN.  Start taking PEPcid at night. Continue taking Nexium in the morning.    Http://www.refluxcookbook.com/  Dropping Acid The Reflux Diet Cookbook and Cure -  Fran Ferrara M.D.    GERD  Worst Foods for Acid Reflux  Chocolate (milk chocolate worse than dark chocolate)  Soda (all carbonated beverages)  Alcohol (beer, liquor, wine)  Fried foods  Denson, sausage, ribs  Cream sauce  Fatty meats (beef)  Butter, margarine, lard, shortening  Coffee, tea  Mint   High fat nuts  Hot sauces and pepper  Citrus fruit/juices      Acidic foods (pH - 1 is MORE acidic, 5 is LESS acidic)     Do not eat or drink these (lower numbers are worse)    Induction diet - For 2 weeks eat nothing below pH 5     Lemon juice 2.3  Grape cranberry juice 2.5  Stomach Acid 2.5  Gelatin Dessert 2.6  Lemon/lime 2.9/2.7  Vinegar 2.9  Gatorade 3.0  Fruits - plums, apricots, strawberries, cherries 3.0  Vitamin C (ascorbic acid) 3.0  Iced tea, Snapple 3.1  Mustard 3.2  Soft drinks 3.3  Nectarines 3.3  Pomegranate 3.3  Applesauce 3.4  Grapefruit 3.4  Kiwi 3.4  Barbecue sauce 3.4  Caesar dressing 3.5  [place] dressing 3.6  Strawberries 3.5  Pineapple juice 3.5  Beer 3.5  Wine 3.5  Grape 3.6  Apples 3.6  Pineapple 3.7  Pickle 3.7  Blackberries, blueberries 3.7  [place] 3.7  Orange 3.8  Cherries 3.9  Red Bull 3.9  Tomatoes 4.2  Coffee 5.1      These are Safe foods:  Agave  Aloe Vera  Apple (only red)  Bagels  Banana (worsens reflux in 1%)  Beans - black, red, lima, lentils  Bread - whole grain, rye  Caramel  Celery  Chamomile tea  Chicken - skinless, never fried  Chicken stock or bouillon  Coffee - one cup/day with milk  Fennel  Fish  Abi  Green vegetables (no green peppers)  Herbs  Honey  Melon  Milk - skin, soy, or Lactaid skim  milk  Mushrooms  Oatmeal  Olive oil  Parsley  Pasta  Pears  Popcorn  Potatoes  Red bell peppers  Rice  Soups  Tofu  Turkey Breast  Turnip  Vegetables - no onion, tomatoes, peppers  Vinaigrette  Water - non carbonated  Whole grain breads, crackers, breakfast cereals      Best Foods for Acid Reflux  Whole grain breads  Oatmeal  Aloe Vera  Salad (no tomatoes, onions, cheese, or high fat dressing)  Banana  Melon  Fennel  Chicken and turkey (skinless, never fried)  Fish/seafood (never fried)  Celery  Parsley  Couscous and Rice    Maybe bad foods (Everyone is unique)  Tomatoes  Garlic  Onion  Nuts (macadamia nuts)  Apples (especially green)  Cucumber  Green peppers  Spicy food  Some herbal teas    GERD tips  Change what you eat:  Eat smaller meals  Eat slowly and chew thoroughly until food is almost liquid  Cut down on junk carbohydrates such as sugar and white flour  Use herbs in your cooking  Eat more raw foods (more than 10 ingredients is not a raw food)  Avoid trans fats and partially hydrogenated oils  Eat more fish and switch to grass fed beef  Switch your cooking oil to macadamia nut or olive oil  Watch extremes of salt intake (too high or too low is bad)    If just cutting out acidic foods is not enough, change how you eat:  Large breakfast, medium lunch, light dinner  Dont mix fruit juices, sweet fruits, and refined starches with meats and heavy food  Dont wash your food down with a lot of liquid      Change these habits:  Stop smoking  Eat dinner earlier (3-4 hours before lying down to sleep)  Elevate the head of your bed 6 inches (blocks under the head of the bed are better than pillows) OR [employer] sells a special Paydiant Reflux relief system  That may be purchased online for a comfortable, individual solution for raising the head of the bed.  Exercise (but wait 2 hours after eating)  Drink more water (between meals)    Take these supplements:  Multi vitamin  Probiotic  Fish oil    Most common food  allergens: milk, eggs, peanuts, tree nuts, fish, shellfish, wheat, and soy    All natural immediate relief:  Chew 2-3 soft probiotic capsules - Dr. Puente's Probiotics 12 Plus  Chew chewable DGL licorice tablet  Chew papaya tablet with high protein meal - [employer]  Drink 2 ounces of aloe vera juice  Swedish bitters  Prelief- reduce the acid in food to keep it form burning sensitive tissue  Iberogast  [place]  Drink Chamomile Tea  Teaspoon of baking soda in water  Spoonful of vinegar in water      All natural ulcer healers:  Zinc carnosine - 75.5 mg with food twice a day x 8 weeks   Amado by Margo - $8 for 60 pills  DGL (deglycyrrhizinated licorice) - 2 tablets before meals. Heals stomach lining   Natural Factors brand, Enzymatic therapy brand.  Aloe Vera juice  - 2 to 8 ounces a day   [place] or [place]

## 2020-01-24 RX ORDER — SUMATRIPTAN SUCCINATE 100 MG/1
TABLET ORAL
Qty: 9 TABLET | Refills: 0 | Status: SHIPPED | OUTPATIENT
Start: 2020-01-24 | End: 2020-07-28

## 2020-01-28 ENCOUNTER — PATIENT OUTREACH (OUTPATIENT)
Dept: ADMINISTRATIVE | Facility: OTHER | Age: 65
End: 2020-01-28

## 2020-01-28 NOTE — PROGRESS NOTES
"      Cardiology Consults Clinic Note    Subjective:   Chief Complaint: Hypertension, palpitations  Last Clinic Visit: 10/17/2019 with Dr. Pereyra    Problems:  HTN  HLD  Agatston score=4    History of Present Illness: Rachel Conroy is a 64 y.o. female who presents for 2 month follow up. She was previously seen by Dr. Pereyra in October 2019 for HTN and palpitations that were suspected to be benign in etiology.   Today Ms. Conroy is seen with her daughter, who also assisted with Prydeinig interpretation.   Ms. Conroy reports feeling well and is without any major cardiac concerns. She reports that the "fluttering" and "hard beats" have improved and rarely occur.    She denies chest pain at rest or with exertion. Denies SOB, LARKIN, lightheadedness, presyncope, or syncope. Denies PND/orthopnea.  Denies claudication or leg swelling.  Monitors BP at home, running mid 130s/60s-70s.  She reports that she has only been taking the chlorthalidone and olmesartan.  Occasionally will take the amlodipine if she is feeling poorly or if her SBP >150.    Also admits to only wearing her CPAP periodically.  Often falls asleep and forgets to put it on.   Does not routinely exercise.  Is aware of the mediterranean diet and does try to follow this.      with an ASCVD risk of 5.4% with a recent Agatston score equal to 4, placing her in the 50th percentile for age and gender.  Overall has minimal plaque burden and low CVD risk. Not currently on statin therapy.     Review of Systems   Constitution: Negative for decreased appetite, fever, malaise/fatigue and weight gain.   HENT: Negative for congestion, hearing loss and nosebleeds.    Eyes: Negative for visual disturbance.   Cardiovascular: Negative for chest pain, dyspnea on exertion, irregular heartbeat, leg swelling, palpitations and syncope.   Respiratory: Negative for cough, shortness of breath and sleep disturbances due to breathing.    Endocrine: Negative for cold " intolerance and heat intolerance.   Hematologic/Lymphatic: Negative for bleeding problem. Does not bruise/bleed easily.   Gastrointestinal: Negative for bloating, abdominal pain, change in bowel habit and heartburn.   Neurological: Negative for dizziness, loss of balance and numbness.   Psychiatric/Behavioral: Negative for altered mental status. The patient is not nervous/anxious.        Medications:  Patient's Medications   New Prescriptions    No medications on file   Previous Medications    CHLORTHALIDONE (HYGROTEN) 25 MG TAB    1/2 tablet daily    CLOBETASOL (CLOBEX) 0.05 % SHAMPOO    APPLY EXTERNALLY TO THE AFFECTED AREA EVERY 7 DAYS    ESOMEPRAZOLE (NEXIUM) 40 MG CAPSULE    Take 1 capsule (40 mg total) by mouth before breakfast.    GABAPENTIN (NEURONTIN) 100 MG CAPSULE    Take 3 capsules (300 mg total) by mouth every evening.    HYOSCYAMINE (LEVSIN/SL) 0.125 MG SUBL    Place 1 tablet (0.125 mg total) under the tongue 3 (three) times daily before meals.    MELOXICAM (MOBIC) 15 MG TABLET    Take 1 tablet (15 mg total) by mouth once daily.    OLMESARTAN (BENICAR) 40 MG TABLET    Mo 1 tableta por isidoro.    SUMATRIPTAN (IMITREX) 100 MG TABLET    TAKE 1/2 TO 1 TABLET BY MOUTH EVERY 2 HOURS AS NEEDED FOR MIGRAINE    VITAMIN D (VITAMIN D3) 1000 UNITS TAB    Take 1,000 Units by mouth once daily.   Modified Medications    Modified Medication Previous Medication    AMLODIPINE (NORVASC) 10 MG TABLET amLODIPine (NORVASC) 10 MG tablet       Take 1 tablet (10 mg total) by mouth once daily.    Take 1 tablet (10 mg total) by mouth once daily.   Discontinued Medications    CHOLECALCIFEROL, VITAMIN D3, 50,000 UNIT CAPSULE    Mo 1 tableta por semana x 12 semanas.    FLUTICASONE (FLONASE) 50 MCG/ACTUATION NASAL SPRAY    1 spray (50 mcg total) by Each Nare route once daily.       Family History:  Rachel's family history includes Alzheimer's disease in her mother; Arthritis in her mother.    Social History:  Rachel reports that  "she has never smoked. She has never used smokeless tobacco. She reports that she does not drink alcohol or use drugs.    Objective:   /62   Pulse 77   Ht 5' 7" (1.702 m)   Wt 87.3 kg (192 lb 7.4 oz)   BMI 30.14 kg/m²     Physical Exam   Constitutional: She is oriented to person, place, and time. Vital signs are normal. She appears well-developed and well-nourished.   HENT:   Head: Normocephalic.   Eyes: Pupils are equal, round, and reactive to light.   Neck: Normal range of motion. Neck supple. No JVD present. Carotid bruit is not present.   Cardiovascular: Normal rate, regular rhythm, S1 normal, S2 normal, normal heart sounds and intact distal pulses. PMI is not displaced. Exam reveals no gallop and no friction rub.   No murmur heard.  Pulses:       Carotid pulses are 2+ on the right side, and 2+ on the left side.       Radial pulses are 2+ on the right side, and 2+ on the left side.        Dorsalis pedis pulses are 2+ on the right side, and 2+ on the left side.        Posterior tibial pulses are 1+ on the right side, and 1+ on the left side.   Pulmonary/Chest: Effort normal and breath sounds normal. No accessory muscle usage. She has no decreased breath sounds. She has no wheezes.   Abdominal: Soft. Normal appearance and bowel sounds are normal. She exhibits no distension, no fluid wave and no ascites. There is no hepatosplenomegaly. There is no tenderness.   Musculoskeletal: Normal range of motion. She exhibits no edema.   Neurological: She is alert and oriented to person, place, and time. She has normal strength.   Skin: Skin is warm, dry and intact. No ecchymosis and no rash noted. No erythema.   Psychiatric: She has a normal mood and affect. Her speech is normal and behavior is normal. Judgment and thought content normal. Cognition and memory are normal.   Vitals reviewed.      EKG (10/17/19):  My independent visualization of most recent EKG is normal sinus rhythm. Minimal voltage criteria for LVH. " Borderline abnormal EKG.     Lipids:  Recent Labs   Lab 08/08/19  0930   LDL Cholesterol 130.8   HDL 79 H   Cholesterol 242 H      Renal:  Recent Labs   Lab 10/30/19  0943   Creatinine 0.7   Potassium 4.0   CO2 29   BUN, Bld 12     Liver:  Recent Labs   Lab 08/08/19  0930   AST 25   ALT 38       Assessment:     1. Essential hypertension    2. Hyperlipidemia, unspecified hyperlipidemia type    3. Sleep apnea, unspecified type      Plan:     Rachel Conroy was seen in clinic today for 2 month follow up of HTN.     Diagnoses and associated orders include:    Essential hypertension  Comments:  Borderline control.   Not taking all medications as prescribed.   Instructed to start taking amlodipine 10 mg daily, in addition to current regimen  Continue to monitor BP daily. Goal BP < 130/80.   Low salt diet  Daily CV exercise for 30 minutes; at least 5 days/week   Orders:  -     amLODIPine (NORVASC) 10 MG tablet; Take 1 tablet (10 mg total) by mouth once daily.  Dispense: 90 tablet; Refill: 2    Hyperlipidemia, unspecified hyperlipidemia type  Comments:  ASCVD risk of 5.4% and calcium score =4  Overall has minimal plaque burden and low CVD  Statin therapy not indicated  Recommend daily exercise and mediterranean diet     Sleep apnea, unspecified type  Comments:  Discsussed risks of untreated sleep apnea  Instructed to wear CPAP nightly         Follow up in 10 months.     This patient was seen and discussed with Dr. Pereyra.     Mary CRYSTAL DNP  01/29/2020  5:05 PM      Follow-up:     Future Appointments   Date Time Provider Department Center   2/18/2020 10:00 AM [facility]   4/23/2020 10:00 AM Jamilah B. [place], NP [facility] [address]

## 2020-01-29 ENCOUNTER — OFFICE VISIT (OUTPATIENT)
Dept: CARDIOLOGY | Facility: CLINIC | Age: 65
End: 2020-01-29
Payer: COMMERCIAL

## 2020-01-29 VITALS
SYSTOLIC BLOOD PRESSURE: 136 MMHG | WEIGHT: 192.44 LBS | BODY MASS INDEX: 30.2 KG/M2 | HEIGHT: 67 IN | HEART RATE: 77 BPM | DIASTOLIC BLOOD PRESSURE: 62 MMHG

## 2020-01-29 DIAGNOSIS — I10 ESSENTIAL HYPERTENSION: Primary | ICD-10-CM

## 2020-01-29 DIAGNOSIS — G47.30 SLEEP APNEA, UNSPECIFIED TYPE: ICD-10-CM

## 2020-01-29 DIAGNOSIS — E78.5 HYPERLIPIDEMIA, UNSPECIFIED HYPERLIPIDEMIA TYPE: ICD-10-CM

## 2020-01-29 PROCEDURE — 99999 PR PBB SHADOW E&M-EST. PATIENT-LVL III: ICD-10-PCS | Mod: PBBFAC,,, | Performed by: NURSE PRACTITIONER

## 2020-01-29 PROCEDURE — 3078F DIAST BP <80 MM HG: CPT | Mod: CPTII,S$GLB,, | Performed by: NURSE PRACTITIONER

## 2020-01-29 PROCEDURE — 99213 OFFICE O/P EST LOW 20 MIN: CPT | Mod: S$GLB,,, | Performed by: NURSE PRACTITIONER

## 2020-01-29 PROCEDURE — 3078F PR MOST RECENT DIASTOLIC BLOOD PRESSURE < 80 MM HG: ICD-10-PCS | Mod: CPTII,S$GLB,, | Performed by: NURSE PRACTITIONER

## 2020-01-29 PROCEDURE — 3075F PR MOST RECENT SYSTOLIC BLOOD PRESS GE 130-139MM HG: ICD-10-PCS | Mod: CPTII,S$GLB,, | Performed by: NURSE PRACTITIONER

## 2020-01-29 PROCEDURE — 3075F SYST BP GE 130 - 139MM HG: CPT | Mod: CPTII,S$GLB,, | Performed by: NURSE PRACTITIONER

## 2020-01-29 PROCEDURE — 99999 PR PBB SHADOW E&M-EST. PATIENT-LVL III: CPT | Mod: PBBFAC,,, | Performed by: NURSE PRACTITIONER

## 2020-01-29 PROCEDURE — 3008F BODY MASS INDEX DOCD: CPT | Mod: CPTII,S$GLB,, | Performed by: NURSE PRACTITIONER

## 2020-01-29 PROCEDURE — 3008F PR BODY MASS INDEX (BMI) DOCUMENTED: ICD-10-PCS | Mod: CPTII,S$GLB,, | Performed by: NURSE PRACTITIONER

## 2020-01-29 PROCEDURE — 99213 PR OFFICE/OUTPT VISIT, EST, LEVL III, 20-29 MIN: ICD-10-PCS | Mod: S$GLB,,, | Performed by: NURSE PRACTITIONER

## 2020-01-29 RX ORDER — AMLODIPINE BESYLATE 10 MG/1
10 TABLET ORAL DAILY
Qty: 90 TABLET | Refills: 2 | Status: SHIPPED | OUTPATIENT
Start: 2020-01-29 | End: 2020-04-06 | Stop reason: SDUPTHER

## 2020-01-29 NOTE — PATIENT INSTRUCTIONS
Thank you for coming to your cardiology appointment today.     Points of care discussed at today's appointment:  · Take all of your blood pressure medications daily.  This includes taking amlodipine 10 mg daily.   · Continue to monitor your blood pressure daily. Goal BP is 130/80 or less.   · Wear your CPAP every night.     If you have any questions or concerns related to your cardiology care, please call [phone].    Thank you,  Mary CRYSTAL, DNP-C  01/29/2020      Apnea Del Sueño [Sleep Apnea, Adult]  La Apnea del Sueño tambien se llama Apnea Obstructiva del Sueño. Ésta ocurre normalmente cuando los tejidos y músculos de la parte posterior de la garganta se relajan demasiado sandi el sueño. [place] provoca que el conducto de aire de saha garganta se estreche o tape por completo. La respiración se frena o para completamente y entonces usted se despierta, respira mio varias veces y se vuelve a dormir. Puede roderick de 10 a 60 despertadas sandi la noche. [place] no le ashlyn a usted alcanzar las etapas más profundas del sueño que son necesarias para que el cuerpo descanse y recupere saha energía.  Sandi el sueño son comunes los ronquidos camilo, y la respiración ruidosa o jadeante. El desorden del sueño causa síntomas sandi el día, tales leo dificultad para levantarse en la mañana, necesidad de siestas sandi el día, irritabilidad, concentración y atención pobres.  Causas De La Apnea Del Sueño.   · El principal factor de riesgo para esta clase de apnea del sueño es aumentar mucho de peso. El tejido grasoso se acumula a lo sandip de los lados de la garganta haciendo que el conducto de aire se estreche más de lo normal.  · El envejecimiento es otro factor debido al ablandamiento del conducto de aire.  · Ciertas formas de itzel y mandíbula son propensas a un conducto de aire estrecho (leo el mentón retirado)  · Las amígdalas agrandadas y adenoides pueden bloquear el conducto de aire cuando está  acostado  · Congestión nasal severa  · El uso de alcohol o sedantes relaja los músculos de la garganta.  · Fumar puede provocar inflamación e hinchazón en los conductos de aire superiores y empeorar wendy problema.  Cuidado En [place]  1. Duerma con saha gerda y itzel en meghan posición recta o neutral. Si el itzel se  mucho hacia paola o atrás esto puede bloquear la respiración.  2. Limite el uso de alcohol y sedantes por la noche.  Seguimiento  con saha médico en el próximo examen programado. Si usted está excedido de peso, hable con saha médico acerca de un programa para adelgazar. Si usted fuma, hable con saha doctor sobre las formas de ayudarle a abandonar el hábito.  Busque Prontamente Atención Médica  si algo de lo siguiente ocurre:  · Pausas más largas de lo usual  · Incapacidad de despertar  · Convulsiones  Date Last Reviewed: 5/3/2015  © 1607-7327 [employer] [place]. [address] [place] [id number]. Todos los derechos reservados. Esta información no pretende sustituir la atención médica profesional. Sólo saha médico puede diagnosticar y tratar un problema de rosemary.

## 2020-02-11 DIAGNOSIS — K21.9 GASTROESOPHAGEAL REFLUX DISEASE WITHOUT ESOPHAGITIS: ICD-10-CM

## 2020-02-11 RX ORDER — ESOMEPRAZOLE MAGNESIUM 40 MG/1
40 CAPSULE, DELAYED RELEASE ORAL
Qty: 90 CAPSULE | Refills: 0 | OUTPATIENT
Start: 2020-02-11

## 2020-02-18 ENCOUNTER — NUTRITION (OUTPATIENT)
Dept: GASTROENTEROLOGY | Facility: CLINIC | Age: 65
End: 2020-02-18

## 2020-02-18 DIAGNOSIS — K21.9 GERD WITHOUT ESOPHAGITIS: ICD-10-CM

## 2020-02-18 DIAGNOSIS — K63.89 CECUM MASS: ICD-10-CM

## 2020-02-18 DIAGNOSIS — E55.9 VITAMIN D DEFICIENCY: ICD-10-CM

## 2020-02-18 DIAGNOSIS — K58.9 IRRITABLE BOWEL SYNDROME, UNSPECIFIED TYPE: ICD-10-CM

## 2020-02-18 DIAGNOSIS — I10 ESSENTIAL HYPERTENSION: ICD-10-CM

## 2020-02-18 DIAGNOSIS — K58.0 IRRITABLE BOWEL SYNDROME WITH DIARRHEA: Primary | ICD-10-CM

## 2020-02-18 PROCEDURE — 99999 PR PBB SHADOW E&M-EST. PATIENT-LVL I: CPT | Mod: PBBFAC,,,

## 2020-02-18 PROCEDURE — 99999 PR PBB SHADOW E&M-EST. PATIENT-LVL I: ICD-10-PCS | Mod: PBBFAC,,,

## 2020-02-19 VITALS — WEIGHT: 187.63 LBS | BODY MASS INDEX: 29.38 KG/M2

## 2020-02-19 NOTE — PROGRESS NOTES
GI NUTRITIONAL ASSESSMENT   patient seen with her daughter today who serves as    Patient works at the [employer] as a Cook   Referring Provider: Jamilah Ramos Cielo Conroy is a 64 y.o. female referred to  regarding the following problems:  Reason for Visit: Nutrition assessment and recommendations related to:   Chief Complaint   Patient presents with    Irritable Bowel Syndrome    Gas    Bloated       Symptoms:   Diarrhea  Abdominal pain  Bloating   Gas   Heartburn   Patient Nutrition Goals :   Improvement of abdominal pain with better food choices    Reduction in diarrhea with better food choices    Improvement in bowel function with better food choices    Improvement in Reflux Symptoms with better food choices and lifestyle modification   Improvement in bloating with better food choices   Attending Visit: Daughter   Social: lives with daughter, brother and mother     Medical/Surgical History  Pertinent Social History:  Social History     Tobacco Use    Smoking status: Never Smoker    Smokeless tobacco: Never Used   Substance Use Topics    Alcohol use: No    Drug use: No        Previous Medical History:  Past Medical History:   Diagnosis Date    Arthritis     GERD (gastroesophageal reflux disease)     Hyperlipidemia     Hypertension     Irritable bowel syndrome     Joint pain     Knee injury     Migraine headache     Muscle pain, fibromyalgia 3/19/2014    Other physical therapy     Plantar fasciitis     Sleep apnea     Urinary incontinence        Previous Surgical History:  Past Surgical History:   Procedure Laterality Date    APPENDECTOMY  2018    COLONOSCOPY N/A 11/9/2017    Procedure: COLONOSCOPY;  Surgeon: Mariaelena Harrell MD;  Location: [facility] (4TH FLR);  Service: Endoscopy;  Laterality: N/A;    COLONOSCOPY N/A 2/26/2018    Procedure: COLONOSCOPY;  Surgeon: Nicola Zhu MD;  Location: [facility] ENDO (2ND FLR);  Service: Endoscopy;  Laterality: N/A;  "   HYSTERECTOMY        Anthropometric Data Usual Weight:   has increased 10  pounds over last past year   Estimated body mass index is 29.38 kg/m² as calculated from the following:    Height as of 1/29/20: 5' 7" (1.702 m).    Weight as of this encounter: 85.1 kg (187 lb 9.8 oz).    Weight History:  Wt Readings from Last 12 Encounters:   02/19/20 85.1 kg (187 lb 9.8 oz)   01/29/20 87.3 kg (192 lb 7.4 oz)   01/23/20 87.1 kg (192 lb 0.3 oz)   10/25/19 88.1 kg (194 lb 3.6 oz)   10/17/19 86.2 kg (190 lb 0.6 oz)   02/18/19 81.6 kg (179 lb 12.8 oz)   07/31/18 79.7 kg (175 lb 11.2 oz)   07/17/18 77.6 kg (171 lb 1.2 oz)   07/02/18 77.7 kg (171 lb 4.8 oz)   04/30/18 77.1 kg (169 lb 15.6 oz)   04/16/18 77.1 kg (170 lb)   03/21/18 77.7 kg (171 lb 3 oz)   ]    BMI: Body mass index is 29.38 kg/m².  Calculated calorie needs :   Calculated Protein needs :  Nutrition Focused Physical Findings:   Well Nourished. No Malnutrition.     Meds  and Biochemical Data   Labs  Lab Results   Component Value Date    GLU 90 10/30/2019    K 4.0 10/30/2019    MG 2.1 10/11/2017    CHOL 242 (H) 08/08/2019    HDL 79 (H) 08/08/2019    TRIG 161 (H) 08/08/2019    ALBUMIN 3.9 08/08/2019    HGBA1C 5.1 07/31/2018    CALCIUM 9.3 10/30/2019      Current Outpatient Medications   Medication Sig    amLODIPine (NORVASC) 10 MG tablet Take 1 tablet (10 mg total) by mouth once daily.    chlorthalidone (HYGROTEN) 25 MG Tab 1/2 tablet daily    clobetasol (CLOBEX) 0.05 % shampoo APPLY EXTERNALLY TO THE AFFECTED AREA EVERY 7 DAYS    esomeprazole (NEXIUM) 40 MG capsule Take 1 capsule (40 mg total) by mouth before breakfast.    gabapentin (NEURONTIN) 100 MG capsule Take 3 capsules (300 mg total) by mouth every evening.    hyoscyamine (LEVSIN/SL) 0.125 mg Subl Place 1 tablet (0.125 mg total) under the tongue 3 (three) times daily before meals.    meloxicam (MOBIC) 15 MG tablet Take 1 tablet (15 mg total) by mouth once daily. (Patient taking differently: Take 15 mg " by mouth once daily. )    olmesartan (BENICAR) 40 MG tablet Mo 1 tableta por isidoro.    sumatriptan (IMITREX) 100 MG tablet TAKE 1/2 TO 1 TABLET BY MOUTH EVERY 2 HOURS AS NEEDED FOR MIGRAINE    vitamin D (VITAMIN D3) 1000 units Tab Take 1,000 Units by mouth once daily.     No current facility-administered medications for this visit.      Lab Results   Component Value Date    WBC 5.24 08/08/2019    HGB 13.9 08/08/2019    HCT 43.1 08/08/2019    MCV 88 08/08/2019     08/08/2019     Lab Results   Component Value Date    FERRITIN 237 10/24/2016     Lab Results   Component Value Date     10/30/2019    K 4.0 10/30/2019     10/30/2019    CO2 29 10/30/2019    GLU 90 10/30/2019    BUN 12 10/30/2019    CREATININE 0.7 10/30/2019    CALCIUM 9.3 10/30/2019    PROT 7.2 08/08/2019    ALBUMIN 3.9 08/08/2019    BILITOT 0.5 08/08/2019    ALKPHOS 119 08/08/2019    AST 25 08/08/2019    ALT 38 08/08/2019     Lab Results   Component Value Date    TSH 0.806 08/08/2019     Lab Results   Component Value Date    SEDRATE 5 05/05/2017     Lab Results   Component Value Date    CRP 4.1 05/05/2017     Lab Results   Component Value Date    HGBA1C 5.1 07/31/2018     Glucose   Date Value Ref Range Status   10/30/2019 90 70 - 110 mg/dL Final   08/08/2019 92 70 - 110 mg/dL Final     BUN, Bld   Date Value Ref Range Status   10/30/2019 12 8 - 23 mg/dL Final   08/08/2019 11 8 - 23 mg/dL Final     Creatinine   Date Value Ref Range Status   10/30/2019 0.7 0.5 - 1.4 mg/dL Final   08/08/2019 0.6 0.5 - 1.4 mg/dL Final     Sodium   Date Value Ref Range Status   10/30/2019 141 136 - 145 mmol/L Final   08/08/2019 140 136 - 145 mmol/L Final     Potassium   Date Value Ref Range Status   10/30/2019 4.0 3.5 - 5.1 mmol/L Final   08/08/2019 4.4 3.5 - 5.1 mmol/L Final     No results found for: PHOS  Calcium   Date Value Ref Range Status   10/30/2019 9.3 8.7 - 10.5 mg/dL Final   08/08/2019 9.8 8.7 - 10.5 mg/dL Final     No results found for:  PREALBUMIN  Albumin   Date Value Ref Range Status   08/08/2019 3.9 3.5 - 5.2 g/dL Final   02/18/2019 4.1 3.5 - 5.2 g/dL Final     Total Protein   Date Value Ref Range Status   08/08/2019 7.2 6.0 - 8.4 g/dL Final   02/18/2019 7.5 6.0 - 8.4 g/dL Final     Cholesterol   Date Value Ref Range Status   08/08/2019 242 (H) 120 - 199 mg/dL Final     Comment:     The National Cholesterol Education Program (NCEP) has set the  following guidelines (reference ranges) for Cholesterol:  Optimal.....................<200 mg/dL  Borderline High.............200-239 mg/dL  High........................> or = 240 mg/dL     02/18/2019 248 (H) 120 - 199 mg/dL Final     Comment:     The National Cholesterol Education Program (NCEP) has set the  following guidelines (reference ranges) for Cholesterol:  Optimal.....................<200 mg/dL  Borderline High.............200-239 mg/dL  High........................> or = 240 mg/dL       Hemoglobin A1C   Date Value Ref Range Status   07/31/2018 5.1 4.0 - 5.6 % Final     Comment:     ADA Screening Guidelines:  5.7-6.4%  Consistent with prediabetes  >or=6.5%  Consistent with diabetes  High levels of fetal hemoglobin interfere with the HbA1C  assay. Heterozygous hemoglobin variants (HbS, HgC, etc)do  not significantly interfere with this assay.   However, presence of multiple variants may affect accuracy.     06/01/2017 5.5 4.5 - 6.2 % Final     Comment:     According to ADA guidelines, hemoglobin A1C <7.0% represents  optimal control in non-pregnant diabetic patients.  Different  metrics may apply to specific populations.   Standards of Medical Care in Diabetes - 2016.  For the purpose of screening for the presence of diabetes:  <5.7%     Consistent with the absence of diabetes  5.7-6.4%  Consistent with increasing risk for diabetes   (prediabetes)  >or=6.5%  Consistent with diabetes  Currently no consensus exists for use of hemoglobin A1C  for diagnosis of diabetes for children.       Hemoglobin    Date Value Ref Range Status   08/08/2019 13.9 12.0 - 16.0 g/dL Final   07/31/2018 13.9 12.0 - 16.0 g/dL Final     Hematocrit   Date Value Ref Range Status   08/08/2019 43.1 37.0 - 48.5 % Final   07/31/2018 43.2 37.0 - 48.5 % Final     Iron   Date Value Ref Range Status   10/24/2016 52 30 - 160 ug/dL Final     No results found for: FOLATE  WBC   Date Value Ref Range Status   08/08/2019 5.24 3.90 - 12.70 K/uL Final   07/31/2018 5.83 3.90 - 12.70 K/uL Final       No components found for: AKSMFEMQ66  No components found for: MXXAURZE91  No components found for: VITAMIND2  No components found for: VITAMIND    Lab Results   Component Value Date    TSH 0.806 08/08/2019     Lab Results   Component Value Date    FERRITIN 237 10/24/2016     Lab Results   Component Value Date    CRP 4.1 05/05/2017     Lab Results   Component Value Date    SEDRATE 5 05/05/2017     Assessment of Lab Values: Elevated Cholesterol and Triglycerides  Related to consumption of fried foods at work       Medication:  Current Outpatient Medications   Medication Sig    amLODIPine (NORVASC) 10 MG tablet Take 1 tablet (10 mg total) by mouth once daily.    chlorthalidone (HYGROTEN) 25 MG Tab 1/2 tablet daily    clobetasol (CLOBEX) 0.05 % shampoo APPLY EXTERNALLY TO THE AFFECTED AREA EVERY 7 DAYS    esomeprazole (NEXIUM) 40 MG capsule Take 1 capsule (40 mg total) by mouth before breakfast.    gabapentin (NEURONTIN) 100 MG capsule Take 3 capsules (300 mg total) by mouth every evening.    hyoscyamine (LEVSIN/SL) 0.125 mg Subl Place 1 tablet (0.125 mg total) under the tongue 3 (three) times daily before meals.    meloxicam (MOBIC) 15 MG tablet Take 1 tablet (15 mg total) by mouth once daily. (Patient taking differently: Take 15 mg by mouth once daily. )    olmesartan (BENICAR) 40 MG tablet Mo 1 tableta por isidoro.    sumatriptan (IMITREX) 100 MG tablet TAKE 1/2 TO 1 TABLET BY MOUTH EVERY 2 HOURS AS NEEDED FOR MIGRAINE    vitamin D (VITAMIN D3) 1000  units Tab Take 1,000 Units by mouth once daily.     No current facility-administered medications for this visit.      Nutritionally significant meds:  Vitamin/Supplements/Herbs: Vit D   Allergies:  Review of patient's allergies indicates:   Allergen Reactions    Tramadol Itching      Dietary Data  Current Diet:   Diet Recall:   7am Coffee, egg, bread  9am Fruit   Noon Fried Fish, Rice, cornbread  5pm Coffee, pastry   8pm Eggs, tortilla   Meal patterns: 3 meals daily and 1-2 snacks daily  Appetite/Current Intake: good   50 - 75 %  Nutrition beverages: mainly drinks waterand coffee but limited fluids- 40 oz per day   Fruit: multiple servings a day  Vegetables: decreased volume  Meal preparation/shopping: self  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   [employer] :[employer] hop  , [employer] 3 times per week   Foods poorly tolerated : Beans, fried food, Milk, cauliflower   Milk tolerance - limits intake due to bloating and gas   Fiber tolerance : limited intake of vegetable and fruit   Nuts/seeds: avoids   Fat tolerance : poor - relies on imodium   Sugars and desserts Daily at hotel   Supplements/ MVI: Vit D  Review of patient's allergies indicates:   Allergen Reactions    Tramadol Itching      Nutrition Diagnosis:   Intake of inappropriate types of carbohydrate inconsistent with needs   Altered GI Function altered fiber intake   Inadequate fluid intake   Motivation to change: low    Patient Goal:  Reduce Lactose if lactose intolerant  Avoid foods high in Fructose   Avoid alcohol based artificial sweeteners   Avoid foods increasing gas and flatulence   Consider use of prebiotics and probiotics     MNT Recommendations/Interventions:  Composition of meals/snacks  · Carbohydrate modified diet ( Lactose, FODMAP)   · Fiber modified diet   · Diet modified for specific foods or ingredients ( gas and flatulence producing foods, food intolerances )  Schedule of food/fluids  · Normalized eating patterns   · Consider  elimination diet to identify food triggers   Supplements to ensure adequate intake   · MVI/Mineral , multi-trace elements, Vitamin, Mineral , pre or probiotics    Discussion with patient included:   Food diary to track response to foods triggering symptoms  Consideration of liquid meal replacements    Stress management techniques     Handouts provided:   Lactose Free Diet   Low FODMAP diet   [facility] GI Medicine IBS Guidelines     Comprehensionfair , daughter notes her mom has a tendency to postpone suggested health changes   Patient demonstrated understanding:   Aware of need for tracking response to specific foods which may trigger discomfort ( gas and flatulence producing foods, Lactose, FODMAP, fiber containing foods ( soluble and insoluble fiber foods )   Aware of liquid meal replacements(  ie OWYN   ) which are free of food triggers   Aware of the role of stress management in management of IBS     Nutrition follow-up:  will follow up as needed patient's schedule is unpredictable beyond two weeks .  Monitoring:    Weight loss    Malabsorption secondary to IBD may result in deficiency of vitamin A, the B vitamins and folate.    Anemia   Counseling time: 1 Hour 15 Minutes due to language translation

## 2020-02-23 DIAGNOSIS — I10 ESSENTIAL HYPERTENSION: ICD-10-CM

## 2020-02-24 RX ORDER — CHLORTHALIDONE 25 MG/1
TABLET ORAL
Qty: 30 TABLET | Refills: 6 | Status: SHIPPED | OUTPATIENT
Start: 2020-02-24 | End: 2020-04-06 | Stop reason: SDUPTHER

## 2020-03-24 DIAGNOSIS — I10 ESSENTIAL HYPERTENSION: ICD-10-CM

## 2020-03-25 RX ORDER — OLMESARTAN MEDOXOMIL 40 MG/1
TABLET ORAL
Qty: 90 TABLET | Refills: 3 | Status: SHIPPED | OUTPATIENT
Start: 2020-03-25 | End: 2020-04-29

## 2020-03-26 DIAGNOSIS — L30.9 DERMATITIS: Primary | ICD-10-CM

## 2020-03-26 RX ORDER — CLOBETASOL PROPIONATE 0.05 G/100ML
SHAMPOO TOPICAL 2 TIMES DAILY
Qty: 1 BOTTLE | Refills: 1 | Status: SHIPPED | OUTPATIENT
Start: 2020-03-26 | End: 2021-01-27 | Stop reason: SDUPTHER

## 2020-04-06 ENCOUNTER — PATIENT MESSAGE (OUTPATIENT)
Dept: INTERNAL MEDICINE | Facility: CLINIC | Age: 65
End: 2020-04-06

## 2020-04-06 DIAGNOSIS — K58.0 IRRITABLE BOWEL SYNDROME WITH DIARRHEA: ICD-10-CM

## 2020-04-06 DIAGNOSIS — I10 ESSENTIAL HYPERTENSION: ICD-10-CM

## 2020-04-06 RX ORDER — HYOSCYAMINE SULFATE 0.12 MG/1
0.12 TABLET SUBLINGUAL
Qty: 90 TABLET | Refills: 6 | Status: CANCELLED | OUTPATIENT
Start: 2020-04-06

## 2020-04-06 RX ORDER — AMLODIPINE BESYLATE 10 MG/1
10 TABLET ORAL DAILY
Qty: 90 TABLET | Refills: 2 | Status: SHIPPED | OUTPATIENT
Start: 2020-04-06 | End: 2020-05-01 | Stop reason: SDUPTHER

## 2020-04-06 RX ORDER — CHLORTHALIDONE 25 MG/1
TABLET ORAL
Qty: 30 TABLET | Refills: 6 | Status: SHIPPED | OUTPATIENT
Start: 2020-04-06 | End: 2020-11-09

## 2020-04-07 RX ORDER — MELOXICAM 15 MG/1
15 TABLET ORAL DAILY
Qty: 30 TABLET | Refills: 6 | Status: SHIPPED | OUTPATIENT
Start: 2020-04-07 | End: 2020-07-28

## 2020-04-09 DIAGNOSIS — J01.00 ACUTE NON-RECURRENT MAXILLARY SINUSITIS: ICD-10-CM

## 2020-04-09 RX ORDER — FLUTICASONE PROPIONATE 50 MCG
SPRAY, SUSPENSION (ML) NASAL
Qty: 16 G | Refills: 2 | Status: SHIPPED | OUTPATIENT
Start: 2020-04-09 | End: 2020-05-01 | Stop reason: SDUPTHER

## 2020-04-14 ENCOUNTER — TELEPHONE (OUTPATIENT)
Dept: INTERNAL MEDICINE | Facility: CLINIC | Age: 65
End: 2020-04-14

## 2020-04-20 ENCOUNTER — TELEPHONE (OUTPATIENT)
Dept: GASTROENTEROLOGY | Facility: CLINIC | Age: 65
End: 2020-04-20

## 2020-04-20 NOTE — TELEPHONE ENCOUNTER
Spoke with family member. Patient speaks only Romansh.  Her appointment with Jamilah Harris NP on 4/23 for 10:00 has been cancelled.  She would like to reschedule when Jamilah returns which she is aware could be July. Patient is ok with waiting.

## 2020-04-27 DIAGNOSIS — I10 ESSENTIAL HYPERTENSION: ICD-10-CM

## 2020-04-29 RX ORDER — OLMESARTAN MEDOXOMIL 40 MG/1
TABLET ORAL
Qty: 90 TABLET | Refills: 3 | Status: SHIPPED | OUTPATIENT
Start: 2020-04-29 | End: 2021-04-23

## 2020-05-25 ENCOUNTER — INITIAL CONSULT (OUTPATIENT)
Dept: INTERNAL MEDICINE | Facility: CLINIC | Age: 65
End: 2020-05-25
Payer: COMMERCIAL

## 2020-05-25 DIAGNOSIS — G47.00 INSOMNIA, UNSPECIFIED TYPE: ICD-10-CM

## 2020-05-25 DIAGNOSIS — J30.9 ALLERGIC RHINITIS, UNSPECIFIED SEASONALITY, UNSPECIFIED TRIGGER: ICD-10-CM

## 2020-05-25 DIAGNOSIS — J06.9 UPPER RESPIRATORY TRACT INFECTION, UNSPECIFIED TYPE: Primary | ICD-10-CM

## 2020-05-25 PROCEDURE — 99999 PR PBB SHADOW E&M-EST. PATIENT-LVL II: ICD-10-PCS | Mod: PBBFAC,,, | Performed by: INTERNAL MEDICINE

## 2020-05-25 PROCEDURE — 99999 PR PBB SHADOW E&M-EST. PATIENT-LVL II: CPT | Mod: PBBFAC,,, | Performed by: INTERNAL MEDICINE

## 2020-05-25 PROCEDURE — 99441 PR PHYSICIAN TELEPHONE EVALUATION 5-10 MIN: ICD-10-PCS | Mod: 95,,, | Performed by: INTERNAL MEDICINE

## 2020-05-25 PROCEDURE — 99441 PR PHYSICIAN TELEPHONE EVALUATION 5-10 MIN: CPT | Mod: 95,,, | Performed by: INTERNAL MEDICINE

## 2020-05-25 RX ORDER — AMOXICILLIN 500 MG/1
500 TABLET, FILM COATED ORAL EVERY 12 HOURS
Qty: 20 TABLET | Refills: 0 | Status: SHIPPED | OUTPATIENT
Start: 2020-05-25 | End: 2020-06-04

## 2020-05-25 NOTE — PROGRESS NOTES
I spoke with Mrs Conroy via tele-visit at her request. Overall doing well except for much stress due to the COVID situation. She is currently not working as her employer is not open at this time, causing her much stress and anxiety about the future, She has noted difficulty sleeping and has resorted to OTC supplements w/o much success. I recommended trial of OTC Benadryl 12.5 mgs at bedtime and see how she does. She also reports issues with allergy-like sx's with much phlegm, post-nasal drip, irritation of her eyes especially in the morning hours. She denies any fevers, cough, SOB, LARKIN or other s/sx's that might be associated with COVID 19. She reports having tested negative to the nasal swab at a recent health center. She has been adhering to the quarantine and other indications and her family is well. Nevertheless, I will send in a rx for Amoxicillin with instructions given her URI sx's and will keep in contact with he over this next week. She is to continue with her current daily medications and will see her in Clinic for her annual exam in the near future.

## 2020-07-28 ENCOUNTER — OFFICE VISIT (OUTPATIENT)
Dept: FAMILY MEDICINE | Facility: CLINIC | Age: 65
End: 2020-07-28
Payer: COMMERCIAL

## 2020-07-28 VITALS
HEART RATE: 92 BPM | TEMPERATURE: 98 F | BODY MASS INDEX: 30.9 KG/M2 | SYSTOLIC BLOOD PRESSURE: 136 MMHG | WEIGHT: 196.88 LBS | OXYGEN SATURATION: 97 % | DIASTOLIC BLOOD PRESSURE: 81 MMHG | HEIGHT: 67 IN | RESPIRATION RATE: 17 BRPM

## 2020-07-28 DIAGNOSIS — Z00.00 GENERAL MEDICAL EXAM: Primary | ICD-10-CM

## 2020-07-28 DIAGNOSIS — H65.192 ACUTE EFFUSION OF LEFT EAR: ICD-10-CM

## 2020-07-28 DIAGNOSIS — M85.88 OSTEOPENIA OF LUMBAR SPINE: ICD-10-CM

## 2020-07-28 DIAGNOSIS — I10 HYPERTENSION, BENIGN: ICD-10-CM

## 2020-07-28 DIAGNOSIS — E66.09 CLASS 1 OBESITY DUE TO EXCESS CALORIES WITH SERIOUS COMORBIDITY AND BODY MASS INDEX (BMI) OF 30.0 TO 30.9 IN ADULT: ICD-10-CM

## 2020-07-28 DIAGNOSIS — G47.33 OBSTRUCTIVE SLEEP APNEA SYNDROME: ICD-10-CM

## 2020-07-28 DIAGNOSIS — E78.5 DYSLIPIDEMIA: ICD-10-CM

## 2020-07-28 DIAGNOSIS — M79.7 FIBROMYALGIA: ICD-10-CM

## 2020-07-28 DIAGNOSIS — F32.A DEPRESSION, UNSPECIFIED DEPRESSION TYPE: ICD-10-CM

## 2020-07-28 DIAGNOSIS — F41.9 ANXIETY: ICD-10-CM

## 2020-07-28 PROCEDURE — 99999 PR PBB SHADOW E&M-EST. PATIENT-LVL V: CPT | Mod: PBBFAC,,, | Performed by: FAMILY MEDICINE

## 2020-07-28 PROCEDURE — 99396 PR PREVENTIVE VISIT,EST,40-64: ICD-10-PCS | Mod: S$GLB,,, | Performed by: FAMILY MEDICINE

## 2020-07-28 PROCEDURE — 3075F SYST BP GE 130 - 139MM HG: CPT | Mod: CPTII,S$GLB,, | Performed by: FAMILY MEDICINE

## 2020-07-28 PROCEDURE — 3079F DIAST BP 80-89 MM HG: CPT | Mod: CPTII,S$GLB,, | Performed by: FAMILY MEDICINE

## 2020-07-28 PROCEDURE — 3008F BODY MASS INDEX DOCD: CPT | Mod: CPTII,S$GLB,, | Performed by: FAMILY MEDICINE

## 2020-07-28 PROCEDURE — 99396 PREV VISIT EST AGE 40-64: CPT | Mod: S$GLB,,, | Performed by: FAMILY MEDICINE

## 2020-07-28 PROCEDURE — 3079F PR MOST RECENT DIASTOLIC BLOOD PRESSURE 80-89 MM HG: ICD-10-PCS | Mod: CPTII,S$GLB,, | Performed by: FAMILY MEDICINE

## 2020-07-28 PROCEDURE — 3008F PR BODY MASS INDEX (BMI) DOCUMENTED: ICD-10-PCS | Mod: CPTII,S$GLB,, | Performed by: FAMILY MEDICINE

## 2020-07-28 PROCEDURE — 99999 PR PBB SHADOW E&M-EST. PATIENT-LVL V: ICD-10-PCS | Mod: PBBFAC,,, | Performed by: FAMILY MEDICINE

## 2020-07-28 PROCEDURE — 3075F PR MOST RECENT SYSTOLIC BLOOD PRESS GE 130-139MM HG: ICD-10-PCS | Mod: CPTII,S$GLB,, | Performed by: FAMILY MEDICINE

## 2020-07-28 RX ORDER — KETOROLAC TROMETHAMINE 5 MG/ML
SOLUTION OPHTHALMIC
COMMUNITY
Start: 2020-07-13 | End: 2020-11-18 | Stop reason: CLARIF

## 2020-07-28 RX ORDER — OFLOXACIN 3 MG/ML
SOLUTION/ DROPS OPHTHALMIC
COMMUNITY
Start: 2020-07-13 | End: 2020-11-18 | Stop reason: CLARIF

## 2020-07-28 RX ORDER — LORATADINE 10 MG/1
TABLET ORAL
COMMUNITY
Start: 2020-06-27 | End: 2020-11-18 | Stop reason: CLARIF

## 2020-07-28 RX ORDER — FLUOXETINE 10 MG/1
10 CAPSULE ORAL DAILY
Qty: 30 CAPSULE | Refills: 11 | Status: SHIPPED | OUTPATIENT
Start: 2020-07-28 | End: 2021-06-07

## 2020-07-28 RX ORDER — PREDNISOLONE ACETATE 10 MG/ML
SUSPENSION/ DROPS OPHTHALMIC
COMMUNITY
Start: 2020-07-01 | End: 2020-11-18 | Stop reason: CLARIF

## 2020-07-28 RX ORDER — FLUTICASONE PROPIONATE 50 MCG
2 SPRAY, SUSPENSION (ML) NASAL DAILY
Qty: 15.8 ML | Refills: 0 | Status: SHIPPED | OUTPATIENT
Start: 2020-07-28 | End: 2021-07-28

## 2020-07-28 NOTE — PROGRESS NOTES
Subjective:       Patient ID: Rachel Conroy is a 64 y.o. female.    Chief Complaint: Establish Care and Annual Exam    HPI   64 year old female with with hypertension, dyslipidemia, obstructive, sleep apnea, osteopenia, and obesity, comes in to establish care with new PCP, as current PCP is retiring from [facility]. She does report that for years she has been having chronic muscle aches throughout her body. She states that she was diagnosed with fibromyalgia. He muscle aches have no particular patterns. Sometimes upper extremities hurt more, sometimes, lower, sometimes joints. On questioning, it seems she uses her CPAP suboptimally.   On further questioning, she also reports that for years, she reports a low mood, difficulty concentrating, changes in appetite when she feels down, and changes in sleep, with racing thoughts at night. She often gets palpitations and chest pains that last several minutes. The only thing that helps is going to a room and isolating herself. She reports that she has seen cardiology for this and no cause has been found, which worries her even more.     Review of Systems   Constitutional: Negative for unexpected weight change.   HENT: Negative for ear pain and sore throat.    Eyes: Negative for visual disturbance.   Respiratory: Negative for shortness of breath.    Cardiovascular: Positive for palpitations. Negative for chest pain.   Gastrointestinal: Negative for abdominal pain and blood in stool.   Endocrine: Negative for cold intolerance and heat intolerance.   Genitourinary: Negative for dysuria and frequency.   Musculoskeletal: Positive for arthralgias and myalgias.   Integumentary:  Negative for rash.   Neurological: Negative for weakness, numbness and headaches.   Hematological: Negative for adenopathy.   Psychiatric/Behavioral: Positive for decreased concentration and sleep disturbance. Negative for dysphoric mood, hallucinations, self-injury and suicidal ideas. The  "patient is nervous/anxious.          Objective:     /81 (BP Location: Left arm, Patient Position: Sitting, BP Method: Medium (Automatic))   Pulse 92   Temp 98.4 °F (36.9 °C) (Oral)   Resp 17   Ht 5' 7" (1.702 m)   Wt 89.3 kg (196 lb 13.9 oz)   SpO2 97%   BMI 30.83 kg/m²     Physical Exam  Vitals signs reviewed.   Constitutional:       General: She is not in acute distress.     Appearance: She is well-developed. She is not diaphoretic.   HENT:      Head: Normocephalic and atraumatic.      Right Ear: Tympanic membrane, ear canal and external ear normal.      Left Ear: Ear canal and external ear normal. A middle ear effusion is present.      Nose: Nose normal.   Eyes:      General:         Right eye: No discharge.         Left eye: No discharge.      Conjunctiva/sclera: Conjunctivae normal.      Pupils: Pupils are equal, round, and reactive to light.   Neck:      Musculoskeletal: Normal range of motion and neck supple.      Thyroid: No thyromegaly.      Trachea: No tracheal deviation.   Cardiovascular:      Rate and Rhythm: Normal rate and regular rhythm.      Pulses:           Radial pulses are 2+ on the right side and 2+ on the left side.      Heart sounds: Normal heart sounds, S1 normal and S2 normal. No murmur.   Pulmonary:      Effort: Pulmonary effort is normal. No respiratory distress.      Breath sounds: Normal breath sounds. No wheezing, rhonchi or rales.   Abdominal:      General: Bowel sounds are normal. There is no distension.      Palpations: Abdomen is soft. Abdomen is not rigid. There is no mass.      Tenderness: There is no abdominal tenderness. There is no guarding.   Lymphadenopathy:      Cervical: No cervical adenopathy.   Skin:     General: Skin is warm and dry.      Capillary Refill: Capillary refill takes less than 2 seconds.      Findings: No rash.   Neurological:      Mental Status: She is alert and oriented to person, place, and time.      Cranial Nerves: No cranial nerve deficit. "      Sensory: No sensory deficit.      Motor: No atrophy or abnormal muscle tone.      Deep Tendon Reflexes:      Reflex Scores:       Patellar reflexes are 2+ on the right side and 2+ on the left side.  Psychiatric:         Mood and Affect: Mood is not elated. Affect is not labile.         Speech: Speech is tangential.         Behavior: Behavior normal. Behavior is not agitated, aggressive or hyperactive.         Thought Content: Thought content is not paranoid. Thought content does not include homicidal or suicidal ideation.         The 10-year ASCVD risk score (Broctoncristhian LOPEZ Jr., et al., 2013) is: 5.7%    Values used to calculate the score:      Age: 64 years      Sex: Female      Is Non- : No      Diabetic: No      Tobacco smoker: No      Systolic Blood Pressure: 136 mmHg      Is BP treated: Yes      HDL Cholesterol: 79 mg/dL      Total Cholesterol: 242 mg/dL    Assessment:       1. General medical exam    2. Osteopenia of lumbar spine    3. Hypertension, benign    4. Dyslipidemia    5. Obstructive sleep apnea syndrome    6. Anxiety    7. Depression, unspecified depression type    8. Fibromyalgia    9. Class 1 obesity due to excess calories with serious comorbidity and body mass index (BMI) of 30.0 to 30.9 in adult        Plan:       Rachel was seen today for establish care and annual exam.    Diagnoses and all orders for this visit:    General medical exam  -     Comprehensive metabolic panel; Future  -     Lipid Panel; Future  -     CBC auto differential; Future  -     Microalbumin/creatinine urine ratio; Future  -     Hemoglobin A1C; Future  -     Vitamin D; Future  -     TSH; Future  Age appropriate recommendations reviewed.  Screening labs ordered.    Osteopenia of lumbar spine  DEXA from 6/5 done at outside facility reviewed.  Advised Caltrate BID.    Hypertension, benign  -     Comprehensive metabolic panel; Future  -     Lipid Panel; Future  -     Microalbumin/creatinine urine ratio;  Future  Continue current regimen    Dyslipidemia  -     Comprehensive metabolic panel; Future  -     Lipid Panel; Future    Obstructive sleep apnea syndrome  -     Comprehensive metabolic panel; Future  -     CBC auto differential; Future  Advised improving compliance with CPAP  Educated on dangers of poor NELL control.    Anxiety  -     FLUoxetine 10 MG capsule; Take 1 capsule (10 mg total) by mouth once daily.  -     TSH; Future  -     Ambulatory referral/consult to Psychiatry; Future  Discussed with patient mental health concerns including anxiety and depression often co-exist with fibromyalgia diagnosis.  Advised treatment for mental health concerns.  She declines seeing psych at present but does agree starting treatment for anxiety and depression.  Side effects of Prozac explained, and patient agrees to start.  Reevaluate in 4 weeks.    Depression, unspecified depression type  -     Vitamin D; Future  -     TSH; Future  -     Ambulatory referral/consult to Psychiatry; Future  As above    Fibromyalgia  Treatment as per rheum    Class 1 obesity due to excess calories with serious comorbidity and body mass index (BMI) of 30.0 to 30.9 in adult  -     Comprehensive metabolic panel; Future  -     Lipid Panel; Future  -     CBC auto differential; Future  -     FLUoxetine 10 MG capsule; Take 1 capsule (10 mg total) by mouth once daily.  -     Hemoglobin A1C; Future  -     Vitamin D; Future  -     TSH; Future    Acute effusion of left ear  -     fluticasone propionate (FLONASE) 50 mcg/actuation nasal spray; 2 sprays (100 mcg total) by Each Nostril route once daily.

## 2020-07-29 ENCOUNTER — PATIENT OUTREACH (OUTPATIENT)
Dept: ADMINISTRATIVE | Facility: HOSPITAL | Age: 65
End: 2020-07-29

## 2020-08-25 ENCOUNTER — OFFICE VISIT (OUTPATIENT)
Dept: FAMILY MEDICINE | Facility: CLINIC | Age: 65
End: 2020-08-25
Payer: COMMERCIAL

## 2020-08-25 VITALS
HEIGHT: 67 IN | RESPIRATION RATE: 19 BRPM | OXYGEN SATURATION: 97 % | HEART RATE: 92 BPM | WEIGHT: 194.69 LBS | TEMPERATURE: 98 F | DIASTOLIC BLOOD PRESSURE: 80 MMHG | SYSTOLIC BLOOD PRESSURE: 138 MMHG | BODY MASS INDEX: 30.56 KG/M2

## 2020-08-25 DIAGNOSIS — R74.01 ELEVATED TRANSAMINASE LEVEL: Primary | ICD-10-CM

## 2020-08-25 DIAGNOSIS — F32.A DEPRESSION, UNSPECIFIED DEPRESSION TYPE: ICD-10-CM

## 2020-08-25 DIAGNOSIS — Z23 NEED FOR VACCINATION: ICD-10-CM

## 2020-08-25 DIAGNOSIS — G47.33 OBSTRUCTIVE SLEEP APNEA SYNDROME: ICD-10-CM

## 2020-08-25 DIAGNOSIS — G47.00 INSOMNIA, UNSPECIFIED TYPE: ICD-10-CM

## 2020-08-25 DIAGNOSIS — I10 HYPERTENSION, BENIGN: ICD-10-CM

## 2020-08-25 DIAGNOSIS — E78.5 DYSLIPIDEMIA: ICD-10-CM

## 2020-08-25 DIAGNOSIS — F41.9 ANXIETY: ICD-10-CM

## 2020-08-25 PROCEDURE — 3075F PR MOST RECENT SYSTOLIC BLOOD PRESS GE 130-139MM HG: ICD-10-PCS | Mod: CPTII,S$GLB,, | Performed by: FAMILY MEDICINE

## 2020-08-25 PROCEDURE — 90471 IMMUNIZATION ADMIN: CPT | Mod: S$GLB,,, | Performed by: FAMILY MEDICINE

## 2020-08-25 PROCEDURE — 99999 PR PBB SHADOW E&M-EST. PATIENT-LVL V: CPT | Mod: PBBFAC,,, | Performed by: FAMILY MEDICINE

## 2020-08-25 PROCEDURE — 90715 TDAP VACCINE 7 YRS/> IM: CPT | Mod: S$GLB,,, | Performed by: FAMILY MEDICINE

## 2020-08-25 PROCEDURE — 3079F DIAST BP 80-89 MM HG: CPT | Mod: CPTII,S$GLB,, | Performed by: FAMILY MEDICINE

## 2020-08-25 PROCEDURE — 3008F BODY MASS INDEX DOCD: CPT | Mod: CPTII,S$GLB,, | Performed by: FAMILY MEDICINE

## 2020-08-25 PROCEDURE — 90715 TDAP VACCINE GREATER THAN OR EQUAL TO 7YO IM: ICD-10-PCS | Mod: S$GLB,,, | Performed by: FAMILY MEDICINE

## 2020-08-25 PROCEDURE — 99214 PR OFFICE/OUTPT VISIT, EST, LEVL IV, 30-39 MIN: ICD-10-PCS | Mod: S$GLB,25,, | Performed by: FAMILY MEDICINE

## 2020-08-25 PROCEDURE — 3008F PR BODY MASS INDEX (BMI) DOCUMENTED: ICD-10-PCS | Mod: CPTII,S$GLB,, | Performed by: FAMILY MEDICINE

## 2020-08-25 PROCEDURE — 99214 OFFICE O/P EST MOD 30 MIN: CPT | Mod: S$GLB,25,, | Performed by: FAMILY MEDICINE

## 2020-08-25 PROCEDURE — 3075F SYST BP GE 130 - 139MM HG: CPT | Mod: CPTII,S$GLB,, | Performed by: FAMILY MEDICINE

## 2020-08-25 PROCEDURE — 90471 TDAP VACCINE GREATER THAN OR EQUAL TO 7YO IM: ICD-10-PCS | Mod: S$GLB,,, | Performed by: FAMILY MEDICINE

## 2020-08-25 PROCEDURE — 3079F PR MOST RECENT DIASTOLIC BLOOD PRESSURE 80-89 MM HG: ICD-10-PCS | Mod: CPTII,S$GLB,, | Performed by: FAMILY MEDICINE

## 2020-08-25 PROCEDURE — 99999 PR PBB SHADOW E&M-EST. PATIENT-LVL V: ICD-10-PCS | Mod: PBBFAC,,, | Performed by: FAMILY MEDICINE

## 2020-08-25 RX ORDER — ZOSTER VACCINE RECOMBINANT, ADJUVANTED 50 MCG/0.5
0.5 KIT INTRAMUSCULAR ONCE
Qty: 1 EACH | Refills: 1 | Status: SHIPPED | OUTPATIENT
Start: 2020-08-25 | End: 2020-08-25

## 2020-09-09 NOTE — PROGRESS NOTES
"Subjective:       Patient ID: Rachel Conroy is a 65 y.o. female.    Chief Complaint: Follow-up and Results    HPI   65-year-old female presents for follow-up on depression and anxiety since starting fluoxetine. She reports that symptoms are improved.  She also reports that she is sleeping better.  She has a history of sleep apnea, and compliance with CPAP has been suboptimal in the past.  She reports good compliance with her blood pressure and cholesterol medication.    Review of Systems   Respiratory: Negative for wheezing.    Cardiovascular: Negative for chest pain, palpitations and leg swelling.   Gastrointestinal: Negative for abdominal pain.   Genitourinary: Negative for dysuria.   Psychiatric/Behavioral: Positive for sleep disturbance (but improved). Negative for hallucinations, self-injury and suicidal ideas. The patient is nervous/anxious (improved). The patient is not hyperactive.          Objective:     /80 (BP Location: Left arm, Patient Position: Sitting, BP Method: Medium (Manual))   Pulse 92   Temp 97.8 °F (36.6 °C) (Oral)   Resp 19   Ht 5' 7" (1.702 m)   Wt 88.3 kg (194 lb 10.7 oz)   SpO2 97%   BMI 30.49 kg/m²     Physical Exam  Vitals signs reviewed.   Constitutional:       Appearance: She is well-developed.   HENT:      Head: Normocephalic and atraumatic.      Right Ear: External ear normal.      Left Ear: External ear normal.      Nose: Nose normal.      Mouth/Throat:      Pharynx: No oropharyngeal exudate.   Eyes:      General:         Right eye: No discharge.         Left eye: No discharge.      Conjunctiva/sclera: Conjunctivae normal.      Pupils: Pupils are equal, round, and reactive to light.   Neck:      Musculoskeletal: Normal range of motion and neck supple.      Trachea: No tracheal deviation.   Cardiovascular:      Rate and Rhythm: Normal rate and regular rhythm.      Heart sounds: Normal heart sounds. No murmur.   Pulmonary:      Effort: Pulmonary effort is normal. "      Breath sounds: Normal breath sounds. No wheezing or rales.   Abdominal:      General: Bowel sounds are normal.      Palpations: Abdomen is soft. Abdomen is not rigid. There is no mass.      Tenderness: There is no abdominal tenderness. There is no guarding.   Lymphadenopathy:      Cervical: No cervical adenopathy.   Neurological:      Mental Status: She is oriented to person, place, and time.      Sensory: No sensory deficit.      Motor: No atrophy.      Gait: Gait normal.      Deep Tendon Reflexes:      Reflex Scores:       Patellar reflexes are 2+ on the right side and 2+ on the left side.        Lab Visit on 08/25/2020   Component Date Value Ref Range Status    Hepatitis B Surface Ag 08/25/2020 Negative  Negative Final    Hep B C IgM 08/25/2020 Negative  Negative Final    Hep A IgM 08/25/2020 Negative  Negative Final    Hepatitis C Ab 08/25/2020 Negative  Negative Final    Hep B Core Total Ab 08/25/2020 Positive*  Final    Hep B S Ab 08/25/2020 Positive*  Final    Hepatitis A Antibody IgG 08/25/2020 Positive*  Final    Total Protein 08/25/2020 6.9  6.0 - 8.4 g/dL Final    Albumin 08/25/2020 3.9  3.5 - 5.2 g/dL Final    Total Bilirubin 08/25/2020 0.3  0.1 - 1.0 mg/dL Final    Comment: For infants and newborns, interpretation of results should be based  on gestational age, weight and in agreement with clinical  observations.  Premature Infant recommended reference ranges:  Up to 24 hours.............<8.0 mg/dL  Up to 48 hours............<12.0 mg/dL  3-5 days..................<15.0 mg/dL  6-29 days.................<15.0 mg/dL      Bilirubin, Direct 08/25/2020 0.2  0.1 - 0.3 mg/dL Final    AST 08/25/2020 57* 10 - 40 U/L Final    ALT 08/25/2020 143* 10 - 44 U/L Final    Alkaline Phosphatase 08/25/2020 117  55 - 135 U/L Final   Lab Visit on 07/28/2020   Component Date Value Ref Range Status    Microalbum.,U,Random 07/28/2020 12.0  ug/mL Final    Creatinine, Random Ur 07/28/2020 105.0  15.0 - 325.0  mg/dL Final    Comment: The random urine reference ranges provided were established   for 24 hour urine collections.  No reference ranges exist for  random urine specimens.  Correlate clinically.      Microalb Creat Ratio 07/28/2020 11.4  0.0 - 30.0 ug/mg Final   Lab Visit on 07/28/2020   Component Date Value Ref Range Status    Sodium 07/28/2020 139  136 - 145 mmol/L Final    Potassium 07/28/2020 3.8  3.5 - 5.1 mmol/L Final    Chloride 07/28/2020 102  95 - 110 mmol/L Final    CO2 07/28/2020 32* 23 - 29 mmol/L Final    Glucose 07/28/2020 59* 70 - 110 mg/dL Final    BUN, Bld 07/28/2020 13  8 - 23 mg/dL Final    Creatinine 07/28/2020 0.7  0.5 - 1.4 mg/dL Final    Calcium 07/28/2020 9.2  8.7 - 10.5 mg/dL Final    Total Protein 07/28/2020 7.4  6.0 - 8.4 g/dL Final    Albumin 07/28/2020 4.1  3.5 - 5.2 g/dL Final    Total Bilirubin 07/28/2020 0.3  0.1 - 1.0 mg/dL Final    Comment: For infants and newborns, interpretation of results should be based  on gestational age, weight and in agreement with clinical  observations.  Premature Infant recommended reference ranges:  Up to 24 hours.............<8.0 mg/dL  Up to 48 hours............<12.0 mg/dL  3-5 days..................<15.0 mg/dL  6-29 days.................<15.0 mg/dL      Alkaline Phosphatase 07/28/2020 125  55 - 135 U/L Final    AST 07/28/2020 70* 10 - 40 U/L Final    ALT 07/28/2020 149* 10 - 44 U/L Final    Anion Gap 07/28/2020 5* 8 - 16 mmol/L Final    eGFR if African American 07/28/2020 >60  >60 mL/min/1.73 m^2 Final    eGFR if non African American 07/28/2020 >60  >60 mL/min/1.73 m^2 Final    Comment: Calculation used to obtain the estimated glomerular filtration  rate (eGFR) is the CKD-EPI equation.       Cholesterol 07/28/2020 243* 120 - 199 mg/dL Final    Comment: The National Cholesterol Education Program (NCEP) has set the  following guidelines (reference ranges) for Cholesterol:  Optimal.....................<200 mg/dL  Borderline  High.............200-239 mg/dL  High........................> or = 240 mg/dL      Triglycerides 07/28/2020 176* 30 - 150 mg/dL Final    Comment: The National Cholesterol Education Program (NCEP) has set the  following guidelines (reference values) for triglycerides:  Normal......................<150 mg/dL  Borderline High.............150-199 mg/dL  High........................200-499 mg/dL      HDL 07/28/2020 87* 40 - 75 mg/dL Final    Comment: The National Cholesterol Education Program (NCEP) has set the  following guidelines (reference values) for HDL Cholesterol:  Low...............<40 mg/dL  Optimal...........>60 mg/dL      LDL Cholesterol 07/28/2020 120.8  63.0 - 159.0 mg/dL Final    Comment: The National Cholesterol Education Program (NCEP) has set the  following guidelines (reference values) for LDL Cholesterol:  Optimal.......................<130 mg/dL  Borderline High...............130-159 mg/dL  High..........................160-189 mg/dL  Very High.....................>190 mg/dL      Hdl/Cholesterol Ratio 07/28/2020 35.8  20.0 - 50.0 % Final    Total Cholesterol/HDL Ratio 07/28/2020 2.8  2.0 - 5.0 Final    Non-HDL Cholesterol 07/28/2020 156  mg/dL Final    Comment: Risk category and Non-HDL cholesterol goals:  Coronary heart disease (CHD)or equivalent (10-year risk of CHD >20%):  Non-HDL cholesterol goal     <130 mg/dL  Two or more CHD risk factors and 10-year risk of CHD <= 20%:  Non-HDL cholesterol goal     <160 mg/dL  0 to 1 CHD risk factor:  Non-HDL cholesterol goal     <190 mg/dL      WBC 07/28/2020 7.18  3.90 - 12.70 K/uL Final    RBC 07/28/2020 4.80  4.00 - 5.40 M/uL Final    Hemoglobin 07/28/2020 13.9  12.0 - 16.0 g/dL Final    Hematocrit 07/28/2020 43.1  37.0 - 48.5 % Final    Mean Corpuscular Volume 07/28/2020 90  82 - 98 fL Final    Mean Corpuscular Hemoglobin 07/28/2020 29.0  27.0 - 31.0 pg Final    Mean Corpuscular Hemoglobin Conc 07/28/2020 32.3  32.0 - 36.0 g/dL Final    RDW  07/28/2020 12.2  11.5 - 14.5 % Final    Platelets 07/28/2020 320  150 - 350 K/uL Final    MPV 07/28/2020 9.7  9.2 - 12.9 fL Final    Immature Granulocytes 07/28/2020 0.3  0.0 - 0.5 % Final    Gran # (ANC) 07/28/2020 3.6  1.8 - 7.7 K/uL Final    Immature Grans (Abs) 07/28/2020 0.02  0.00 - 0.04 K/uL Final    Comment: Mild elevation in immature granulocytes is non specific and   can be seen in a variety of conditions including stress response,   acute inflammation, trauma and pregnancy. Correlation with other   laboratory and clinical findings is essential.      Lymph # 07/28/2020 2.6  1.0 - 4.8 K/uL Final    Mono # 07/28/2020 0.8  0.3 - 1.0 K/uL Final    Eos # 07/28/2020 0.1  0.0 - 0.5 K/uL Final    Baso # 07/28/2020 0.04  0.00 - 0.20 K/uL Final    nRBC 07/28/2020 0  0 /100 WBC Final    Gran% 07/28/2020 50.5  38.0 - 73.0 % Final    Lymph% 07/28/2020 36.4  18.0 - 48.0 % Final    Mono% 07/28/2020 10.9  4.0 - 15.0 % Final    Eosinophil% 07/28/2020 1.3  0.0 - 8.0 % Final    Basophil% 07/28/2020 0.6  0.0 - 1.9 % Final    Differential Method 07/28/2020 Automated   Final    Hemoglobin A1C 07/28/2020 5.4  4.0 - 5.6 % Final    Comment: ADA Screening Guidelines:  5.7-6.4%  Consistent with prediabetes  >or=6.5%  Consistent with diabetes  High levels of fetal hemoglobin interfere with the HbA1C  assay. Heterozygous hemoglobin variants (HbS, HgC, etc)do  not significantly interfere with this assay.   However, presence of multiple variants may affect accuracy.      Estimated Avg Glucose 07/28/2020 108  68 - 131 mg/dL Final    Vit D, 25-Hydroxy 07/28/2020 30  30 - 96 ng/mL Final    Comment: Vitamin D deficiency.........<10 ng/mL                              Vitamin D insufficiency......10-29 ng/mL       Vitamin D sufficiency........> or equal to 30 ng/mL  Vitamin D toxicity............>100 ng/mL      TSH 07/28/2020 0.877  0.400 - 4.000 uIU/mL Final       Assessment:       1. Elevated transaminase level    2.  Anxiety    3. Depression, unspecified depression type    4. Insomnia, unspecified type    5. Hypertension, benign    6. Obstructive sleep apnea syndrome    7. Dyslipidemia    8. Need for vaccination        Plan:       Rachel was seen today for follow-up and results.    Diagnoses and all orders for this visit:    Elevated transaminase level  -     Hepatitis Panel, Acute; Future  -     Hepatitis B Core Antibody, Total; Future  -     Hepatitis B Surface Ab, Qualitative; Future  -     Hepatitis A antibody, IgG; Future  -     Hepatic function panel; Future  Recheck hepatic function and hepatitis panel.    Anxiety  Continue fluoxetine.    Depression, unspecified depression type  Continue fluoxetine.    Insomnia, unspecified type  Improved.    Hypertension, benign  Continue current blood pressure regimen    Obstructive sleep apnea syndrome  Compliance with CPAP encouraged.    Dyslipidemia  -     Comprehensive metabolic panel; Future  -     Lipid Panel; Future  Recheck lipids in three months.    Need for vaccination  -     SHINGRIX, PF, 50 mcg/0.5 mL injection; Inject 0.5 mLs into the muscle once. Now and 1 dose in 2-6 months for 1 dose  -     (In Office Administered) Tdap Vaccine  Vaccination recommendations reviewed with patient

## 2020-09-15 NOTE — TRANSFER OF CARE
"Anesthesia Transfer of Care Note    Patient: Rachel Conroy    Procedure(s) Performed: Procedure(s) (LRB):  COLONOSCOPY (N/A)  ULTRASOUND-ENDOSCOPIC-LOWER (N/A)    Patient location: PACU    Anesthesia Type: general    Transport from OR: Transported from OR on 6-10 L/min O2 by face mask with adequate spontaneous ventilation    Post pain: adequate analgesia    Post assessment: no apparent anesthetic complications and tolerated procedure well    Post vital signs: stable    Level of consciousness: awake and responds to stimulation    Nausea/Vomiting: no nausea/vomiting    Complications: none    Transfer of care protocol was followed      Last vitals:   Visit Vitals  BP (!) 146/76 (BP Location: Left arm, Patient Position: Sitting)   Pulse 91   Temp 36.7 °C (98 °F) (Oral)   Resp 16   Ht 5' 7" (1.702 m)   Wt 78.9 kg (174 lb)   SpO2 97%   Breastfeeding? No   BMI 27.25 kg/m²     "
1

## 2020-09-29 ENCOUNTER — PATIENT OUTREACH (OUTPATIENT)
Dept: ADMINISTRATIVE | Facility: OTHER | Age: 65
End: 2020-09-29

## 2020-09-30 ENCOUNTER — OFFICE VISIT (OUTPATIENT)
Dept: SLEEP MEDICINE | Facility: CLINIC | Age: 65
End: 2020-09-30
Payer: COMMERCIAL

## 2020-09-30 VITALS
HEIGHT: 67 IN | WEIGHT: 189.63 LBS | HEART RATE: 83 BPM | DIASTOLIC BLOOD PRESSURE: 70 MMHG | BODY MASS INDEX: 29.76 KG/M2 | SYSTOLIC BLOOD PRESSURE: 116 MMHG

## 2020-09-30 DIAGNOSIS — G43.809 OTHER MIGRAINE WITHOUT STATUS MIGRAINOSUS, NOT INTRACTABLE: ICD-10-CM

## 2020-09-30 DIAGNOSIS — G47.33 OBSTRUCTIVE SLEEP APNEA: Primary | ICD-10-CM

## 2020-09-30 DIAGNOSIS — G47.33 OBSTRUCTIVE SLEEP APNEA SYNDROME: ICD-10-CM

## 2020-09-30 PROCEDURE — 3078F PR MOST RECENT DIASTOLIC BLOOD PRESSURE < 80 MM HG: ICD-10-PCS | Mod: CPTII,S$GLB,, | Performed by: NURSE PRACTITIONER

## 2020-09-30 PROCEDURE — 99999 PR PBB SHADOW E&M-EST. PATIENT-LVL III: CPT | Mod: PBBFAC,,, | Performed by: NURSE PRACTITIONER

## 2020-09-30 PROCEDURE — 3074F SYST BP LT 130 MM HG: CPT | Mod: CPTII,S$GLB,, | Performed by: NURSE PRACTITIONER

## 2020-09-30 PROCEDURE — 3078F DIAST BP <80 MM HG: CPT | Mod: CPTII,S$GLB,, | Performed by: NURSE PRACTITIONER

## 2020-09-30 PROCEDURE — 99214 OFFICE O/P EST MOD 30 MIN: CPT | Mod: S$GLB,,, | Performed by: NURSE PRACTITIONER

## 2020-09-30 PROCEDURE — 3008F PR BODY MASS INDEX (BMI) DOCUMENTED: ICD-10-PCS | Mod: CPTII,S$GLB,, | Performed by: NURSE PRACTITIONER

## 2020-09-30 PROCEDURE — 99214 PR OFFICE/OUTPT VISIT, EST, LEVL IV, 30-39 MIN: ICD-10-PCS | Mod: S$GLB,,, | Performed by: NURSE PRACTITIONER

## 2020-09-30 PROCEDURE — 99999 PR PBB SHADOW E&M-EST. PATIENT-LVL III: ICD-10-PCS | Mod: PBBFAC,,, | Performed by: NURSE PRACTITIONER

## 2020-09-30 PROCEDURE — 3074F PR MOST RECENT SYSTOLIC BLOOD PRESSURE < 130 MM HG: ICD-10-PCS | Mod: CPTII,S$GLB,, | Performed by: NURSE PRACTITIONER

## 2020-09-30 PROCEDURE — 3008F BODY MASS INDEX DOCD: CPT | Mod: CPTII,S$GLB,, | Performed by: NURSE PRACTITIONER

## 2020-09-30 RX ORDER — SUMATRIPTAN SUCCINATE 100 MG/1
100 TABLET ORAL
Qty: 9 TABLET | Refills: 3 | Status: SHIPPED | OUTPATIENT
Start: 2020-09-30 | End: 2021-04-20 | Stop reason: SDUPTHER

## 2020-09-30 NOTE — PROGRESS NOTES
"SLEEP CLINIC FOLLOW-UP NOTE:     Rachel Conroy  returns for management of obstructive sleep apnea and CPAP equipment check. Son present interpreting today. She has been using "sometimes" her apap 8-16cm. Feels mask is too big, not had replaced since 1/2019.  With mask sleep more consolidated. Still having headaches when lying down. Sleeps on 2 pillows. Having returns of am headache past 3 mos.       Feels diffuse pressure/sinus congestion. HA daily, worse the past 2 mos. Worse with lying down. Aleve prn. imitrex helps abort pain, takes only when severe  Denies blurred vision. rev'd mri brain 2015. HA similar nature/location.   HIT-6= 52      CPAP interrogation: no machine today    HST: Aug '15: AHI 9 (RDI 26)  Findings on this study suggest that the degree of sleep disordered breathing is in the moderate severity range, when RDI is measured. Also, there was noted of frequent episodes of what appeared to be prolonged exhalation, lasting 10-15 seconds, during periods of the night. This could be a manifestation of central apnea, but could not be further investigated by this testing modality    ROS: In addition to sleep symptoms, 18# GAIN, +occasional sinus congestion,  otherwise a balance review of systems is negative.     PHYSICAL EXAM:   /70 (BP Location: Left arm, Patient Position: Sitting, BP Method: Medium (Automatic))   Pulse 83   Ht 5' 7" (1.702 m)   Wt 86 kg (189 lb 9.5 oz)   BMI 29.69 kg/m²   W/N, W/D well groomed      IMPRESSION:   1. Obstructive sleep apnea, mild-mod, CPAP adherent but limited, needs new mask    Medical co-morbidities: HTN (stable),migraines, fibromyalgia    PLAN:   Discussed importance of continued CPAP compliance as well as the potential ramifications of untreated sleep apnea, which could include daytime sleepiness, hypertension, heart disease, and/or stroke.    apap 8-14cm resume consistent use GET new mask/supplies today [facility]. RTC 5 wks adherence.   See " ophthalmology assess papilledema.      continue prn imitrex 100mg 1/2-1 tab +_ NSAID

## 2020-09-30 NOTE — PROGRESS NOTES
Health Maintenance Due   Topic Date Due    Shingles Vaccine (1 of 2) 08/29/2007    Influenza Vaccine (1) 08/01/2020     Updates were requested from care everywhere.  Chart was reviewed for overdue Proactive [facility] Encounters ([facility]) topics (CRS, Breast Cancer Screening, Eye exam)  Health Maintenance has been updated.  LINKS immunization registry triggered.  Immunizations were reconciled.

## 2020-11-03 ENCOUNTER — PATIENT OUTREACH (OUTPATIENT)
Dept: ADMINISTRATIVE | Facility: OTHER | Age: 65
End: 2020-11-03

## 2020-11-03 NOTE — PROGRESS NOTES
Health Maintenance Due   Topic Date Due    Influenza Vaccine (1) 08/01/2020    Shingles Vaccine (2 of 2) 10/25/2020     Updates were requested from care everywhere.  Chart was reviewed for overdue Proactive [facility] Encounters ([facility]ENNIFER) topics (CRS, Breast Cancer Screening, Eye exam)  Health Maintenance has been updated.  LINKS immunization registry triggered.  Immunizations were reconciled.

## 2020-11-04 ENCOUNTER — TELEPHONE (OUTPATIENT)
Dept: SLEEP MEDICINE | Facility: CLINIC | Age: 65
End: 2020-11-04

## 2020-11-04 ENCOUNTER — OFFICE VISIT (OUTPATIENT)
Dept: SLEEP MEDICINE | Facility: CLINIC | Age: 65
End: 2020-11-04
Payer: COMMERCIAL

## 2020-11-04 VITALS
DIASTOLIC BLOOD PRESSURE: 69 MMHG | HEART RATE: 98 BPM | SYSTOLIC BLOOD PRESSURE: 123 MMHG | WEIGHT: 189.63 LBS | HEIGHT: 67 IN | BODY MASS INDEX: 29.76 KG/M2

## 2020-11-04 DIAGNOSIS — G43.009 MIGRAINE WITHOUT AURA AND WITHOUT STATUS MIGRAINOSUS, NOT INTRACTABLE: ICD-10-CM

## 2020-11-04 DIAGNOSIS — G47.33 OBSTRUCTIVE SLEEP APNEA: Primary | ICD-10-CM

## 2020-11-04 DIAGNOSIS — M79.7 FIBROMYALGIA: ICD-10-CM

## 2020-11-04 PROCEDURE — 99214 PR OFFICE/OUTPT VISIT, EST, LEVL IV, 30-39 MIN: ICD-10-PCS | Mod: S$GLB,,, | Performed by: NURSE PRACTITIONER

## 2020-11-04 PROCEDURE — 99999 PR PBB SHADOW E&M-EST. PATIENT-LVL III: CPT | Mod: PBBFAC,,, | Performed by: NURSE PRACTITIONER

## 2020-11-04 PROCEDURE — 3074F PR MOST RECENT SYSTOLIC BLOOD PRESSURE < 130 MM HG: ICD-10-PCS | Mod: CPTII,S$GLB,, | Performed by: NURSE PRACTITIONER

## 2020-11-04 PROCEDURE — 99214 OFFICE O/P EST MOD 30 MIN: CPT | Mod: S$GLB,,, | Performed by: NURSE PRACTITIONER

## 2020-11-04 PROCEDURE — 3074F SYST BP LT 130 MM HG: CPT | Mod: CPTII,S$GLB,, | Performed by: NURSE PRACTITIONER

## 2020-11-04 PROCEDURE — 3008F PR BODY MASS INDEX (BMI) DOCUMENTED: ICD-10-PCS | Mod: CPTII,S$GLB,, | Performed by: NURSE PRACTITIONER

## 2020-11-04 PROCEDURE — 99999 PR PBB SHADOW E&M-EST. PATIENT-LVL III: ICD-10-PCS | Mod: PBBFAC,,, | Performed by: NURSE PRACTITIONER

## 2020-11-04 PROCEDURE — 3008F BODY MASS INDEX DOCD: CPT | Mod: CPTII,S$GLB,, | Performed by: NURSE PRACTITIONER

## 2020-11-04 PROCEDURE — 3078F DIAST BP <80 MM HG: CPT | Mod: CPTII,S$GLB,, | Performed by: NURSE PRACTITIONER

## 2020-11-04 PROCEDURE — 3078F PR MOST RECENT DIASTOLIC BLOOD PRESSURE < 80 MM HG: ICD-10-PCS | Mod: CPTII,S$GLB,, | Performed by: NURSE PRACTITIONER

## 2020-11-04 NOTE — PROGRESS NOTES
"CC:  Rachel Conroy  returns for management of obstructive sleep apnea and headaches. Daughter present interpreting today. She has resumed using her apap machine but its set 4-5cm (not 8-14cm) but not every night. Wants to know minimum time is she needs to use it. Likes dreamwear mask. No chinstrap. Headaches unimproved.They were better in psat when consistently used her machine.Has ringing in ears. No worsening of belch/bloat (has IBS and gets if eats late). Saw ophthalmologist and NO papilledema. Reviewed her HST with her today from 2015    Feels diffuse pressure/sinus congestion. HA daily, worse the past 3 mos. Worse with lying down. Aleve prn. imitrex helps abort pain, takes only when severe  Denies blurred vision. rev'd mri brain 2015. HA similar nature/location.     Stopped taking gabapentin  Herbal tea to help sleep not helping, lot of worry/pandemic worsens this, MLT ineffective      CPAP interrogation: 30d 3.6 (1d 5h) hr/night. 100% mask fit. 0-2% PB, 90% tile 5cm. AHI 12-28    HST: Aug '15: AHI 9 (RDI 26)  Findings on this study suggest that the degree of sleep disordered breathing is in the moderate severity range, when RDI is measured. Also, there was noted of frequent episodes of what appeared to be prolonged exhalation, lasting 10-15 seconds, during periods of the night. This could be a manifestation of central apnea, but could not be further investigated by this testing modality    ROS: In addition to sleep symptoms, wgt stable, myalgia (better since not working) +occasional sinus congestion,  otherwise a balance review of systems is negative.     PHYSICAL EXAM:   /69   Pulse 98   Ht 5' 7" (1.702 m)   Wt 86 kg (189 lb 9.5 oz)   BMI 29.69 kg/m²   W/N, W/D well groomed      IMPRESSION:   1. Obstructive sleep apnea, mild-mod, apap limited adherence, ahi reveals mild-mod NELL with incorrect apap settings. Must have been lowered since 2018 ? Due to pressure intolerance    Medical " co-morbidities: HTN (stable),migraines, fibromyalgia    PLAN:   Discussed importance of improving CPAP compliance consistently as well as the potential ramifications of untreated sleep apnea, which could include daytime sleepiness, hypertension, heart disease, and/or stroke.    reste APAP to 4-11cm. Use qhs and notify me 1-2 wks of ahi/hours/90% tile and if any pressure intolerance. Would benefit from bipap titration study.  Sup-plies via [facility]  RESUME gabapentin 100mg qhs, up to 300mg qhs (help mood/sleep/pain/headache)  continue prn imitrex 100mg 1/2-1 tab +- NSAID

## 2020-11-04 NOTE — TELEPHONE ENCOUNTER
----- Message from Daphne Kenrs sent at 11/4/2020  9:21 AM CST -----  Name of Who is Calling: JING RUSSELL  What is the request in detail: Patient is calling to inform the office she is coming up now she's in the parking lot. Please advise Can the clinic reply by [facility]: No What Number to Call Back if not in [facility]: [phone]

## 2020-11-09 ENCOUNTER — OFFICE VISIT (OUTPATIENT)
Dept: FAMILY MEDICINE | Facility: CLINIC | Age: 65
End: 2020-11-09
Payer: COMMERCIAL

## 2020-11-09 VITALS
TEMPERATURE: 98 F | SYSTOLIC BLOOD PRESSURE: 132 MMHG | WEIGHT: 190.06 LBS | BODY MASS INDEX: 29.83 KG/M2 | HEIGHT: 67 IN | OXYGEN SATURATION: 98 % | HEART RATE: 80 BPM | RESPIRATION RATE: 18 BRPM | DIASTOLIC BLOOD PRESSURE: 93 MMHG

## 2020-11-09 DIAGNOSIS — Z01.818 PREOPERATIVE EXAMINATION: ICD-10-CM

## 2020-11-09 DIAGNOSIS — R74.01 ELEVATED TRANSAMINASE LEVEL: ICD-10-CM

## 2020-11-09 DIAGNOSIS — M17.11 PRIMARY OSTEOARTHRITIS OF RIGHT KNEE: Primary | ICD-10-CM

## 2020-11-09 DIAGNOSIS — I10 HYPERTENSION, BENIGN: ICD-10-CM

## 2020-11-09 DIAGNOSIS — E78.5 DYSLIPIDEMIA: ICD-10-CM

## 2020-11-09 DIAGNOSIS — G47.33 OBSTRUCTIVE SLEEP APNEA SYNDROME: ICD-10-CM

## 2020-11-09 PROCEDURE — 99214 OFFICE O/P EST MOD 30 MIN: CPT | Mod: S$GLB,,, | Performed by: FAMILY MEDICINE

## 2020-11-09 PROCEDURE — 3075F SYST BP GE 130 - 139MM HG: CPT | Mod: CPTII,S$GLB,, | Performed by: FAMILY MEDICINE

## 2020-11-09 PROCEDURE — 3008F BODY MASS INDEX DOCD: CPT | Mod: CPTII,S$GLB,, | Performed by: FAMILY MEDICINE

## 2020-11-09 PROCEDURE — 3080F PR MOST RECENT DIASTOLIC BLOOD PRESSURE >= 90 MM HG: ICD-10-PCS | Mod: CPTII,S$GLB,, | Performed by: FAMILY MEDICINE

## 2020-11-09 PROCEDURE — 3080F DIAST BP >= 90 MM HG: CPT | Mod: CPTII,S$GLB,, | Performed by: FAMILY MEDICINE

## 2020-11-09 PROCEDURE — 3075F PR MOST RECENT SYSTOLIC BLOOD PRESS GE 130-139MM HG: ICD-10-PCS | Mod: CPTII,S$GLB,, | Performed by: FAMILY MEDICINE

## 2020-11-09 PROCEDURE — 99999 PR PBB SHADOW E&M-EST. PATIENT-LVL V: CPT | Mod: PBBFAC,,, | Performed by: FAMILY MEDICINE

## 2020-11-09 PROCEDURE — 99214 PR OFFICE/OUTPT VISIT, EST, LEVL IV, 30-39 MIN: ICD-10-PCS | Mod: S$GLB,,, | Performed by: FAMILY MEDICINE

## 2020-11-09 PROCEDURE — 99999 PR PBB SHADOW E&M-EST. PATIENT-LVL V: ICD-10-PCS | Mod: PBBFAC,,, | Performed by: FAMILY MEDICINE

## 2020-11-09 PROCEDURE — 3008F PR BODY MASS INDEX (BMI) DOCUMENTED: ICD-10-PCS | Mod: CPTII,S$GLB,, | Performed by: FAMILY MEDICINE

## 2020-11-09 RX ORDER — CHLORTHALIDONE 25 MG/1
25 TABLET ORAL DAILY
Qty: 30 TABLET | Refills: 5 | Status: SHIPPED | OUTPATIENT
Start: 2020-11-09 | End: 2021-06-07 | Stop reason: CLARIF

## 2020-11-09 NOTE — PROGRESS NOTES
"Subjective:       Patient ID: Rachel Conroy is a 65 y.o. female.    Chief Complaint: Pre-op Exam    HPI   65 year old female with hypertension, NELL on CPAP, dyslipidemia, and elevated transaminase levels comes in for preop evaluation. She is scheduled for right knee total arthoplasty secondary to osteoarthritis on Novemeber 24. She is having preop testing tomorrow. She reports surgery will be in [place] with Dr. Stein at [facility].  She is able to walk a flight of stairs without getting winded. Has never had issues with anesthesia previously.      Review of Systems   Constitutional: Negative for unexpected weight change.   Respiratory: Negative for shortness of breath and wheezing.    Cardiovascular: Negative for chest pain, palpitations and leg swelling.   Gastrointestinal: Negative for abdominal pain, blood in stool, change in bowel habit, constipation, diarrhea and change in bowel habit.   Genitourinary: Negative for hematuria.         Objective:     BP (!) 132/93 (BP Location: Left arm, Patient Position: Sitting, BP Method: Medium (Manual))   Pulse 80   Temp 98.3 °F (36.8 °C) (Oral)   Resp 18   Ht 5' 7" (1.702 m)   Wt 86.2 kg (190 lb 0.6 oz)   SpO2 98%   BMI 29.76 kg/m²     Physical Exam  Vitals signs reviewed.   Constitutional:       Appearance: She is well-developed.   HENT:      Head: Normocephalic and atraumatic.      Right Ear: External ear normal.      Left Ear: External ear normal.      Nose: Nose normal.      Mouth/Throat:      Pharynx: No oropharyngeal exudate.   Eyes:      General:         Right eye: No discharge.         Left eye: No discharge.      Conjunctiva/sclera: Conjunctivae normal.      Pupils: Pupils are equal, round, and reactive to light.   Neck:      Musculoskeletal: Normal range of motion and neck supple.      Trachea: No tracheal deviation.   Cardiovascular:      Rate and Rhythm: Normal rate and regular rhythm.      Heart sounds: Normal heart sounds. No murmur. "   Pulmonary:      Effort: Pulmonary effort is normal.      Breath sounds: Normal breath sounds. No wheezing or rales.   Abdominal:      General: Bowel sounds are normal.      Palpations: Abdomen is soft. Abdomen is not rigid. There is no mass.      Tenderness: There is no abdominal tenderness. There is no guarding.   Lymphadenopathy:      Cervical: No cervical adenopathy.   Neurological:      Mental Status: She is oriented to person, place, and time.      Sensory: No sensory deficit.      Motor: No atrophy.      Gait: Gait normal.      Deep Tendon Reflexes:      Reflex Scores:       Patellar reflexes are 2+ on the right side and 2+ on the left side.        Assessment:       1. Primary osteoarthritis of right knee    2. Hypertension, benign    3. Obstructive sleep apnea syndrome    4. Dyslipidemia    5. Preoperative examination    6. Elevated transaminase level        Plan:        Rachel was seen today for pre-op exam.    Diagnoses and all orders for this visit:    Primary osteoarthritis of right knee  Patient is a category 1 risk on revised cardiac index score- low risk patient for intermediate risk procedure.  May proceed with surgery.     Hypertension, benign  -     Comprehensive Metabolic Panel; Future  -     chlorthalidone (HYGROTEN) 25 MG Tab; Take 1 tablet (25 mg total) by mouth once daily.  Diastolic above goal.  Increase chlorthalidone to full tablet and continue amlodipine and olmesartan as is.  Recheck BP next week along with renal function.    Obstructive sleep apnea syndrome  Encouraged good CPAP compliance.    Dyslipidemia  Continue current regimen.    Preoperative examination  See above    Elevated transaminase level  Check hepatic functio with labs.

## 2020-11-16 DIAGNOSIS — M17.11 OSTEOARTHRITIS OF RIGHT KNEE: Primary | ICD-10-CM

## 2020-11-17 ENCOUNTER — CLINICAL SUPPORT (OUTPATIENT)
Dept: FAMILY MEDICINE | Facility: CLINIC | Age: 65
End: 2020-11-17
Payer: COMMERCIAL

## 2020-11-17 VITALS — HEART RATE: 89 BPM | SYSTOLIC BLOOD PRESSURE: 132 MMHG | DIASTOLIC BLOOD PRESSURE: 80 MMHG

## 2020-11-17 DIAGNOSIS — Z01.30 BP CHECK: Primary | ICD-10-CM

## 2020-11-17 PROCEDURE — 99999 PR PBB SHADOW E&M-EST. PATIENT-LVL I: ICD-10-PCS | Mod: PBBFAC,,,

## 2020-11-17 PROCEDURE — 99499 NO LOS: ICD-10-PCS | Mod: S$GLB,,, | Performed by: FAMILY MEDICINE

## 2020-11-17 PROCEDURE — 99999 PR PBB SHADOW E&M-EST. PATIENT-LVL I: CPT | Mod: PBBFAC,,,

## 2020-11-17 PROCEDURE — 99499 UNLISTED E&M SERVICE: CPT | Mod: S$GLB,,, | Performed by: FAMILY MEDICINE

## 2020-11-17 NOTE — PROGRESS NOTES
.  Rachel Conroy 65 y.o. female is here today for Blood Pressure check.   History of HTN yes.    Review of patient's allergies indicates:   Allergen Reactions    Tramadol Itching     Creatinine   Date Value Ref Range Status   07/28/2020 0.7 0.5 - 1.4 mg/dL Final     Sodium   Date Value Ref Range Status   07/28/2020 139 136 - 145 mmol/L Final     Potassium   Date Value Ref Range Status   07/28/2020 3.8 3.5 - 5.1 mmol/L Final   ]  Patient verifies taking blood pressure medications on a regular basis at the same time of the day.     Current Outpatient Medications:     amLODIPine (NORVASC) 10 MG tablet, Take 1 tablet (10 mg total) by mouth once daily., Disp: 90 tablet, Rfl: 2    chlorthalidone (HYGROTEN) 25 MG Tab, Take 1 tablet (25 mg total) by mouth once daily., Disp: 30 tablet, Rfl: 5    clobetasol (CLOBEX) 0.05 % shampoo, APPLY EXTERNALLY TO THE AFFECTED AREA EVERY 7 DAYS, Disp: 118 mL, Rfl: 0    clobetasoL (CLOBEX) 0.05 % shampoo, Apply topically 2 (two) times daily., Disp: 1 Bottle, Rfl: 1    clobetasoL (CLOBEX) 0.05 % shampoo, APPLY EXTERNALLY TO THE AFFECTED AREA EVERY 7 DAYS, Disp: 118 mL, Rfl: 0    esomeprazole (NEXIUM) 40 MG capsule, Take 1 capsule (40 mg total) by mouth before breakfast., Disp: 90 capsule, Rfl: 0    FLUoxetine 10 MG capsule, Take 1 capsule (10 mg total) by mouth once daily., Disp: 30 capsule, Rfl: 11    fluticasone propionate (FLONASE) 50 mcg/actuation nasal spray, 2 sprays (100 mcg total) by Each Nostril route once daily., Disp: 15.8 mL, Rfl: 0    gabapentin (NEURONTIN) 100 MG capsule, Take 3 capsules (300 mg total) by mouth every evening., Disp: 90 capsule, Rfl: 0    hyoscyamine (LEVSIN/SL) 0.125 mg Subl, DISSOLVE 1 TABLET UNDER THE TONGUE 3 TIMES A DAY BEFORE MEALS, Disp: 270 tablet, Rfl: 1    ketorolac 0.5% (ACULAR) 0.5 % Drop, INSTILL 1 TO 2 DROPS INTO THE RIGHT EYE QID, Disp: , Rfl:     loratadine (CLARITIN) 10 mg tablet, TK 1 T PO D, Disp: , Rfl:     ofloxacin  (OCUFLOX) 0.3 % ophthalmic solution, INSTILL 1 TO 2 GTS INTO THE RIGHT EYE FOUR TIMES DAILY, Disp: , Rfl:     olmesartan (BENICAR) 40 MG tablet, TAKE 1 TABLET BY MOUTH ONCE DAILY, Disp: 90 tablet, Rfl: 3    prednisoLONE acetate (PRED FORTE) 1 % DrpS, INSTILL 1 TO 2 DROPS  QID IN LEFT EYE. TO BE USED  AFTER SURGERY FOR 30 DAYS, Disp: , Rfl:     sumatriptan (IMITREX) 100 MG tablet, Take 1 tablet (100 mg total) by mouth every 2 (two) hours as needed for Migraine., Disp: 9 tablet, Rfl: 3    vitamin D (VITAMIN D3) 1000 units Tab, Take 1 tablet (1,000 Units total) by mouth once daily., Disp: 30 tablet, Rfl: 0  Does patient have record of home blood pressure readings no.  Last dose of blood pressure medication was taken at AM.  Patient is asymptomatic.       BP: 132/80 , Pulse: 89 .

## 2020-11-18 ENCOUNTER — HOSPITAL ENCOUNTER (OUTPATIENT)
Dept: PREADMISSION TESTING | Facility: HOSPITAL | Age: 65
Discharge: HOME | End: 2020-11-18
Attending: ORTHOPAEDIC SURGERY
Payer: COMMERCIAL

## 2020-11-18 ENCOUNTER — ANESTHESIA EVENT (OUTPATIENT)
Dept: SURGERY | Facility: HOSPITAL | Age: 65
End: 2020-11-18
Payer: COMMERCIAL

## 2020-11-18 ENCOUNTER — HOSPITAL ENCOUNTER (OUTPATIENT)
Dept: RADIOLOGY | Facility: HOSPITAL | Age: 65
Discharge: HOME | End: 2020-11-18
Attending: ORTHOPAEDIC SURGERY
Payer: COMMERCIAL

## 2020-11-18 VITALS
WEIGHT: 192.88 LBS | HEIGHT: 67 IN | HEART RATE: 96 BPM | SYSTOLIC BLOOD PRESSURE: 121 MMHG | TEMPERATURE: 98 F | BODY MASS INDEX: 30.27 KG/M2 | DIASTOLIC BLOOD PRESSURE: 77 MMHG | RESPIRATION RATE: 18 BRPM | OXYGEN SATURATION: 98 %

## 2020-11-18 DIAGNOSIS — Z01.818 PRE-OP TESTING: Primary | ICD-10-CM

## 2020-11-18 DIAGNOSIS — M17.11 OSTEOARTHRITIS OF RIGHT KNEE: ICD-10-CM

## 2020-11-18 LAB
BASOPHILS # BLD AUTO: 0.04 K/UL (ref 0–0.2)
BASOPHILS NFR BLD: 0.7 % (ref 0–1.9)
DIFFERENTIAL METHOD: ABNORMAL
EOSINOPHIL # BLD AUTO: 0 K/UL (ref 0–0.5)
EOSINOPHIL NFR BLD: 0.5 % (ref 0–8)
ERYTHROCYTE [DISTWIDTH] IN BLOOD BY AUTOMATED COUNT: 11.9 % (ref 11.5–14.5)
HCT VFR BLD AUTO: 42.5 % (ref 37–48.5)
HGB BLD-MCNC: 14 G/DL (ref 12–16)
IMM GRANULOCYTES # BLD AUTO: 0.04 K/UL (ref 0–0.04)
IMM GRANULOCYTES NFR BLD AUTO: 0.7 % (ref 0–0.5)
LYMPHOCYTES # BLD AUTO: 1.4 K/UL (ref 1–4.8)
LYMPHOCYTES NFR BLD: 24.3 % (ref 18–48)
MCH RBC QN AUTO: 29 PG (ref 27–31)
MCHC RBC AUTO-ENTMCNC: 32.9 G/DL (ref 32–36)
MCV RBC AUTO: 88 FL (ref 82–98)
MONOCYTES # BLD AUTO: 0.6 K/UL (ref 0.3–1)
MONOCYTES NFR BLD: 10.3 % (ref 4–15)
NEUTROPHILS # BLD AUTO: 3.7 K/UL (ref 1.8–7.7)
NEUTROPHILS NFR BLD: 63.5 % (ref 38–73)
NRBC BLD-RTO: 0 /100 WBC
PLATELET # BLD AUTO: 221 K/UL (ref 150–350)
PMV BLD AUTO: 9.2 FL (ref 9.2–12.9)
RBC # BLD AUTO: 4.82 M/UL (ref 4–5.4)
WBC # BLD AUTO: 5.75 K/UL (ref 3.9–12.7)

## 2020-11-18 PROCEDURE — 85025 COMPLETE CBC W/AUTO DIFF WBC: CPT

## 2020-11-18 PROCEDURE — 73700 CT KNEE WITHOUT CONTRAST RIGHT W/MAKO PROTOCOL: ICD-10-PCS | Mod: 26,RT,, | Performed by: RADIOLOGY

## 2020-11-18 PROCEDURE — 93010 ELECTROCARDIOGRAM REPORT: CPT | Mod: ,,, | Performed by: INTERNAL MEDICINE

## 2020-11-18 PROCEDURE — 36415 COLL VENOUS BLD VENIPUNCTURE: CPT

## 2020-11-18 PROCEDURE — 73700 CT LOWER EXTREMITY W/O DYE: CPT | Mod: TC,RT

## 2020-11-18 PROCEDURE — 73700 CT LOWER EXTREMITY W/O DYE: CPT | Mod: 26,RT,, | Performed by: RADIOLOGY

## 2020-11-18 PROCEDURE — 93010 EKG 12-LEAD: ICD-10-PCS | Mod: ,,, | Performed by: INTERNAL MEDICINE

## 2020-11-18 PROCEDURE — 93005 ELECTROCARDIOGRAM TRACING: CPT

## 2020-11-18 RX ORDER — NAPROXEN SODIUM 220 MG
220 TABLET ORAL
Status: ON HOLD | COMMUNITY
End: 2020-11-23 | Stop reason: HOSPADM

## 2020-11-18 RX ORDER — IBUPROFEN 200 MG
200 TABLET ORAL EVERY 6 HOURS PRN
Status: ON HOLD | COMMUNITY
End: 2020-11-23 | Stop reason: HOSPADM

## 2020-11-18 NOTE — DISCHARGE INSTRUCTIONS
"  Your surgery is scheduled for _Monday Nov. 23, 2020__.    Call [phone] between 2 p.m. and 5 p.m. on   _Friday_ to find out your arrival time for the day of your surgery.      Please report to SAME DAY SURGERY UNIT on the 2nd FLOOR at _______ a.m.    If you need WHEELCHAIR assistance please call  [phone] from your cell phone or "[phone]"  from the  hospital courtesy phone located to the right after you enter the hospital lobby.      INSTRUCTIONS IMPORTANT!!!  ¨ Do not eat or drink after 12 midnight-including water. OK to brush teeth, no   gum, candy or mints!    ¨ Take only these medicines with a small swallow of water-morning of surgery.  Take only med's checked on MED LIST        _x___  Return to Hospital Lab on Friday Nov. 20, 2020 at 9:00 am__for Covid test.    _x___  Prep instructions:   SHOWER     _x___  Please shower using Hibiclens soap the night before AND  the morning of your surgery/procedure. Do not use Hibiclens on your face or genitals       __x__  No shaving of procedural area at least 4-5 days before surgery due to increased risk of skin irritation and/or possible infection.  __x__  Do not wear makeup, including mascara. WEARING EYE MAKEUP MAY LEAD TO SERIOUS EYE INJURY during surgery.  __x__  No powder, lotions or creams to your body.  __x__  You may wear only deodorant on the day of surgery.  __x__  Please remove all jewelry, including piercings and leave at home.  __x__  No money or valuables needed. Please leave at home.  You may bring your cell phone.    __x__  If going home the same day, arrange for a ride home. You will not be able to   drive if Anesthesia was used.  __x__  Wear loose fitting clothing. Allow for dressings, bandages.  _x_  Stop Aspirin, Ibuprofen, Motrin and Aleve at least -5 days before surgery, unless otherwise instructed by your doctor, or the nurse.       x       You MAY use Tylenol/acetaminophen until day of surgery.  __x__  Call MD for temperature above 101 degrees.        " _x___ Stop taking any Fish Oil supplement or  Vitamin  E at least 5 days prior to surgery.              I have read or had read and explained to me, and understand the above information.  Additional comments or instructions:Please call   [phone] if you have any questions regarding the instructions above.

## 2020-11-18 NOTE — ANESTHESIA PREPROCEDURE EVALUATION
"                                                                                                             11/18/2020  Rachel Conroy is a 65 y.o., female scheduled for ARTHROPLASTY, KNEE, TOTAL-STEFANIE (Right )  On 11/23/2020.    NPO >8h  METS >4    Vitals:    11/20/20 0710 11/23/20 0855   BP:  136/69   BP Location:  Right arm   Patient Position:  Lying   Pulse:  91   Resp:  20   Temp:  37 °C (98.6 °F)   TempSrc:  Oral   SpO2:  100%   Weight: 87.5 kg (192 lb 14.4 oz) 87.1 kg (192 lb)   Height:  5' 7" (1.702 m)         Patient Active Problem List   Diagnosis    Osteoarthritis of right knee    Migraine headache    Nail fungal infection    Hyperlipidemia    HTN (hypertension)    Muscle pain, fibromyalgia    Fatigue    Unspecified vitamin D deficiency    IBS (irritable bowel syndrome)    Arthralgia of both knees    Chronic pain of multiple joints    BRBPR (bright red blood per rectum)    Nodule of colon    Cecum mass    Palpitations    Sleep apnea       Review of patient's allergies indicates:   Allergen Reactions    Tramadol Itching       No current facility-administered medications on file prior to encounter.      Current Outpatient Medications on File Prior to Encounter   Medication Sig Dispense Refill    amLODIPine (NORVASC) 10 MG tablet Take 1 tablet (10 mg total) by mouth once daily. 90 tablet 2    chlorthalidone (HYGROTEN) 25 MG Tab Take 1 tablet (25 mg total) by mouth once daily. 30 tablet 5    clobetasoL (CLOBEX) 0.05 % shampoo Apply topically 2 (two) times daily. 1 Bottle 1    esomeprazole (NEXIUM) 40 MG capsule Take 1 capsule (40 mg total) by mouth before breakfast. 90 capsule 0    FLUoxetine 10 MG capsule Take 1 capsule (10 mg total) by mouth once daily. 30 capsule 11    fluticasone propionate (FLONASE) 50 mcg/actuation nasal spray 2 sprays (100 mcg total) by Each Nostril route once daily. 15.8 mL 0    gabapentin (NEURONTIN) 100 MG capsule Take 3 capsules (300 mg total) by " mouth every evening. (Patient taking differently: Take 300 mg by mouth nightly as needed. ) 90 capsule 0    hyoscyamine (LEVSIN/SL) 0.125 mg Subl DISSOLVE 1 TABLET UNDER THE TONGUE 3 TIMES A DAY BEFORE MEALS 270 tablet 1    olmesartan (BENICAR) 40 MG tablet TAKE 1 TABLET BY MOUTH ONCE DAILY 90 tablet 3    sumatriptan (IMITREX) 100 MG tablet Take 1 tablet (100 mg total) by mouth every 2 (two) hours as needed for Migraine. 9 tablet 3    vitamin D (VITAMIN D3) 1000 units Tab Take 1 tablet (1,000 Units total) by mouth once daily. 30 tablet 0       Past Surgical History:   Procedure Laterality Date    APPENDECTOMY  2018    COLONOSCOPY N/A 11/9/2017    Procedure: COLONOSCOPY;  Surgeon: Mariaelena Harrell MD;  Location: [facility] ENDO (4TH FLR);  Service: Endoscopy;  Laterality: N/A;    COLONOSCOPY N/A 2/26/2018    Procedure: COLONOSCOPY;  Surgeon: Nicola Zhu MD;  Location: [facility] ENDO (2ND FLR);  Service: Endoscopy;  Laterality: N/A;    HYSTERECTOMY         Social History     Socioeconomic History    Marital status:      Spouse name: Not on file    Number of children: Not on file    Years of education: Not on file    Highest education level: Not on file   Occupational History    Occupation: [employer]     Employer: [employer]    Social Needs    Financial resource strain: Somewhat hard    Food insecurity     Worry: Never true     Inability: Never true    Transportation needs     Medical: No     Non-medical: No   Tobacco Use    Smoking status: Never Smoker    Smokeless tobacco: Never Used   Substance and Sexual Activity    Alcohol use: No     Frequency: Never     Drinks per session: Patient refused     Binge frequency: Never    Drug use: No    Sexual activity: Not Currently     Partners: Male   Lifestyle    Physical activity     Days per week: 2 days     Minutes per session: 30 min    Stress: Rather much   Relationships    Social connections     Talks on phone: Three times a week     Gets  together: Twice a week     Attends L[employer]-day service: Not on file     Active member of club or organization: Yes     Attends meetings of clubs or organizations: 1 to 4 times per year     Relationship status:    Other Topics Concern    Are you pregnant or think you may be? No    Breast-feeding No   Social History Narrative    Not on file       Lab Results   Component Value Date    WBC 5.75 11/18/2020    HGB 14.0 11/18/2020    HCT 42.5 11/18/2020    MCV 88 11/18/2020     11/18/2020       CMP  Sodium   Date Value Ref Range Status   11/17/2020 139 136 - 145 mmol/L Final     Potassium   Date Value Ref Range Status   11/17/2020 3.8 3.5 - 5.1 mmol/L Final     Chloride   Date Value Ref Range Status   11/17/2020 102 95 - 110 mmol/L Final     CO2   Date Value Ref Range Status   11/17/2020 29 23 - 29 mmol/L Final     Glucose   Date Value Ref Range Status   11/17/2020 85 70 - 110 mg/dL Final     BUN   Date Value Ref Range Status   11/17/2020 13 8 - 23 mg/dL Final     Creatinine   Date Value Ref Range Status   11/17/2020 0.6 0.5 - 1.4 mg/dL Final     Calcium   Date Value Ref Range Status   11/17/2020 9.3 8.7 - 10.5 mg/dL Final     Total Protein   Date Value Ref Range Status   11/17/2020 7.3 6.0 - 8.4 g/dL Final     Albumin   Date Value Ref Range Status   11/17/2020 4.1 3.5 - 5.2 g/dL Final     Total Bilirubin   Date Value Ref Range Status   11/17/2020 0.3 0.1 - 1.0 mg/dL Final     Comment:     For infants and newborns, interpretation of results should be based  on gestational age, weight and in agreement with clinical  observations.  Premature Infant recommended reference ranges:  Up to 24 hours.............<8.0 mg/dL  Up to 48 hours............<12.0 mg/dL  3-5 days..................<15.0 mg/dL  6-29 days.................<15.0 mg/dL       Alkaline Phosphatase   Date Value Ref Range Status   11/17/2020 121 55 - 135 U/L Final     AST   Date Value Ref Range Status   11/17/2020 54 (H) 10 - 40 U/L Final     ALT    Date Value Ref Range Status   11/17/2020 98 (H) 10 - 44 U/L Final     Anion Gap   Date Value Ref Range Status   11/17/2020 8 8 - 16 mmol/L Final     eGFR if    Date Value Ref Range Status   11/17/2020 >60 >60 mL/min/1.73 m^2 Final     eGFR if non    Date Value Ref Range Status   11/17/2020 >60 >60 mL/min/1.73 m^2 Final     Comment:     Calculation used to obtain the estimated glomerular filtration  rate (eGFR) is the CKD-EPI equation.              Diagnostic Studies:      EKG:  Vent. Rate : 084 BPM     Atrial Rate : 084 BPM      P-R Int : 148 ms          QRS Dur : 086 ms       QT Int : 378 ms       P-R-T Axes : 032 010 043 degrees      QTc Int : 446 ms     Normal sinus rhythm   Normal ECG   When compared with ECG of 17-OCT-2019 09:47,   No significant change was found   Confirmed by Romie PRUITT, Jess MCALLISTER ([id number]) on 11/19/2020 8:40:00 PM    2D Echo:  No results found for this or any previous visit.  '    Anesthesia Evaluation    I have reviewed the Patient Summary Reports.    I have reviewed the Nursing Notes. I have reviewed the NPO Status.   I have reviewed the Medications.     Review of Systems  Anesthesia Hx:  No problems with previous Anesthesia  History of prior surgery of interest to airway management or planning: Denies Family Hx of Anesthesia complications.   Denies Personal Hx of Anesthesia complications.   Social:  Non-Smoker, No Alcohol Use    Hematology/Oncology:  Hematology Normal   Oncology Normal     EENT/Dental:EENT/Dental Normal   Cardiovascular:   Exercise tolerance: good Hypertension ECG has been reviewed.  Functional Capacity good / => 4 METS    Pulmonary:   Sleep Apnea, CPAP    Renal/:  Renal/ Normal     Hepatic/GI:   GERD    Musculoskeletal:   Arthritis  R knee   Neurological:   Neuromuscular Disease, Headaches    Endocrine:  Endocrine Normal    Dermatological:  Skin Normal    Psych:  Psychiatric Normal           Physical Exam  General:  Obesity   "  Airway/Jaw/Neck:  Airway Findings: Mouth Opening: Normal Tongue: Normal  Mallampati: III  TM Distance: 4 - 6 cm        Eyes/Ears/Nose:  EYES/EARS/NOSE FINDINGS: Normal   Dental:  Dental Findings: In tact   Chest/Lungs:  Chest/Lungs Clear    Heart/Vascular:  Heart Findings: Normal       Mental Status:  Mental Status Findings: Normal        Anesthesia Plan  Type of Anesthesia, risks & benefits discussed:  Anesthesia Type:  spinal, general  Patient's Preference:   Intra-op Monitoring Plan: standard ASA monitors  Intra-op Monitoring Plan Comments:   Post Op Pain Control Plan: multimodal analgesia, IV/PO Opioids PRN and per primary service following discharge from PACU  Post Op Pain Control Plan Comments:   Induction:   IV  Beta Blocker:  Patient is not currently on a Beta-Blocker (No further documentation required).       Informed Consent: Patient understands risks and agrees with Anesthesia plan.  Questions answered. Anesthesia consent signed with patient.  ASA Score: 2     Day of Surgery Review of History & Physical:  There are no significant changes.      Anesthesia Plan Notes:   Seen by PCP on 11/9/2020 he states, "Patient is a category 1 risk on revised cardiac index score- low risk patient for intermediate risk procedure.  May proceed with surgery."           Ready For Surgery From Anesthesia Perspective.       "

## 2020-11-20 ENCOUNTER — HOSPITAL ENCOUNTER (OUTPATIENT)
Dept: PREADMISSION TESTING | Facility: HOSPITAL | Age: 65
Discharge: HOME | End: 2020-11-20
Attending: ORTHOPAEDIC SURGERY
Payer: COMMERCIAL

## 2020-11-20 DIAGNOSIS — Z01.818 PREOP TESTING: ICD-10-CM

## 2020-11-20 LAB — SARS-COV-2 RDRP RESP QL NAA+PROBE: NEGATIVE

## 2020-11-20 PROCEDURE — U0002 COVID-19 LAB TEST NON-CDC: HCPCS

## 2020-11-23 ENCOUNTER — HOSPITAL ENCOUNTER (OUTPATIENT)
Facility: HOSPITAL | Age: 65
Discharge: HOME | End: 2020-11-24
Attending: ORTHOPAEDIC SURGERY | Admitting: ORTHOPAEDIC SURGERY
Payer: COMMERCIAL

## 2020-11-23 ENCOUNTER — ANESTHESIA (OUTPATIENT)
Dept: SURGERY | Facility: HOSPITAL | Age: 65
End: 2020-11-23
Payer: COMMERCIAL

## 2020-11-23 DIAGNOSIS — Z01.818 PREOP TESTING: ICD-10-CM

## 2020-11-23 DIAGNOSIS — M17.11 PRIMARY OSTEOARTHRITIS OF RIGHT KNEE: Primary | ICD-10-CM

## 2020-11-23 LAB
ABO + RH BLD: NORMAL
ANION GAP SERPL CALC-SCNC: 10 MMOL/L (ref 8–16)
BASOPHILS # BLD AUTO: 0.04 K/UL (ref 0–0.2)
BASOPHILS NFR BLD: 0.3 % (ref 0–1.9)
BLD GP AB SCN CELLS X3 SERPL QL: NORMAL
BUN SERPL-MCNC: 10 MG/DL (ref 8–23)
CALCIUM SERPL-MCNC: 8.2 MG/DL (ref 8.7–10.5)
CHLORIDE SERPL-SCNC: 105 MMOL/L (ref 95–110)
CO2 SERPL-SCNC: 26 MMOL/L (ref 23–29)
CREAT SERPL-MCNC: 0.6 MG/DL (ref 0.5–1.4)
DIFFERENTIAL METHOD: ABNORMAL
EOSINOPHIL # BLD AUTO: 0 K/UL (ref 0–0.5)
EOSINOPHIL NFR BLD: 0.1 % (ref 0–8)
ERYTHROCYTE [DISTWIDTH] IN BLOOD BY AUTOMATED COUNT: 12 % (ref 11.5–14.5)
EST. GFR  (AFRICAN AMERICAN): >60 ML/MIN/1.73 M^2
EST. GFR  (NON AFRICAN AMERICAN): >60 ML/MIN/1.73 M^2
GLUCOSE SERPL-MCNC: 141 MG/DL (ref 70–110)
HCT VFR BLD AUTO: 39 % (ref 37–48.5)
HGB BLD-MCNC: 13.1 G/DL (ref 12–16)
IMM GRANULOCYTES # BLD AUTO: 0.09 K/UL (ref 0–0.04)
IMM GRANULOCYTES NFR BLD AUTO: 0.8 % (ref 0–0.5)
LYMPHOCYTES # BLD AUTO: 1.9 K/UL (ref 1–4.8)
LYMPHOCYTES NFR BLD: 16.2 % (ref 18–48)
MCH RBC QN AUTO: 29 PG (ref 27–31)
MCHC RBC AUTO-ENTMCNC: 33.6 G/DL (ref 32–36)
MCV RBC AUTO: 87 FL (ref 82–98)
MONOCYTES # BLD AUTO: 0.2 K/UL (ref 0.3–1)
MONOCYTES NFR BLD: 2 % (ref 4–15)
NEUTROPHILS # BLD AUTO: 9.4 K/UL (ref 1.8–7.7)
NEUTROPHILS NFR BLD: 80.6 % (ref 38–73)
NRBC BLD-RTO: 0 /100 WBC
PLATELET # BLD AUTO: 272 K/UL (ref 150–350)
PMV BLD AUTO: 9.2 FL (ref 9.2–12.9)
POTASSIUM SERPL-SCNC: 4 MMOL/L (ref 3.5–5.1)
RBC # BLD AUTO: 4.51 M/UL (ref 4–5.4)
SODIUM SERPL-SCNC: 141 MMOL/L (ref 136–145)
WBC # BLD AUTO: 11.66 K/UL (ref 3.9–12.7)

## 2020-11-23 PROCEDURE — D9220A PRA ANESTHESIA: Mod: CRNA,,, | Performed by: STUDENT IN AN ORGANIZED HEALTH CARE EDUCATION/TRAINING PROGRAM

## 2020-11-23 PROCEDURE — 25000003 PHARM REV CODE 250: Performed by: ORTHOPAEDIC SURGERY

## 2020-11-23 PROCEDURE — 63600175 PHARM REV CODE 636 W HCPCS: Performed by: STUDENT IN AN ORGANIZED HEALTH CARE EDUCATION/TRAINING PROGRAM

## 2020-11-23 PROCEDURE — 25000003 PHARM REV CODE 250: Performed by: ANESTHESIOLOGY

## 2020-11-23 PROCEDURE — 37000009 HC ANESTHESIA EA ADD 15 MINS: Performed by: ORTHOPAEDIC SURGERY

## 2020-11-23 PROCEDURE — 63600175 PHARM REV CODE 636 W HCPCS: Performed by: ORTHOPAEDIC SURGERY

## 2020-11-23 PROCEDURE — 63600175 PHARM REV CODE 636 W HCPCS: Performed by: ANESTHESIOLOGY

## 2020-11-23 PROCEDURE — 36000711: Performed by: ORTHOPAEDIC SURGERY

## 2020-11-23 PROCEDURE — 25000003 PHARM REV CODE 250: Performed by: STUDENT IN AN ORGANIZED HEALTH CARE EDUCATION/TRAINING PROGRAM

## 2020-11-23 PROCEDURE — 37000008 HC ANESTHESIA 1ST 15 MINUTES: Performed by: ORTHOPAEDIC SURGERY

## 2020-11-23 PROCEDURE — C1713 ANCHOR/SCREW BN/BN,TIS/BN: HCPCS | Performed by: ORTHOPAEDIC SURGERY

## 2020-11-23 PROCEDURE — 71000033 HC RECOVERY, INTIAL HOUR: Performed by: ORTHOPAEDIC SURGERY

## 2020-11-23 PROCEDURE — 85025 COMPLETE CBC W/AUTO DIFF WBC: CPT

## 2020-11-23 PROCEDURE — 86850 RBC ANTIBODY SCREEN: CPT

## 2020-11-23 PROCEDURE — D9220A PRA ANESTHESIA: ICD-10-PCS | Mod: CRNA,,, | Performed by: STUDENT IN AN ORGANIZED HEALTH CARE EDUCATION/TRAINING PROGRAM

## 2020-11-23 PROCEDURE — C1776 JOINT DEVICE (IMPLANTABLE): HCPCS | Performed by: ORTHOPAEDIC SURGERY

## 2020-11-23 PROCEDURE — D9220A PRA ANESTHESIA: ICD-10-PCS | Mod: ANES,,, | Performed by: ANESTHESIOLOGY

## 2020-11-23 PROCEDURE — D9220A PRA ANESTHESIA: Mod: ANES,,, | Performed by: ANESTHESIOLOGY

## 2020-11-23 PROCEDURE — 36415 COLL VENOUS BLD VENIPUNCTURE: CPT

## 2020-11-23 PROCEDURE — 36000710: Performed by: ORTHOPAEDIC SURGERY

## 2020-11-23 PROCEDURE — 27201423 OPTIME MED/SURG SUP & DEVICES STERILE SUPPLY: Performed by: ORTHOPAEDIC SURGERY

## 2020-11-23 PROCEDURE — 80048 BASIC METABOLIC PNL TOTAL CA: CPT

## 2020-11-23 PROCEDURE — 71000039 HC RECOVERY, EACH ADD'L HOUR: Performed by: ORTHOPAEDIC SURGERY

## 2020-11-23 DEVICE — FEMORAL CRUC RTN CEM SZ 3 RT: Type: IMPLANTABLE DEVICE | Site: KNEE | Status: FUNCTIONAL

## 2020-11-23 DEVICE — BASEPLATE TRIATHLON TS SZ4: Type: IMPLANTABLE DEVICE | Site: KNEE | Status: FUNCTIONAL

## 2020-11-23 DEVICE — INSERT TIBIAL CS SIZE 4 9MM: Type: IMPLANTABLE DEVICE | Site: KNEE | Status: FUNCTIONAL

## 2020-11-23 DEVICE — PATELLA TRI 29X8 X3 POLYETHYLE: Type: IMPLANTABLE DEVICE | Site: KNEE | Status: FUNCTIONAL

## 2020-11-23 DEVICE — CEMENT BONE SMPLX HV GENTMYCN: Type: IMPLANTABLE DEVICE | Site: KNEE | Status: FUNCTIONAL

## 2020-11-23 RX ORDER — BUPIVACAINE HYDROCHLORIDE 5 MG/ML
INJECTION, SOLUTION PERINEURAL
Status: DISCONTINUED | OUTPATIENT
Start: 2020-11-23 | End: 2020-11-23

## 2020-11-23 RX ORDER — HYDROMORPHONE HYDROCHLORIDE 2 MG/ML
0.2 INJECTION, SOLUTION INTRAMUSCULAR; INTRAVENOUS; SUBCUTANEOUS EVERY 5 MIN PRN
Status: DISCONTINUED | OUTPATIENT
Start: 2020-11-23 | End: 2020-11-24 | Stop reason: HOSPADM

## 2020-11-23 RX ORDER — ROPIVACAINE/EPI/CLONIDINE/KET 2.46-0.005
SYRINGE (ML) INJECTION ONCE
Status: COMPLETED | OUTPATIENT
Start: 2020-11-23 | End: 2020-11-23

## 2020-11-23 RX ORDER — SODIUM CHLORIDE 0.9 % (FLUSH) 0.9 %
10 SYRINGE (ML) INJECTION
Status: DISCONTINUED | OUTPATIENT
Start: 2020-11-23 | End: 2020-11-23 | Stop reason: HOSPADM

## 2020-11-23 RX ORDER — MIDAZOLAM HYDROCHLORIDE 1 MG/ML
INJECTION INTRAMUSCULAR; INTRAVENOUS
Status: DISCONTINUED | OUTPATIENT
Start: 2020-11-23 | End: 2020-11-23

## 2020-11-23 RX ORDER — ONDANSETRON 2 MG/ML
4 INJECTION INTRAMUSCULAR; INTRAVENOUS EVERY 12 HOURS PRN
Status: DISCONTINUED | OUTPATIENT
Start: 2020-11-23 | End: 2020-11-24 | Stop reason: HOSPADM

## 2020-11-23 RX ORDER — FAMOTIDINE 20 MG/1
20 TABLET, FILM COATED ORAL 2 TIMES DAILY
Status: DISCONTINUED | OUTPATIENT
Start: 2020-11-23 | End: 2020-11-24 | Stop reason: HOSPADM

## 2020-11-23 RX ORDER — ACETAMINOPHEN 10 MG/ML
1000 INJECTION, SOLUTION INTRAVENOUS ONCE
Status: DISCONTINUED | OUTPATIENT
Start: 2020-11-23 | End: 2020-11-23

## 2020-11-23 RX ORDER — SODIUM CHLORIDE, SODIUM LACTATE, POTASSIUM CHLORIDE, CALCIUM CHLORIDE 600; 310; 30; 20 MG/100ML; MG/100ML; MG/100ML; MG/100ML
INJECTION, SOLUTION INTRAVENOUS CONTINUOUS
Status: DISCONTINUED | OUTPATIENT
Start: 2020-11-23 | End: 2020-11-23

## 2020-11-23 RX ORDER — SODIUM CHLORIDE 0.9 % (FLUSH) 0.9 %
10 SYRINGE (ML) INJECTION
Status: DISCONTINUED | OUTPATIENT
Start: 2020-11-23 | End: 2020-11-24 | Stop reason: HOSPADM

## 2020-11-23 RX ORDER — POLYETHYLENE GLYCOL 3350 17 G/17G
17 POWDER, FOR SOLUTION ORAL DAILY
Status: DISCONTINUED | OUTPATIENT
Start: 2020-11-23 | End: 2020-11-24 | Stop reason: HOSPADM

## 2020-11-23 RX ORDER — CHLORTHALIDONE 25 MG/1
25 TABLET ORAL DAILY
Status: DISCONTINUED | OUTPATIENT
Start: 2020-11-23 | End: 2020-11-24 | Stop reason: HOSPADM

## 2020-11-23 RX ORDER — PROPOFOL 10 MG/ML
VIAL (ML) INTRAVENOUS
Status: DISCONTINUED | OUTPATIENT
Start: 2020-11-23 | End: 2020-11-23

## 2020-11-23 RX ORDER — PHENYLEPHRINE HYDROCHLORIDE 10 MG/ML
INJECTION INTRAVENOUS
Status: DISCONTINUED | OUTPATIENT
Start: 2020-11-23 | End: 2020-11-23

## 2020-11-23 RX ORDER — LIDOCAINE HYDROCHLORIDE 20 MG/ML
INJECTION INTRAVENOUS
Status: DISCONTINUED | OUTPATIENT
Start: 2020-11-23 | End: 2020-11-23

## 2020-11-23 RX ORDER — ASPIRIN 81 MG/1
81 TABLET ORAL 2 TIMES DAILY WITH MEALS
Qty: 58 TABLET | Refills: 0 | Status: SHIPPED | OUTPATIENT
Start: 2020-11-23 | End: 2021-07-28

## 2020-11-23 RX ORDER — CEFAZOLIN SODIUM 2 G/50ML
2 SOLUTION INTRAVENOUS
Status: COMPLETED | OUTPATIENT
Start: 2020-11-23 | End: 2020-11-24

## 2020-11-23 RX ORDER — TALC
6 POWDER (GRAM) TOPICAL NIGHTLY PRN
Status: DISCONTINUED | OUTPATIENT
Start: 2020-11-23 | End: 2020-11-24 | Stop reason: HOSPADM

## 2020-11-23 RX ORDER — SODIUM CHLORIDE 0.9 G/100ML
IRRIGANT IRRIGATION
Status: DISCONTINUED | OUTPATIENT
Start: 2020-11-23 | End: 2020-11-23 | Stop reason: HOSPADM

## 2020-11-23 RX ORDER — AMLODIPINE BESYLATE 5 MG/1
10 TABLET ORAL DAILY
Status: DISCONTINUED | OUTPATIENT
Start: 2020-11-23 | End: 2020-11-24 | Stop reason: HOSPADM

## 2020-11-23 RX ORDER — OXYCODONE HYDROCHLORIDE 5 MG/1
5 TABLET ORAL EVERY 4 HOURS PRN
Status: DISCONTINUED | OUTPATIENT
Start: 2020-11-23 | End: 2020-11-24 | Stop reason: HOSPADM

## 2020-11-23 RX ORDER — TRANEXAMIC ACID 100 MG/ML
1000 INJECTION, SOLUTION INTRAVENOUS
Status: DISCONTINUED | OUTPATIENT
Start: 2020-11-23 | End: 2020-11-23

## 2020-11-23 RX ORDER — CELECOXIB 100 MG/1
200 CAPSULE ORAL 2 TIMES DAILY
Status: DISCONTINUED | OUTPATIENT
Start: 2020-11-23 | End: 2020-11-24 | Stop reason: HOSPADM

## 2020-11-23 RX ORDER — DOCUSATE SODIUM 100 MG/1
100 CAPSULE, LIQUID FILLED ORAL 2 TIMES DAILY
Status: DISCONTINUED | OUTPATIENT
Start: 2020-11-23 | End: 2020-11-24 | Stop reason: HOSPADM

## 2020-11-23 RX ORDER — GABAPENTIN 300 MG/1
300 CAPSULE ORAL NIGHTLY PRN
Status: DISCONTINUED | OUTPATIENT
Start: 2020-11-23 | End: 2020-11-24 | Stop reason: HOSPADM

## 2020-11-23 RX ORDER — FENTANYL CITRATE 50 UG/ML
INJECTION, SOLUTION INTRAMUSCULAR; INTRAVENOUS
Status: DISCONTINUED | OUTPATIENT
Start: 2020-11-23 | End: 2020-11-23

## 2020-11-23 RX ORDER — FENTANYL CITRATE 50 UG/ML
25 INJECTION, SOLUTION INTRAMUSCULAR; INTRAVENOUS EVERY 5 MIN PRN
Status: DISCONTINUED | OUTPATIENT
Start: 2020-11-23 | End: 2020-11-23 | Stop reason: HOSPADM

## 2020-11-23 RX ORDER — SODIUM CHLORIDE, SODIUM LACTATE, POTASSIUM CHLORIDE, CALCIUM CHLORIDE 600; 310; 30; 20 MG/100ML; MG/100ML; MG/100ML; MG/100ML
INJECTION, SOLUTION INTRAVENOUS CONTINUOUS
Status: ACTIVE | OUTPATIENT
Start: 2020-11-23 | End: 2020-11-23

## 2020-11-23 RX ORDER — CEFADROXIL 500 MG/1
500 CAPSULE ORAL EVERY 12 HOURS
Qty: 14 CAPSULE | Refills: 0 | Status: SHIPPED | OUTPATIENT
Start: 2020-11-23 | End: 2021-06-07

## 2020-11-23 RX ORDER — OLMESARTAN MEDOXOMIL 20 MG/1
40 TABLET ORAL DAILY
Status: DISCONTINUED | OUTPATIENT
Start: 2020-11-23 | End: 2020-11-24 | Stop reason: HOSPADM

## 2020-11-23 RX ORDER — SUCCINYLCHOLINE CHLORIDE 20 MG/ML
INJECTION INTRAMUSCULAR; INTRAVENOUS
Status: DISCONTINUED | OUTPATIENT
Start: 2020-11-23 | End: 2020-11-23

## 2020-11-23 RX ORDER — KETOROLAC TROMETHAMINE 30 MG/ML
15 INJECTION, SOLUTION INTRAMUSCULAR; INTRAVENOUS EVERY 8 HOURS PRN
Status: DISCONTINUED | OUTPATIENT
Start: 2020-11-23 | End: 2020-11-23 | Stop reason: HOSPADM

## 2020-11-23 RX ORDER — MUPIROCIN 20 MG/G
OINTMENT TOPICAL 2 TIMES DAILY
Status: DISCONTINUED | OUTPATIENT
Start: 2020-11-23 | End: 2020-11-24 | Stop reason: HOSPADM

## 2020-11-23 RX ORDER — OXYCODONE HYDROCHLORIDE 5 MG/1
10 TABLET ORAL EVERY 4 HOURS PRN
Status: DISCONTINUED | OUTPATIENT
Start: 2020-11-23 | End: 2020-11-24 | Stop reason: HOSPADM

## 2020-11-23 RX ORDER — DEXAMETHASONE SODIUM PHOSPHATE 100 MG/10ML
8 INJECTION INTRAMUSCULAR; INTRAVENOUS
Status: DISCONTINUED | OUTPATIENT
Start: 2020-11-23 | End: 2020-11-23

## 2020-11-23 RX ORDER — OXYCODONE AND ACETAMINOPHEN 5; 325 MG/1; MG/1
1 TABLET ORAL EVERY 4 HOURS PRN
Qty: 41 TABLET | Refills: 0 | OUTPATIENT
Start: 2020-11-23 | End: 2021-06-12

## 2020-11-23 RX ORDER — CEFAZOLIN SODIUM 2 G/50ML
2 SOLUTION INTRAVENOUS
Status: DISCONTINUED | OUTPATIENT
Start: 2020-11-23 | End: 2020-11-23

## 2020-11-23 RX ORDER — ONDANSETRON 2 MG/ML
INJECTION INTRAMUSCULAR; INTRAVENOUS
Status: DISCONTINUED | OUTPATIENT
Start: 2020-11-23 | End: 2020-11-23

## 2020-11-23 RX ORDER — ACETAMINOPHEN 10 MG/ML
1000 INJECTION, SOLUTION INTRAVENOUS ONCE
Status: COMPLETED | OUTPATIENT
Start: 2020-11-23 | End: 2020-11-23

## 2020-11-23 RX ORDER — FLUOXETINE 10 MG/1
10 CAPSULE ORAL DAILY
Status: DISCONTINUED | OUTPATIENT
Start: 2020-11-23 | End: 2020-11-24 | Stop reason: HOSPADM

## 2020-11-23 RX ADMIN — ONDANSETRON 4 MG: 2 INJECTION, SOLUTION INTRAMUSCULAR; INTRAVENOUS at 12:11

## 2020-11-23 RX ADMIN — PHENYLEPHRINE HYDROCHLORIDE 100 MCG: 10 INJECTION INTRAVENOUS at 11:11

## 2020-11-23 RX ADMIN — SODIUM CHLORIDE, SODIUM LACTATE, POTASSIUM CHLORIDE, AND CALCIUM CHLORIDE: .6; .31; .03; .02 INJECTION, SOLUTION INTRAVENOUS at 10:11

## 2020-11-23 RX ADMIN — PHENYLEPHRINE HYDROCHLORIDE 200 MCG: 10 INJECTION INTRAVENOUS at 11:11

## 2020-11-23 RX ADMIN — FENTANYL CITRATE 25 MCG: 50 INJECTION, SOLUTION INTRAMUSCULAR; INTRAVENOUS at 01:11

## 2020-11-23 RX ADMIN — HYDROMORPHONE HYDROCHLORIDE 0.2 MG: 2 INJECTION INTRAMUSCULAR; INTRAVENOUS; SUBCUTANEOUS at 01:11

## 2020-11-23 RX ADMIN — BUPIVACAINE HYDROCHLORIDE 2.5 ML: 5 INJECTION, SOLUTION PERINEURAL at 10:11

## 2020-11-23 RX ADMIN — PHENYLEPHRINE HYDROCHLORIDE 100 MCG: 10 INJECTION INTRAVENOUS at 12:11

## 2020-11-23 RX ADMIN — SUCCINYLCHOLINE CHLORIDE 120 MG: 20 INJECTION, SOLUTION INTRAMUSCULAR; INTRAVENOUS at 11:11

## 2020-11-23 RX ADMIN — OXYCODONE 5 MG: 5 TABLET ORAL at 06:11

## 2020-11-23 RX ADMIN — PROPOFOL 20 MG: 10 INJECTION, EMULSION INTRAVENOUS at 12:11

## 2020-11-23 RX ADMIN — DEXAMETHASONE SODIUM PHOSPHATE 8 MG: 10 INJECTION INTRAMUSCULAR; INTRAVENOUS at 11:11

## 2020-11-23 RX ADMIN — FAMOTIDINE 20 MG: 20 TABLET, FILM COATED ORAL at 09:11

## 2020-11-23 RX ADMIN — MIDAZOLAM HYDROCHLORIDE 2 MG: 1 INJECTION, SOLUTION INTRAMUSCULAR; INTRAVENOUS at 10:11

## 2020-11-23 RX ADMIN — SODIUM CHLORIDE, SODIUM LACTATE, POTASSIUM CHLORIDE, AND CALCIUM CHLORIDE: .6; .31; .03; .02 INJECTION, SOLUTION INTRAVENOUS at 03:11

## 2020-11-23 RX ADMIN — HYDROMORPHONE HYDROCHLORIDE 0.2 MG: 2 INJECTION INTRAMUSCULAR; INTRAVENOUS; SUBCUTANEOUS at 02:11

## 2020-11-23 RX ADMIN — PHENYLEPHRINE HYDROCHLORIDE 200 MCG: 10 INJECTION INTRAVENOUS at 12:11

## 2020-11-23 RX ADMIN — CHLORTHALIDONE 25 MG: 25 TABLET ORAL at 03:11

## 2020-11-23 RX ADMIN — PROPOFOL 150 MG: 10 INJECTION, EMULSION INTRAVENOUS at 10:11

## 2020-11-23 RX ADMIN — PHENYLEPHRINE HYDROCHLORIDE 50 MCG: 10 INJECTION INTRAVENOUS at 12:11

## 2020-11-23 RX ADMIN — MUPIROCIN: 20 OINTMENT TOPICAL at 09:11

## 2020-11-23 RX ADMIN — ACETAMINOPHEN 1000 MG: 10 INJECTION, SOLUTION INTRAVENOUS at 01:11

## 2020-11-23 RX ADMIN — Medication: at 12:11

## 2020-11-23 RX ADMIN — CELECOXIB 200 MG: 100 CAPSULE ORAL at 09:11

## 2020-11-23 RX ADMIN — Medication 100 MG: at 10:11

## 2020-11-23 RX ADMIN — TRANEXAMIC ACID 1000 MG: 100 INJECTION, SOLUTION INTRAVENOUS at 11:11

## 2020-11-23 RX ADMIN — SUCCINYLCHOLINE CHLORIDE 120 MG: 20 INJECTION, SOLUTION INTRAMUSCULAR; INTRAVENOUS at 10:11

## 2020-11-23 RX ADMIN — FENTANYL CITRATE 100 MCG: 50 INJECTION INTRAMUSCULAR; INTRAVENOUS at 10:11

## 2020-11-23 RX ADMIN — FLUOXETINE 10 MG: 10 CAPSULE ORAL at 03:11

## 2020-11-23 RX ADMIN — CEFAZOLIN SODIUM 2 G: 2 SOLUTION INTRAVENOUS at 06:11

## 2020-11-23 RX ADMIN — TRANEXAMIC ACID 1000 MG: 100 INJECTION, SOLUTION INTRAVENOUS at 12:11

## 2020-11-23 RX ADMIN — DOCUSATE SODIUM 100 MG: 100 CAPSULE ORAL at 09:11

## 2020-11-23 RX ADMIN — CEFAZOLIN SODIUM 2 G: 2 SOLUTION INTRAVENOUS at 10:11

## 2020-11-23 NOTE — PLAN OF CARE
Pt AAOx4, VSS on 2L NC, voids per bedpan, Diet regular, Scheduled meds administered, pain management reviewed with pt & daughter, daughter at bedside, bed locked on lowest position, call light within reach, bed alarm on, Remains free of fall. Polar ice to RLE, dressing clean dry intact.    Problem: Adult Inpatient Plan of Care  Goal: Plan of Care Review  Outcome: Ongoing, Progressing  Flowsheets (Taken 11/23/2020 1728)  Plan of Care Reviewed With: patient  Goal: Patient-Specific Goal (Individualization)  Outcome: Ongoing, Progressing  Goal: Absence of Hospital-Acquired Illness or Injury  Outcome: Ongoing, Progressing  Goal: Optimal Comfort and Wellbeing  Outcome: Ongoing, Progressing  Goal: Readiness for Transition of Care  Outcome: Ongoing, Progressing  Goal: Rounds/Family Conference  Outcome: Ongoing, Progressing     Problem: Fall Injury Risk  Goal: Absence of Fall and Fall-Related Injury  Outcome: Ongoing, Progressing  Intervention: Identify and Manage Contributors to Fall Injury Risk  Flowsheets (Taken 11/23/2020 1728)  Self-Care Promotion:   independence encouraged   BADL personal objects within reach   BADL personal routines maintained  Medication Review/Management: medications reviewed  Intervention: Promote Injury-Free Environment  Flowsheets (Taken 11/23/2020 1728)  Safety Promotion/Fall Prevention:   assistive device/personal item within reach   bed alarm set   instructed to call staff for mobility   side rails raised x 3   nonskid shoes/socks when out of bed   medications reviewed   Fall Risk reviewed with patient/family  Environmental Safety Modification:   assistive device/personal items within reach   clutter free environment maintained

## 2020-11-23 NOTE — OP NOTE
DATE OF PROCEDURE: 11/23/2020    PREOPERATIVE DIAGNOSIS: Osteoarthritis right knee.     POSTOPERATIVE DIAGNOSIS: Osteoarthritis right knee.     PROCEDURE: 1) Right total knee replacement (CPT #47423)   2) Computer assisted surgical navigation (CPT #60991)    SURGEON: Gerardo Stein M.D.     ASSISTANT: Kymberly Polanco CFA      ANESTHESIA: General     ESTIMATED BLOOD LOSS: 100 mL.  IVF: 1500ml     Complications: None    Specimen: Bone and soft tissue    INDICATIONS: The patient is a 65 y.o. female with a longstanding history of right knee pain. Radiographs revealed advanced arthritis of the right knee.  She had failed extensive conservative treatment at this point. Treatment options were discussed and the patient elected to proceed with total knee replacement with computer navigation/robotic assistance. Risks and complications were discussed including, but not limited to the risks of anesthetic complications, infections, wound healing complications, DVT, pulmonary embolism, death, continued pain, stiffness and need for further surgery among others and she elected to proceed. Perioperative medical risks were also discussed.     COMPONENTS USED:   Implant Name Type Inv. Item Serial No.  Lot No. LRB No. Used Action   CEMENT BONE SMPLX HV GENTMYCN - [id number]  CEMENT BONE SMPLX HV GENTMYCN  [employer] JUAN M. [id number] Right 2 Implanted   PIN BONE [id number] - [id number]  PIN BONE [id number]  [employer] JUAN M. [id number] Right 1 Implanted and Explanted   PIN BONE 4 X 140MM STERILE - [id number]  PIN BONE 4 X 140MM STERILE  STEFANIE [employer] [id number] Right 1 Implanted   TRIATHLON FEMORAL PREP KIT SIZE 3    TYLOR [id number] Right 1 Implanted   TRIATHLON CR TIBIAL PREP KIT SIZE 4    TYLOR [id number] Right 1 Implanted   FEMORAL CRUC RTN CLARA SZ 3 RT - [id number]  FEMORAL CRUC RTN CLARA SZ 3 RT  TYLOR [employer] JUAN M. [id number] Right 1 Implanted   BASEPLATE TRIATHLON TS SZ4 - [id number]  BASEPLATE TRIATHLON  TS SZ4  [employer]. [id number] Right 1 Implanted   INSERT TIBIAL CS SIZE 4 9MM - [id number]  INSERT TIBIAL CS SIZE 4 9MM  [employer] JUAN M. [id number] Right 1 Implanted   PATELLA TRI 29X8 X3 POLYETHYLE - [id number]  PATELLA TRI 29X8 X3 POLYETHYLE  [employer] JUAN M. [id number] Right 1 Implanted     DESCRIPTION OF PROCEDURE: The patient was taken to the Operating Room where general anesthesia was administered by the Anesthesia Department. She was then placed in the supine position. All superficial neurovascular structures were well padded. The right lower extremity was then sterilely prepped and draped in the normal fashion.     Under tourniquet control, a 20 cm longitudinal incision was made over the anterior aspect of the knee.  Subcutaneous tissue was sharply dissected down to the deep fascia, which was incised along the line of the incision.  A standard medial parapatellar arthrotomy was made.  The proximal medial tibial plateau was then subperiosteally exposed protecting the medial collateral ligament.  A portion of the infrapatellar fat pad was excised.  The patella was everted and the knee was flexed to 90 degrees.    The patellar cutting guide was then used to remove the articular surface of the patellar surface to a final thickness of 13 mm.  This was sized to a size 29. Drill holes were placed.    The tibial and femoral check points were then placed in the standard fashion. The tracking arrays were then placed by placing 2 parallel Schanz pins in both the tibia and the femur. The tibial pins were placed anteromedially approximately 5 cm distal to the tibial tubercle. The femoral pins were placed directly anterior approximately 5 cm proximal to the superior pole of the patella.     After confirming the check points, the hip was rotated to assess the center of rotation of the hip.     The femoral and tibial surfaces were then mapped in the standard fashion for STEFANIE total knee replacemen. Once surface mapping was  complete, the knee was placed infull extension while applying gentle varus and valgus stress followed by flexion at 90 degrees using graduated spoons to assess soft tissue tightness and the flexion-extension tension gaps were equalized by adjusting the component positions.     Once this was completed, the CromoUp unit was placed and secured and check points on the robotic arm were confirmed. The femoral preparation using the saw was first performed in the standard fashion by cutting the distal, posterior champher, anterior, and anterior champher cuts and once the femoral preparation was complete, the tibial cut was performed protecting the patellar tendon and patellar tendon.  The ACL was cut and the PCL retained.  All bone fragments were removed and the medial and lateral menisci were removed. Once completion of both femoral and tibial preparation was done, the tibial baseplate trial was placed, impacting the keel into place.The femoral trial was then impacted as well. The 9 mm trial implant was then placed. The knee was placed through a range of motion and tension was checked throughout full range of motion was noted to be satisfactory.     At this time, all trial components were removed. The tibial, femoral, and patellar surfaces were thoroughly irrigated in a pulse lavage fashion and dried, and two batches of cement were mixed. The tibial component was impacted in position and held in place as any excess cement was removed using cement removal tools. The femoral and patellar components were then cemented in position, impacted, any excess cement was removed using the cement removal tools. The trial tibial liner was placed while the cement polymerized.  Any residual soft tissue impingement or soft tissue debris was removed and the final tibial polyethylene was then locked into position.     The wound was then thoroughly irrigated. The check points were removed along with the Schanz pins for the tibial and femoral  arrays.    The capsule and parapatellar retinaculum were then closed with interrupted figure-of-eight sutures of #1 Vicryl then oversewn with stratafix, subcutaneous tissue was closed with interrupted inverted stitches of  Vicryl. ZULEYMA solution was used in the surrounding soft tissues including the posterior capsule.Skin was approximated using skin staples. Sterile dressing was applied and the patient was returned to the Postanesthesia Care Unit in stable condition.

## 2020-11-23 NOTE — ANESTHESIA PROCEDURE NOTES
Intubation  Performed by: Keri Han MD  Authorized by: Keri Han MD     Intubation:     Induction:  Intravenous    Intubated:  Postinduction    Mask Ventilation:  Very difficult with oral airway    Attempts:  3    Attempted By:  CRNA    Method of Intubation:  Direct    Blade:  Floyd 2    Laryngeal View Grade: Grade IV - neither epiglottis nor glottis seen      Attempted By (2nd Attempt):  Staff anesthesiologist    Method of Intubation (2nd Attempt):  Video laryngoscopy    Blade (2nd Attempt):  Glidescope 3    Laryngeal View Grade (2nd Attempt): Grade IIa - cords partially seen      Attempted By (3rd Attempt):  CRNA    Method of Intubation (3rd Attempt):  Video laryngoscopy    Blade (3rd Attempt):  Glidescope 3    Laryngeal View Grade (3rd Attempt): Grade IIa - cords partially seen      Difficult Airway Encountered?: Yes      Complications:  Soft tissue trauma and laryngospasm    Airway Device:  Oral endotracheal tube    Airway Device Size:  7.0    Style/Cuff Inflation:  Cuffed    Inflation Amount (mL):  6    Tube secured:  21    Secured at:  The teeth    Placement Verified By:  Capnometry and Revisualization with laryngoscopy    Complicating Factors:  Anterior larynx, small mouth, bleeding in oropharynx and oropharyngeal edema or fat    Findings Post-Intubation:  BS equal bilateral and atraumatic/condition of teeth unchanged  Notes:      Mask ventilation very difficult after initial intubation attempt and ventilated with LMA #4 in between intubation 2nd and 3rd attempts. Final success with intubation made with C-MAC D blade and rigid stylet.

## 2020-11-23 NOTE — ANESTHESIA POSTPROCEDURE EVALUATION
Anesthesia Post Evaluation    Patient: Rachel Conroy    Procedure(s) Performed: Procedure(s) (LRB):  ARTHROPLASTY, KNEE, TOTAL-STEFANIE (Right)    Final Anesthesia Type: general    Patient location during evaluation: PACU  Patient participation: Yes- Able to Participate  Level of consciousness: awake and alert and oriented  Post-procedure vital signs: reviewed and stable  Pain management: adequate  Airway patency: patent    PONV status at discharge: No PONV  Anesthetic complications: no      Cardiovascular status: blood pressure returned to baseline, hemodynamically stable and stable  Respiratory status: unassisted, spontaneous ventilation and room air  Hydration status: euvolemic  Follow-up not needed.          Vitals Value Taken Time   /53 11/23/20 1501   Temp 36.5 °C (97.7 °F) 11/23/20 1500   Pulse 84 11/23/20 1504   Resp 13 11/23/20 1504   SpO2 99 % 11/23/20 1504   Vitals shown include unvalidated device data.      Event Time   Out of Recovery 15:13:00         Pain/Shahzad Score: Pain Rating Prior to Med Admin: 6 (11/23/2020  2:20 PM)  Pain Rating Post Med Admin: 6 (11/23/2020  3:00 PM)

## 2020-11-23 NOTE — TRANSFER OF CARE
"Anesthesia Transfer of Care Note    Patient: Rachel Conroy    Procedure(s) Performed: Procedure(s) (LRB):  ARTHROPLASTY, KNEE, TOTAL-STEFANIE (Right)    Patient location: PACU    Anesthesia Type: general    Transport from OR: Transported from OR on room air with adequate spontaneous ventilation    Post pain: pain needs to be addressed    Post assessment: no apparent anesthetic complications and tolerated procedure well    Post vital signs: stable    Level of consciousness: awake    Nausea/Vomiting: no nausea/vomiting    Complications: none    Transfer of care protocol was followed      Last vitals:   Visit Vitals  BP (!) 119/57   Pulse 99   Temp 36.5 °C (97.7 °F) (Oral)   Resp 20   Ht 5' 7" (1.702 m)   Wt 87.1 kg (192 lb)   SpO2 98%   Breastfeeding No   BMI 30.07 kg/m²     "

## 2020-11-23 NOTE — ANESTHESIA PROCEDURE NOTES
Spinal    Diagnosis: Osteoarthritis Right knee  Patient location during procedure: pre-op  Start time: 11/23/2020 10:36 AM  Timeout: 11/23/2020 10:36 AM  End time: 11/23/2020 10:46 AM    Staffing  Authorizing Provider: Keri Han MD  Performing Provider: Keri Han MD    Preanesthetic Checklist  Completed: patient identified, site marked, surgical consent, pre-op evaluation, timeout performed, IV checked, risks and benefits discussed and monitors and equipment checked  Spinal Block  Patient position: sitting  Prep: ChloraPrep  Patient monitoring: continuous pulse ox, frequent blood pressure checks, heart rate and cardiac monitor  Approach: midline  Location: L2-3  Injection technique: single shot  CSF Fluid: clear free-flowing CSF  Needle  Needle type: pencil-tip   Needle gauge: 24 G  Needle length: 4 in  Additional Documentation: incremental injection, negative aspiration for heme and no paresthesia on injection  Needle localization: anatomical landmarks  Assessment  Sensory level: L3   Dermatomal levels determined by alcohol wipe and pinch or prick  Ease of block: easy  Patient's tolerance of the procedure: comfortable throughout block and no complaints  Additional Notes  Inadequate block for surgery; needed to convert to general

## 2020-11-23 NOTE — BRIEF OP NOTE
Operative Note       Surgery Date: 11/23/2020     Surgeon(s) and Role:     * Gerardo Stein MD - Primary     * Kymberly Polanco - Assisting    Pre-op Diagnosis:  Primary osteoarthritis of right knee [M17.11]    Post-op Diagnosis:  S/p tka    Procedure(s) (LRB):  ARTHROPLASTY, KNEE, TOTAL-STEFANIE (Right)    Anesthesia: General    Findings/Key Components:  C/w pre op dx    Core Measure Documentation:  Were antibiotics extended? No  Was the patient administered a VTE Prophylaxis? No. Short procedure; low risk  Estimated Blood Loss: 100ml  IVF: 1500ml           Specimens (From admission, onward)    None        Implants:   Implant Name Type Inv. Item Serial No.  Lot No. LRB No. Used Action   CEMENT BONE SMPLX HV GENTMYCN - [id number]  CEMENT BONE SMPLX HV GENTMYCN  TYLOR Bitdel[employer] JUAN M. [id number] Right 2 Implanted   PIN BONE [id number] - [id number]  PIN BONE [id number]  TYLOR Bitdel[employer] JUAN M. [id number] Right 1 Implanted and Explanted   PIN BONE 4 X 140MM STERILE - [id number]  PIN BONE 4 X 140MM STERILE  STEFANIE SURGICAL [id number] Right 1 Implanted   TRIATHLON FEMORAL PREP KIT SIZE 3    TYLOR [id number] Right 1 Implanted   TRIATHLON CR TIBIAL PREP KIT SIZE 4    TYLOR [id number] Right 1 Implanted   FEMORAL CRUC RTN CLARA SZ 3 RT - [id number]  FEMORAL CRUC RTN CLARA SZ 3 RT  TYLOR SALES JUAN M. [id number] Right 1 Implanted   BASEPLATE TRIATHLON TS SZ4 - [id number]  BASEPLATE TRIATHLON TS SZ4  TYLOR [employer] JUAN M. [id number] Right 1 Implanted   INSERT TIBIAL CS SIZE 4 9MM - [id number]  INSERT TIBIAL CS SIZE 4 9MM  TYLOR [employer] JUAN M. [id number] Right 1 Implanted   PATELLA TRI 29X8 X3 POLYETHYLE - [id number]  PATELLA TRI 29X8 X3 POLYETHYLE  TYLOR [employer]ES JUAN M. [id number] Right 1 Implanted       Complications: none           Disposition: PACU - hemodynamically stable.           Condition: Stable    Attestation:  I was present for the entire procedure.

## 2020-11-23 NOTE — INTERVAL H&P NOTE
The patient has been examined and the H&P has been reviewed:    I concur with the findings and no changes have occurred since H&P was written.    Surgery risks, benefits and alternative options discussed and understood by patient/family.          Active Hospital Problems    Diagnosis  POA    Osteoarthritis of right knee [M17.11]  Yes      Resolved Hospital Problems   No resolved problems to display.

## 2020-11-23 NOTE — PLAN OF CARE
[facility]    HOME HEALTH ORDERS  FACE TO FACE ENCOUNTER    Patient Name: Rachel Conroy  YOB: 1955    PCP: Maxwell Gonzalez Jr, MD   PCP Address: [address]  PCP Phone Number: [phone]  PCP Fax: [phone]    Encounter Date: 11/23/2020    Admit to Home Health    Diagnoses:  Active Hospital Problems    Diagnosis  POA    *Osteoarthritis of right knee [M17.11]  Yes      Resolved Hospital Problems   No resolved problems to display.       Future Appointments   Date Time Provider Department Center   11/25/2020  9:40 AM Maxwell Gonzalez Jr., MD [facility]           I have seen and examined this patient face to face today. My clinical findings that support the need for the home health skilled services and home bound status are the following:  Weakness/numbness causing balance and gait disturbance due to Joint Replacement making it taxing to leave home.    Allergies:  Review of patient's allergies indicates:   Allergen Reactions    Tramadol Itching       Diet: regular diet    Activities: activity as tolerated    Nursing:   SN to complete comprehensive assessment including routine vital signs. Instruct on disease process and s/s of complications to report to MD. Follow specific home health arthoplasty protocol. Review/verify medication list sent home with the patient at time of discharge  and instruct patient/caregiver as needed. If coumadin ordered, coumadin clinic to manage INR with INR draws 2x per week with a goal to maintain INR between 1.8 and 2.2. Frequency may be adjusted depending on start of care date.    Notify MD if SBP > 160 or < 90; DBP > 90 or < 50; HR > 120 or < 50; Temp > 101;     Home Medical Equipment:  Walker, 3-1 bedside commode, transfer tub bench    CONSULTS:    Physical Therapy to evaluate and treat. Evaluate for home safety and equipment needs; Establish/upgrade home exercise program. Perform / instruct on therapeutic  exercises, gait training, transfer training, and Range of Motion.    OTHER:  Occupational Therapy to evaluate and treat. Evaluate home environment for safety and equipment needs. Perform/Instruct on transfers, ADL training, ROM, and therapeutic exercises.    MISCELLANEOUS CARE:  Routine Skin for Bedridden Patients: Instruct patient/caregiver to apply moisture barrier cream to all skin folds and wet areas in perineal area daily and after baths and all bowel movements.    WOUND CARE ORDERS  Follow Home Health specific protocol.      Medications: Review discharge medications with patient and family and provide education.      Current Discharge Medication List      START taking these medications    Details   aspirin (ECOTRIN) 81 MG EC tablet Take 1 tablet (81 mg total) by mouth 2 (two) times daily with meals.  Qty: 58 tablet, Refills: 0      cefadroxil (DURICEF) 500 MG Cap Take 1 capsule (500 mg total) by mouth every 12 (twelve) hours.  Qty: 14 capsule, Refills: 0      oxyCODONE-acetaminophen (PERCOCET) 5-325 mg per tablet Take 1 tablet by mouth every 4 (four) hours as needed for Pain.  Qty: 41 tablet, Refills: 0         CONTINUE these medications which have NOT CHANGED    Details   amLODIPine (NORVASC) 10 MG tablet Take 1 tablet (10 mg total) by mouth once daily.  Qty: 90 tablet, Refills: 2    Comments: DX Code Needed  .HTN icd10  I10  Associated Diagnoses: Essential hypertension      chlorthalidone (HYGROTEN) 25 MG Tab Take 1 tablet (25 mg total) by mouth once daily.  Qty: 30 tablet, Refills: 5    Comments: .  Associated Diagnoses: Hypertension, benign      fluticasone propionate (FLONASE) 50 mcg/actuation nasal spray 2 sprays (100 mcg total) by Each Nostril route once daily.  Qty: 15.8 mL, Refills: 0    Associated Diagnoses: Acute effusion of left ear      hyoscyamine (LEVSIN/SL) 0.125 mg Subl DISSOLVE 1 TABLET UNDER THE TONGUE 3 TIMES A DAY BEFORE MEALS  Qty: 270 tablet, Refills: 1    Associated Diagnoses: Irritable  bowel syndrome with diarrhea      olmesartan (BENICAR) 40 MG tablet TAKE 1 TABLET BY MOUTH ONCE DAILY  Qty: 90 tablet, Refills: 3    Associated Diagnoses: Essential hypertension      clobetasoL (CLOBEX) 0.05 % shampoo Apply topically 2 (two) times daily.  Qty: 1 Bottle, Refills: 1    Associated Diagnoses: Dermatitis      esomeprazole (NEXIUM) 40 MG capsule Take 1 capsule (40 mg total) by mouth before breakfast.  Qty: 90 capsule, Refills: 0    Associated Diagnoses: Gastroesophageal reflux disease without esophagitis      FLUoxetine 10 MG capsule Take 1 capsule (10 mg total) by mouth once daily.  Qty: 30 capsule, Refills: 11    Associated Diagnoses: Anxiety; Class 1 obesity due to excess calories with serious comorbidity and body mass index (BMI) of 30.0 to 30.9 in adult      gabapentin (NEURONTIN) 100 MG capsule Take 3 capsules (300 mg total) by mouth every evening.  Qty: 90 capsule, Refills: 0    Associated Diagnoses: Fibromyalgia      sumatriptan (IMITREX) 100 MG tablet Take 1 tablet (100 mg total) by mouth every 2 (two) hours as needed for Migraine.  Qty: 9 tablet, Refills: 3      vitamin D (VITAMIN D3) 1000 units Tab Take 1 tablet (1,000 Units total) by mouth once daily.  Qty: 30 tablet, Refills: 0    Associated Diagnoses: Vitamin D deficiency         STOP taking these medications       ibuprofen (ADVIL,MOTRIN) 200 MG tablet Comments:   Reason for Stopping:         naproxen sodium (ANAPROX) 220 MG tablet Comments:   Reason for Stopping:               I certify that this patient is confined to her home and needs physical therapy and occupational therapy.

## 2020-11-24 VITALS
OXYGEN SATURATION: 95 % | WEIGHT: 192 LBS | HEIGHT: 67 IN | HEART RATE: 96 BPM | TEMPERATURE: 98 F | SYSTOLIC BLOOD PRESSURE: 94 MMHG | DIASTOLIC BLOOD PRESSURE: 56 MMHG | BODY MASS INDEX: 30.13 KG/M2 | RESPIRATION RATE: 16 BRPM

## 2020-11-24 PROCEDURE — 97165 OT EVAL LOW COMPLEX 30 MIN: CPT

## 2020-11-24 PROCEDURE — 97535 SELF CARE MNGMENT TRAINING: CPT

## 2020-11-24 PROCEDURE — 97161 PT EVAL LOW COMPLEX 20 MIN: CPT

## 2020-11-24 PROCEDURE — 97110 THERAPEUTIC EXERCISES: CPT

## 2020-11-24 PROCEDURE — 97116 GAIT TRAINING THERAPY: CPT

## 2020-11-24 PROCEDURE — 94761 N-INVAS EAR/PLS OXIMETRY MLT: CPT

## 2020-11-24 PROCEDURE — 63600175 PHARM REV CODE 636 W HCPCS: Performed by: ORTHOPAEDIC SURGERY

## 2020-11-24 PROCEDURE — 25000003 PHARM REV CODE 250: Performed by: ORTHOPAEDIC SURGERY

## 2020-11-24 RX ADMIN — CELECOXIB 200 MG: 100 CAPSULE ORAL at 08:11

## 2020-11-24 RX ADMIN — AMLODIPINE BESYLATE 10 MG: 5 TABLET ORAL at 08:11

## 2020-11-24 RX ADMIN — OXYCODONE 10 MG: 5 TABLET ORAL at 08:11

## 2020-11-24 RX ADMIN — FAMOTIDINE 20 MG: 20 TABLET, FILM COATED ORAL at 08:11

## 2020-11-24 RX ADMIN — OXYCODONE 5 MG: 5 TABLET ORAL at 12:11

## 2020-11-24 RX ADMIN — CEFAZOLIN SODIUM 2 G: 2 SOLUTION INTRAVENOUS at 02:11

## 2020-11-24 RX ADMIN — MUPIROCIN: 20 OINTMENT TOPICAL at 08:11

## 2020-11-24 RX ADMIN — Medication 6 MG: at 12:11

## 2020-11-24 RX ADMIN — CHLORTHALIDONE 25 MG: 25 TABLET ORAL at 08:11

## 2020-11-24 RX ADMIN — OLMESARTAN MEDOXOMIL 40 MG: 20 TABLET, FILM COATED ORAL at 08:11

## 2020-11-24 RX ADMIN — OXYCODONE 10 MG: 5 TABLET ORAL at 01:11

## 2020-11-24 NOTE — PLAN OF CARE
11/24/20 0737   Discharge Assessment   Assessment Type Discharge Planning Assessment   Confirmed/corrected address and phone number on facesheet? Yes   Assessment information obtained from? Patient   Communicated expected length of stay with patient/caregiver yes   Prior to hospitilization cognitive status: Alert/Oriented   Prior to hospitalization functional status: Independent   Current cognitive status: Alert/Oriented   Current Functional Status: Assistive Equipment   Lives With child(johnson), adult   Able to Return to Prior Arrangements yes   Is patient able to care for self after discharge? Yes   Who are your caregiver(s) and their phone number(s)? Luna Betancourt [phone]   Patient's perception of discharge disposition home or selfcare   Readmission Within the Last 30 Days no previous admission in last 30 days   Patient currently being followed by outpatient case management? No   Patient currently receives any other outside agency services? No   Equipment Currently Used at Home CPAP   Do you have any problems affording any of your prescribed medications? No   Is the patient taking medications as prescribed? yes   Does the patient have transportation home? Yes   Transportation Anticipated family or friend will provide   Dialysis Name and Scheduled days N/A   Does the patient receive services at the Coumadin Clinic? No   Discharge Plan A Home with family;Home Health   Discharge Plan B Home with family;Home Health   Patient/Family in Agreement with Plan yes     Assessment completed using Tony, id number [id number]. Pt confirmed address, PCP, and contact information. Pt explained she lives with her adult children and they will assist at home.       [id number] - [place] - [address] AT [facility] [address]  [address]  [place] [address]  Phone: [phone] Fax: [phone]    [facility] #[id number] - [place] - [address] [facility]  [address]  [place] [address]  Phone: [phone] Fax: [phone]

## 2020-11-24 NOTE — PROGRESS NOTES
WRITTEN HEALTHCARE DISCHARGE INFORMATION     Things that YOU are RESPONSIBLE for to Manage Your Care At Home:    1. Getting your prescriptions filled.  2. Taking you medications as directed. DO NOT MISS ANY DOSES!  3. Going to your follow-up doctor appointments. This is important because it allows the doctor to monitor your progress and to determine if any changes need to be made to your treatment plan.    If you are unable to make your follow up appointments, please call the number listed and reschedule this appointment.     ____________HELP AT HOME____________________    Experiencing any SIGNS or SYMPTOMS: YOU CAN    Schedule a same day appopintment with your Primary Care Doctor or  you can call [facility] On Call Nurse Care Line for 24/7 assistance at [phone]    If you are experience any signs or symptoms that have become severe, Call 911 and come to your nearest Emergency Room.    Thank you for choosing [facility] and allowing us to care for you.   From your care management team: Perla MATHEW [facility] [phone]    You should receive a call from [facility] Discharge Department within 48-72 hours to help manage your care after discharge. Please try to make sure that you answer your phone for this important phone call.  Follow-up Information     Gerardo Stein MD On 12/7/2020.    Specialty: Orthopedic Surgery  Why: Outpatient Services, Ortho, follow-up appointment @ 1:30pm.   Contact information:  [address]  [facility] [place] [address]  [phone]

## 2020-11-24 NOTE — PROGRESS NOTES
ART informed nurse Stuart that patient was ready for discharge from case management standpoint.

## 2020-11-24 NOTE — PT/OT/SLP EVAL
Physical Therapy Evaluation    Patient Name:  Racehl Conroy   MRN:  [id number]    Recommendations:     Discharge Recommendations:  ( PT with family)   Discharge Equipment Recommendations: bedside commode, RW  Barriers to discharge home: none    Assessment:     Rachel Conroy is a 65 y.o. female admitted with a medical diagnosis of Osteoarthritis of right knee.  She presents with the following impairments/functional limitations:  weakness, impaired self care skills, impaired functional mobilty, decreased lower extremity function, pain, decreased ROM, impaired skin, edema, orthopedic precautions.    Rehab Prognosis: Good; patient would benefit from acute skilled PT services to address these deficits and reach maximum level of function.    Recent Surgery: Procedure(s) (LRB):  ARTHROPLASTY, KNEE, TOTAL-STEFANIE (Right) 1 Day Post-Op    Plan:     During this hospitalization, patient to be seen BID to address the identified rehab impairments via gait training, therapeutic activities, therapeutic exercises and progress toward the following goals:    · Plan of Care Expires:  12/08/20    Subjective     Chief Complaint: Pt voiced no notable complaints other than mild-mod pain in RLE to be expected s/p R TKA  Patient/Family Comments/goals: Pt pleasant and willing to participate in PT eval  Pain/Comfort:  Pain Rating 1: (pt reported 5/10 pain throughout PT eval)  Pain Addressed 1: Pre-medicate for activity, Reposition, Cessation of Activity, Nurse notified    Living Environment:  Pt lives in [facility] with one step to enter. Pt lives with daughter who is a nurse at [employer].     Prior to admission, patients level of function was independent and using no AD.  Equipment used at home: CPAP, shower chair DME owned (not currently used): none.  Upon discharge, patient will have assistance from herself and daughter (Nurse Luna) and son available prn.    Objective:     Patient found reclined in chair with cryotherapy,  peripheral IV  upon PT entry to room.    General Precautions: Standard, fall   Orthopedic Precautions:RLE weight bearing as tolerated   Braces: N/A     Exams:  · Cognitive Exam: Pt alert, oriented, and able to follow 2 step commands with the help of daughter present at bedside as   · Gross Motor Coordination:  WFL  · Postural Exam:  Patient presented with the following abnormalities:    · -       Rounded shoulders  · -       Forward head  · Sensation: Sensation not formally tested but pt reported feeling to PT touch throughout BLE  · Skin Integrity/Edema:  Visible intact; mild edema noted in distal RLE   · RLE ROM: Knee flex ~60 deg, Knee ext ~5 deg, hip flex NT, DF/PF WFL   · RLE Strength: DF/PF 5/5; all other major muscle groups not tested 2* pain  · LLE ROM: WFL  · LLE Strength: WFL    Ms. Conroy was alert, oriented, pleasant, and cooperative upon PT entry and throughout PT evaluation with pt's daughter at bedside. Pt's daughter available for translation. She reported a 5/10 pain level throughout eval session.  PT noted mild impairments in ROM, transfer ability, and gait as to be expected s/p R TKA. Pt was able to ambulate ~125 ft CGA using RW requiring min vc/tc. PT reviewed RLE WBAT precautions with pt and daughter to which they both verbalized/demonstrated understanding. Pt is fit for  PT with family assistance prn upon formal d/c from hospital. There were no complications noted during PT evaluation.     Functional Mobility:  All completed with decreased speed.  · Chair Mobility:     · Scooting: stand by assistance to scoot ant/post in chair  · Transfers:     · Sit to Stand:  contact guard assistance with rolling walker  · Bed to Chair: contact guard assistance with  rolling walker     · Chair to mat: contact guard assistance with  rolling walker    · Gait: Pt ambulated ~125 ft CGA using RW with atnalgic step to gait pattern with intermittent step through gait pattern. Pt demonstrated decreased  gait speed, decreased step length, decreased hip/knee flexion, decreased knee flexion, decreased heel strike/toe off, mildly flexed trunk, and occasional downward gaze. Pt's gait pattern to be expected s/p R TKA.  Pt required little-no vc/tc throughout.  · Balance: Seated static: good; seated standing: fair +; dynamic ambulation: fair +    Therapeutic Activities and Exercises: Pt instructed on supine heel slides to improve RLE knee flexion to which the pt verbalized/demonstrated understanding.     AM-PAC 6 CLICK MOBILITY  Total Score:17     Patient left reclined in chair, cryotherapy with all lines intact, call button in reach and daughter present.    GOALS:   Multidisciplinary Problems     Physical Therapy Goals        Problem: Physical Therapy Goal    Goal Priority Disciplines Outcome Goal Variances Interventions   Physical Therapy Goal     PT, PT/OT Ongoing, Progressing     Description: Goals to be met by: 20    Patient will increase functional independence with mobility by performin. Supine to sit with Modified [place]  2. Sit to supine with Modified [place]  3. Rolling to Left and Right with Modified [place].  4. Sit to stand transfer with Modified [place]  5. Bed to chair transfer with Modified [place] using Rolling Walker  6. Gait  x 250 feet with Modified [place] using Rolling Walker.   7. Increased functional strength to WNL for aid in bed mobility, transfers, gait.  8. Lower extremity exercise program 2x10, 2-3x/day reps per handout, with independence                     History:     Past Medical History:   Diagnosis Date    Arthritis     GERD (gastroesophageal reflux disease)     Hyperlipidemia     Hypertension     Irritable bowel syndrome     Joint pain     Knee injury     Migraine headache     Muscle pain, fibromyalgia 3/19/2014    Other physical therapy     Plantar fasciitis     Sleep apnea     Urinary incontinence        Past Surgical History:    Procedure Laterality Date    APPENDECTOMY  2018    COLONOSCOPY N/A 11/9/2017    Procedure: COLONOSCOPY;  Surgeon: Mariaelena Harrell MD;  Location: [facility] ENDO (4TH FLR);  Service: Endoscopy;  Laterality: N/A;    COLONOSCOPY N/A 2/26/2018    Procedure: COLONOSCOPY;  Surgeon: Nicola Zhu MD;  Location: [facility] ENDO (2ND FLR);  Service: Endoscopy;  Laterality: N/A;    HYSTERECTOMY         Time Tracking:     PT Received On: 11/24/20  PT Start Time: 0948     PT Stop Time: 1008  PT Total Time (min): 20 min     Billable Minutes: Evaluation 20      Marcelo Manriquez, [facility]  11/24/2020

## 2020-11-24 NOTE — PT/OT/SLP EVAL
Occupational Therapy   Evaluation    Name: Rachel Conroy  MRN: [id number]  Admitting Diagnosis:  Osteoarthritis of right knee 1 Day Post-Op    Recommendations:     Discharge Recommendations: home health OT(with family supervision/assist)  Discharge Equipment Recommendations:  bedside commode, walker, rolling  Barriers to discharge:  None    Assessment:     Rachel Conroy is a 65 y.o. female with a medical diagnosis of Osteoarthritis of right knee. Performance deficits affecting function: weakness, gait instability, decreased ROM, decreased lower extremity function, impaired functional mobilty, edema, pain, impaired skin, orthopedic precautions, impaired self care skills, decreased safety awareness, impaired balance.      MIN A with 1 minor LOB with stand>sit on low commode. Pt would benefit from BSC for additional height on pt's low commode at home to reduce risk of falls. OT rec HHOT for home safety and increase independence/safety with ADLs. DME: BSC, RW    vitals on room air: 96%, 116 bpm at EOB; 97%, 112 bpm after session.    Rehab Prognosis: Good; patient would benefit from acute skilled OT services to address these deficits and reach maximum level of function.       Plan:     Patient to be seen 5 x/week to address the above listed problems via self-care/home management, therapeutic activities, therapeutic exercises  · Plan of Care Expires: 12/08/20  · Plan of Care Reviewed with: patient, daughter    Subjective     Chief Complaint: fearful to fall with toilet transfer without BSC   Patient/Family Comments/goals: pleasant and agreeable to eval session     Pt's daughter present- pt and daughter both requesting daughter to be used as a  during session.     Occupational Profile:  Living Environment: Pt lives with her daughter in a [facility] with threshold at entry. Bathroom set-up: walk-in shower with shower chair.   Previous level of function: Pt was MOD I with ADLs and independent with  functional mobility.   Roles and Routines: ?   Equipment Used at Home:  shower chair, CPAP  Assistance upon Discharge: daughter (nurse here); pt's son will be available when dtr is at nurse     Pain/Comfort:  · Pain Rating 1: 4/10  · Location - Side 1: Right  · Location - Orientation 1: generalized  · Location 1: knee  · Pain Addressed 1: Pre-medicate for activity, Reposition, Distraction, Cessation of Activity, Nurse notified  · Pain Rating Post-Intervention 1: 4/10    Patients cultural, spiritual, Faith conflicts given the current situation: no    Objective:     Communicated with: nurseSade, prior to session.  Patient found HOB elevated with cryotherapy, peripheral IV upon OT entry to room.    General Precautions: Standard, fall(Wolof speaking)   Orthopedic Precautions:RLE weight bearing as tolerated   Braces: N/A     Occupational Performance:    Bed Mobility:    · Patient completed Scooting/Bridging with stand by assistance  · Patient completed Supine to Sit with contact guard assistance for RLE and HOB elevated    Functional Mobility/Transfers:  · Patient completed Sit <> Stand Transfer with contact guard assistance  with  rolling walker   · Patient completed Bed <> Chair Transfer using Step Transfer technique with contact guard assistance with rolling walker  · Patient completed Toilet Transfer Step Transfer technique with minimum assistance with  rolling walker and grab bar   · Functional Mobility: Pt required minimal verbal cueing for body mechanics within RW for in-room and bathroom functional mobility. Pt had 1 LOB in the bathroom with MIN A to recover. Pt ambulated from the bathroom across the room to the chair using RW and SBA for safety/balance. Pt followed cueing well for safe hand placement stand>sit.     Activities of Daily Living:  · Grooming: set-up with wipes to wash her hands after toileting    · Upper Body Dressing: set-up seated EOB to don back gown     · Toileting: set-up with toilet  paper for pericare while seated on commode      Cognitive/Visual Perceptual:  Cognitive/Psychosocial Skills:     -       Follows Commands/attention:follows most simple commands  -       Communication: fluent   -       Memory: No Deficits noted  -       Safety awareness/insight to disability: intact   -       Mood/Affect/Coping skills/emotional control: Appropriate to situation  Visual/Perceptual:      -Intact  R/L discrimination      Physical Exam:  Balance:    -       seated: SUP; standing: CGA with RW  Postural examination/scapula alignment:    -       Forward head  Skin integrity: Visible skin intact  Edema:  no BUE edema noted  Sensation:    -       Intact  light/touch BUE  Dominant hand:    -       Right  Upper Extremity Range of Motion:     -       Right Upper Extremity: WFL  -       Left Upper Extremity: WFL  Upper Extremity Strength:    -       Right Upper Extremity: WFL  -       Left Upper Extremity: WFL   Strength:    -       Right Upper Extremity: WFL  -       Left Upper Extremity: WFL  Fine Motor Coordination:    -       Intact  Left hand, manipulation of objects and Right hand, manipulation of objects  Gross motor coordination:   WFL    AMPAC 6 Click ADL:  AMPAC Total Score: 21    Treatment & Education:  · Pt educated on OT role/POC.   · Importance of OOB activity with staff assistance.  · OT placed BSC over toilet to increase safety with functional transfer   · Safety during functional t/f and mobility   · Pt required increased time with all functional transfers to practice hand placement and safety   · Handout provided and reviewed on lower body dressing sequencing and safety: sit down for task; RLE threaded in pant leg first then LLE to don; LLE threaded out of pant leg first then RLE to doff   · OT suggested wearing skirts/dresses in the meantime to make ADL easier/ more independence   · White board updated   · Multiple self-care tasks/functional mobility completed- assistance level noted above    · All questions/concerns answered within OT scope of practice     Education:    Patient left reclined in the chair with towel roll under R ankle  with all lines intact, call button in reach, PT notified and daughter present. Polar ice refilled but not in place d/t pt about to start PT session.     GOALS:   Multidisciplinary Problems     Occupational Therapy Goals        Problem: Occupational Therapy Goal    Goal Priority Disciplines Outcome Interventions   Occupational Therapy Goal     OT, PT/OT Ongoing, Progressing    Description: Goals to be met by: 12/08/20     Patient will increase functional independence with ADLs by performing:    LE Dressing with Set-up Assistance.  Grooming while standing at sink with Modified [place].  Toileting from bedside commode with Modified [place] for hygiene and clothing management.   Supine to sit with Modified [place].  Step transfer with Modified [place]  Toilet transfer to bedside commode with Modified [place].  Upper extremity exercise program x15 reps per handout, with independence.                     History:     Past Medical History:   Diagnosis Date    Arthritis     GERD (gastroesophageal reflux disease)     Hyperlipidemia     Hypertension     Irritable bowel syndrome     Joint pain     Knee injury     Migraine headache     Muscle pain, fibromyalgia 3/19/2014    Other physical therapy     Plantar fasciitis     Sleep apnea     Urinary incontinence        Past Surgical History:   Procedure Laterality Date    APPENDECTOMY  2018    COLONOSCOPY N/A 11/9/2017    Procedure: COLONOSCOPY;  Surgeon: Mariaelena Harrell MD;  Location: [facility] ENDO (4TH FLR);  Service: Endoscopy;  Laterality: N/A;    COLONOSCOPY N/A 2/26/2018    Procedure: COLONOSCOPY;  Surgeon: Nicola Zhu MD;  Location: [facility] ENDO (2ND FLR);  Service: Endoscopy;  Laterality: N/A;    HYSTERECTOMY         Time Tracking:     OT Date of Treatment: 11/24/20  OT Start Time:  0917  OT Stop Time: 0940  OT Total Time (min): 23 min    Billable Minutes:Evaluation 15 min  Self Care/Home Management 8 min  Total Time 23 min    Steffanie Verde OT  11/24/2020

## 2020-11-24 NOTE — PT/OT/SLP PROGRESS
PM Physical Therapy Treatment    Patient Name:  Rachel Conroy   MRN:  [id number]    Recommendations:     Discharge Recommendations:  ( PT with family assistance)   Discharge Equipment Recommendations: bedside commode, walker, rolling   Barriers to discharge: None    Assessment:     Rachel Conroy is a 65 y.o. female admitted with a medical diagnosis of Osteoarthritis of right knee.  She presents with the following impairments/functional limitations:  weakness, impaired self care skills, impaired functional mobilty, decreased lower extremity function, pain, decreased ROM, impaired skin, edema, orthopedic precautions .    Rehab Prognosis: Good; patient would benefit from acute skilled PT services to address these deficits and reach maximum level of function.    Recent Surgery: Procedure(s) (LRB):  ARTHROPLASTY, KNEE, TOTAL-STEFANIE (Right) 1 Day Post-Op    Plan:     During this hospitalization, patient to be seen BID to address the identified rehab impairments via gait training, therapeutic activities, therapeutic exercises and progress toward the following goals:    · Plan of Care Expires:  12/08/20    Subjective     Chief Complaint: no complaints noted  Patient/Family Comments/goals: Pt cooperative and motivated for tx session  Pain/Comfort:  · Pain Rating 1: (5/10 pain reported in R knee 2* s/p R TKA)  · Pain Addressed 1: Pre-medicate for activity, Reposition, Distraction, Cessation of Activity, Nurse notified      Objective:     Patient found reclined in chair, R leg elevated with peripheral IV upon PT entry to room.     General Precautions: Standard, fall   Orthopedic Precautions:RLE weight bearing as tolerated   Braces: N/A     Ms Conroy remains to be alert, oriented, pleasant, and cooperative upon PT entry and throughout tx session. Daughter Luna remains at bedside for translation if needed. PT still notes deficits in functional mobility 2* s/p R TKA but pt did demonstrate improvements in gait  pattern and ambulation distance during PM session. PT also administered/instructed Ms. Conroy on LE HEP handout to which pt verbalized/demonstrated understanding with min-mod vc/tc. Pain levels remain to be the same and pt is progressing as expected. There were no noted complications during PM PT tx session.     Functional Mobility:  · Chair Mobility:     · Scooting: stand by assistance  · Transfers:     · Sit to Stand:  contact guard assistance with rolling walker  · Gait: Pt ambulated ~250 ft CGA using RW and demonstrating step through gait pattern. Although pt increased step length from AM session, pt continues to demonstrate decreased speed, decreased step length, decreased hip flex/ext, decreased knee flex R>L, decreased heel strike/toe off, and occasional downward gaze. Pt was instructed to increase step length, maintain forward gaze, and to make small incremental turns to L side when changing directions with rolling walker. Pt required min vc/tc and verbalized/demonstrated understanding.   · Balance: seated static: good; standing static: good; dynamic ambulation: fair +      AM-PAC 6 CLICK MOBILITY  Turning over in bed (including adjusting bedclothes, sheets and blankets)?: 3  Sitting down on and standing up from a chair with arms (e.g., wheelchair, bedside commode, etc.): 3  Moving from lying on back to sitting on the side of the bed?: 3  Moving to and from a bed to a chair (including a wheelchair)?: 3  Need to walk in hospital room?: 3  Climbing 3-5 steps with a railing?: 2  Basic Mobility Total Score: 17       Therapeutic Activities and Exercises: Seated in chair RLE x 10: LAQ; Relined in chair RLE x 10: SAQ, QS, AP, SLR, HS; all completed with min-mod vc/tc. RLE only due to increased time to complete TE.     Patient left reclined in chair with all lines intact, call button in reach and daughter Luna present..    GOALS:   Multidisciplinary Problems     Physical Therapy Goals        Problem: Physical  Therapy Goal    Goal Priority Disciplines Outcome Goal Variances Interventions   Physical Therapy Goal     PT, PT/OT Ongoing, Progressing     Description: Goals to be met by: 20    Patient will increase functional independence with mobility by performin. Supine to sit with Modified [place]  2. Sit to supine with Modified [place]  3. Rolling to Left and Right with Modified [place].  4. Sit to stand transfer with Modified [place]  5. Bed to chair transfer with Modified [place] using Rolling Walker  6. Gait  x 250 feet with Modified [place] using Rolling Walker.   7. Increased functional strength to WNL for aid in bed mobility, transfers, gait.  8. Lower extremity exercise program 2x10, 2-3x/day reps per handout, with independence                     Time Tracking:     PT Received On: 20  PT Start Time: 1405     PT Stop Time: 1431  PT Total Time (min): 26 min     Billable Minutes: Gait Training 11 and Therapeutic Exercise 15    Treatment Type: Treatment  PT/PTA: PT     PTA Visit Number: 0     Marcelo Manriquez [facility]  2020

## 2020-11-24 NOTE — DISCHARGE SUMMARY
Orthopaedic Discharge Summary  Rachel Conroy, 1955   [id number], [id number]      SUMMARY     Admit Date: 11/23/2020    Attending Physician:Gerardo Stein MD    Discharge Date: 11/24/2020  3:23 PM ;     Principal Diagnosis: total joint arthroplasty  Secondary Diagnosis:    - No concurrent serious illness or elevated hypoglycemic risk                    History of Present Illness: Rachel Conroy is a 65 y.o. female with a history significant for degenerative joint disease that has been refractory to all conservative/non-operative modalities.  An extensive discussion was had concerning the risks, benefits, alternatives, and indications for surgical intervention.  All questions were answered, and informed consent was obtained. No guarantees were made. The patient elected to proceed with surgery.      Hospital Course: On the aforementioned date, the patient underwent a total joint replacement. See the operative note for details. Post-operatively the patient was transferred to the recovery room and then to the floor.    The interim of the hospital stay from arrival on the floor up to discharge has been largely uncomplicated. The patient progressed well with physical therapy on a daily basis. See the PT notes for details. Post operative pain was controlled and the patient was felt ready for discharge.     Currently the patient is ambulating with moderate assistance and the physical therapy team feels that the patient's progress is sufficient to allow the patient to be discharged home safely.     Disposition: Stable. Discharged to home with home health.    Activity: As tolerated with assistive devices PRN.     Diet: Resume a healthy, balanced diet    Follow-up: Patient will follow-up with the attending surgeon [facility] within two weeks of the operative date for wound check and re-evaluation of the post-operative plan.     Medications: See below for detailed medication reconciliation.  This includes  DVT prophylaxis  for 30 days following joint replacement surgery.    The patient was instructed to monitor for signs and symptoms of infection and to contact the orthopaedic surgery clinic with any questions or concerns.      Patient Instructions:   Discharge Medication List as of 11/24/2020  3:07 PM      START taking these medications    Details   aspirin (ECOTRIN) 81 MG EC tablet Take 1 tablet (81 mg total) by mouth 2 (two) times daily with meals., Starting Mon 11/23/2020, Print      cefadroxil (DURICEF) 500 MG Cap Take 1 capsule (500 mg total) by mouth every 12 (twelve) hours., Starting Mon 11/23/2020, Print      oxyCODONE-acetaminophen (PERCOCET) 5-325 mg per tablet Take 1 tablet by mouth every 4 (four) hours as needed for Pain., Starting Mon 11/23/2020, Print         CONTINUE these medications which have NOT CHANGED    Details   amLODIPine (NORVASC) 10 MG tablet Take 1 tablet (10 mg total) by mouth once daily., Starting Mon 5/4/2020, Normal      chlorthalidone (HYGROTEN) 25 MG Tab Take 1 tablet (25 mg total) by mouth once daily., Starting Mon 11/9/2020, Normal      fluticasone propionate (FLONASE) 50 mcg/actuation nasal spray 2 sprays (100 mcg total) by Each Nostril route once daily., Starting Tue 7/28/2020, Normal      hyoscyamine (LEVSIN/SL) 0.125 mg Subl DISSOLVE 1 TABLET UNDER THE TONGUE 3 TIMES A DAY BEFORE MEALS, Normal      olmesartan (BENICAR) 40 MG tablet TAKE 1 TABLET BY MOUTH ONCE DAILY, Normal      clobetasoL (CLOBEX) 0.05 % shampoo Apply topically 2 (two) times daily., Starting Thu 3/26/2020, Normal      esomeprazole (NEXIUM) 40 MG capsule Take 1 capsule (40 mg total) by mouth before breakfast., Starting Mon 5/4/2020, Normal      FLUoxetine 10 MG capsule Take 1 capsule (10 mg total) by mouth once daily., Starting Tue 7/28/2020, Until Wed 7/28/2021, Normal      gabapentin (NEURONTIN) 100 MG capsule Take 3 capsules (300 mg total) by mouth every evening., Starting Mon 5/4/2020, Until  "Wed 11/18/2020, Normal      sumatriptan (IMITREX) 100 MG tablet Take 1 tablet (100 mg total) by mouth every 2 (two) hours as needed for Migraine., Starting Wed 9/30/2020, Until Wed 11/18/2020, Normal      vitamin D (VITAMIN D3) 1000 units Tab Take 1 tablet (1,000 Units total) by mouth once daily., Starting Mon 5/4/2020, Normal         STOP taking these medications       ibuprofen (ADVIL,MOTRIN) 200 MG tablet Comments:   Reason for Stopping:         naproxen sodium (ANAPROX) 220 MG tablet Comments:   Reason for Stopping:               Discharge Procedure Orders (must include Diet, Follow-up, Activity)   Discharge Procedure Orders (must include Diet, Follow-up, Activity)   WALKER FOR HOME USE     Order Specific Question Answer Comments   Type of Walker: Adult (5'4"-6'6")    With wheels? Yes    Height: 5' 7" (1.702 m)    Weight: 87.1 kg (192 lb)    Length of need (1-99 months): 99    Does patient have medical equipment at home? CPAP    Please check all that apply: Patient's condition impairs ambulation.    Please check all that apply: Patient is unable to safely ambulate without equipment.    Please check all that apply: Patient needs help to get in and out of chair.    Please check all that apply: Walker will be used for gait training.      Diet Adult Regular     Notify your health care provider if you experience any of the following:  increased confusion or weakness     Notify your health care provider if you experience any of the following:  persistent dizziness, light-headedness, or visual disturbances     Notify your health care provider if you experience any of the following:  worsening rash     Notify your health care provider if you experience any of the following:  severe persistent headache     Notify your health care provider if you experience any of the following:  difficulty breathing or increased cough     Notify your health care provider if you experience any of the following:  redness, tenderness, or " signs of infection (pain, swelling, redness, odor or green/yellow discharge around incision site)     Notify your health care provider if you experience any of the following:  severe uncontrolled pain     Notify your health care provider if you experience any of the following:  persistent nausea and vomiting or diarrhea     Notify your health care provider if you experience any of the following:  temperature >100.4      Remove dressing in 72 hours     Weight bearing restrictions (specify):   Order Comments: As tolerated with cristiano Stein M.D.

## 2020-11-24 NOTE — PLAN OF CARE
11/24/20 1201   Final Note   Assessment Type Final Discharge Note   Anticipated Discharge Disposition Home-Health   What phone number can be called within the next 1-3 days to see how you are doing after discharge? [phone]   Hospital Follow Up  Appt(s) scheduled? Yes   Discharge plans and expectations educations in teach back method with documentation complete? Yes   Right Care Referral Info   Post Acute Recommendation Home-care   Referral Type Home Care   Post-Acute Status   Post-Acute Authorization Home Health   Home Health Status Set-up Complete   Patient choice form signed by patient/caregiver List with quality metrics by geographic area provided   Discharge Delays None known at this time

## 2020-11-24 NOTE — PLAN OF CARE
Problem: Occupational Therapy Goal  Goal: Occupational Therapy Goal  Description: Goals to be met by: 12/08/20     Patient will increase functional independence with ADLs by performing:    LE Dressing with Set-up Assistance.  Grooming while standing at sink with Modified [place].  Toileting from bedside commode with Modified [place] for hygiene and clothing management.   Supine to sit with Modified [place].  Step transfer with Modified [place]  Toilet transfer to bedside commode with Modified [place].  Upper extremity exercise program x15 reps per handout, with independence.    Outcome: Ongoing, Progressing  MIN A with 1 minor LOB with stand>sit on low commode. Pt would benefit from BSC for additional height on pt's low commode at home to reduce risk of falls. OT rec HHOT for home safety and increase independence/safety with ADLs. DME: [facility] GRAVES.

## 2020-11-24 NOTE — PLAN OF CARE
Problem: Physical Therapy Goal  Goal: Physical Therapy Goal  Description: Goals to be met by: 20    Patient will increase functional independence with mobility by performin. Supine to sit with Modified [place]  2. Sit to supine with Modified [place]  3. Rolling to Left and Right with Modified [place].  4. Sit to stand transfer with Modified [place]  5. Bed to chair transfer with Modified [place] using Rolling Walker  6. Gait  x 250 feet with Modified [place] using Rolling Walker.   7. Increased functional strength to WNL for aid in bed mobility, transfers, gait.  8. Lower extremity exercise program 2x10, 2-3x/day reps per handout, with independence    Outcome: Ongoing, Progressing    Ms. Conroy was alert, oriented, pleasant, and cooperative upon PT entry and throughout PT evaluation with pt's daughter at bedside. Pt's daughter available for translation. She reported a 5/10 pain level throughout eval session.  PT noted mild impairments in ROM, transfer ability, and gait as to be expected s/p R TKA. Pt was able to ambulate ~125 ft CGA using RW requiring min vc/tc. PT reviewed RLE WBAT precautions with pt and daughter to which they both verbalized/demonstrated understanding. Pt is fit for  PT with family assistance prn upon formal d/c from hospital. There were no complications noted during PT evaluation.

## 2020-11-25 NOTE — PT/OT/SLP DISCHARGE
Occupational Therapy Discharge Summary    Rachel Conroy  MRN: [id number]   Principal Problem: Osteoarthritis of right knee      Patient Discharged from acute Occupational Therapy on 11/24/20.  Please refer to prior OT notes for functional status.    Assessment:      Patient appropriate for care in another setting.    Objective:     GOALS:   Multidisciplinary Problems     Occupational Therapy Goals        Problem: Occupational Therapy Goal    Goal Priority Disciplines Outcome Interventions   Occupational Therapy Goal     OT, PT/OT Ongoing, Progressing    Description: Goals to be met by: 12/08/20     Patient will increase functional independence with ADLs by performing:    LE Dressing with Set-up Assistance.  Grooming while standing at sink with Modified [place].  Toileting from bedside commode with Modified [place] for hygiene and clothing management.   Supine to sit with Modified [place].  Step transfer with Modified [place]  Toilet transfer to bedside commode with Modified [place].  Upper extremity exercise program x15 reps per handout, with independence.                     Reasons for Discontinuation of Therapy Services  Transfer to alternate level of care.      Plan:     Patient Discharged to: Home with Home Health Service    Steffanie Verde OT  11/25/2020

## 2020-11-27 ENCOUNTER — TELEPHONE (OUTPATIENT)
Dept: FAMILY MEDICINE | Facility: CLINIC | Age: 65
End: 2020-11-27

## 2020-11-27 NOTE — TELEPHONE ENCOUNTER
"----- Message from Kati Blake sent at 11/27/2020  8:59 AM CST -----  Contact: Makayla Watt" [phone] ext [phone]  Type: Patient Call Back    Who called: Makayla Watt"    What is the request in detail: calling to speak with someone regarding patient's start of care    Can the clinic reply by [facility]? Call Back    Would the patient rather a call back or a response via [facility]? Call back    Best call back number: [phone] ext [phone]          "

## 2020-11-27 NOTE — TELEPHONE ENCOUNTER
Requesting a new start date for therapy orders written by the surgeon. Per Dr. Gonzalez, any changes to orders including start date will have to be made by the surgeon.

## 2020-11-27 NOTE — TELEPHONE ENCOUNTER
Asking for approval to start OT on alternate date. Orders written by surgeon to start OT on 11/24/20, but facility could not staff the request at that time. PCP approval needed to start therapy on Nov 30th

## 2020-11-27 NOTE — TELEPHONE ENCOUNTER
----- Message from Marquez Oden sent at 11/27/2020 11:57 AM CST -----  Name of Who is Calling: Makayla galeano     What is the request in detail: Makayla galeano  is calling in regards to extreme urgency for above patient .. Please contact to further discuss and advise      Can the clinic reply by [facility]: no     What [facility]:  [phone] ext [phone]

## 2020-11-30 ENCOUNTER — TELEPHONE (OUTPATIENT)
Dept: FAMILY MEDICINE | Facility: CLINIC | Age: 65
End: 2020-11-30

## 2020-11-30 NOTE — TELEPHONE ENCOUNTER
----- Message from Priscila Cross sent at 11/30/2020  3:47 PM CST -----  Type: Patient Call Back    Who called: Makayla/Neyda    What is the request in detail: patient has been staff with [facility] [phone]    Can the clinic reply by [facility]? no    Would the patient rather a call back or a response via [facility]? call    Best call back number: [phone] ext [phone]    Additional Information: do not have to call back

## 2020-12-06 ENCOUNTER — PATIENT MESSAGE (OUTPATIENT)
Dept: SLEEP MEDICINE | Facility: CLINIC | Age: 65
End: 2020-12-06

## 2020-12-06 DIAGNOSIS — R74.01 ELEVATED TRANSAMINASE LEVEL: Primary | ICD-10-CM

## 2020-12-07 ENCOUNTER — DOCUMENTATION ONLY (OUTPATIENT)
Dept: TRANSPLANT | Facility: CLINIC | Age: 65
End: 2020-12-07

## 2020-12-07 NOTE — NURSING
Pt records reviewed.   Pt will be referred to Hepatology.  Elevated transaminase level  Initial referral received  from the [facility]que.   Referring Provider/diagnosis  Maxwell Gonzalez Jr., MD      Referral letter sent to patient.

## 2020-12-07 NOTE — LETTER
December 7, 2020    Rachel Conroy  [address] [place] [address]      Dear Rachel Conroy:    Your doctor has referred you to the [facility]. We are sending this letter to remind you to make an appointment with us to complete the referral process.     Please call us at [phone] to schedule an appointment. We look forward to seeing you soon.     If you received a call and have been scheduled, please disregard this letter.       Sincerely,        [facility]   [facility] [place] [address]  [phone]

## 2020-12-08 ENCOUNTER — TELEPHONE (OUTPATIENT)
Dept: FAMILY MEDICINE | Facility: CLINIC | Age: 65
End: 2020-12-08

## 2020-12-08 NOTE — TELEPHONE ENCOUNTER
----- Message from Maxwell Gonzalez Jr., MD sent at 12/6/2020  6:39 PM CST -----  Please let patient know liver numbers were still elevated so I placed a referral for a liver doctor

## 2020-12-10 DIAGNOSIS — G47.33 OBSTRUCTIVE SLEEP APNEA: Primary | ICD-10-CM

## 2020-12-11 ENCOUNTER — EXTERNAL HOME HEALTH (OUTPATIENT)
Dept: HOME HEALTH SERVICES | Facility: HOSPITAL | Age: 65
End: 2020-12-11

## 2020-12-22 ENCOUNTER — PATIENT MESSAGE (OUTPATIENT)
Dept: SLEEP MEDICINE | Facility: CLINIC | Age: 65
End: 2020-12-22

## 2020-12-23 ENCOUNTER — PATIENT MESSAGE (OUTPATIENT)
Dept: FAMILY MEDICINE | Facility: CLINIC | Age: 65
End: 2020-12-23

## 2020-12-24 ENCOUNTER — TELEPHONE (OUTPATIENT)
Dept: HEPATOLOGY | Facility: CLINIC | Age: 65
End: 2020-12-24

## 2020-12-24 NOTE — TELEPHONE ENCOUNTER
Left message for patient that January appt was canceled and we will be glad to reschedule with a date that will work for her

## 2020-12-28 ENCOUNTER — TELEPHONE (OUTPATIENT)
Dept: HEPATOLOGY | Facility: CLINIC | Age: 65
End: 2020-12-28

## 2020-12-28 NOTE — TELEPHONE ENCOUNTER
----- Message from Eugenie Chambers sent at 12/28/2020  2:08 PM CST -----  Regarding: Reschedule appt  Contact: Luna(daughter)  Pt daughter is calling to reschedule appt.      [phone]

## 2020-12-30 NOTE — NURSING
Patient discharged per MD order. IV removed; catheter tip intact. No distress noted. Discharge instructions explained; patient verbalized understanding. VSS. Afebrile. No complaint of pain, n/v, diarrhea, or SOB. RX provided. Patient left with belongings to Main Entrance via wheelchair per transport.    Speaking Coherently

## 2021-01-11 ENCOUNTER — TELEPHONE (OUTPATIENT)
Dept: HEPATOLOGY | Facility: CLINIC | Age: 66
End: 2021-01-11

## 2021-01-13 ENCOUNTER — LAB VISIT (OUTPATIENT)
Dept: LAB | Facility: HOSPITAL | Age: 66
End: 2021-01-13
Payer: COMMERCIAL

## 2021-01-13 ENCOUNTER — OFFICE VISIT (OUTPATIENT)
Dept: HEPATOLOGY | Facility: CLINIC | Age: 66
End: 2021-01-13
Payer: COMMERCIAL

## 2021-01-13 ENCOUNTER — PROCEDURE VISIT (OUTPATIENT)
Dept: HEPATOLOGY | Facility: CLINIC | Age: 66
End: 2021-01-13
Payer: COMMERCIAL

## 2021-01-13 VITALS
WEIGHT: 189.81 LBS | SYSTOLIC BLOOD PRESSURE: 122 MMHG | HEART RATE: 80 BPM | DIASTOLIC BLOOD PRESSURE: 65 MMHG | HEIGHT: 67 IN | BODY MASS INDEX: 29.79 KG/M2 | TEMPERATURE: 98 F | OXYGEN SATURATION: 97 % | RESPIRATION RATE: 20 BRPM

## 2021-01-13 DIAGNOSIS — R10.11 RUQ PAIN: ICD-10-CM

## 2021-01-13 DIAGNOSIS — R74.8 ELEVATED LIVER ENZYMES: Primary | ICD-10-CM

## 2021-01-13 DIAGNOSIS — R74.8 ELEVATED LIVER ENZYMES: ICD-10-CM

## 2021-01-13 DIAGNOSIS — K76.0 FATTY LIVER: ICD-10-CM

## 2021-01-13 LAB
ALBUMIN SERPL BCP-MCNC: 4.1 G/DL (ref 3.5–5.2)
ALP SERPL-CCNC: 113 U/L (ref 55–135)
ALT SERPL W/O P-5'-P-CCNC: 38 U/L (ref 10–44)
AST SERPL-CCNC: 25 U/L (ref 10–40)
BILIRUB DIRECT SERPL-MCNC: 0.1 MG/DL (ref 0.1–0.3)
BILIRUB SERPL-MCNC: 0.3 MG/DL (ref 0.1–1)
PROT SERPL-MCNC: 7.4 G/DL (ref 6–8.4)

## 2021-01-13 PROCEDURE — 36415 COLL VENOUS BLD VENIPUNCTURE: CPT

## 2021-01-13 PROCEDURE — 99214 PR OFFICE/OUTPT VISIT, EST, LEVL IV, 30-39 MIN: ICD-10-PCS | Mod: S$GLB,,, | Performed by: NURSE PRACTITIONER

## 2021-01-13 PROCEDURE — 80076 HEPATIC FUNCTION PANEL: CPT

## 2021-01-13 PROCEDURE — 99214 OFFICE O/P EST MOD 30 MIN: CPT | Mod: S$GLB,,, | Performed by: NURSE PRACTITIONER

## 2021-01-13 PROCEDURE — 99999 PR PBB SHADOW E&M-EST. PATIENT-LVL V: ICD-10-PCS | Mod: PBBFAC,,, | Performed by: NURSE PRACTITIONER

## 2021-01-13 PROCEDURE — 3008F BODY MASS INDEX DOCD: CPT | Mod: CPTII,S$GLB,, | Performed by: NURSE PRACTITIONER

## 2021-01-13 PROCEDURE — 3008F PR BODY MASS INDEX (BMI) DOCUMENTED: ICD-10-PCS | Mod: CPTII,S$GLB,, | Performed by: NURSE PRACTITIONER

## 2021-01-13 PROCEDURE — 91200 FIBROSCAN (VIBRATION CONTROLLED TRANSIENT ELASTOGRAPHY): ICD-10-PCS | Mod: S$GLB,,, | Performed by: NURSE PRACTITIONER

## 2021-01-13 PROCEDURE — 1126F PR PAIN SEVERITY QUANTIFIED, NO PAIN PRESENT: ICD-10-PCS | Mod: S$GLB,,, | Performed by: NURSE PRACTITIONER

## 2021-01-13 PROCEDURE — 3078F DIAST BP <80 MM HG: CPT | Mod: CPTII,S$GLB,, | Performed by: NURSE PRACTITIONER

## 2021-01-13 PROCEDURE — 3074F PR MOST RECENT SYSTOLIC BLOOD PRESSURE < 130 MM HG: ICD-10-PCS | Mod: CPTII,S$GLB,, | Performed by: NURSE PRACTITIONER

## 2021-01-13 PROCEDURE — 3074F SYST BP LT 130 MM HG: CPT | Mod: CPTII,S$GLB,, | Performed by: NURSE PRACTITIONER

## 2021-01-13 PROCEDURE — 1126F AMNT PAIN NOTED NONE PRSNT: CPT | Mod: S$GLB,,, | Performed by: NURSE PRACTITIONER

## 2021-01-13 PROCEDURE — 91200 LIVER ELASTOGRAPHY: CPT | Mod: S$GLB,,, | Performed by: NURSE PRACTITIONER

## 2021-01-13 PROCEDURE — 99999 PR PBB SHADOW E&M-EST. PATIENT-LVL V: CPT | Mod: PBBFAC,,, | Performed by: NURSE PRACTITIONER

## 2021-01-13 PROCEDURE — 3078F PR MOST RECENT DIASTOLIC BLOOD PRESSURE < 80 MM HG: ICD-10-PCS | Mod: CPTII,S$GLB,, | Performed by: NURSE PRACTITIONER

## 2021-01-20 ENCOUNTER — HOSPITAL ENCOUNTER (OUTPATIENT)
Dept: RADIOLOGY | Facility: HOSPITAL | Age: 66
Discharge: HOME | End: 2021-01-20
Attending: NURSE PRACTITIONER
Payer: COMMERCIAL

## 2021-01-20 DIAGNOSIS — K76.0 FATTY LIVER: ICD-10-CM

## 2021-01-20 DIAGNOSIS — R10.11 RUQ PAIN: ICD-10-CM

## 2021-01-20 DIAGNOSIS — R74.8 ELEVATED LIVER ENZYMES: ICD-10-CM

## 2021-01-20 PROCEDURE — 76700 US EXAM ABDOM COMPLETE: CPT | Mod: TC

## 2021-01-20 PROCEDURE — 76700 US ABDOMEN COMPLETE: ICD-10-PCS | Mod: 26,,, | Performed by: INTERNAL MEDICINE

## 2021-01-20 PROCEDURE — 76700 US EXAM ABDOM COMPLETE: CPT | Mod: 26,,, | Performed by: INTERNAL MEDICINE

## 2021-01-21 ENCOUNTER — TELEPHONE (OUTPATIENT)
Dept: SLEEP MEDICINE | Facility: CLINIC | Age: 66
End: 2021-01-21

## 2021-01-22 ENCOUNTER — TELEPHONE (OUTPATIENT)
Dept: SLEEP MEDICINE | Facility: OTHER | Age: 66
End: 2021-01-22

## 2021-01-26 ENCOUNTER — TELEPHONE (OUTPATIENT)
Dept: SLEEP MEDICINE | Facility: HOSPITAL | Age: 66
End: 2021-01-26

## 2021-01-26 DIAGNOSIS — Z01.818 PREPROCEDURAL EXAMINATION: ICD-10-CM

## 2021-01-27 ENCOUNTER — PATIENT MESSAGE (OUTPATIENT)
Dept: FAMILY MEDICINE | Facility: CLINIC | Age: 66
End: 2021-01-27

## 2021-01-27 DIAGNOSIS — L30.9 DERMATITIS: ICD-10-CM

## 2021-01-27 RX ORDER — CLOBETASOL PROPIONATE 0.05 G/100ML
SHAMPOO TOPICAL 2 TIMES DAILY
Qty: 1 BOTTLE | Refills: 1 | Status: SHIPPED | OUTPATIENT
Start: 2021-01-27 | End: 2021-08-23

## 2021-01-29 ENCOUNTER — TELEPHONE (OUTPATIENT)
Dept: SLEEP MEDICINE | Facility: OTHER | Age: 66
End: 2021-01-29

## 2021-02-22 ENCOUNTER — LAB VISIT (OUTPATIENT)
Dept: FAMILY MEDICINE | Facility: CLINIC | Age: 66
End: 2021-02-22
Payer: COMMERCIAL

## 2021-02-22 DIAGNOSIS — Z01.818 PREPROCEDURAL EXAMINATION: ICD-10-CM

## 2021-02-22 PROCEDURE — U0005 INFEC AGEN DETEC AMPLI PROBE: HCPCS

## 2021-02-22 PROCEDURE — U0003 INFECTIOUS AGENT DETECTION BY NUCLEIC ACID (DNA OR RNA); SEVERE ACUTE RESPIRATORY SYNDROME CORONAVIRUS 2 (SARS-COV-2) (CORONAVIRUS DISEASE [COVID-19]), AMPLIFIED PROBE TECHNIQUE, MAKING USE OF HIGH THROUGHPUT TECHNOLOGIES AS DESCRIBED BY CMS-2020-01-R: HCPCS

## 2021-02-23 ENCOUNTER — PATIENT MESSAGE (OUTPATIENT)
Dept: RHEUMATOLOGY | Facility: CLINIC | Age: 66
End: 2021-02-23

## 2021-02-23 LAB — SARS-COV-2 RNA RESP QL NAA+PROBE: NOT DETECTED

## 2021-02-24 ENCOUNTER — HOSPITAL ENCOUNTER (OUTPATIENT)
Dept: SLEEP MEDICINE | Facility: HOSPITAL | Age: 66
Discharge: HOME | End: 2021-02-24
Attending: FAMILY MEDICINE
Payer: COMMERCIAL

## 2021-02-24 DIAGNOSIS — G47.33 OBSTRUCTIVE SLEEP APNEA: ICD-10-CM

## 2021-02-24 PROCEDURE — 95811 PR POLYSOMNOGRAPHY W/CPAP: ICD-10-PCS | Mod: 26,,, | Performed by: INTERNAL MEDICINE

## 2021-02-24 PROCEDURE — 95811 POLYSOM 6/>YRS CPAP 4/> PARM: CPT | Mod: 26,,, | Performed by: INTERNAL MEDICINE

## 2021-02-24 PROCEDURE — 95811 POLYSOM 6/>YRS CPAP 4/> PARM: CPT

## 2021-03-03 ENCOUNTER — PATIENT MESSAGE (OUTPATIENT)
Dept: SLEEP MEDICINE | Facility: CLINIC | Age: 66
End: 2021-03-03

## 2021-03-03 ENCOUNTER — TELEPHONE (OUTPATIENT)
Dept: SLEEP MEDICINE | Facility: CLINIC | Age: 66
End: 2021-03-03

## 2021-04-05 ENCOUNTER — PATIENT OUTREACH (OUTPATIENT)
Dept: ADMINISTRATIVE | Facility: OTHER | Age: 66
End: 2021-04-05

## 2021-04-16 DIAGNOSIS — I10 ESSENTIAL HYPERTENSION: ICD-10-CM

## 2021-04-18 RX ORDER — AMLODIPINE BESYLATE 10 MG/1
10 TABLET ORAL DAILY
Qty: 90 TABLET | Refills: 2 | Status: SHIPPED | OUTPATIENT
Start: 2021-04-18 | End: 2021-06-07 | Stop reason: SDUPTHER

## 2021-04-20 ENCOUNTER — OFFICE VISIT (OUTPATIENT)
Dept: SLEEP MEDICINE | Facility: CLINIC | Age: 66
End: 2021-04-20
Payer: COMMERCIAL

## 2021-04-20 VITALS
BODY MASS INDEX: 30.13 KG/M2 | DIASTOLIC BLOOD PRESSURE: 71 MMHG | SYSTOLIC BLOOD PRESSURE: 132 MMHG | WEIGHT: 192 LBS | HEIGHT: 67 IN | HEART RATE: 75 BPM

## 2021-04-20 DIAGNOSIS — G43.809 OTHER MIGRAINE WITHOUT STATUS MIGRAINOSUS, NOT INTRACTABLE: ICD-10-CM

## 2021-04-20 DIAGNOSIS — G47.33 OBSTRUCTIVE SLEEP APNEA: Primary | ICD-10-CM

## 2021-04-20 PROCEDURE — 3008F BODY MASS INDEX DOCD: CPT | Mod: CPTII,S$GLB,, | Performed by: NURSE PRACTITIONER

## 2021-04-20 PROCEDURE — 99999 PR PBB SHADOW E&M-EST. PATIENT-LVL III: ICD-10-PCS | Mod: PBBFAC,,, | Performed by: NURSE PRACTITIONER

## 2021-04-20 PROCEDURE — 99214 OFFICE O/P EST MOD 30 MIN: CPT | Mod: S$GLB,,, | Performed by: NURSE PRACTITIONER

## 2021-04-20 PROCEDURE — 3008F PR BODY MASS INDEX (BMI) DOCUMENTED: ICD-10-PCS | Mod: CPTII,S$GLB,, | Performed by: NURSE PRACTITIONER

## 2021-04-20 PROCEDURE — 99999 PR PBB SHADOW E&M-EST. PATIENT-LVL III: CPT | Mod: PBBFAC,,, | Performed by: NURSE PRACTITIONER

## 2021-04-20 PROCEDURE — 99214 PR OFFICE/OUTPT VISIT, EST, LEVL IV, 30-39 MIN: ICD-10-PCS | Mod: S$GLB,,, | Performed by: NURSE PRACTITIONER

## 2021-04-20 RX ORDER — TOPIRAMATE SPINKLE 15 MG/1
15 CAPSULE ORAL SEE ADMIN INSTRUCTIONS
Qty: 90 CAPSULE | Refills: 2 | Status: SHIPPED | OUTPATIENT
Start: 2021-04-20 | End: 2022-08-21

## 2021-04-20 RX ORDER — SUMATRIPTAN SUCCINATE 100 MG/1
100 TABLET ORAL
Qty: 9 TABLET | Refills: 5 | Status: SHIPPED | OUTPATIENT
Start: 2021-04-20 | End: 2021-10-08 | Stop reason: SDUPTHER

## 2021-06-07 ENCOUNTER — OFFICE VISIT (OUTPATIENT)
Dept: FAMILY MEDICINE | Facility: CLINIC | Age: 66
End: 2021-06-07
Payer: COMMERCIAL

## 2021-06-07 VITALS
RESPIRATION RATE: 18 BRPM | BODY MASS INDEX: 30.2 KG/M2 | OXYGEN SATURATION: 98 % | HEIGHT: 67 IN | TEMPERATURE: 98 F | DIASTOLIC BLOOD PRESSURE: 86 MMHG | SYSTOLIC BLOOD PRESSURE: 138 MMHG | HEART RATE: 88 BPM | WEIGHT: 192.44 LBS

## 2021-06-07 DIAGNOSIS — I10 ESSENTIAL HYPERTENSION: ICD-10-CM

## 2021-06-07 DIAGNOSIS — M25.511 CHRONIC RIGHT SHOULDER PAIN: Primary | ICD-10-CM

## 2021-06-07 DIAGNOSIS — G47.33 OBSTRUCTIVE SLEEP APNEA SYNDROME: ICD-10-CM

## 2021-06-07 DIAGNOSIS — G89.29 CHRONIC RIGHT SHOULDER PAIN: Primary | ICD-10-CM

## 2021-06-07 DIAGNOSIS — R73.9 HYPERGLYCEMIA: ICD-10-CM

## 2021-06-07 PROCEDURE — 3008F BODY MASS INDEX DOCD: CPT | Mod: CPTII,S$GLB,, | Performed by: FAMILY MEDICINE

## 2021-06-07 PROCEDURE — 1125F PR PAIN SEVERITY QUANTIFIED, PAIN PRESENT: ICD-10-PCS | Mod: S$GLB,,, | Performed by: FAMILY MEDICINE

## 2021-06-07 PROCEDURE — 99999 PR PBB SHADOW E&M-EST. PATIENT-LVL V: CPT | Mod: PBBFAC,,, | Performed by: FAMILY MEDICINE

## 2021-06-07 PROCEDURE — 1125F AMNT PAIN NOTED PAIN PRSNT: CPT | Mod: S$GLB,,, | Performed by: FAMILY MEDICINE

## 2021-06-07 PROCEDURE — 99214 PR OFFICE/OUTPT VISIT, EST, LEVL IV, 30-39 MIN: ICD-10-PCS | Mod: S$GLB,,, | Performed by: FAMILY MEDICINE

## 2021-06-07 PROCEDURE — 3008F PR BODY MASS INDEX (BMI) DOCUMENTED: ICD-10-PCS | Mod: CPTII,S$GLB,, | Performed by: FAMILY MEDICINE

## 2021-06-07 PROCEDURE — 99214 OFFICE O/P EST MOD 30 MIN: CPT | Mod: S$GLB,,, | Performed by: FAMILY MEDICINE

## 2021-06-07 PROCEDURE — 99999 PR PBB SHADOW E&M-EST. PATIENT-LVL V: ICD-10-PCS | Mod: PBBFAC,,, | Performed by: FAMILY MEDICINE

## 2021-06-07 RX ORDER — OLMESARTAN MEDOXOMIL AND HYDROCHLOROTHIAZIDE 40/25 40; 25 MG/1; MG/1
1 TABLET ORAL DAILY
Qty: 90 TABLET | Refills: 3 | Status: SHIPPED | OUTPATIENT
Start: 2021-06-07 | End: 2021-07-28 | Stop reason: SDUPTHER

## 2021-06-07 RX ORDER — CELECOXIB 100 MG/1
100 CAPSULE ORAL 2 TIMES DAILY
Qty: 60 CAPSULE | Refills: 1 | Status: SHIPPED | OUTPATIENT
Start: 2021-06-07 | End: 2022-04-11

## 2021-06-07 RX ORDER — AMLODIPINE BESYLATE 10 MG/1
10 TABLET ORAL DAILY
Qty: 90 TABLET | Refills: 3 | Status: SHIPPED | OUTPATIENT
Start: 2021-06-07 | End: 2021-07-28 | Stop reason: SDUPTHER

## 2021-06-12 ENCOUNTER — HOSPITAL ENCOUNTER (EMERGENCY)
Facility: OTHER | Age: 66
Discharge: HOME | End: 2021-06-12
Attending: EMERGENCY MEDICINE
Payer: COMMERCIAL

## 2021-06-12 VITALS
OXYGEN SATURATION: 96 % | WEIGHT: 190 LBS | DIASTOLIC BLOOD PRESSURE: 68 MMHG | BODY MASS INDEX: 29.82 KG/M2 | HEIGHT: 67 IN | TEMPERATURE: 98 F | HEART RATE: 76 BPM | RESPIRATION RATE: 20 BRPM | SYSTOLIC BLOOD PRESSURE: 134 MMHG

## 2021-06-12 DIAGNOSIS — S39.012A LUMBAR STRAIN, INITIAL ENCOUNTER: Primary | ICD-10-CM

## 2021-06-12 DIAGNOSIS — M79.7 FIBROMYALGIA: ICD-10-CM

## 2021-06-12 LAB
BACTERIA #/AREA URNS HPF: ABNORMAL /HPF
BILIRUB UR QL STRIP: NEGATIVE
CLARITY UR: CLEAR
COLOR UR: YELLOW
GLUCOSE UR QL STRIP: NEGATIVE
HGB UR QL STRIP: NEGATIVE
KETONES UR QL STRIP: NEGATIVE
LEUKOCYTE ESTERASE UR QL STRIP: ABNORMAL
MICROSCOPIC COMMENT: ABNORMAL
NITRITE UR QL STRIP: NEGATIVE
PH UR STRIP: 7 [PH] (ref 5–8)
PROT UR QL STRIP: NEGATIVE
RBC #/AREA URNS HPF: 0 /HPF (ref 0–4)
SP GR UR STRIP: 1.01 (ref 1–1.03)
SQUAMOUS #/AREA URNS HPF: 2 /HPF
URN SPEC COLLECT METH UR: ABNORMAL
UROBILINOGEN UR STRIP-ACNC: NEGATIVE EU/DL
WBC #/AREA URNS HPF: 3 /HPF (ref 0–5)

## 2021-06-12 PROCEDURE — 81000 URINALYSIS NONAUTO W/SCOPE: CPT | Performed by: EMERGENCY MEDICINE

## 2021-06-12 PROCEDURE — 99284 EMERGENCY DEPT VISIT MOD MDM: CPT | Mod: 25

## 2021-06-12 PROCEDURE — 63600175 PHARM REV CODE 636 W HCPCS: Performed by: EMERGENCY MEDICINE

## 2021-06-12 PROCEDURE — 96372 THER/PROPH/DIAG INJ SC/IM: CPT

## 2021-06-12 PROCEDURE — 25000003 PHARM REV CODE 250: Performed by: EMERGENCY MEDICINE

## 2021-06-12 RX ORDER — FENTANYL CITRATE 50 UG/ML
75 INJECTION, SOLUTION INTRAMUSCULAR; INTRAVENOUS
Status: COMPLETED | OUTPATIENT
Start: 2021-06-12 | End: 2021-06-12

## 2021-06-12 RX ORDER — CAPSAICIN 0 G/G
1 CREAM TOPICAL 2 TIMES DAILY PRN
Qty: 56.6 G | Refills: 0 | Status: SHIPPED | OUTPATIENT
Start: 2021-06-12 | End: 2021-07-28

## 2021-06-12 RX ORDER — HYDROCODONE BITARTRATE AND ACETAMINOPHEN 7.5; 325 MG/1; MG/1
1 TABLET ORAL EVERY 6 HOURS PRN
Qty: 12 TABLET | Refills: 0 | Status: SHIPPED | OUTPATIENT
Start: 2021-06-12 | End: 2021-07-28

## 2021-06-12 RX ORDER — CAPSAICIN 0.03 G/100G
CREAM TOPICAL 2 TIMES DAILY
Status: DISCONTINUED | OUTPATIENT
Start: 2021-06-12 | End: 2021-06-12 | Stop reason: HOSPADM

## 2021-06-12 RX ORDER — CYCLOBENZAPRINE HCL 10 MG
10 TABLET ORAL
Status: COMPLETED | OUTPATIENT
Start: 2021-06-12 | End: 2021-06-12

## 2021-06-12 RX ORDER — CYCLOBENZAPRINE HCL 10 MG
TABLET ORAL
Status: DISPENSED
Start: 2021-06-12 | End: 2021-06-12

## 2021-06-12 RX ORDER — DOCUSATE SODIUM 100 MG/1
100 CAPSULE, LIQUID FILLED ORAL 2 TIMES DAILY PRN
Qty: 60 CAPSULE | Refills: 0 | Status: SHIPPED | OUTPATIENT
Start: 2021-06-12 | End: 2021-07-28

## 2021-06-12 RX ORDER — FENTANYL CITRATE 50 UG/ML
INJECTION, SOLUTION INTRAMUSCULAR; INTRAVENOUS
Status: DISPENSED
Start: 2021-06-12 | End: 2021-06-12

## 2021-06-12 RX ORDER — CAPSAICIN 0.03 G/100G
CREAM TOPICAL
Status: DISPENSED
Start: 2021-06-12 | End: 2021-06-12

## 2021-06-12 RX ORDER — LIDOCAINE 50 MG/G
1 PATCH TOPICAL DAILY PRN
Qty: 15 PATCH | Refills: 0 | Status: SHIPPED | OUTPATIENT
Start: 2021-06-12 | End: 2021-07-28

## 2021-06-12 RX ADMIN — CYCLOBENZAPRINE 10 MG: 10 TABLET, FILM COATED ORAL at 10:06

## 2021-06-12 RX ADMIN — FENTANYL CITRATE 75 MCG: 50 INJECTION, SOLUTION INTRAMUSCULAR; INTRAVENOUS at 10:06

## 2021-07-19 ENCOUNTER — CLINICAL SUPPORT (OUTPATIENT)
Dept: REHABILITATION | Facility: HOSPITAL | Age: 66
End: 2021-07-19
Attending: FAMILY MEDICINE
Payer: COMMERCIAL

## 2021-07-19 DIAGNOSIS — M25.511 CHRONIC RIGHT SHOULDER PAIN: ICD-10-CM

## 2021-07-19 DIAGNOSIS — R29.898 DECREASED STRENGTH OF UPPER EXTREMITY: ICD-10-CM

## 2021-07-19 DIAGNOSIS — G89.29 CHRONIC RIGHT SHOULDER PAIN: ICD-10-CM

## 2021-07-19 DIAGNOSIS — M25.611 DECREASED ROM OF RIGHT SHOULDER: ICD-10-CM

## 2021-07-19 PROBLEM — M25.561 ARTHRALGIA OF BOTH KNEES: Status: RESOLVED | Noted: 2017-07-28 | Resolved: 2021-07-19

## 2021-07-19 PROBLEM — M25.562 ARTHRALGIA OF BOTH KNEES: Status: RESOLVED | Noted: 2017-07-28 | Resolved: 2021-07-19

## 2021-07-19 LAB — BCS RECOMMENDATION EXT: NORMAL

## 2021-07-19 PROCEDURE — 97110 THERAPEUTIC EXERCISES: CPT | Mod: PN

## 2021-07-19 PROCEDURE — 97161 PT EVAL LOW COMPLEX 20 MIN: CPT | Mod: PN

## 2021-07-21 ENCOUNTER — LAB VISIT (OUTPATIENT)
Dept: LAB | Facility: HOSPITAL | Age: 66
End: 2021-07-21
Attending: FAMILY MEDICINE
Payer: COMMERCIAL

## 2021-07-21 DIAGNOSIS — R73.9 HYPERGLYCEMIA: ICD-10-CM

## 2021-07-21 DIAGNOSIS — I10 ESSENTIAL HYPERTENSION: ICD-10-CM

## 2021-07-21 LAB
ALBUMIN SERPL BCP-MCNC: 3.8 G/DL (ref 3.5–5.2)
ALP SERPL-CCNC: 98 U/L (ref 55–135)
ALT SERPL W/O P-5'-P-CCNC: 50 U/L (ref 10–44)
ANION GAP SERPL CALC-SCNC: 8 MMOL/L (ref 8–16)
AST SERPL-CCNC: 32 U/L (ref 10–40)
BASOPHILS # BLD AUTO: 0.04 K/UL (ref 0–0.2)
BASOPHILS NFR BLD: 0.8 % (ref 0–1.9)
BILIRUB SERPL-MCNC: 0.6 MG/DL (ref 0.1–1)
BUN SERPL-MCNC: 13 MG/DL (ref 8–23)
CALCIUM SERPL-MCNC: 8.7 MG/DL (ref 8.7–10.5)
CHLORIDE SERPL-SCNC: 110 MMOL/L (ref 95–110)
CHOLEST SERPL-MCNC: 213 MG/DL (ref 120–199)
CHOLEST/HDLC SERPL: 2.8 {RATIO} (ref 2–5)
CO2 SERPL-SCNC: 24 MMOL/L (ref 23–29)
CREAT SERPL-MCNC: 0.7 MG/DL (ref 0.5–1.4)
DIFFERENTIAL METHOD: NORMAL
EOSINOPHIL # BLD AUTO: 0.1 K/UL (ref 0–0.5)
EOSINOPHIL NFR BLD: 2 % (ref 0–8)
ERYTHROCYTE [DISTWIDTH] IN BLOOD BY AUTOMATED COUNT: 12.7 % (ref 11.5–14.5)
EST. GFR  (AFRICAN AMERICAN): >60 ML/MIN/1.73 M^2
EST. GFR  (NON AFRICAN AMERICAN): >60 ML/MIN/1.73 M^2
ESTIMATED AVG GLUCOSE: 103 MG/DL (ref 68–131)
GLUCOSE SERPL-MCNC: 91 MG/DL (ref 70–110)
HBA1C MFR BLD: 5.2 % (ref 4–5.6)
HCT VFR BLD AUTO: 41.5 % (ref 37–48.5)
HDLC SERPL-MCNC: 76 MG/DL (ref 40–75)
HDLC SERPL: 35.7 % (ref 20–50)
HGB BLD-MCNC: 13.3 G/DL (ref 12–16)
IMM GRANULOCYTES # BLD AUTO: 0.02 K/UL (ref 0–0.04)
IMM GRANULOCYTES NFR BLD AUTO: 0.4 % (ref 0–0.5)
LDLC SERPL CALC-MCNC: 115.6 MG/DL (ref 63–159)
LYMPHOCYTES # BLD AUTO: 1.5 K/UL (ref 1–4.8)
LYMPHOCYTES NFR BLD: 30.8 % (ref 18–48)
MCH RBC QN AUTO: 28.7 PG (ref 27–31)
MCHC RBC AUTO-ENTMCNC: 32 G/DL (ref 32–36)
MCV RBC AUTO: 90 FL (ref 82–98)
MONOCYTES # BLD AUTO: 0.5 K/UL (ref 0.3–1)
MONOCYTES NFR BLD: 9.7 % (ref 4–15)
NEUTROPHILS # BLD AUTO: 2.8 K/UL (ref 1.8–7.7)
NEUTROPHILS NFR BLD: 56.3 % (ref 38–73)
NONHDLC SERPL-MCNC: 137 MG/DL
NRBC BLD-RTO: 0 /100 WBC
PLATELET # BLD AUTO: 296 K/UL (ref 150–450)
PMV BLD AUTO: 9.8 FL (ref 9.2–12.9)
POTASSIUM SERPL-SCNC: 3.8 MMOL/L (ref 3.5–5.1)
PROT SERPL-MCNC: 6.9 G/DL (ref 6–8.4)
RBC # BLD AUTO: 4.63 M/UL (ref 4–5.4)
SODIUM SERPL-SCNC: 142 MMOL/L (ref 136–145)
TRIGL SERPL-MCNC: 107 MG/DL (ref 30–150)
WBC # BLD AUTO: 4.94 K/UL (ref 3.9–12.7)

## 2021-07-21 PROCEDURE — 80061 LIPID PANEL: CPT | Performed by: FAMILY MEDICINE

## 2021-07-21 PROCEDURE — 85025 COMPLETE CBC W/AUTO DIFF WBC: CPT | Performed by: FAMILY MEDICINE

## 2021-07-21 PROCEDURE — 36415 COLL VENOUS BLD VENIPUNCTURE: CPT | Mod: PN | Performed by: FAMILY MEDICINE

## 2021-07-21 PROCEDURE — 80053 COMPREHEN METABOLIC PANEL: CPT | Performed by: FAMILY MEDICINE

## 2021-07-21 PROCEDURE — 83036 HEMOGLOBIN GLYCOSYLATED A1C: CPT | Performed by: FAMILY MEDICINE

## 2021-07-26 ENCOUNTER — CLINICAL SUPPORT (OUTPATIENT)
Dept: REHABILITATION | Facility: HOSPITAL | Age: 66
End: 2021-07-26
Attending: FAMILY MEDICINE
Payer: COMMERCIAL

## 2021-07-26 DIAGNOSIS — R29.898 DECREASED STRENGTH OF UPPER EXTREMITY: ICD-10-CM

## 2021-07-26 DIAGNOSIS — M25.511 CHRONIC RIGHT SHOULDER PAIN: ICD-10-CM

## 2021-07-26 DIAGNOSIS — M25.611 DECREASED ROM OF RIGHT SHOULDER: ICD-10-CM

## 2021-07-26 DIAGNOSIS — G89.29 CHRONIC RIGHT SHOULDER PAIN: ICD-10-CM

## 2021-07-26 PROCEDURE — 97110 THERAPEUTIC EXERCISES: CPT | Mod: PN

## 2021-07-28 ENCOUNTER — OFFICE VISIT (OUTPATIENT)
Dept: FAMILY MEDICINE | Facility: CLINIC | Age: 66
End: 2021-07-28
Payer: COMMERCIAL

## 2021-07-28 VITALS
TEMPERATURE: 99 F | HEIGHT: 67 IN | WEIGHT: 191.81 LBS | OXYGEN SATURATION: 98 % | RESPIRATION RATE: 18 BRPM | HEART RATE: 88 BPM | DIASTOLIC BLOOD PRESSURE: 80 MMHG | SYSTOLIC BLOOD PRESSURE: 128 MMHG | BODY MASS INDEX: 30.1 KG/M2

## 2021-07-28 DIAGNOSIS — M25.561 CHRONIC PAIN OF BOTH KNEES: ICD-10-CM

## 2021-07-28 DIAGNOSIS — G47.33 OBSTRUCTIVE SLEEP APNEA SYNDROME: ICD-10-CM

## 2021-07-28 DIAGNOSIS — M25.562 CHRONIC PAIN OF BOTH KNEES: ICD-10-CM

## 2021-07-28 DIAGNOSIS — G89.29 CHRONIC PAIN OF BOTH KNEES: ICD-10-CM

## 2021-07-28 DIAGNOSIS — K21.9 GASTROESOPHAGEAL REFLUX DISEASE WITHOUT ESOPHAGITIS: ICD-10-CM

## 2021-07-28 DIAGNOSIS — I10 ESSENTIAL HYPERTENSION: Primary | ICD-10-CM

## 2021-07-28 DIAGNOSIS — Z23 NEED FOR VACCINATION: ICD-10-CM

## 2021-07-28 DIAGNOSIS — R74.01 ELEVATED TRANSAMINASE LEVEL: ICD-10-CM

## 2021-07-28 PROCEDURE — 3074F PR MOST RECENT SYSTOLIC BLOOD PRESSURE < 130 MM HG: ICD-10-PCS | Mod: CPTII,S$GLB,, | Performed by: FAMILY MEDICINE

## 2021-07-28 PROCEDURE — 3008F PR BODY MASS INDEX (BMI) DOCUMENTED: ICD-10-PCS | Mod: CPTII,S$GLB,, | Performed by: FAMILY MEDICINE

## 2021-07-28 PROCEDURE — 3044F PR MOST RECENT HEMOGLOBIN A1C LEVEL <7.0%: ICD-10-PCS | Mod: CPTII,S$GLB,, | Performed by: FAMILY MEDICINE

## 2021-07-28 PROCEDURE — 3008F BODY MASS INDEX DOCD: CPT | Mod: CPTII,S$GLB,, | Performed by: FAMILY MEDICINE

## 2021-07-28 PROCEDURE — 90471 IMMUNIZATION ADMIN: CPT | Mod: S$GLB,,, | Performed by: FAMILY MEDICINE

## 2021-07-28 PROCEDURE — 1160F PR REVIEW ALL MEDS BY PRESCRIBER/CLIN PHARMACIST DOCUMENTED: ICD-10-PCS | Mod: CPTII,S$GLB,, | Performed by: FAMILY MEDICINE

## 2021-07-28 PROCEDURE — 3079F PR MOST RECENT DIASTOLIC BLOOD PRESSURE 80-89 MM HG: ICD-10-PCS | Mod: CPTII,S$GLB,, | Performed by: FAMILY MEDICINE

## 2021-07-28 PROCEDURE — 90471 PNEUMOCOCCAL POLYSACCHARIDE VACCINE 23-VALENT =>2YO SQ IM: ICD-10-PCS | Mod: S$GLB,,, | Performed by: FAMILY MEDICINE

## 2021-07-28 PROCEDURE — 99999 PR PBB SHADOW E&M-EST. PATIENT-LVL IV: ICD-10-PCS | Mod: PBBFAC,,, | Performed by: FAMILY MEDICINE

## 2021-07-28 PROCEDURE — 90732 PNEUMOCOCCAL POLYSACCHARIDE VACCINE 23-VALENT =>2YO SQ IM: ICD-10-PCS | Mod: S$GLB,,, | Performed by: FAMILY MEDICINE

## 2021-07-28 PROCEDURE — 99214 PR OFFICE/OUTPT VISIT, EST, LEVL IV, 30-39 MIN: ICD-10-PCS | Mod: 25,S$GLB,, | Performed by: FAMILY MEDICINE

## 2021-07-28 PROCEDURE — 3044F HG A1C LEVEL LT 7.0%: CPT | Mod: CPTII,S$GLB,, | Performed by: FAMILY MEDICINE

## 2021-07-28 PROCEDURE — 90732 PPSV23 VACC 2 YRS+ SUBQ/IM: CPT | Mod: S$GLB,,, | Performed by: FAMILY MEDICINE

## 2021-07-28 PROCEDURE — 1160F RVW MEDS BY RX/DR IN RCRD: CPT | Mod: CPTII,S$GLB,, | Performed by: FAMILY MEDICINE

## 2021-07-28 PROCEDURE — 1159F PR MEDICATION LIST DOCUMENTED IN MEDICAL RECORD: ICD-10-PCS | Mod: CPTII,S$GLB,, | Performed by: FAMILY MEDICINE

## 2021-07-28 PROCEDURE — 1159F MED LIST DOCD IN RCRD: CPT | Mod: CPTII,S$GLB,, | Performed by: FAMILY MEDICINE

## 2021-07-28 PROCEDURE — 3074F SYST BP LT 130 MM HG: CPT | Mod: CPTII,S$GLB,, | Performed by: FAMILY MEDICINE

## 2021-07-28 PROCEDURE — 99214 OFFICE O/P EST MOD 30 MIN: CPT | Mod: 25,S$GLB,, | Performed by: FAMILY MEDICINE

## 2021-07-28 PROCEDURE — 3079F DIAST BP 80-89 MM HG: CPT | Mod: CPTII,S$GLB,, | Performed by: FAMILY MEDICINE

## 2021-07-28 PROCEDURE — 99999 PR PBB SHADOW E&M-EST. PATIENT-LVL IV: CPT | Mod: PBBFAC,,, | Performed by: FAMILY MEDICINE

## 2021-07-28 RX ORDER — OLMESARTAN MEDOXOMIL 40 MG/1
TABLET ORAL
COMMUNITY
Start: 2021-07-22 | End: 2021-07-28

## 2021-07-28 RX ORDER — ESOMEPRAZOLE MAGNESIUM 40 MG/1
40 CAPSULE, DELAYED RELEASE ORAL
Qty: 90 CAPSULE | Refills: 3 | Status: SHIPPED | OUTPATIENT
Start: 2021-07-28 | End: 2022-04-13

## 2021-07-28 RX ORDER — RALOXIFENE HYDROCHLORIDE 60 MG/1
60 TABLET, FILM COATED ORAL DAILY
COMMUNITY
Start: 2021-07-14 | End: 2022-04-11

## 2021-07-28 RX ORDER — DICLOFENAC SODIUM 10 MG/G
GEL TOPICAL
Qty: 100 G | Refills: 5 | Status: SHIPPED | OUTPATIENT
Start: 2021-07-28 | End: 2022-04-11

## 2021-07-28 RX ORDER — AMLODIPINE BESYLATE 10 MG/1
10 TABLET ORAL DAILY
Qty: 90 TABLET | Refills: 3 | Status: SHIPPED | OUTPATIENT
Start: 2021-07-28 | End: 2022-04-11

## 2021-07-28 RX ORDER — OLMESARTAN MEDOXOMIL AND HYDROCHLOROTHIAZIDE 40/25 40; 25 MG/1; MG/1
1 TABLET ORAL DAILY
Qty: 90 TABLET | Refills: 3 | Status: SHIPPED | OUTPATIENT
Start: 2021-07-28 | End: 2022-10-01

## 2021-07-29 ENCOUNTER — TELEPHONE (OUTPATIENT)
Dept: FAMILY MEDICINE | Facility: CLINIC | Age: 66
End: 2021-07-29

## 2021-08-05 ENCOUNTER — CLINICAL SUPPORT (OUTPATIENT)
Dept: REHABILITATION | Facility: HOSPITAL | Age: 66
End: 2021-08-05
Attending: FAMILY MEDICINE
Payer: COMMERCIAL

## 2021-08-05 DIAGNOSIS — G89.29 CHRONIC RIGHT SHOULDER PAIN: ICD-10-CM

## 2021-08-05 DIAGNOSIS — M25.611 DECREASED ROM OF RIGHT SHOULDER: ICD-10-CM

## 2021-08-05 DIAGNOSIS — M25.511 CHRONIC RIGHT SHOULDER PAIN: ICD-10-CM

## 2021-08-05 DIAGNOSIS — R29.898 DECREASED STRENGTH OF UPPER EXTREMITY: ICD-10-CM

## 2021-08-05 PROCEDURE — 97110 THERAPEUTIC EXERCISES: CPT | Mod: PN

## 2021-08-10 ENCOUNTER — TELEPHONE (OUTPATIENT)
Dept: REHABILITATION | Facility: HOSPITAL | Age: 66
End: 2021-08-10

## 2021-08-11 ENCOUNTER — CLINICAL SUPPORT (OUTPATIENT)
Dept: REHABILITATION | Facility: HOSPITAL | Age: 66
End: 2021-08-11
Attending: FAMILY MEDICINE
Payer: COMMERCIAL

## 2021-08-11 DIAGNOSIS — G89.29 CHRONIC RIGHT SHOULDER PAIN: ICD-10-CM

## 2021-08-11 DIAGNOSIS — R29.898 DECREASED STRENGTH OF UPPER EXTREMITY: ICD-10-CM

## 2021-08-11 DIAGNOSIS — M25.511 CHRONIC RIGHT SHOULDER PAIN: ICD-10-CM

## 2021-08-11 DIAGNOSIS — M25.611 DECREASED ROM OF RIGHT SHOULDER: ICD-10-CM

## 2021-08-11 PROCEDURE — 97110 THERAPEUTIC EXERCISES: CPT | Mod: PN

## 2021-08-11 PROCEDURE — 97140 MANUAL THERAPY 1/> REGIONS: CPT | Mod: PN

## 2021-08-16 ENCOUNTER — CLINICAL SUPPORT (OUTPATIENT)
Dept: REHABILITATION | Facility: HOSPITAL | Age: 66
End: 2021-08-16
Attending: FAMILY MEDICINE
Payer: COMMERCIAL

## 2021-08-16 DIAGNOSIS — G89.29 CHRONIC RIGHT SHOULDER PAIN: ICD-10-CM

## 2021-08-16 DIAGNOSIS — M25.611 DECREASED ROM OF RIGHT SHOULDER: ICD-10-CM

## 2021-08-16 DIAGNOSIS — R29.898 DECREASED STRENGTH OF UPPER EXTREMITY: ICD-10-CM

## 2021-08-16 DIAGNOSIS — M25.511 CHRONIC RIGHT SHOULDER PAIN: ICD-10-CM

## 2021-08-16 PROCEDURE — 97110 THERAPEUTIC EXERCISES: CPT | Mod: PN,CQ

## 2021-09-22 ENCOUNTER — PATIENT MESSAGE (OUTPATIENT)
Dept: SLEEP MEDICINE | Facility: CLINIC | Age: 66
End: 2021-09-22

## 2021-10-01 ENCOUNTER — TELEPHONE (OUTPATIENT)
Dept: FAMILY MEDICINE | Facility: CLINIC | Age: 66
End: 2021-10-01

## 2021-10-08 RX ORDER — SUMATRIPTAN SUCCINATE 100 MG/1
100 TABLET ORAL
Qty: 9 TABLET | Refills: 5 | Status: SHIPPED | OUTPATIENT
Start: 2021-10-08 | End: 2023-08-29 | Stop reason: SDUPTHER

## 2021-12-06 ENCOUNTER — OFFICE VISIT (OUTPATIENT)
Dept: RHEUMATOLOGY | Facility: CLINIC | Age: 66
End: 2021-12-06
Payer: COMMERCIAL

## 2021-12-06 VITALS
WEIGHT: 196.19 LBS | SYSTOLIC BLOOD PRESSURE: 119 MMHG | OXYGEN SATURATION: 97 % | HEIGHT: 67 IN | DIASTOLIC BLOOD PRESSURE: 69 MMHG | RESPIRATION RATE: 18 BRPM | HEART RATE: 100 BPM | TEMPERATURE: 98 F | BODY MASS INDEX: 30.79 KG/M2

## 2021-12-06 DIAGNOSIS — Z79.1 NSAID LONG-TERM USE: ICD-10-CM

## 2021-12-06 DIAGNOSIS — E66.9 CLASS 1 OBESITY WITH BODY MASS INDEX (BMI) OF 30.0 TO 30.9 IN ADULT, UNSPECIFIED OBESITY TYPE, UNSPECIFIED WHETHER SERIOUS COMORBIDITY PRESENT: ICD-10-CM

## 2021-12-06 DIAGNOSIS — Z71.89 COUNSELING AND COORDINATION OF CARE: ICD-10-CM

## 2021-12-06 DIAGNOSIS — M79.7 FIBROMYALGIA: Primary | ICD-10-CM

## 2021-12-06 DIAGNOSIS — M15.9 PRIMARY OSTEOARTHRITIS INVOLVING MULTIPLE JOINTS: ICD-10-CM

## 2021-12-06 PROCEDURE — 99214 OFFICE O/P EST MOD 30 MIN: CPT | Mod: S$GLB,,, | Performed by: INTERNAL MEDICINE

## 2021-12-06 PROCEDURE — 3066F NEPHROPATHY DOC TX: CPT | Mod: CPTII,S$GLB,, | Performed by: INTERNAL MEDICINE

## 2021-12-06 PROCEDURE — 3066F PR DOCUMENTATION OF TREATMENT FOR NEPHROPATHY: ICD-10-PCS | Mod: CPTII,S$GLB,, | Performed by: INTERNAL MEDICINE

## 2021-12-06 PROCEDURE — 4010F ACE/ARB THERAPY RXD/TAKEN: CPT | Mod: CPTII,S$GLB,, | Performed by: INTERNAL MEDICINE

## 2021-12-06 PROCEDURE — 4010F PR ACE/ARB THEARPY RXD/TAKEN: ICD-10-PCS | Mod: CPTII,S$GLB,, | Performed by: INTERNAL MEDICINE

## 2021-12-06 PROCEDURE — 99999 PR PBB SHADOW E&M-EST. PATIENT-LVL III: CPT | Mod: PBBFAC,,, | Performed by: INTERNAL MEDICINE

## 2021-12-06 PROCEDURE — 99214 PR OFFICE/OUTPT VISIT, EST, LEVL IV, 30-39 MIN: ICD-10-PCS | Mod: S$GLB,,, | Performed by: INTERNAL MEDICINE

## 2021-12-06 PROCEDURE — 3061F PR NEG MICROALBUMINURIA RESULT DOCUMENTED/REVIEW: ICD-10-PCS | Mod: CPTII,S$GLB,, | Performed by: INTERNAL MEDICINE

## 2021-12-06 PROCEDURE — 99999 PR PBB SHADOW E&M-EST. PATIENT-LVL III: ICD-10-PCS | Mod: PBBFAC,,, | Performed by: INTERNAL MEDICINE

## 2021-12-06 PROCEDURE — 3061F NEG MICROALBUMINURIA REV: CPT | Mod: CPTII,S$GLB,, | Performed by: INTERNAL MEDICINE

## 2021-12-06 RX ORDER — MELOXICAM 15 MG/1
15 TABLET ORAL DAILY
Qty: 30 TABLET | Refills: 6 | Status: SHIPPED | OUTPATIENT
Start: 2021-12-06 | End: 2022-08-21

## 2021-12-06 RX ORDER — CYCLOBENZAPRINE HCL 5 MG
5 TABLET ORAL 3 TIMES DAILY PRN
Qty: 60 TABLET | Refills: 3 | Status: SHIPPED | OUTPATIENT
Start: 2021-12-06 | End: 2022-04-11 | Stop reason: SINTOL

## 2021-12-06 RX ORDER — GABAPENTIN 300 MG/1
300 CAPSULE ORAL 2 TIMES DAILY
Qty: 60 CAPSULE | Refills: 11 | Status: SHIPPED | OUTPATIENT
Start: 2021-12-06 | End: 2024-02-16 | Stop reason: SDUPTHER

## 2022-01-10 DIAGNOSIS — L30.9 DERMATITIS: ICD-10-CM

## 2022-01-11 NOTE — TELEPHONE ENCOUNTER
No new care gaps identified.  Powered by [employer]. Reference number: [id number].   1/10/2022 10:45:52 PM CST

## 2022-01-15 RX ORDER — CLOBETASOL PROPIONATE 0.05 G/100ML
SHAMPOO TOPICAL
Qty: 118 ML | Refills: 2 | Status: SHIPPED | OUTPATIENT
Start: 2022-01-15 | End: 2022-12-14

## 2022-01-16 NOTE — TELEPHONE ENCOUNTER
Refill Authorization Note   Rachel Conroy  is requesting a refill authorization.  Brief Assessment and Rationale for Refill:  Approve     Medication Therapy Plan:       Medication Reconciliation Completed: No   Comments:   --->Care Gap information included below if applicable.   Orders Placed This Encounter    clobetasoL (CLOBEX) 0.05 % shampoo      Requested Prescriptions   Signed Prescriptions Disp Refills    clobetasoL (CLOBEX) 0.05 % shampoo 118 mL 2     Sig: APPLY EXTERNALLY TWICE DAILY       Dermatology:  Corticosteroids Passed - 1/10/2022 10:45 PM        Passed - Patient is at least 18 years old        Passed - Valid encounter within last 15 months     Recent Visits  Date Type Provider Dept   07/28/21 Office Visit Maxwell Gonzalez Jr., MD [facility] Med   06/07/21 Office Visit Maxwell Gonzalez Jr., MD [facility]/ [facility] Med   11/09/20 Office Visit Maxwell Gonzalez Jr., MD [facility]/ Internal Med   08/25/20 Office Visit Maxwell Gonzalez Jr., MD [facility]/ Internal Med   07/28/20 Office Visit Maxwell Gonzalez Jr., MD [facility]/ Internal Med   Showing recent visits within past 720 days and meeting all other requirements  Future Appointments  No visits were found meeting these conditions.  Showing future appointments within next 150 days and meeting all other requirements      Future Appointments              In 4 days LAB, [facility] [place] [facility] [facility] [place]    In 4 days LAB, [facility] [place] [facility], [facility] [place]    In 1 week Maxwell Gonzalez Jr., MD [place] - Family Medicine, [facility]    In 1 month [facility] [place] [facility]                Passed - Manual Review: Max refill duration of 3 months.            Appointments  past 12m or future 3m with PCP    Date Provider   Last Visit   7/28/2021 Maxwell Gonzalez Jr., MD   Next Visit   1/14/2022 Maxwell Gonzalez Jr., MD   ED visits in past 90 days:  0     Note composed:6:03 PM 01/15/2022

## 2022-01-19 ENCOUNTER — LAB VISIT (OUTPATIENT)
Dept: LAB | Facility: HOSPITAL | Age: 67
End: 2022-01-19
Attending: FAMILY MEDICINE
Payer: COMMERCIAL

## 2022-01-19 DIAGNOSIS — I10 ESSENTIAL HYPERTENSION: ICD-10-CM

## 2022-01-19 LAB
ALBUMIN/CREAT UR: 12.5 UG/MG (ref 0–30)
CREAT UR-MCNC: 152 MG/DL (ref 15–325)
MICROALBUMIN UR DL<=1MG/L-MCNC: 19 UG/ML

## 2022-01-19 PROCEDURE — 82570 ASSAY OF URINE CREATININE: CPT | Performed by: FAMILY MEDICINE

## 2022-01-19 PROCEDURE — 82043 UR ALBUMIN QUANTITATIVE: CPT | Performed by: FAMILY MEDICINE

## 2022-01-31 ENCOUNTER — TELEPHONE (OUTPATIENT)
Dept: FAMILY MEDICINE | Facility: CLINIC | Age: 67
End: 2022-01-31
Payer: COMMERCIAL

## 2022-01-31 NOTE — TELEPHONE ENCOUNTER
----- Message from Maxwell Gonzalez Jr., MD sent at 1/29/2022 10:36 PM CST -----  Patient did labs and missed appointment with me. She reschedule for May. In the future she needs to be seen every 6 months and if she continues to miss appointments after labs are done I will not continue to place previsit orders.

## 2022-01-31 NOTE — TELEPHONE ENCOUNTER
Patient did labs and missed appointment with me. She reschedule for May. In the future she needs to be seen every 6 months and if she continues to miss appointments after labs are done I will not continue to place previsit orders.

## 2022-04-11 ENCOUNTER — OFFICE VISIT (OUTPATIENT)
Dept: FAMILY MEDICINE | Facility: CLINIC | Age: 67
End: 2022-04-11
Payer: COMMERCIAL

## 2022-04-11 VITALS
WEIGHT: 199.75 LBS | RESPIRATION RATE: 18 BRPM | DIASTOLIC BLOOD PRESSURE: 80 MMHG | BODY MASS INDEX: 31.35 KG/M2 | HEART RATE: 76 BPM | HEIGHT: 67 IN | OXYGEN SATURATION: 97 % | SYSTOLIC BLOOD PRESSURE: 120 MMHG | TEMPERATURE: 98 F

## 2022-04-11 DIAGNOSIS — E78.5 DYSLIPIDEMIA: ICD-10-CM

## 2022-04-11 DIAGNOSIS — R00.2 PALPITATIONS: Primary | ICD-10-CM

## 2022-04-11 DIAGNOSIS — M79.7 FIBROMYALGIA: ICD-10-CM

## 2022-04-11 DIAGNOSIS — I10 ESSENTIAL HYPERTENSION: ICD-10-CM

## 2022-04-11 DIAGNOSIS — Z87.898 H/O MOTION SICKNESS: ICD-10-CM

## 2022-04-11 PROCEDURE — 3061F PR NEG MICROALBUMINURIA RESULT DOCUMENTED/REVIEW: ICD-10-PCS | Mod: CPTII,S$GLB,, | Performed by: FAMILY MEDICINE

## 2022-04-11 PROCEDURE — 3066F NEPHROPATHY DOC TX: CPT | Mod: CPTII,S$GLB,, | Performed by: FAMILY MEDICINE

## 2022-04-11 PROCEDURE — 3066F PR DOCUMENTATION OF TREATMENT FOR NEPHROPATHY: ICD-10-PCS | Mod: CPTII,S$GLB,, | Performed by: FAMILY MEDICINE

## 2022-04-11 PROCEDURE — 99214 OFFICE O/P EST MOD 30 MIN: CPT | Mod: S$GLB,,, | Performed by: FAMILY MEDICINE

## 2022-04-11 PROCEDURE — 3074F SYST BP LT 130 MM HG: CPT | Mod: CPTII,S$GLB,, | Performed by: FAMILY MEDICINE

## 2022-04-11 PROCEDURE — 3074F PR MOST RECENT SYSTOLIC BLOOD PRESSURE < 130 MM HG: ICD-10-PCS | Mod: CPTII,S$GLB,, | Performed by: FAMILY MEDICINE

## 2022-04-11 PROCEDURE — 3079F DIAST BP 80-89 MM HG: CPT | Mod: CPTII,S$GLB,, | Performed by: FAMILY MEDICINE

## 2022-04-11 PROCEDURE — 3061F NEG MICROALBUMINURIA REV: CPT | Mod: CPTII,S$GLB,, | Performed by: FAMILY MEDICINE

## 2022-04-11 PROCEDURE — 3008F BODY MASS INDEX DOCD: CPT | Mod: CPTII,S$GLB,, | Performed by: FAMILY MEDICINE

## 2022-04-11 PROCEDURE — 99999 PR PBB SHADOW E&M-EST. PATIENT-LVL IV: ICD-10-PCS | Mod: PBBFAC,,, | Performed by: FAMILY MEDICINE

## 2022-04-11 PROCEDURE — 3008F PR BODY MASS INDEX (BMI) DOCUMENTED: ICD-10-PCS | Mod: CPTII,S$GLB,, | Performed by: FAMILY MEDICINE

## 2022-04-11 PROCEDURE — 3079F PR MOST RECENT DIASTOLIC BLOOD PRESSURE 80-89 MM HG: ICD-10-PCS | Mod: CPTII,S$GLB,, | Performed by: FAMILY MEDICINE

## 2022-04-11 PROCEDURE — 99999 PR PBB SHADOW E&M-EST. PATIENT-LVL IV: CPT | Mod: PBBFAC,,, | Performed by: FAMILY MEDICINE

## 2022-04-11 PROCEDURE — 99214 PR OFFICE/OUTPT VISIT, EST, LEVL IV, 30-39 MIN: ICD-10-PCS | Mod: S$GLB,,, | Performed by: FAMILY MEDICINE

## 2022-04-11 RX ORDER — METOPROLOL SUCCINATE 25 MG/1
25 TABLET, EXTENDED RELEASE ORAL DAILY
Qty: 30 TABLET | Refills: 0 | Status: SHIPPED | OUTPATIENT
Start: 2022-04-11 | End: 2022-04-13 | Stop reason: SDUPTHER

## 2022-04-11 RX ORDER — BACLOFEN 10 MG/1
10 TABLET ORAL 3 TIMES DAILY
Qty: 90 TABLET | Refills: 11 | Status: SHIPPED | OUTPATIENT
Start: 2022-04-11 | End: 2022-08-21

## 2022-04-11 RX ORDER — SCOLOPAMINE TRANSDERMAL SYSTEM 1 MG/1
1 PATCH, EXTENDED RELEASE TRANSDERMAL
Qty: 4 PATCH | Refills: 0 | Status: SHIPPED | OUTPATIENT
Start: 2022-04-11 | End: 2022-05-22

## 2022-04-13 ENCOUNTER — OFFICE VISIT (OUTPATIENT)
Dept: GASTROENTEROLOGY | Facility: CLINIC | Age: 67
End: 2022-04-13
Payer: COMMERCIAL

## 2022-04-13 VITALS
WEIGHT: 196 LBS | SYSTOLIC BLOOD PRESSURE: 142 MMHG | DIASTOLIC BLOOD PRESSURE: 80 MMHG | BODY MASS INDEX: 30.76 KG/M2 | HEIGHT: 67 IN | HEART RATE: 70 BPM

## 2022-04-13 DIAGNOSIS — K21.9 GASTROESOPHAGEAL REFLUX DISEASE, UNSPECIFIED WHETHER ESOPHAGITIS PRESENT: Primary | ICD-10-CM

## 2022-04-13 DIAGNOSIS — K58.0 IRRITABLE BOWEL SYNDROME WITH DIARRHEA: ICD-10-CM

## 2022-04-13 DIAGNOSIS — R00.2 PALPITATIONS: ICD-10-CM

## 2022-04-13 DIAGNOSIS — K21.9 GASTROESOPHAGEAL REFLUX DISEASE WITHOUT ESOPHAGITIS: ICD-10-CM

## 2022-04-13 PROCEDURE — 99214 OFFICE O/P EST MOD 30 MIN: CPT | Mod: S$GLB,,, | Performed by: STUDENT IN AN ORGANIZED HEALTH CARE EDUCATION/TRAINING PROGRAM

## 2022-04-13 PROCEDURE — 99999 PR PBB SHADOW E&M-EST. PATIENT-LVL III: ICD-10-PCS | Mod: PBBFAC,,, | Performed by: STUDENT IN AN ORGANIZED HEALTH CARE EDUCATION/TRAINING PROGRAM

## 2022-04-13 PROCEDURE — 99214 PR OFFICE/OUTPT VISIT, EST, LEVL IV, 30-39 MIN: ICD-10-PCS | Mod: S$GLB,,, | Performed by: STUDENT IN AN ORGANIZED HEALTH CARE EDUCATION/TRAINING PROGRAM

## 2022-04-13 PROCEDURE — 3061F NEG MICROALBUMINURIA REV: CPT | Mod: CPTII,S$GLB,, | Performed by: STUDENT IN AN ORGANIZED HEALTH CARE EDUCATION/TRAINING PROGRAM

## 2022-04-13 PROCEDURE — 3077F SYST BP >= 140 MM HG: CPT | Mod: CPTII,S$GLB,, | Performed by: STUDENT IN AN ORGANIZED HEALTH CARE EDUCATION/TRAINING PROGRAM

## 2022-04-13 PROCEDURE — 1159F PR MEDICATION LIST DOCUMENTED IN MEDICAL RECORD: ICD-10-PCS | Mod: CPTII,S$GLB,, | Performed by: STUDENT IN AN ORGANIZED HEALTH CARE EDUCATION/TRAINING PROGRAM

## 2022-04-13 PROCEDURE — 3061F PR NEG MICROALBUMINURIA RESULT DOCUMENTED/REVIEW: ICD-10-PCS | Mod: CPTII,S$GLB,, | Performed by: STUDENT IN AN ORGANIZED HEALTH CARE EDUCATION/TRAINING PROGRAM

## 2022-04-13 PROCEDURE — 3008F PR BODY MASS INDEX (BMI) DOCUMENTED: ICD-10-PCS | Mod: CPTII,S$GLB,, | Performed by: STUDENT IN AN ORGANIZED HEALTH CARE EDUCATION/TRAINING PROGRAM

## 2022-04-13 PROCEDURE — 99999 PR PBB SHADOW E&M-EST. PATIENT-LVL III: CPT | Mod: PBBFAC,,, | Performed by: STUDENT IN AN ORGANIZED HEALTH CARE EDUCATION/TRAINING PROGRAM

## 2022-04-13 PROCEDURE — 3077F PR MOST RECENT SYSTOLIC BLOOD PRESSURE >= 140 MM HG: ICD-10-PCS | Mod: CPTII,S$GLB,, | Performed by: STUDENT IN AN ORGANIZED HEALTH CARE EDUCATION/TRAINING PROGRAM

## 2022-04-13 PROCEDURE — 3008F BODY MASS INDEX DOCD: CPT | Mod: CPTII,S$GLB,, | Performed by: STUDENT IN AN ORGANIZED HEALTH CARE EDUCATION/TRAINING PROGRAM

## 2022-04-13 PROCEDURE — 3066F PR DOCUMENTATION OF TREATMENT FOR NEPHROPATHY: ICD-10-PCS | Mod: CPTII,S$GLB,, | Performed by: STUDENT IN AN ORGANIZED HEALTH CARE EDUCATION/TRAINING PROGRAM

## 2022-04-13 PROCEDURE — 3079F PR MOST RECENT DIASTOLIC BLOOD PRESSURE 80-89 MM HG: ICD-10-PCS | Mod: CPTII,S$GLB,, | Performed by: STUDENT IN AN ORGANIZED HEALTH CARE EDUCATION/TRAINING PROGRAM

## 2022-04-13 PROCEDURE — 3066F NEPHROPATHY DOC TX: CPT | Mod: CPTII,S$GLB,, | Performed by: STUDENT IN AN ORGANIZED HEALTH CARE EDUCATION/TRAINING PROGRAM

## 2022-04-13 PROCEDURE — 1159F MED LIST DOCD IN RCRD: CPT | Mod: CPTII,S$GLB,, | Performed by: STUDENT IN AN ORGANIZED HEALTH CARE EDUCATION/TRAINING PROGRAM

## 2022-04-13 PROCEDURE — 3079F DIAST BP 80-89 MM HG: CPT | Mod: CPTII,S$GLB,, | Performed by: STUDENT IN AN ORGANIZED HEALTH CARE EDUCATION/TRAINING PROGRAM

## 2022-04-13 RX ORDER — AMITRIPTYLINE HYDROCHLORIDE 10 MG/1
10 TABLET, FILM COATED ORAL NIGHTLY
Qty: 30 TABLET | Refills: 11 | Status: SHIPPED | OUTPATIENT
Start: 2022-04-13 | End: 2022-08-21

## 2022-04-13 RX ORDER — ESOMEPRAZOLE MAGNESIUM 40 MG/1
40 CAPSULE, DELAYED RELEASE ORAL 2 TIMES DAILY
Qty: 90 CAPSULE | Refills: 3 | Status: SHIPPED | OUTPATIENT
Start: 2022-04-13 | End: 2022-09-22

## 2022-04-13 RX ORDER — METOPROLOL SUCCINATE 25 MG/1
25 TABLET, EXTENDED RELEASE ORAL DAILY
Qty: 90 TABLET | Refills: 0 | Status: SHIPPED | OUTPATIENT
Start: 2022-04-13 | End: 2023-04-27 | Stop reason: DRUGHIGH

## 2022-04-13 NOTE — PATIENT INSTRUCTIONS
Take nexium 30 min before meals, increase to twice a daily for next two months     Take pepcid (famotidine) at night     Do not eat 3 hours before bedtime     Avoid chocolate, caffeine, alcohol or peppermint   _______________________________________________________________________________________    Take lactaid pill with dairy intake   Pepto bismol would help diarrhea as well     _______________________________________________________________________________________    Start elavil at night for IBS-diarrhea

## 2022-04-13 NOTE — PROGRESS NOTES
[facility] Follow-UP Note    Reason for Follow-Up:  Diarrhea, GERD     PCP:   Maxwell Gonzalez Jr       HPI:  This is a 66 y.o. female with PMHx of FM last seen in GI clinic in 2020 for chronic diarrhea and GERD. Previous work up diagnosed her with IBS-D. She tried and failed viberzi in the past. Was using bentyl PRN. Last cscope in 2017 showed hemorrhoids but otherwise normal. No prior EGD. Most recent CBC and CMP wnl. Also noted to have mobic on medication list for FM and takes this infrequently. Mostly uses gabapentin. She is on nexium once daily     Noted to have a history of difficult airway and intubation in the past     Interval History:  Today, patient is still having issues with diarrhea and reflux. She states she has 5-6 loose stools daily which are associated with lower abdominal cramping. Diary and stress are triggers for her. Denied and blood in stool. No currently on any medication for diarrhea. Weight and appetite are stable. Her reflux has also been an issue. She has regurgitation and acid taste in her mouth after meals and in AM. Denied dysphagia, N/V, hematemesis or melena. She does admit to eating late at night and drinks coffee. She is taking Nexium but often on full stomach. Not on any OTC reflux meds     ROS:  Constitutional: No fevers, chills, No weight loss  ENT: No allergies  CV: No chest pain  Pulm: No cough, No shortness of breath  Ophtho: No vision changes  GI: see HPI  Derm: No rash  Heme: No lymphadenopathy, No bruising  MSK: No arthritis  : No dysuria, No hematuria  Endo: No hot or cold intolerance  Neuro: No syncope, No seizure  Psych: No anxiety, No depression    PMH, PSH, SH, FH reviewed     All:   Review of patient's allergies indicates:   Allergen Reactions    Tramadol Itching       Current Outpatient Rx   Medication Sig Dispense Refill    amitriptyline (ELAVIL) 10 MG tablet Take 1 tablet (10 mg total) by mouth every evening. 30 tablet 11    baclofen  "(LIORESAL) 10 MG tablet Take 1 tablet (10 mg total) by mouth 3 (three) times daily. 90 tablet 11    clobetasoL (CLOBEX) 0.05 % shampoo APPLY EXTERNALLY TWICE DAILY 118 mL 2    esomeprazole (NEXIUM) 40 MG capsule Take 1 capsule (40 mg total) by mouth 2 (two) times daily. 90 capsule 3    gabapentin (NEURONTIN) 300 MG capsule Take 1 capsule (300 mg total) by mouth 2 (two) times daily. 60 capsule 11    meloxicam (MOBIC) 15 MG tablet Take 1 tablet (15 mg total) by mouth once daily. 30 tablet 6    metoprolol succinate (TOPROL-XL) 25 MG 24 hr tablet Take 1 tablet (25 mg total) by mouth once daily. 90 tablet 0    olmesartan-hydrochlorothiazide (BENICAR HCT) 40-25 mg per tablet Take 1 tablet by mouth once daily. 90 tablet 3    scopolamine (TRANSDERM-SCOP) 1.3-1.5 mg (1 mg over 3 days) Place 1 patch onto the skin every 72 hours. 4 patch 0    sumatriptan (IMITREX) 100 MG tablet Take 1 tablet (100 mg total) by mouth every 2 (two) hours as needed for Migraine. 9 tablet 5    topiramate (TOPAMAX) 15 MG capsule Take 1 capsule (15 mg total) by mouth As instructed (headache). 1 cap qhs 1 week then 2 caps qhs x 1 week, then 3 caps qhs 90 capsule 2    vitamin D (VITAMIN D3) 1000 units Tab Take 1 tablet (1,000 Units total) by mouth once daily. 30 tablet 0       Objective Findings:    Vital Signs:  BP (!) 142/80   Pulse 70   Ht 5' 7" (1.702 m)   Wt 88.9 kg (196 lb)   BMI 30.70 kg/m²   Body mass index is 30.7 kg/m².    Physical Exam:  General Appearance: Well appearing in no acute distress  Head:   Normocephalic, without obvious abnormality  Eyes:    No scleral icterus, EOMI  ENT: Neck supple, Lips, mucosa, and tongue normal   Lungs: CTA bilaterally in anterior and posterior fields, no wheezes, no crackles.  Heart:  Regular rate and rhythm, S1, S2 normal, no murmurs heard  Abdomen: Soft, non tender, non distended with positive bowel sounds in all four quadrants. No hepatosplenomegaly, ascites, or mass  Extremities: 2+ " pulses, no clubbing, cyanosis or edema  Skin: No rash  Neurologic: no focal deficits       Labs:  Lab Results   Component Value Date    WBC 5.22 01/19/2022    HGB 13.3 01/19/2022    HCT 42.6 01/19/2022     01/19/2022    CHOL 222 (H) 01/19/2022    TRIG 122 01/19/2022    HDL 75 01/19/2022    ALT 42 01/19/2022    AST 25 01/19/2022     01/19/2022    K 4.0 01/19/2022     01/19/2022    CREATININE 0.7 01/19/2022    BUN 15 01/19/2022    CO2 25 01/19/2022    TSH 0.877 07/28/2020    INR 0.9 10/24/2016    HGBA1C 5.2 07/21/2021       Imaging: reviewed     Endoscopy:      Lower EUS February 2018 An oval intramural (subepithelial) lesion was                         found in the appendiceal orifice. The lesion was                         anechoic and appear to connect with the appendix.                         The lesion measured approximately 14.4 mm by 9.3                         mm. This lesion appear to be cystic without any                         solid component.  Colonoscopy 11/2017 - Submucosal nodule at the appendiceal orifice.                         Biopsied.                        - Vascular nodule in the transverse colon.                        - Non-bleeding internal hemorrhoids.                        - The entire examined colon is normal. Biopsied.                        - The examination was otherwise normal on direct                         and retroflexion views.                        - The examined portion of the ileum was normal.  EGD in 2011.  Hiatal hernia and gastritis.  Normal duodenum.  Colonoscopy in 2011 normal colon no specimens removed.    Assessment:    1. IBD-D   2. GERD   3. FM     Patient with history of IBS with diarrhea not currently on any medications. Trial of elavil nightly as this should also help FM symptoms. For reflux, we discussed EGD vs conservative approach initially with HP stool testing and change in medications due to her history of difficult airway. She elected  for a conservative approach for now. Thankfully, she is without alarm features. We discussed the correct admin of nexium and will increase to BID for 2 months. Also add a pepcid at night and check stool HP. If symptoms do not respond, will consider EGD at follow up appt.      Recommendations:    - Start elavil. Instructed on possible side effects and how to take   - Pepto bismol PRN for diarrhea as well   - Has levsin for cramping   - Low FODMAP diet, lactaid with dairy intake   - For reflux, will increase PPI to BID for 2 months and add pepcid   - Discussed food triggers. She does eat late at night and will change this   - H pylori stool test ordered   - If symptoms persist, will consider EGD. No alarm features or anemia currently   - Cscope in 10 years for CRC screening 2027     Follow up in about 2 months (around 6/13/2022).      Order summary:  Orders Placed This Encounter    H. pylori antigen, stool    amitriptyline (ELAVIL) 10 MG tablet    esomeprazole (NEXIUM) 40 MG capsule         Thank you so much for allowing me to participate in the care of Rachel Carranza MD  Gastroenterology   [facility]

## 2022-04-24 NOTE — PROGRESS NOTES
Subjective:       Patient ID: Rachel Conroy is a 66 y.o. female.    Chief Complaint: Palpitations    HPI   66-year-old female with hypertension comes in with complaint of palpitations.  She states that they happen randomly.  She reports that they are not associated with anxiety or depression.  The he states that they happen a few times a week. She denies passing out or feeling that she will pass out   At the visit, the patient expresses that she will be taking a cruise ship trip soon.  She does report getting motion sickness and would like to know what she can use.      Review of Systems   Respiratory: Negative for shortness of breath.    Cardiovascular: Positive for palpitations. Negative for chest pain.   Gastrointestinal: Negative for abdominal pain.   Genitourinary: Negative for dysuria.   Musculoskeletal: Negative for neck pain.   Neurological: Negative for numbness and headaches.         Objective:      Physical Exam  Vitals reviewed.   Constitutional:       Appearance: She is well-developed.   HENT:      Head: Normocephalic and atraumatic.      Right Ear: External ear normal.      Left Ear: External ear normal.      Nose: Nose normal.      Mouth/Throat:      Pharynx: No oropharyngeal exudate.   Eyes:      General:         Right eye: No discharge.         Left eye: No discharge.      Conjunctiva/sclera: Conjunctivae normal.      Pupils: Pupils are equal, round, and reactive to light.   Neck:      Trachea: No tracheal deviation.   Cardiovascular:      Rate and Rhythm: Normal rate and regular rhythm.      Heart sounds: Normal heart sounds. No murmur heard.  Pulmonary:      Effort: Pulmonary effort is normal.      Breath sounds: Normal breath sounds. No wheezing or rales.   Abdominal:      General: Bowel sounds are normal.      Palpations: Abdomen is soft. Abdomen is not rigid. There is no mass.      Tenderness: There is no abdominal tenderness. There is no guarding.   Musculoskeletal:      Cervical  back: Normal range of motion and neck supple.   Lymphadenopathy:      Cervical: No cervical adenopathy.   Neurological:      Mental Status: She is oriented to person, place, and time.      Sensory: No sensory deficit.      Motor: No atrophy.      Gait: Gait normal.      Deep Tendon Reflexes:      Reflex Scores:       Patellar reflexes are 2+ on the right side and 2+ on the left side.        Assessment:       Problem List Items Addressed This Visit        Cardiac/Vascular    Palpitations - Primary      Other Visit Diagnoses     Fibromyalgia        Relevant Medications    baclofen (LIORESAL) 10 MG tablet    H/O motion sickness        Relevant Medications    scopolamine (TRANSDERM-SCOP) 1.3-1.5 mg (1 mg over 3 days)    Essential hypertension        Relevant Orders    Comprehensive Metabolic Panel    CBC Auto Differential    Lipid Panel    Dyslipidemia        Relevant Orders    Comprehensive Metabolic Panel    CBC Auto Differential    Lipid Panel          Plan:       Rachel was seen today for follow-up and shoulder pain.    Diagnoses and all orders for this visit:    Palpitations  -     metoprolol succinate (TOPROL-XL) 25 MG 24 hr tablet; Take 1 tablet (25 mg total) by mouth once daily.  Course of Toprol XL recommended    Fibromyalgia  -     baclofen (LIORESAL) 10 MG tablet; Take 1 tablet (10 mg total) by mouth 3 (three) times daily.  Continue current regimen    H/O motion sickness  -     scopolamine (TRANSDERM-SCOP) 1.3-1.5 mg (1 mg over 3 days); Place 1 patch onto the skin every 72 hours.  Recommended scopolamine patch for motion sickness    Essential hypertension  -     Comprehensive Metabolic Panel; Future  -     CBC Auto Differential; Future  -     Lipid Panel; Future  Advised start low dose Toprol aloong with Benicar HCT    Dyslipidemia  -     Comprehensive Metabolic Panel; Future  -     CBC Auto Differential; Future  -     Lipid Panel; Future  Continue monitioring

## 2022-04-25 ENCOUNTER — LAB VISIT (OUTPATIENT)
Dept: LAB | Facility: HOSPITAL | Age: 67
End: 2022-04-25
Attending: STUDENT IN AN ORGANIZED HEALTH CARE EDUCATION/TRAINING PROGRAM
Payer: COMMERCIAL

## 2022-04-25 DIAGNOSIS — K21.9 GASTROESOPHAGEAL REFLUX DISEASE, UNSPECIFIED WHETHER ESOPHAGITIS PRESENT: ICD-10-CM

## 2022-04-25 PROCEDURE — 87338 HPYLORI STOOL AG IA: CPT | Performed by: STUDENT IN AN ORGANIZED HEALTH CARE EDUCATION/TRAINING PROGRAM

## 2022-05-04 LAB
H PYLORI AG STL QL IA: NOT DETECTED
SPECIMEN SOURCE: NORMAL

## 2022-05-04 NOTE — TELEPHONE ENCOUNTER
No new care gaps identified.  Powered by [employer] [place]. Reference number: [id number].   4/13/2022 7:24:13 AM CDT  
Spontaneous, unlabored and symmetrical

## 2022-05-21 DIAGNOSIS — Z87.898 H/O MOTION SICKNESS: ICD-10-CM

## 2022-05-21 NOTE — TELEPHONE ENCOUNTER
Refill Routing Note   Medication(s) are not appropriate for processing by [facility] for the following reason(s):      - Outside of protocol    [facility] action(s):  Route          Medication reconciliation completed: No     Appointments  past 12m or future 3m with PCP    Date Provider   Last Visit   4/11/2022 Maxwell Gonzalez Jr., MD   Next Visit   8/15/2022 Maxwell Gonzalez Jr., MD   ED visits in past 90 days: 0        Note composed:3:39 PM 05/21/2022

## 2022-05-22 RX ORDER — SCOLOPAMINE TRANSDERMAL SYSTEM 1 MG/1
PATCH, EXTENDED RELEASE TRANSDERMAL
Qty: 4 PATCH | Refills: 0 | Status: SHIPPED | OUTPATIENT
Start: 2022-05-22 | End: 2022-08-21

## 2022-07-29 ENCOUNTER — LAB VISIT (OUTPATIENT)
Dept: LAB | Facility: HOSPITAL | Age: 67
End: 2022-07-29
Attending: FAMILY MEDICINE
Payer: COMMERCIAL

## 2022-07-29 DIAGNOSIS — E78.5 DYSLIPIDEMIA: ICD-10-CM

## 2022-07-29 DIAGNOSIS — I10 ESSENTIAL HYPERTENSION: ICD-10-CM

## 2022-07-29 LAB
ALBUMIN SERPL BCP-MCNC: 3.5 G/DL (ref 3.5–5.2)
ALP SERPL-CCNC: 99 U/L (ref 55–135)
ALT SERPL W/O P-5'-P-CCNC: 59 U/L (ref 10–44)
ANION GAP SERPL CALC-SCNC: 6 MMOL/L (ref 8–16)
AST SERPL-CCNC: 34 U/L (ref 10–40)
BASOPHILS # BLD AUTO: 0.03 K/UL (ref 0–0.2)
BASOPHILS NFR BLD: 0.6 % (ref 0–1.9)
BILIRUB SERPL-MCNC: 0.3 MG/DL (ref 0.1–1)
BUN SERPL-MCNC: 12 MG/DL (ref 8–23)
CALCIUM SERPL-MCNC: 8.7 MG/DL (ref 8.7–10.5)
CHLORIDE SERPL-SCNC: 106 MMOL/L (ref 95–110)
CHOLEST SERPL-MCNC: 194 MG/DL (ref 120–199)
CHOLEST/HDLC SERPL: 2.9 {RATIO} (ref 2–5)
CO2 SERPL-SCNC: 29 MMOL/L (ref 23–29)
CREAT SERPL-MCNC: 0.6 MG/DL (ref 0.5–1.4)
DIFFERENTIAL METHOD: NORMAL
EOSINOPHIL # BLD AUTO: 0.1 K/UL (ref 0–0.5)
EOSINOPHIL NFR BLD: 2.7 % (ref 0–8)
ERYTHROCYTE [DISTWIDTH] IN BLOOD BY AUTOMATED COUNT: 12.3 % (ref 11.5–14.5)
EST. GFR  (AFRICAN AMERICAN): >60 ML/MIN/1.73 M^2
EST. GFR  (NON AFRICAN AMERICAN): >60 ML/MIN/1.73 M^2
GLUCOSE SERPL-MCNC: 93 MG/DL (ref 70–110)
HCT VFR BLD AUTO: 40.7 % (ref 37–48.5)
HDLC SERPL-MCNC: 67 MG/DL (ref 40–75)
HDLC SERPL: 34.5 % (ref 20–50)
HGB BLD-MCNC: 13.3 G/DL (ref 12–16)
IMM GRANULOCYTES # BLD AUTO: 0.01 K/UL (ref 0–0.04)
IMM GRANULOCYTES NFR BLD AUTO: 0.2 % (ref 0–0.5)
LDLC SERPL CALC-MCNC: 106 MG/DL (ref 63–159)
LYMPHOCYTES # BLD AUTO: 2.3 K/UL (ref 1–4.8)
LYMPHOCYTES NFR BLD: 44.2 % (ref 18–48)
MCH RBC QN AUTO: 28.7 PG (ref 27–31)
MCHC RBC AUTO-ENTMCNC: 32.7 G/DL (ref 32–36)
MCV RBC AUTO: 88 FL (ref 82–98)
MONOCYTES # BLD AUTO: 0.7 K/UL (ref 0.3–1)
MONOCYTES NFR BLD: 12.6 % (ref 4–15)
NEUTROPHILS # BLD AUTO: 2.1 K/UL (ref 1.8–7.7)
NEUTROPHILS NFR BLD: 39.7 % (ref 38–73)
NONHDLC SERPL-MCNC: 127 MG/DL
NRBC BLD-RTO: 0 /100 WBC
PLATELET # BLD AUTO: 293 K/UL (ref 150–450)
PMV BLD AUTO: 9.4 FL (ref 9.2–12.9)
POTASSIUM SERPL-SCNC: 3.9 MMOL/L (ref 3.5–5.1)
PROT SERPL-MCNC: 6.9 G/DL (ref 6–8.4)
RBC # BLD AUTO: 4.63 M/UL (ref 4–5.4)
SODIUM SERPL-SCNC: 141 MMOL/L (ref 136–145)
TRIGL SERPL-MCNC: 105 MG/DL (ref 30–150)
WBC # BLD AUTO: 5.25 K/UL (ref 3.9–12.7)

## 2022-07-29 PROCEDURE — 85025 COMPLETE CBC W/AUTO DIFF WBC: CPT | Performed by: FAMILY MEDICINE

## 2022-07-29 PROCEDURE — 80061 LIPID PANEL: CPT | Performed by: FAMILY MEDICINE

## 2022-07-29 PROCEDURE — 36415 COLL VENOUS BLD VENIPUNCTURE: CPT | Mod: PN | Performed by: FAMILY MEDICINE

## 2022-07-29 PROCEDURE — 80053 COMPREHEN METABOLIC PANEL: CPT | Performed by: FAMILY MEDICINE

## 2022-08-18 ENCOUNTER — OFFICE VISIT (OUTPATIENT)
Dept: FAMILY MEDICINE | Facility: CLINIC | Age: 67
End: 2022-08-18
Payer: COMMERCIAL

## 2022-08-18 VITALS — DIASTOLIC BLOOD PRESSURE: 85 MMHG | SYSTOLIC BLOOD PRESSURE: 128 MMHG

## 2022-08-18 DIAGNOSIS — E78.5 DYSLIPIDEMIA: ICD-10-CM

## 2022-08-18 DIAGNOSIS — K76.0 FATTY LIVER: Primary | ICD-10-CM

## 2022-08-18 DIAGNOSIS — I10 ESSENTIAL HYPERTENSION: ICD-10-CM

## 2022-08-18 PROCEDURE — 1159F MED LIST DOCD IN RCRD: CPT | Mod: CPTII,95,, | Performed by: FAMILY MEDICINE

## 2022-08-18 PROCEDURE — 1160F RVW MEDS BY RX/DR IN RCRD: CPT | Mod: CPTII,95,, | Performed by: FAMILY MEDICINE

## 2022-08-18 PROCEDURE — 3079F DIAST BP 80-89 MM HG: CPT | Mod: CPTII,95,, | Performed by: FAMILY MEDICINE

## 2022-08-18 PROCEDURE — 3061F NEG MICROALBUMINURIA REV: CPT | Mod: CPTII,95,, | Performed by: FAMILY MEDICINE

## 2022-08-18 PROCEDURE — 99213 PR OFFICE/OUTPT VISIT, EST, LEVL III, 20-29 MIN: ICD-10-PCS | Mod: 95,,, | Performed by: FAMILY MEDICINE

## 2022-08-18 PROCEDURE — 3066F NEPHROPATHY DOC TX: CPT | Mod: CPTII,95,, | Performed by: FAMILY MEDICINE

## 2022-08-18 PROCEDURE — 3074F PR MOST RECENT SYSTOLIC BLOOD PRESSURE < 130 MM HG: ICD-10-PCS | Mod: CPTII,95,, | Performed by: FAMILY MEDICINE

## 2022-08-18 PROCEDURE — 3079F PR MOST RECENT DIASTOLIC BLOOD PRESSURE 80-89 MM HG: ICD-10-PCS | Mod: CPTII,95,, | Performed by: FAMILY MEDICINE

## 2022-08-18 PROCEDURE — 3061F PR NEG MICROALBUMINURIA RESULT DOCUMENTED/REVIEW: ICD-10-PCS | Mod: CPTII,95,, | Performed by: FAMILY MEDICINE

## 2022-08-18 PROCEDURE — 1159F PR MEDICATION LIST DOCUMENTED IN MEDICAL RECORD: ICD-10-PCS | Mod: CPTII,95,, | Performed by: FAMILY MEDICINE

## 2022-08-18 PROCEDURE — 3066F PR DOCUMENTATION OF TREATMENT FOR NEPHROPATHY: ICD-10-PCS | Mod: CPTII,95,, | Performed by: FAMILY MEDICINE

## 2022-08-18 PROCEDURE — 99213 OFFICE O/P EST LOW 20 MIN: CPT | Mod: 95,,, | Performed by: FAMILY MEDICINE

## 2022-08-18 PROCEDURE — 3074F SYST BP LT 130 MM HG: CPT | Mod: CPTII,95,, | Performed by: FAMILY MEDICINE

## 2022-08-18 PROCEDURE — 1160F PR REVIEW ALL MEDS BY PRESCRIBER/CLIN PHARMACIST DOCUMENTED: ICD-10-PCS | Mod: CPTII,95,, | Performed by: FAMILY MEDICINE

## 2022-08-22 NOTE — PROGRESS NOTES
The patient location is: [place], [place]  The chief complaint leading to consultation is: HTN, Lipids, fatty liver    Visit type: audiovisual    Face to Face time with patient: 13 minutes  15 minutes of total time spent on the encounter, which includes face to face time and non-face to face time preparing to see the patient (eg, review of tests), Obtaining and/or reviewing separately obtained history, Documenting clinical information in the electronic or other health record, Independently interpreting results (not separately reported) and communicating results to the patient/family/caregiver, or Care coordination (not separately reported).         Each patient to whom he or she provides medical services by telemedicine is:  (1) informed of the relationship between the physician and patient and the respective role of any other health care provider with respect to management of the patient; and (2) notified that he or she may decline to receive medical services by telemedicine and may withdraw from such care at any time.    Notes:       Subjective:       Patient ID: Rachel Conroy is a 66 y.o. female.    Chief Complaint: Fatty Liver, Hypertension, and Hyperlipidemia    Hypertension  This is a chronic problem. The problem is controlled. Pertinent negatives include no anxiety, blurred vision, chest pain, headaches, malaise/fatigue, neck pain, orthopnea, palpitations, peripheral edema, PND, shortness of breath or sweats. Past treatments include angiotensin blockers, diuretics and beta blockers. The current treatment provides significant improvement. There are no compliance problems.  There is no history of chronic renal disease.   Hyperlipidemia  This is a chronic problem. Exacerbating diseases include liver disease. She has no history of chronic renal disease, diabetes, hypothyroidism, obesity or nephrotic syndrome. Pertinent negatives include no chest pain or shortness of breath. Current antihyperlipidemic  treatment includes diet change.     Last saw Hepatology over a year ago for fatty liver.    Review of Systems   Constitutional: Negative for malaise/fatigue and unexpected weight change.   HENT: Negative for ear pain and sore throat.    Eyes: Negative for blurred vision and visual disturbance.   Respiratory: Negative for shortness of breath.    Cardiovascular: Negative for chest pain, palpitations, orthopnea and PND.   Gastrointestinal: Negative for abdominal pain and blood in stool.   Endocrine: Negative for cold intolerance and heat intolerance.   Genitourinary: Negative for dysuria and frequency.   Musculoskeletal: Negative for neck pain.   Integumentary:  Negative for rash.   Neurological: Negative for weakness, numbness and headaches.   Hematological: Negative for adenopathy.   Psychiatric/Behavioral: Negative for suicidal ideas.         Objective:      Physical Exam  Vitals reviewed.   Constitutional:       Appearance: She is well-developed.   HENT:      Head: Normocephalic and atraumatic.      Right Ear: External ear normal.      Left Ear: External ear normal.      Nose: Nose normal.      Mouth/Throat:      Pharynx: No oropharyngeal exudate.   Eyes:      General:         Right eye: No discharge.         Left eye: No discharge.      Conjunctiva/sclera: Conjunctivae normal.      Pupils: Pupils are equal, round, and reactive to light.   Neck:      Trachea: No tracheal deviation.   Cardiovascular:      Rate and Rhythm: Normal rate and regular rhythm.      Heart sounds: Normal heart sounds. No murmur heard.  Pulmonary:      Effort: Pulmonary effort is normal.      Breath sounds: Normal breath sounds. No wheezing or rales.   Abdominal:      General: Bowel sounds are normal.      Palpations: Abdomen is soft. Abdomen is not rigid. There is no mass.      Tenderness: There is no abdominal tenderness. There is no guarding.   Musculoskeletal:      Cervical back: Normal range of motion and neck supple.      Right knee:  Crepitus present. No swelling or deformity.      Left knee: Crepitus present. No swelling or deformity.   Lymphadenopathy:      Cervical: No cervical adenopathy.   Neurological:      Mental Status: She is oriented to person, place, and time.      Sensory: No sensory deficit.      Motor: No atrophy.      Gait: Gait normal.      Deep Tendon Reflexes:      Reflex Scores:       Patellar reflexes are 2+ on the right side and 2+ on the left side.          Lab Visit on 07/29/2022   Component Date Value Ref Range Status    Sodium 07/29/2022 141  136 - 145 mmol/L Final    Potassium 07/29/2022 3.9  3.5 - 5.1 mmol/L Final    Chloride 07/29/2022 106  95 - 110 mmol/L Final    CO2 07/29/2022 29  23 - 29 mmol/L Final    Glucose 07/29/2022 93  70 - 110 mg/dL Final    BUN 07/29/2022 12  8 - 23 mg/dL Final    Creatinine 07/29/2022 0.6  0.5 - 1.4 mg/dL Final    Calcium 07/29/2022 8.7  8.7 - 10.5 mg/dL Final    Total Protein 07/29/2022 6.9  6.0 - 8.4 g/dL Final    Albumin 07/29/2022 3.5  3.5 - 5.2 g/dL Final    Total Bilirubin 07/29/2022 0.3  0.1 - 1.0 mg/dL Final    Comment: For infants and newborns, interpretation of results should be based  on gestational age, weight and in agreement with clinical  observations.    Premature Infant recommended reference ranges:  Up to 24 hours.............<8.0 mg/dL  Up to 48 hours............<12.0 mg/dL  3-5 days..................<15.0 mg/dL  6-29 days.................<15.0 mg/dL      Alkaline Phosphatase 07/29/2022 99  55 - 135 U/L Final    AST 07/29/2022 34  10 - 40 U/L Final    ALT 07/29/2022 59 (A) 10 - 44 U/L Final    Anion Gap 07/29/2022 6 (A) 8 - 16 mmol/L Final    eGFR if African American 07/29/2022 >60  >60 mL/min/1.73 m^2 Final    eGFR if non African American 07/29/2022 >60  >60 mL/min/1.73 m^2 Final    Comment: Calculation used to obtain the estimated glomerular filtration  rate (eGFR) is the CKD-EPI equation.       WBC 07/29/2022 5.25  3.90 - 12.70 K/uL Final    RBC  07/29/2022 4.63  4.00 - 5.40 M/uL Final    Hemoglobin 07/29/2022 13.3  12.0 - 16.0 g/dL Final    Hematocrit 07/29/2022 40.7  37.0 - 48.5 % Final    MCV 07/29/2022 88  82 - 98 fL Final    MCH 07/29/2022 28.7  27.0 - 31.0 pg Final    MCHC 07/29/2022 32.7  32.0 - 36.0 g/dL Final    RDW 07/29/2022 12.3  11.5 - 14.5 % Final    Platelets 07/29/2022 293  150 - 450 K/uL Final    MPV 07/29/2022 9.4  9.2 - 12.9 fL Final    Immature Granulocytes 07/29/2022 0.2  0.0 - 0.5 % Final    Gran # (ANC) 07/29/2022 2.1  1.8 - 7.7 K/uL Final    Immature Grans (Abs) 07/29/2022 0.01  0.00 - 0.04 K/uL Final    Comment: Mild elevation in immature granulocytes is non specific and   can be seen in a variety of conditions including stress response,   acute inflammation, trauma and pregnancy. Correlation with other   laboratory and clinical findings is essential.      Lymph # 07/29/2022 2.3  1.0 - 4.8 K/uL Final    Mono # 07/29/2022 0.7  0.3 - 1.0 K/uL Final    Eos # 07/29/2022 0.1  0.0 - 0.5 K/uL Final    Baso # 07/29/2022 0.03  0.00 - 0.20 K/uL Final    nRBC 07/29/2022 0  0 /100 WBC Final    Gran % 07/29/2022 39.7  38.0 - 73.0 % Final    Lymph % 07/29/2022 44.2  18.0 - 48.0 % Final    Mono % 07/29/2022 12.6  4.0 - 15.0 % Final    Eosinophil % 07/29/2022 2.7  0.0 - 8.0 % Final    Basophil % 07/29/2022 0.6  0.0 - 1.9 % Final    Differential Method 07/29/2022 Automated   Final    Cholesterol 07/29/2022 194  120 - 199 mg/dL Final    Comment: The National Cholesterol Education Program (NCEP) has set the  following guidelines (reference ranges) for Cholesterol:  Optimal.....................<200 mg/dL  Borderline High.............200-239 mg/dL  High........................> or = 240 mg/dL      Triglycerides 07/29/2022 105  30 - 150 mg/dL Final    Comment: The National Cholesterol Education Program (NCEP) has set the  following guidelines (reference values) for triglycerides:  Normal......................<150 mg/dL  Borderline  High.............150-199 mg/dL  High........................200-499 mg/dL      HDL 07/29/2022 67  40 - 75 mg/dL Final    Comment: The National Cholesterol Education Program (NCEP) has set the  following guidelines (reference values) for HDL Cholesterol:  Low...............<40 mg/dL  Optimal...........>60 mg/dL      LDL Cholesterol 07/29/2022 106.0  63.0 - 159.0 mg/dL Final    Comment: The National Cholesterol Education Program (NCEP) has set the  following guidelines (reference values) for LDL Cholesterol:  Optimal.......................<130 mg/dL  Borderline High...............130-159 mg/dL  High..........................160-189 mg/dL  Very High.....................>190 mg/dL      HDL/Cholesterol Ratio 07/29/2022 34.5  20.0 - 50.0 % Final    Total Cholesterol/HDL Ratio 07/29/2022 2.9  2.0 - 5.0 Final    Non-HDL Cholesterol 07/29/2022 127  mg/dL Final    Comment: Risk category and Non-HDL cholesterol goals:  Coronary heart disease (CHD)or equivalent (10-year risk of CHD >20%):  Non-HDL cholesterol goal     <130 mg/dL  Two or more CHD risk factors and 10-year risk of CHD <= 20%:  Non-HDL cholesterol goal     <160 mg/dL  0 to 1 CHD risk factor:  Non-HDL cholesterol goal     <190 mg/dL           Narrative & Impression  EXAMINATION:  US ABDOMEN COMPLETE     CLINICAL HISTORY:  Abnormal levels of other serum enzymes     TECHNIQUE:  Complete abdominal ultrasound (including pancreas, aorta, liver, gallbladder, common bile duct, IVC, kidneys, and spleen) was performed.     COMPARISON:  Ultrasound liver with Doppler 11/07/2016, CT abdomen pelvis 12/18/2017     FINDINGS:  Demonstrated portions of the pancreas are unremarkable.     The liver is enlarged in size measuring 17.6 cm with homogeneous parenchymal echotexture.  Hepatorenal index is elevated at 1.6.  Gallbladder demonstrates no calculi, wall thickening, or pericholecystic fluid.  Negative sonographic Kirby sign.  There is no intra or extrahepatic biliary ductal  dilatation and the common bile duct measures 2 mm in diameter.     The aorta and IVC appear within normal limits.     Kidneys are normal in size measuring 12.3 cm on the right and 11.9 cm on the left.  A 3 mm echogenic focus at the right renal midpole, likely a nonobstructing nephrolith.  No solid masses or hydronephrosis.     The spleen is normal in size measuring 11.5 x 4.2 cm with homogeneous echotexture.     No abdominal free fluid identified.     Impression:     Hepatomegaly and hepatic steatosis.     Right renal nonobstructing nephrolith.     Electronically signed by resident: Ricardo Graf MD  Date:                                            01/20/2021  Time:                                           11:49     Electronically signed by: Harmony Umana MD  Date:                                            01/20/2021  Time:                                           12:15    Assessment:       Problem List Items Addressed This Visit        GI    Fatty liver - Primary      Other Visit Diagnoses     Essential hypertension        Dyslipidemia              Plan:       Rachel was seen today for fatty liver, hypertension and hyperlipidemia.    Diagnoses and all orders for this visit:    Fatty liver  Hepatology appt scheduled    Essential hypertension  Current therapy effective, will continue    Dyslipidemia  Continue with dietary and lifestyle changes

## 2022-09-22 ENCOUNTER — OFFICE VISIT (OUTPATIENT)
Dept: HEPATOLOGY | Facility: CLINIC | Age: 67
End: 2022-09-22
Payer: COMMERCIAL

## 2022-09-22 VITALS
HEART RATE: 77 BPM | DIASTOLIC BLOOD PRESSURE: 66 MMHG | OXYGEN SATURATION: 97 % | RESPIRATION RATE: 18 BRPM | HEIGHT: 67 IN | WEIGHT: 191.38 LBS | SYSTOLIC BLOOD PRESSURE: 142 MMHG | TEMPERATURE: 98 F | BODY MASS INDEX: 30.04 KG/M2

## 2022-09-22 DIAGNOSIS — K76.0 FATTY LIVER: Primary | ICD-10-CM

## 2022-09-22 DIAGNOSIS — E78.5 HYPERLIPIDEMIA, UNSPECIFIED HYPERLIPIDEMIA TYPE: ICD-10-CM

## 2022-09-22 DIAGNOSIS — I10 PRIMARY HYPERTENSION: ICD-10-CM

## 2022-09-22 DIAGNOSIS — R74.8 ELEVATED LIVER ENZYMES: ICD-10-CM

## 2022-09-22 PROCEDURE — 3288F FALL RISK ASSESSMENT DOCD: CPT | Mod: CPTII,S$GLB,, | Performed by: NURSE PRACTITIONER

## 2022-09-22 PROCEDURE — 3078F PR MOST RECENT DIASTOLIC BLOOD PRESSURE < 80 MM HG: ICD-10-PCS | Mod: CPTII,S$GLB,, | Performed by: NURSE PRACTITIONER

## 2022-09-22 PROCEDURE — 3288F PR FALLS RISK ASSESSMENT DOCUMENTED: ICD-10-PCS | Mod: CPTII,S$GLB,, | Performed by: NURSE PRACTITIONER

## 2022-09-22 PROCEDURE — 1101F PT FALLS ASSESS-DOCD LE1/YR: CPT | Mod: CPTII,S$GLB,, | Performed by: NURSE PRACTITIONER

## 2022-09-22 PROCEDURE — 99999 PR PBB SHADOW E&M-EST. PATIENT-LVL IV: ICD-10-PCS | Mod: PBBFAC,,, | Performed by: NURSE PRACTITIONER

## 2022-09-22 PROCEDURE — 3077F SYST BP >= 140 MM HG: CPT | Mod: CPTII,S$GLB,, | Performed by: NURSE PRACTITIONER

## 2022-09-22 PROCEDURE — 99214 PR OFFICE/OUTPT VISIT, EST, LEVL IV, 30-39 MIN: ICD-10-PCS | Mod: S$GLB,,, | Performed by: NURSE PRACTITIONER

## 2022-09-22 PROCEDURE — 3066F NEPHROPATHY DOC TX: CPT | Mod: CPTII,S$GLB,, | Performed by: NURSE PRACTITIONER

## 2022-09-22 PROCEDURE — 99999 PR PBB SHADOW E&M-EST. PATIENT-LVL IV: CPT | Mod: PBBFAC,,, | Performed by: NURSE PRACTITIONER

## 2022-09-22 PROCEDURE — 3008F PR BODY MASS INDEX (BMI) DOCUMENTED: ICD-10-PCS | Mod: CPTII,S$GLB,, | Performed by: NURSE PRACTITIONER

## 2022-09-22 PROCEDURE — 1160F RVW MEDS BY RX/DR IN RCRD: CPT | Mod: CPTII,S$GLB,, | Performed by: NURSE PRACTITIONER

## 2022-09-22 PROCEDURE — 99214 OFFICE O/P EST MOD 30 MIN: CPT | Mod: S$GLB,,, | Performed by: NURSE PRACTITIONER

## 2022-09-22 PROCEDURE — 3008F BODY MASS INDEX DOCD: CPT | Mod: CPTII,S$GLB,, | Performed by: NURSE PRACTITIONER

## 2022-09-22 PROCEDURE — 1159F PR MEDICATION LIST DOCUMENTED IN MEDICAL RECORD: ICD-10-PCS | Mod: CPTII,S$GLB,, | Performed by: NURSE PRACTITIONER

## 2022-09-22 PROCEDURE — 3078F DIAST BP <80 MM HG: CPT | Mod: CPTII,S$GLB,, | Performed by: NURSE PRACTITIONER

## 2022-09-22 PROCEDURE — 3077F PR MOST RECENT SYSTOLIC BLOOD PRESSURE >= 140 MM HG: ICD-10-PCS | Mod: CPTII,S$GLB,, | Performed by: NURSE PRACTITIONER

## 2022-09-22 PROCEDURE — 3066F PR DOCUMENTATION OF TREATMENT FOR NEPHROPATHY: ICD-10-PCS | Mod: CPTII,S$GLB,, | Performed by: NURSE PRACTITIONER

## 2022-09-22 PROCEDURE — 3061F PR NEG MICROALBUMINURIA RESULT DOCUMENTED/REVIEW: ICD-10-PCS | Mod: CPTII,S$GLB,, | Performed by: NURSE PRACTITIONER

## 2022-09-22 PROCEDURE — 3061F NEG MICROALBUMINURIA REV: CPT | Mod: CPTII,S$GLB,, | Performed by: NURSE PRACTITIONER

## 2022-09-22 PROCEDURE — 1159F MED LIST DOCD IN RCRD: CPT | Mod: CPTII,S$GLB,, | Performed by: NURSE PRACTITIONER

## 2022-09-22 PROCEDURE — 1101F PR PT FALLS ASSESS DOC 0-1 FALLS W/OUT INJ PAST YR: ICD-10-PCS | Mod: CPTII,S$GLB,, | Performed by: NURSE PRACTITIONER

## 2022-09-22 PROCEDURE — 1160F PR REVIEW ALL MEDS BY PRESCRIBER/CLIN PHARMACIST DOCUMENTED: ICD-10-PCS | Mod: CPTII,S$GLB,, | Performed by: NURSE PRACTITIONER

## 2022-09-22 NOTE — PROGRESS NOTES
[facility] VISIT ESTABLISHED PT NOTE    REFERRING PROVIDER:  No ref. provider found    CHIEF COMPLAINT: Elevated Liver Enzymes/Fatty Liver    HPI: This is a 67 y.o.  female with PMH noted below, presenting for follow up in the management of fatty liver. She was accompanied by her daughter who provides translation, per patient request. She was previously followed by Dr. Debra Finnegan and was last seen in clinic by myself in 1/2021. Fibroscan at initial visit was suggestive of F0-F1 fibrosis. Unfortunately she has regained the weight that she previously lost (15-20 pounds) and her liver enzymes have become mildly elevated again (had normalized previously). Repeat Fibroscan in 1/2021 was suggestive of F0-F1 fibrosis with mild hepatic steatosis.   Most recent abdominal ultrasound in 1/2021 showed mild hepatomegaly (17.6 cm), due to hepatic steatosis. She rarely drinks alcohol. She is immune to HAV (has received 1 vaccination) and is also immune to HBV through prior exposure. She denies any known family history of liver disease.  She is well appearing and has no signs or symptoms of decompensated liver disease including jaundice, dark urine, abdominal distention, lower extremity edema, hematemesis, melena, or periods of confusion suggestive of hepatic encephalopathy.      Review of patient's allergies indicates:   Allergen Reactions    Tramadol Itching       Current Outpatient Medications on File Prior to Visit   Medication Sig Dispense Refill    clobetasoL (CLOBEX) 0.05 % shampoo APPLY EXTERNALLY TWICE DAILY 118 mL 2    esomeprazole (NEXIUM) 40 MG capsule Take 1 capsule (40 mg total) by mouth 2 (two) times daily. 90 capsule 3    gabapentin (NEURONTIN) 300 MG capsule Take 1 capsule (300 mg total) by mouth 2 (two) times daily. 60 capsule 11    metoprolol succinate (TOPROL-XL) 25 MG 24 hr tablet Take 1 tablet (25 mg total) by mouth once daily. 90 tablet 0    olmesartan-hydrochlorothiazide (BENICAR  HCT) 40-25 mg per tablet Take 1 tablet by mouth once daily. 90 tablet 3    sumatriptan (IMITREX) 100 MG tablet Take 1 tablet (100 mg total) by mouth every 2 (two) hours as needed for Migraine. 9 tablet 5    vitamin D (VITAMIN D3) 1000 units Tab Take 1 tablet (1,000 Units total) by mouth once daily. 30 tablet 0     No current facility-administered medications on file prior to visit.     PMHX:  has a past medical history of Arthritis, GERD (gastroesophageal reflux disease), Hyperlipidemia, Hypertension, Irritable bowel syndrome, Joint pain, Knee injury, Migraine headache, Muscle pain, fibromyalgia (03/19/2014), NAFLD (nonalcoholic fatty liver disease), Other physical therapy, Plantar fasciitis, Sleep apnea, and Urinary incontinence.    PSHX:  has a past surgical history that includes Hysterectomy; Colonoscopy (N/A, 11/9/2017); Colonoscopy (N/A, 2/26/2018); Appendectomy (2018); and Total knee arthroplasty (Right, 11/23/2020).    FAMILY HISTORY: Negative for liver disease, reviewed in [facility]    SOCIAL HISTORY:   Social History     Tobacco Use   Smoking Status Never   Smokeless Tobacco Never     Social History     Substance and Sexual Activity   Alcohol Use No     Social History     Substance and Sexual Activity   Drug Use No       ROS:   GENERAL: Denies fever, chills, weight loss/gain, fatigue  HEENT: Denies headaches, dizziness, vision/hearing changes  CARDIOVASCULAR: Denies chest pain, palpitations, or edema  RESPIRATORY: Denies dyspnea, cough  GI: Denies abdominal pain, rectal bleeding, nausea, vomiting. No change in bowel pattern or color  : Denies dysuria, hematuria   SKIN: Denies rash, itching   NEURO: Denies confusion, memory loss, or mood changes  PSYCH: Denies depression or anxiety  HEME/LYMPH: Denies easy bruising or bleeding    PHYSICAL EXAM:   Friendly  female, in no acute distress; alert and oriented to person, place and time  VITALS: BP (!) 142/66 (BP Location: Right arm, Patient Position: Sitting,  "BP Method: Medium (Automatic))   Pulse 77   Temp 98.2 °F (36.8 °C) (Oral)   Resp 18   Ht 5' 7" (1.702 m)   Wt 86.8 kg (191 lb 5.8 oz)   SpO2 97%   BMI 29.97 kg/m²   HENT: Normocephalic, without obvious abnormality.   EYES: Sclerae anicteric.   NECK: Supple.   CARDIOVASCULAR: No peripheral edema.  RESPIRATORY: Normal respiratory effort.   GI: Soft, non-tender, non-distended.   EXTREMITIES:  No clubbing, cyanosis or edema.  SKIN: Warm and dry. No jaundice. No rashes noted to exposed skin.   NEURO:  Normal gait. No asterixis.  PSYCH:  Memory intact. Thought and speech pattern appropriate. Behavior normal. No depression or anxiety noted.    DIAGNOSTIC STUDIES:    ABD. U/S - Ordered at visit.      FIBROSCAN 10/24/2016:    Fibroscan readin.3 KPa     IQR/med: 19 %     Fibrosis:F 0-1      FIBROSCAN 2021:    Findings  Median liver stiffness score:  4.9  CAP Reading: dB/m:  236     IQR/med %:  16  Interpretation  Fibrosis interpretation is based on medial liver stiffness - Kilopascal (kPa).     Fibrosis Stage:  F 0-1  Steatosis interpretation is based on controlled attenuation parameter - (dB/m).     Steatosis Grade:  S1    US ABDOMEN COMPLETE 2021:    FINDINGS:    Demonstrated portions of the pancreas are unremarkable.     The liver is enlarged in size measuring 17.6 cm with homogeneous parenchymal echotexture.  Hepatorenal index is elevated at 1.6.  Gallbladder demonstrates no calculi, wall thickening, or pericholecystic fluid.  Negative sonographic Kirby sign.  There is no intra or extrahepatic biliary ductal dilatation and the common bile duct measures 2 mm in diameter.     The aorta and IVC appear within normal limits.     Kidneys are normal in size measuring 12.3 cm on the right and 11.9 cm on the left.  A 3 mm echogenic focus at the right renal midpole, likely a nonobstructing nephrolith.  No solid masses or hydronephrosis.     The spleen is normal in size measuring 11.5 x 4.2 cm with homogeneous " echotexture.     No abdominal free fluid identified.     Impression:     Hepatomegaly and hepatic steatosis.     Right renal nonobstructing nephrolith     ASSESSMENT & PLAN:  67 y.o. White female with:    1. Fatty liver  FibroScan [place] (Vibration Controlled Transient Elastography)    US Abdomen Complete    Hepatic Function Panel    CANCELED: FibroScan [place] (Vibration Controlled Transient Elastography)      2. Elevated liver enzymes  FibroScan [place] (Vibration Controlled Transient Elastography)    US Abdomen Complete    Hepatic Function Panel    CANCELED: FibroScan [place] (Vibration Controlled Transient Elastography)      3. BMI 29.0-29.9,adult  FibroScan [place] (Vibration Controlled Transient Elastography)    US Abdomen Complete    Hepatic Function Panel    CANCELED: FibroScan [place] (Vibration Controlled Transient Elastography)      4. Hyperlipidemia, unspecified hyperlipidemia type  FibroScan [place] (Vibration Controlled Transient Elastography)    US Abdomen Complete    Hepatic Function Panel    CANCELED: FibroScan [place] (Vibration Controlled Transient Elastography)      5. Primary hypertension  FibroScan [place] (Vibration Controlled Transient Elastography)    US Abdomen Complete    Hepatic Function Panel    CANCELED: FibroScan [place] (Vibration Controlled Transient Elastography)        - Recommend weight loss of 15-20 pounds, through diet and exercise.  - Schedule Fibroscan to restage liver disease in 1/2023.  - Repeat labs in 4 months to monitor liver function.  - Schedule abdominal ultrasound to evaluate the liver and gallbladder in 1/2023.  - Avoid alcohol and herbal supplements/alternative remedies.  - Return to clinic in 4 months.    Thank you for allowing me to participate in the care of Rachel Conroy       Hepatology Nurse Practitioner  [facility]  9/22/2022 @ 0950    CC'ed note to:   No ref. provider  found  Maxwell Gonzalez Jr, MD

## 2022-09-22 NOTE — PATIENT INSTRUCTIONS
- Recommend weight loss of 15-20 pounds through diet and exercise.  - Schedule Fibroscan to restage liver disease in 1/2023.  - Repeat labs in 3 months to monitor liver function.  - Schedule abdominal ultrasound to evaluate the liver and gallbladder in 1/2023.  - Avoid alcohol and herbal supplements/alternative remedies.  - Return to clinic in 4 months.

## 2022-09-30 DIAGNOSIS — I10 ESSENTIAL HYPERTENSION: ICD-10-CM

## 2022-09-30 NOTE — TELEPHONE ENCOUNTER
Refill Routing Note   Medication(s) are not appropriate for processing by [facility] for the following reason(s):      - Required vitals are abnormal    [facility] action(s):  Defer          Medication reconciliation completed: No     Appointments  past 12m or future 3m with PCP    Date Provider   Last Visit   8/18/2022 Maxwell Gonzalez Jr., MD   Next Visit   2/20/2023 Maxwell Gonzalez Jr., MD   ED visits in past 90 days: 0        Note composed:7:45 AM 09/30/2022

## 2022-09-30 NOTE — TELEPHONE ENCOUNTER
No new care gaps identified.  [facility] Embedded Care Gaps. Reference number: [id number]. 9/30/2022   1:56:37 AM CDT

## 2022-10-01 RX ORDER — OLMESARTAN MEDOXOMIL AND HYDROCHLOROTHIAZIDE 40/25 40; 25 MG/1; MG/1
TABLET ORAL
Qty: 90 TABLET | Refills: 0 | Status: SHIPPED | OUTPATIENT
Start: 2022-10-01 | End: 2023-01-04

## 2022-10-01 NOTE — TELEPHONE ENCOUNTER
Please call patient and see if she has measured her BP as BP was off when she went to hepatologist. If does not have way to check, please schedule nurse visit

## 2022-10-04 NOTE — TELEPHONE ENCOUNTER
Chas   Called pt to ask if she's taken her bp at home since hepatologist ov . Na , lvm  on both numbers listed .

## 2022-11-14 LAB — BCS RECOMMENDATION EXT: NORMAL

## 2022-12-27 NOTE — PRE-PROCEDURE INSTRUCTIONS
PreOp Instructions given:    -- Medication information (what to hold and what to take)   -- NPO guidelines -- DO NOT EAT ANYTHING AFTER MIDNIGHT, INCLUDING GUM, HARD CANDY, MINTS, OR CHEWING TOBACCO, unless otherwise instructed by surgeon.  -- Arrival place and directions given; time to be given the day before procedure by the Surgeon's Office   -- Bathing with antibacterial soap   -- Don't wear any jewelry or bring any valuables AM of surgery   -- No makeup or moisturizer to face   -- No perfume/cologne, powder, lotions or aftershave     Pt verbalized understanding.    Problem: Patient/Family Goals  Goal: Patient/Family Long Term Goal  Description: Patient's Long Term Goal: Uncomplicated Delivery Process    Interventions:  -   - See additional Care Plan goals for specific interventions  Outcome: Progressing  Goal: Patient/Family Short Term Goal  Description: Patient's Short Term Goal: adequate pain control    Interventions:   -   - See additional Care Plan goals for specific interventions  Outcome: Progressing     Problem: SAFETY ADULT - FALL  Goal: Free from fall injury  Description: INTERVENTIONS:  - Assess pt frequently for physical needs  - Identify cognitive and physical deficits and behaviors that affect risk of falls. - [place] fall precautions as indicated by assessment.  - Educate pt/family on patient safety including physical limitations  - Instruct pt to call for assistance with activity based on assessment  - Modify environment to reduce risk of injury  - Provide assistive devices as appropriate  - Consider OT/PT consult to assist with strengthening/mobility  - Encourage toileting schedule  Outcome: Progressing     Problem: POSTPARTUM  Goal: Long Term Goal:Experiences normal postpartum course  Description: INTERVENTIONS:  - Assess and monitor vital signs and lab values. - Assess fundus and lochia. - Provide ice/sitz baths for perineum discomfort. - Monitor healing of incision/episiotomy/laceration, and assess for signs and symptoms of infection and hematoma. - Assess bladder function and monitor for bladder distention.  - Provide/instruct/assist with pericare as needed. - Provide VTE prophylaxis as needed. - Monitor bowel function.  - Encourage ambulation and provide assistance as needed. - Assess and monitor emotional status and provide social service/psych resources as needed. - Utilize standard precautions and use personal protective equipment as indicated.  Ensure aseptic care of all intravenous lines and invasive tubes/drains.  - Obtain immunization and exposure to communicable diseases history. Outcome: Progressing  Goal: Optimize infant feeding at the breast  Description: INTERVENTIONS:  - Initiate breast feeding within first hour after birth. - Monitor effectiveness of current breast feeding efforts. - Assess support systems available to mother/family.  - Identify cultural beliefs/practices regarding lactation, letdown techniques, maternal food preferences. - Assess mother's knowledge and previous experience with breast feeding.  - Provide information as needed about early infant feeding cues (e.g., rooting, lip smacking, sucking fingers/hand) versus late cue of crying.  - Discuss/demonstrate breast feeding aids (e.g., infant sling, nursing footstool/pillows, and breast pumps). - Encourage mother/other family members to express feelings/concerns, and actively listen. - Educate father/SO about benefits of breast feeding and how to manage common lactation challenges. - Recommend avoidance of specific medications or substances incompatible with breast feeding.  - Assess and monitor for signs of nipple pain/trauma. - Instruct and provide assistance with proper latch. - Review techniques for milk expression (breast pumping) and storage of breast milk. Provide pumping equipment/supplies, instructions and assistance, as needed. - Encourage rooming-in and breast feeding on demand.  - Encourage skin-to-skin contact. - Provide LC support as needed. - Assess for and manage engorgement. - Provide breast feeding education handouts and information on community breast feeding support. Outcome: Progressing  Goal: Establishment of adequate milk supply with medication/procedure interruptions  Description: INTERVENTIONS:  - Review techniques for milk expression (breast pumping). - Provide pumping equipment/supplies, instructions, and assistance until it is safe to breastfeed infant.   Outcome: Progressing  Goal: Appropriate maternal -  bonding  Description: INTERVENTIONS:  - Assess caregiver- interactions. - Assess caregiver's emotional status and coping mechanisms. - Encourage caregiver to participate in  daily care. - Assess support systems available to mother/family.  - Provide /case management support as needed.   Outcome: Progressing

## 2023-01-17 ENCOUNTER — HOSPITAL ENCOUNTER (OUTPATIENT)
Dept: RADIOLOGY | Facility: HOSPITAL | Age: 68
Discharge: HOME | End: 2023-01-17
Attending: NURSE PRACTITIONER
Payer: COMMERCIAL

## 2023-01-17 DIAGNOSIS — E78.5 HYPERLIPIDEMIA, UNSPECIFIED HYPERLIPIDEMIA TYPE: ICD-10-CM

## 2023-01-17 DIAGNOSIS — I10 PRIMARY HYPERTENSION: ICD-10-CM

## 2023-01-17 DIAGNOSIS — K76.0 FATTY LIVER: ICD-10-CM

## 2023-01-17 DIAGNOSIS — R74.8 ELEVATED LIVER ENZYMES: ICD-10-CM

## 2023-01-17 PROCEDURE — 76705 ECHO EXAM OF ABDOMEN: CPT | Mod: TC

## 2023-01-17 PROCEDURE — 76705 US ABDOMEN LIMITED: ICD-10-PCS | Mod: 26,,, | Performed by: STUDENT IN AN ORGANIZED HEALTH CARE EDUCATION/TRAINING PROGRAM

## 2023-01-17 PROCEDURE — 76705 ECHO EXAM OF ABDOMEN: CPT | Mod: 26,,, | Performed by: STUDENT IN AN ORGANIZED HEALTH CARE EDUCATION/TRAINING PROGRAM

## 2023-01-19 ENCOUNTER — PROCEDURE VISIT (OUTPATIENT)
Dept: HEPATOLOGY | Facility: CLINIC | Age: 68
End: 2023-01-19
Payer: COMMERCIAL

## 2023-01-19 ENCOUNTER — OFFICE VISIT (OUTPATIENT)
Dept: HEPATOLOGY | Facility: CLINIC | Age: 68
End: 2023-01-19
Payer: COMMERCIAL

## 2023-01-19 VITALS
BODY MASS INDEX: 29.83 KG/M2 | HEIGHT: 67 IN | HEART RATE: 69 BPM | WEIGHT: 190.06 LBS | SYSTOLIC BLOOD PRESSURE: 140 MMHG | OXYGEN SATURATION: 97 % | RESPIRATION RATE: 18 BRPM | DIASTOLIC BLOOD PRESSURE: 71 MMHG | TEMPERATURE: 98 F

## 2023-01-19 DIAGNOSIS — E78.5 HYPERLIPIDEMIA, UNSPECIFIED HYPERLIPIDEMIA TYPE: ICD-10-CM

## 2023-01-19 DIAGNOSIS — K76.0 FATTY LIVER: Primary | ICD-10-CM

## 2023-01-19 DIAGNOSIS — I10 PRIMARY HYPERTENSION: ICD-10-CM

## 2023-01-19 DIAGNOSIS — K74.01 EARLY HEPATIC FIBROSIS: ICD-10-CM

## 2023-01-19 DIAGNOSIS — R74.8 ELEVATED LIVER ENZYMES: ICD-10-CM

## 2023-01-19 PROBLEM — Z96.651 PRESENCE OF RIGHT ARTIFICIAL KNEE JOINT: Status: ACTIVE | Noted: 2020-12-02

## 2023-01-19 PROBLEM — Z91.81 HISTORY OF FALLING: Status: ACTIVE | Noted: 2020-12-02

## 2023-01-19 PROBLEM — Z47.1 AFTERCARE FOLLOWING JOINT REPLACEMENT SURGERY: Status: ACTIVE | Noted: 2020-12-01

## 2023-01-19 PROBLEM — Z79.891 LONG TERM (CURRENT) USE OF OPIATE ANALGESIC: Status: ACTIVE | Noted: 2020-12-02

## 2023-01-19 PROCEDURE — 3288F FALL RISK ASSESSMENT DOCD: CPT | Mod: CPTII,S$GLB,, | Performed by: NURSE PRACTITIONER

## 2023-01-19 PROCEDURE — 1126F AMNT PAIN NOTED NONE PRSNT: CPT | Mod: CPTII,S$GLB,, | Performed by: NURSE PRACTITIONER

## 2023-01-19 PROCEDURE — 3008F BODY MASS INDEX DOCD: CPT | Mod: CPTII,S$GLB,, | Performed by: NURSE PRACTITIONER

## 2023-01-19 PROCEDURE — 3077F PR MOST RECENT SYSTOLIC BLOOD PRESSURE >= 140 MM HG: ICD-10-PCS | Mod: CPTII,S$GLB,, | Performed by: NURSE PRACTITIONER

## 2023-01-19 PROCEDURE — 1160F RVW MEDS BY RX/DR IN RCRD: CPT | Mod: CPTII,S$GLB,, | Performed by: NURSE PRACTITIONER

## 2023-01-19 PROCEDURE — 1160F PR REVIEW ALL MEDS BY PRESCRIBER/CLIN PHARMACIST DOCUMENTED: ICD-10-PCS | Mod: CPTII,S$GLB,, | Performed by: NURSE PRACTITIONER

## 2023-01-19 PROCEDURE — 76981 FIBROSCAN NEW ORLEANS: ICD-10-PCS | Mod: S$GLB,,, | Performed by: NURSE PRACTITIONER

## 2023-01-19 PROCEDURE — 3077F SYST BP >= 140 MM HG: CPT | Mod: CPTII,S$GLB,, | Performed by: NURSE PRACTITIONER

## 2023-01-19 PROCEDURE — 3288F PR FALLS RISK ASSESSMENT DOCUMENTED: ICD-10-PCS | Mod: CPTII,S$GLB,, | Performed by: NURSE PRACTITIONER

## 2023-01-19 PROCEDURE — 99999 PR PBB SHADOW E&M-EST. PATIENT-LVL V: ICD-10-PCS | Mod: PBBFAC,,, | Performed by: NURSE PRACTITIONER

## 2023-01-19 PROCEDURE — 76981 USE PARENCHYMA: CPT | Mod: S$GLB,,, | Performed by: NURSE PRACTITIONER

## 2023-01-19 PROCEDURE — 3008F PR BODY MASS INDEX (BMI) DOCUMENTED: ICD-10-PCS | Mod: CPTII,S$GLB,, | Performed by: NURSE PRACTITIONER

## 2023-01-19 PROCEDURE — 99214 PR OFFICE/OUTPT VISIT, EST, LEVL IV, 30-39 MIN: ICD-10-PCS | Mod: S$GLB,,, | Performed by: NURSE PRACTITIONER

## 2023-01-19 PROCEDURE — 1159F MED LIST DOCD IN RCRD: CPT | Mod: CPTII,S$GLB,, | Performed by: NURSE PRACTITIONER

## 2023-01-19 PROCEDURE — 1101F PT FALLS ASSESS-DOCD LE1/YR: CPT | Mod: CPTII,S$GLB,, | Performed by: NURSE PRACTITIONER

## 2023-01-19 PROCEDURE — 1101F PR PT FALLS ASSESS DOC 0-1 FALLS W/OUT INJ PAST YR: ICD-10-PCS | Mod: CPTII,S$GLB,, | Performed by: NURSE PRACTITIONER

## 2023-01-19 PROCEDURE — 1126F PR PAIN SEVERITY QUANTIFIED, NO PAIN PRESENT: ICD-10-PCS | Mod: CPTII,S$GLB,, | Performed by: NURSE PRACTITIONER

## 2023-01-19 PROCEDURE — 1159F PR MEDICATION LIST DOCUMENTED IN MEDICAL RECORD: ICD-10-PCS | Mod: CPTII,S$GLB,, | Performed by: NURSE PRACTITIONER

## 2023-01-19 PROCEDURE — 99999 PR PBB SHADOW E&M-EST. PATIENT-LVL V: CPT | Mod: PBBFAC,,, | Performed by: NURSE PRACTITIONER

## 2023-01-19 PROCEDURE — 99214 OFFICE O/P EST MOD 30 MIN: CPT | Mod: S$GLB,,, | Performed by: NURSE PRACTITIONER

## 2023-01-19 PROCEDURE — 3078F DIAST BP <80 MM HG: CPT | Mod: CPTII,S$GLB,, | Performed by: NURSE PRACTITIONER

## 2023-01-19 PROCEDURE — 3078F PR MOST RECENT DIASTOLIC BLOOD PRESSURE < 80 MM HG: ICD-10-PCS | Mod: CPTII,S$GLB,, | Performed by: NURSE PRACTITIONER

## 2023-01-19 RX ORDER — RALOXIFENE HYDROCHLORIDE 60 MG/1
60 TABLET, FILM COATED ORAL
COMMUNITY
Start: 2022-12-24

## 2023-01-19 NOTE — PATIENT INSTRUCTIONS
1. Fibroscan today to assess for fat and scar tissue in the liver  2. Recommend an Ultrasound of the liver annually, next due 1/2024.  3. Repeat liver function tests in 6 months.   4. Avoid alcohol and herbal supplements/alternative remedies.  5. Return to clinic in 6 months.    There is no FDA approved therapy for non-alcoholic fatty liver disease. Therefore, these things are important:  1. Weight loss goal of 10-15 lbs.  2. Low carb/sugar, high fiber and protein diet.Try to limit your carb intake to LESS than 30-45 grams of carbs with a meal or LESS than 5-10 grams with any snack (total of any snack foods eaten during that time). Use MyFitness Pal carlos to add up your carbs through the day. Do NOT drink any beverages with calories or carbs (this can lead to high blood sugar and weight gain). Also, some of our patients have been very successful with weight loss using the pre made/planned meal planning services that limit calories and portion size (one example is Sensible Meals but there are many out there).  3. Exercise, 5 days per week, 30 minutes per day, as tolerated.  4. Recommend good control of cholesterol, blood pressure, & blood sugar levels.    In some people, fatty liver can progress to steatohepatitis (inflamatory fatty liver) and possibly to cirrhosis, putting one at increased risk for liver cancer, liver failure, and death. Therefore, the lifestyle changes are very important to decrease this risk.     Website with information about fatty liver and inflammation related to fatty liver ([place]) = www.nashtruth.com  AND www.NASHactually.com

## 2023-01-19 NOTE — PROCEDURES
FibroScan [address]    Date/Time: 1/19/2023 9:15 AM  Performed by: Jeanie Paredes NP  Authorized by: Jeanie Paredes NP     Diagnosis:  NAFLD    Probe:  M    Universal Protocol: Patient's identity, procedure and site were verified, confirmatory pause was performed.  Discussed procedure including risks and potential complications.  Questions answered.  Patient verbalizes understanding and wishes to proceed with VCTE.     Procedure: After providing explanations of the procedure, patient was placed in the supine position with right arm in maximum abduction to allow optimal exposure of right lateral abdomen.  Patient was briefly assessed, Testing was performed in the mid-axillary location, 50Hz Shear Wave pulses were applied and the resulting Shear Wave and Propagation Speed detected with a 3.5 MHz ultrasonic signal, using the FibroScan probe, Skin to liver capsule distance and liver parenchyma were accessed during the entire examination with the FibroScan probe, Patient was instructed to breathe normally and to abstain from sudden movements during the procedure, allowing for random measurements of liver stiffness. At least 10 Shear Waves were produced, Individual measurements of each Shear Wave were calculated.  Patient tolerated the procedure well with no complications.  Meets discharge criteria as was dismissed.  Rates pain 0 out of 10.  Patient will follow up with ordering provider to review results.    Findings  Median liver stiffness score:  8.2  CAP Reading: dB/m:  281    IQR/med %:  11  Interpretation  Fibrosis interpretation is based on medial liver stiffness - Kilopascal (kPa).    Fibrosis Stage:  F2  Steatosis interpretation is based on controlled attenuation parameter - (dB/m).    Steatosis Grade:  S2

## 2023-01-19 NOTE — PROGRESS NOTES
[facility] VISIT ESTABLISHED PT NOTE    REFERRING PROVIDER:  No ref. provider found    CHIEF COMPLAINT: Fatty Liver    HPI: This is a 67 y.o.  female with PMH noted below, presenting for follow up in the management of fatty liver. She was accompanied by her son who provides translation, per patient request. She was previously followed by Dr. Debra Finnegan and was last seen in clinic by myself in 1/2022. Fibroscan at initial visit was suggestive of F0-F1 fibrosis. Unfortunately she has regained the weight that she previously lost (15-20 pounds) and her liver enzymes became mildly elevated again (had normalized previously). Repeat Fibroscan in 1/2021 was suggestive of F0-F1 fibrosis with mild hepatic steatosis (S1).     Most recent abdominal ultrasound in 1/2023 continues to show mild hepatomegaly (17.6 cm), due to fatty infiltration of the liver. She rarely drinks alcohol. She is immune to HAV (has received 1 vaccination) and is also immune to HBV through prior exposure. She denies any known family history of liver disease. Her weight has been stable since last visit. Her LFT's have essentially normalized again.     Repeat Fibroscan today is worsened and is suggestive of moderate fatty infiltration of the liver, with F2 (moderate) fibrosis, concerning for progression of her liver disease. She is well appearing and has no signs or symptoms of decompensated liver disease including jaundice, dark urine, abdominal distention, lower extremity edema, hematemesis, melena, or periods of confusion suggestive of hepatic encephalopathy.      Review of patient's allergies indicates:   Allergen Reactions    Tramadol Itching       Current Outpatient Medications on File Prior to Visit   Medication Sig Dispense Refill    clobetasoL (CLOBEX) 0.05 % shampoo APPLY TOPICALLY TWICE DAILY 118 mL 0    gabapentin (NEURONTIN) 300 MG capsule Take 1 capsule (300 mg total) by mouth 2 (two) times daily. 60 capsule 11     metoprolol succinate (TOPROL-XL) 25 MG 24 hr tablet Take 1 tablet (25 mg total) by mouth once daily. 90 tablet 0    olmesartan-hydrochlorothiazide (BENICAR HCT) 40-25 mg per tablet TAKE 1 TABLET BY MOUTH EVERY DAY 90 tablet 0    raloxifene (EVISTA) 60 mg tablet Take 60 mg by mouth.      vitamin D (VITAMIN D3) 1000 units Tab Take 1 tablet (1,000 Units total) by mouth once daily. 30 tablet 0    esomeprazole (NEXIUM) 40 MG capsule Take 1 capsule (40 mg total) by mouth 2 (two) times daily. 90 capsule 3    sumatriptan (IMITREX) 100 MG tablet Take 1 tablet (100 mg total) by mouth every 2 (two) hours as needed for Migraine. 9 tablet 5     No current facility-administered medications on file prior to visit.     PMHX:  has a past medical history of Arthritis, GERD (gastroesophageal reflux disease), Hyperlipidemia, Hypertension, Irritable bowel syndrome, Joint pain, Knee injury, Migraine headache, Muscle pain, fibromyalgia (03/19/2014), NAFLD (nonalcoholic fatty liver disease), Other physical therapy, Plantar fasciitis, Sleep apnea, and Urinary incontinence.    PSHX:  has a past surgical history that includes Hysterectomy; Colonoscopy (N/A, 11/9/2017); Colonoscopy (N/A, 2/26/2018); Appendectomy (2018); and Total knee arthroplasty (Right, 11/23/2020).    FAMILY HISTORY: Negative for liver disease, reviewed in [facility]    SOCIAL HISTORY:   Social History     Tobacco Use   Smoking Status Never   Smokeless Tobacco Never     Social History     Substance and Sexual Activity   Alcohol Use No     Social History     Substance and Sexual Activity   Drug Use No       ROS:   GENERAL: Denies fever, chills, weight loss/gain, fatigue  HEENT: Denies headaches, dizziness, vision/hearing changes  CARDIOVASCULAR: Denies chest pain, palpitations, or edema  RESPIRATORY: Denies dyspnea, cough  GI: Denies abdominal pain, rectal bleeding, nausea, vomiting. No change in bowel pattern or color  : Denies dysuria, hematuria   SKIN: Denies rash, itching  "  NEURO: Denies confusion, memory loss, or mood changes  PSYCH: Denies depression or anxiety  HEME/LYMPH: Denies easy bruising or bleeding    PHYSICAL EXAM:   Friendly  female, in no acute distress; alert and oriented to person, place and time  VITALS: BP (!) 140/71 (BP Location: Right arm, Patient Position: Sitting, BP Method: Medium (Automatic))   Pulse 69   Temp 97.8 °F (36.6 °C) (Oral)   Resp 18   Ht 5' 7" (1.702 m)   Wt 86.2 kg (190 lb 0.6 oz)   SpO2 97%   BMI 29.76 kg/m²   HENT: Normocephalic, without obvious abnormality.   EYES: Sclerae anicteric.   NECK: Supple.   CARDIOVASCULAR: No peripheral edema.  RESPIRATORY: Normal respiratory effort.   GI: Soft, non-tender, non-distended.   EXTREMITIES:  No clubbing, cyanosis or edema.  SKIN: Warm and dry. No jaundice. No rashes noted to exposed skin.   NEURO:  Normal gait. No asterixis.  PSYCH:  Memory intact. Thought and speech pattern appropriate. Behavior normal. No depression or anxiety noted.    DIAGNOSTIC STUDIES:    FIBROSCAN 10/24/2016:    Fibroscan readin.3 KPa     IQR/med: 19 %     Fibrosis:F 0-1      FIBROSCAN 2021:    Findings  Median liver stiffness score:  4.9  CAP Reading: dB/m:  236     IQR/med %:  16  Interpretation  Fibrosis interpretation is based on medial liver stiffness - Kilopascal (kPa).     Fibrosis Stage:  F 0-1  Steatosis interpretation is based on controlled attenuation parameter - (dB/m).     Steatosis Grade:  S1    US ABDOMEN COMPLETE 2021:    FINDINGS:    Demonstrated portions of the pancreas are unremarkable.     The liver is enlarged in size measuring 17.6 cm with homogeneous parenchymal echotexture.  Hepatorenal index is elevated at 1.6.  Gallbladder demonstrates no calculi, wall thickening, or pericholecystic fluid.  Negative sonographic Kirby sign.  There is no intra or extrahepatic biliary ductal dilatation and the common bile duct measures 2 mm in diameter.     The aorta and IVC appear within normal " limits.     Kidneys are normal in size measuring 12.3 cm on the right and 11.9 cm on the left.  A 3 mm echogenic focus at the right renal midpole, likely a nonobstructing nephrolith.  No solid masses or hydronephrosis.     The spleen is normal in size measuring 11.5 x 4.2 cm with homogeneous echotexture.     No abdominal free fluid identified.     Impression:     Hepatomegaly and hepatic steatosis.     Right renal nonobstructing nephrolith.    US Abdomen Limited  Narrative: EXAMINATION:  US ABDOMEN LIMITED    CLINICAL HISTORY:  Fatty (change of) liver, not elsewhere classified    TECHNIQUE:  Limited ultrasound of the right upper quadrant of the abdomen (including pancreas, liver, gallbladder, common bile duct, and right kidney) was performed.    COMPARISON:  Complete abdominal ultrasound from 01/20/2021.    FINDINGS:  Exam is somewhat degraded due to prominent overlying bowel gas.    The visualized portion of the pancreas is unremarkable.    The liver is upper limit of normal for size measuring 17.6 cm.  The liver demonstrates uniform echotexture with mildly increased echogenicity.  No focal hepatic lesions are seen.    The gallbladder is unremarkable with no evidence of calculi.  The common duct is not dilated, measuring 2 mm.  The gallbladder wall is not thickened.  There is no sonographic Kirby sign.  No dilated intrahepatic radicles are seen.  No pericholecystic fluid.    The spleen is normal in size measuring 10.8 x 4.0 cm with a homogeneous echotexture.    There is no evidence of ascites.  Impression: Mildly increased hepatic echogenicity compatible with reported steatosis.  No focal suspicious lesion.    Electronically signed by: León Roa  Date:    01/17/2023  Time:    12:42    FIBROSCAN 1/19/2023:    Findings  Median liver stiffness score:  8.2  CAP Reading: dB/m:  281     IQR/med %:  11  Interpretation  Fibrosis interpretation is based on medial liver stiffness - Kilopascal (kPa).     Fibrosis  Stage:  F2  Steatosis interpretation is based on controlled attenuation parameter - (dB/m).     Steatosis Grade:  S2     ASSESSMENT & PLAN:  67 y.o. White female with:    1. Fatty liver  Hepatic Function Panel    FibroScan [place] (Vibration Controlled Transient Elastography)      2. Early hepatic fibrosis  Hepatic Function Panel    FibroScan [place] (Vibration Controlled Transient Elastography)      3. BMI 29.0-29.9,adult  Hepatic Function Panel    FibroScan [place] (Vibration Controlled Transient Elastography)      4. Hyperlipidemia, unspecified hyperlipidemia type  Hepatic Function Panel    FibroScan [place] (Vibration Controlled Transient Elastography)      5. Primary hypertension  Hepatic Function Panel    FibroScan [place] (Vibration Controlled Transient Elastography)        - Recommend weight loss of 10-15 pounds, through diet and exercise.  - Schedule Fibroscan to restage liver disease in 6 months.  - Repeat labs in 6 months to monitor liver function.  - Recommend an abdominal ultrasound annually, next due 1/2024.  - Avoid alcohol and herbal supplements/alternative remedies.  - Return to clinic in 6 months.    Thank you for allowing me to participate in the care of Rachel Conroy       Hepatology Nurse Practitioner  [facility]    CC'ed note to:   No ref. provider found  Maxwell Gonzalez Jr, MD

## 2023-01-20 ENCOUNTER — OFFICE VISIT (OUTPATIENT)
Dept: FAMILY MEDICINE | Facility: CLINIC | Age: 68
End: 2023-01-20
Payer: COMMERCIAL

## 2023-01-20 VITALS
WEIGHT: 192.44 LBS | OXYGEN SATURATION: 92 % | HEART RATE: 80 BPM | DIASTOLIC BLOOD PRESSURE: 72 MMHG | RESPIRATION RATE: 18 BRPM | TEMPERATURE: 98 F | SYSTOLIC BLOOD PRESSURE: 104 MMHG | BODY MASS INDEX: 30.14 KG/M2

## 2023-01-20 DIAGNOSIS — R30.0 DYSURIA: Primary | ICD-10-CM

## 2023-01-20 DIAGNOSIS — R31.29 MICROSCOPIC HEMATURIA: ICD-10-CM

## 2023-01-20 LAB
BILIRUB SERPL-MCNC: NORMAL MG/DL
BILIRUB UR QL STRIP: NEGATIVE
BILIRUB UR QL STRIP: NEGATIVE
BLOOD URINE, POC: NORMAL
CLARITY UR: CLEAR
CLARITY UR: CLEAR
CLARITY, POC UA: NORMAL
COLOR UR: YELLOW
COLOR UR: YELLOW
COLOR, POC UA: YELLOW
GLUCOSE UR QL STRIP: NEGATIVE
GLUCOSE UR QL STRIP: NEGATIVE
GLUCOSE UR QL STRIP: NORMAL
HGB UR QL STRIP: NEGATIVE
HGB UR QL STRIP: NEGATIVE
KETONES UR QL STRIP: NEGATIVE
KETONES UR QL STRIP: NEGATIVE
KETONES UR QL STRIP: NORMAL
LEUKOCYTE ESTERASE UR QL STRIP: NEGATIVE
LEUKOCYTE ESTERASE UR QL STRIP: NEGATIVE
LEUKOCYTE ESTERASE URINE, POC: NORMAL
NITRITE UR QL STRIP: NEGATIVE
NITRITE UR QL STRIP: NEGATIVE
NITRITE, POC UA: NORMAL
PH UR STRIP: 5 [PH] (ref 5–8)
PH UR STRIP: 5 [PH] (ref 5–8)
PH, POC UA: 5
PROT UR QL STRIP: NEGATIVE
PROT UR QL STRIP: NEGATIVE
PROTEIN, POC: NORMAL
SP GR UR STRIP: 1.02 (ref 1–1.03)
SP GR UR STRIP: 1.02 (ref 1–1.03)
SPECIFIC GRAVITY, POC UA: 1.03
URN SPEC COLLECT METH UR: NORMAL
URN SPEC COLLECT METH UR: NORMAL
UROBILINOGEN UR STRIP-ACNC: NEGATIVE EU/DL
UROBILINOGEN UR STRIP-ACNC: NEGATIVE EU/DL
UROBILINOGEN, POC UA: NORMAL

## 2023-01-20 PROCEDURE — 1159F MED LIST DOCD IN RCRD: CPT | Mod: CPTII,S$GLB,, | Performed by: INTERNAL MEDICINE

## 2023-01-20 PROCEDURE — 87086 URINE CULTURE/COLONY COUNT: CPT | Performed by: INTERNAL MEDICINE

## 2023-01-20 PROCEDURE — 1101F PR PT FALLS ASSESS DOC 0-1 FALLS W/OUT INJ PAST YR: ICD-10-PCS | Mod: CPTII,S$GLB,, | Performed by: INTERNAL MEDICINE

## 2023-01-20 PROCEDURE — 1101F PT FALLS ASSESS-DOCD LE1/YR: CPT | Mod: CPTII,S$GLB,, | Performed by: INTERNAL MEDICINE

## 2023-01-20 PROCEDURE — 81002 URINALYSIS NONAUTO W/O SCOPE: CPT | Mod: S$GLB,,, | Performed by: INTERNAL MEDICINE

## 2023-01-20 PROCEDURE — 3008F PR BODY MASS INDEX (BMI) DOCUMENTED: ICD-10-PCS | Mod: CPTII,S$GLB,, | Performed by: INTERNAL MEDICINE

## 2023-01-20 PROCEDURE — 1160F PR REVIEW ALL MEDS BY PRESCRIBER/CLIN PHARMACIST DOCUMENTED: ICD-10-PCS | Mod: CPTII,S$GLB,, | Performed by: INTERNAL MEDICINE

## 2023-01-20 PROCEDURE — 1125F AMNT PAIN NOTED PAIN PRSNT: CPT | Mod: CPTII,S$GLB,, | Performed by: INTERNAL MEDICINE

## 2023-01-20 PROCEDURE — 3074F PR MOST RECENT SYSTOLIC BLOOD PRESSURE < 130 MM HG: ICD-10-PCS | Mod: CPTII,S$GLB,, | Performed by: INTERNAL MEDICINE

## 2023-01-20 PROCEDURE — 3288F PR FALLS RISK ASSESSMENT DOCUMENTED: ICD-10-PCS | Mod: CPTII,S$GLB,, | Performed by: INTERNAL MEDICINE

## 2023-01-20 PROCEDURE — 81002 POCT URINE DIPSTICK WITHOUT MICROSCOPE: ICD-10-PCS | Mod: S$GLB,,, | Performed by: INTERNAL MEDICINE

## 2023-01-20 PROCEDURE — 3288F FALL RISK ASSESSMENT DOCD: CPT | Mod: CPTII,S$GLB,, | Performed by: INTERNAL MEDICINE

## 2023-01-20 PROCEDURE — 3008F BODY MASS INDEX DOCD: CPT | Mod: CPTII,S$GLB,, | Performed by: INTERNAL MEDICINE

## 2023-01-20 PROCEDURE — 99214 OFFICE O/P EST MOD 30 MIN: CPT | Mod: S$GLB,,, | Performed by: INTERNAL MEDICINE

## 2023-01-20 PROCEDURE — 99999 PR PBB SHADOW E&M-EST. PATIENT-LVL IV: ICD-10-PCS | Mod: PBBFAC,,, | Performed by: INTERNAL MEDICINE

## 2023-01-20 PROCEDURE — 81003 URINALYSIS AUTO W/O SCOPE: CPT | Performed by: INTERNAL MEDICINE

## 2023-01-20 PROCEDURE — 3078F PR MOST RECENT DIASTOLIC BLOOD PRESSURE < 80 MM HG: ICD-10-PCS | Mod: CPTII,S$GLB,, | Performed by: INTERNAL MEDICINE

## 2023-01-20 PROCEDURE — 1160F RVW MEDS BY RX/DR IN RCRD: CPT | Mod: CPTII,S$GLB,, | Performed by: INTERNAL MEDICINE

## 2023-01-20 PROCEDURE — 1125F PR PAIN SEVERITY QUANTIFIED, PAIN PRESENT: ICD-10-PCS | Mod: CPTII,S$GLB,, | Performed by: INTERNAL MEDICINE

## 2023-01-20 PROCEDURE — 99999 PR PBB SHADOW E&M-EST. PATIENT-LVL IV: CPT | Mod: PBBFAC,,, | Performed by: INTERNAL MEDICINE

## 2023-01-20 PROCEDURE — 99214 PR OFFICE/OUTPT VISIT, EST, LEVL IV, 30-39 MIN: ICD-10-PCS | Mod: S$GLB,,, | Performed by: INTERNAL MEDICINE

## 2023-01-20 PROCEDURE — 3074F SYST BP LT 130 MM HG: CPT | Mod: CPTII,S$GLB,, | Performed by: INTERNAL MEDICINE

## 2023-01-20 PROCEDURE — 1159F PR MEDICATION LIST DOCUMENTED IN MEDICAL RECORD: ICD-10-PCS | Mod: CPTII,S$GLB,, | Performed by: INTERNAL MEDICINE

## 2023-01-20 PROCEDURE — 3078F DIAST BP <80 MM HG: CPT | Mod: CPTII,S$GLB,, | Performed by: INTERNAL MEDICINE

## 2023-01-20 RX ORDER — TIZANIDINE 4 MG/1
4 TABLET ORAL EVERY 8 HOURS PRN
Qty: 30 TABLET | Refills: 0 | Status: SHIPPED | OUTPATIENT
Start: 2023-01-20 | End: 2023-01-30

## 2023-01-20 RX ORDER — NITROFURANTOIN (MACROCRYSTALS) 100 MG/1
100 CAPSULE ORAL 2 TIMES DAILY
Qty: 10 CAPSULE | Refills: 0 | Status: SHIPPED | OUTPATIENT
Start: 2023-01-20 | End: 2023-07-19 | Stop reason: ALTCHOICE

## 2023-01-20 NOTE — PROGRESS NOTES
Subjective:       Patient ID: Rachel Conroy is a 67 y.o. female.    Chief Complaint: Flank Pain (Kidney and lower right side back pain)    Flank Pain  This is a new problem. The current episode started in the past 7 days (x 2 days). The problem has been waxing and waning since onset. The pain is present in the lumbar spine. The quality of the pain is described as aching. The pain does not radiate. The pain is moderate. The pain is The same all the time. The symptoms are aggravated by twisting and bending. Associated symptoms include dysuria. Pertinent negatives include no abdominal pain, bladder incontinence, bowel incontinence, chest pain, fever, headaches or weakness. Risk factors include obesity. She has tried NSAIDs (took two doses of old antibiotic yesterday with some improvement) for the symptoms. The treatment provided mild relief.   Review of Systems   Constitutional:  Negative for activity change, appetite change, fatigue, fever and unexpected weight change.   HENT:  Negative for ear pain, rhinorrhea and sore throat.    Eyes:  Negative for discharge and visual disturbance.   Respiratory:  Negative for chest tightness, shortness of breath and wheezing.    Cardiovascular:  Negative for chest pain, palpitations and leg swelling.   Gastrointestinal:  Negative for abdominal pain, bowel incontinence, constipation and diarrhea.   Endocrine: Negative for cold intolerance and heat intolerance.   Genitourinary:  Positive for dysuria and flank pain. Negative for bladder incontinence and hematuria.   Musculoskeletal:  Negative for joint swelling and neck stiffness.   Skin:  Negative for rash.   Neurological:  Negative for dizziness, syncope, weakness and headaches.   Psychiatric/Behavioral:  Negative for suicidal ideas.      Noted pink tinge to urine starting one day ago. 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   HENT:      Head: Normocephalic and atraumatic.   Eyes:      Conjunctiva/sclera: Conjunctivae normal.   Cardiovascular:      Rate and Rhythm: Regular rhythm.   Pulmonary:      Effort: Pulmonary effort is normal.      Breath sounds: Normal breath sounds. No wheezing or rales.   Abdominal:      General: There is no distension.      Palpations: Abdomen is soft.      Tenderness: There is no abdominal tenderness. There is no right CVA tenderness, left CVA tenderness, guarding or rebound.   Musculoskeletal:         General: No tenderness. Normal range of motion.      Cervical back: Normal range of motion.      Right lower leg: No edema.      Left lower leg: No edema.   Skin:     General: Skin is warm and dry.      Comments: Lumbar skin posteriorlly without rash or erythema   Neurological:      Mental Status: She is alert and oriented to person, place, and time.      Cranial Nerves: No cranial nerve deficit.       Assessment and Plan   1. Dysuria  Urine dip + blood low pH emperic treatment with macrobid specimen submitted for culture avoid nsaids trial of low dose muscle relaxer for MSK pain no evidence of pyelo on exam soft abdomen without rebound  - POCT URINE DIPSTICK WITHOUT MICROSCOPE  - nitrofurantoin (MACRODANTIN) 100 MG capsule; Take 1 capsule (100 mg total) by mouth 2 (two) times a day.  Dispense: 10 capsule; Refill: 0  - tiZANidine (ZANAFLEX) 4 MG tablet; Take 1 tablet (4 mg total) by mouth every 8 (eight) hours as needed (back pain).  Dispense: 30 tablet; Refill: 0    2. Microscopic hematuria  As above  - Urinalysis; Future  - Urine culture

## 2023-01-22 LAB — BACTERIA UR CULT: NORMAL

## 2023-02-20 ENCOUNTER — OFFICE VISIT (OUTPATIENT)
Dept: FAMILY MEDICINE | Facility: CLINIC | Age: 68
End: 2023-02-20
Payer: COMMERCIAL

## 2023-02-20 VITALS
DIASTOLIC BLOOD PRESSURE: 70 MMHG | OXYGEN SATURATION: 97 % | SYSTOLIC BLOOD PRESSURE: 114 MMHG | TEMPERATURE: 98 F | BODY MASS INDEX: 29.69 KG/M2 | HEART RATE: 87 BPM | WEIGHT: 189.63 LBS

## 2023-02-20 DIAGNOSIS — Z53.21 PROCEDURE AND TREATMENT NOT CARRIED OUT DUE TO PATIENT LEAVING PRIOR TO BEING SEEN BY HEALTH CARE PROVIDER: Primary | ICD-10-CM

## 2023-02-20 PROCEDURE — 1159F MED LIST DOCD IN RCRD: CPT | Mod: CPTII,S$GLB,, | Performed by: FAMILY MEDICINE

## 2023-02-20 PROCEDURE — 1101F PR PT FALLS ASSESS DOC 0-1 FALLS W/OUT INJ PAST YR: ICD-10-PCS | Mod: CPTII,S$GLB,, | Performed by: FAMILY MEDICINE

## 2023-02-20 PROCEDURE — 1160F RVW MEDS BY RX/DR IN RCRD: CPT | Mod: CPTII,S$GLB,, | Performed by: FAMILY MEDICINE

## 2023-02-20 PROCEDURE — 3078F DIAST BP <80 MM HG: CPT | Mod: CPTII,S$GLB,, | Performed by: FAMILY MEDICINE

## 2023-02-20 PROCEDURE — 3074F PR MOST RECENT SYSTOLIC BLOOD PRESSURE < 130 MM HG: ICD-10-PCS | Mod: CPTII,S$GLB,, | Performed by: FAMILY MEDICINE

## 2023-02-20 PROCEDURE — 1126F AMNT PAIN NOTED NONE PRSNT: CPT | Mod: CPTII,S$GLB,, | Performed by: FAMILY MEDICINE

## 2023-02-20 PROCEDURE — 1126F PR PAIN SEVERITY QUANTIFIED, NO PAIN PRESENT: ICD-10-PCS | Mod: CPTII,S$GLB,, | Performed by: FAMILY MEDICINE

## 2023-02-20 PROCEDURE — 3288F PR FALLS RISK ASSESSMENT DOCUMENTED: ICD-10-PCS | Mod: CPTII,S$GLB,, | Performed by: FAMILY MEDICINE

## 2023-02-20 PROCEDURE — 1160F PR REVIEW ALL MEDS BY PRESCRIBER/CLIN PHARMACIST DOCUMENTED: ICD-10-PCS | Mod: CPTII,S$GLB,, | Performed by: FAMILY MEDICINE

## 2023-02-20 PROCEDURE — 99499 NO LOS: ICD-10-PCS | Mod: S$GLB,,, | Performed by: FAMILY MEDICINE

## 2023-02-20 PROCEDURE — 3288F FALL RISK ASSESSMENT DOCD: CPT | Mod: CPTII,S$GLB,, | Performed by: FAMILY MEDICINE

## 2023-02-20 PROCEDURE — 99499 UNLISTED E&M SERVICE: CPT | Mod: S$GLB,,, | Performed by: FAMILY MEDICINE

## 2023-02-20 PROCEDURE — 1101F PT FALLS ASSESS-DOCD LE1/YR: CPT | Mod: CPTII,S$GLB,, | Performed by: FAMILY MEDICINE

## 2023-02-20 PROCEDURE — 3008F PR BODY MASS INDEX (BMI) DOCUMENTED: ICD-10-PCS | Mod: CPTII,S$GLB,, | Performed by: FAMILY MEDICINE

## 2023-02-20 PROCEDURE — 1159F PR MEDICATION LIST DOCUMENTED IN MEDICAL RECORD: ICD-10-PCS | Mod: CPTII,S$GLB,, | Performed by: FAMILY MEDICINE

## 2023-02-20 PROCEDURE — 3008F BODY MASS INDEX DOCD: CPT | Mod: CPTII,S$GLB,, | Performed by: FAMILY MEDICINE

## 2023-02-20 PROCEDURE — 3074F SYST BP LT 130 MM HG: CPT | Mod: CPTII,S$GLB,, | Performed by: FAMILY MEDICINE

## 2023-02-20 PROCEDURE — 3078F PR MOST RECENT DIASTOLIC BLOOD PRESSURE < 80 MM HG: ICD-10-PCS | Mod: CPTII,S$GLB,, | Performed by: FAMILY MEDICINE

## 2023-03-05 DIAGNOSIS — L30.9 DERMATITIS: ICD-10-CM

## 2023-03-06 RX ORDER — CLOBETASOL PROPIONATE 0.05 G/100ML
SHAMPOO TOPICAL
Qty: 236 ML | Refills: 0 | Status: SHIPPED | OUTPATIENT
Start: 2023-03-06 | End: 2023-10-14 | Stop reason: SDUPTHER

## 2023-03-06 NOTE — TELEPHONE ENCOUNTER
Refill Decision Note   Rachel Conroy  is requesting a refill authorization.  Brief Assessment and Rationale for Refill:  Approve     Medication Therapy Plan:       Medication Reconciliation Completed: No   Comments:     No Care Gaps recommended.     Note composed:10:26 AM 03/06/2023

## 2023-03-06 NOTE — TELEPHONE ENCOUNTER
No new care gaps identified.  [facility] Embedded Care Gaps. Reference number: [id number]. 3/05/2023   10:09:29 PM CST

## 2023-03-06 NOTE — PROGRESS NOTES
Patient checked in late for her appointment.  She was informed by the medical assistant that as she was late and the next patient's were already here and on time they would be seen first. After some time she decided she did not want to keep waiting because she did not feel that she should wait that long just because she was a few minutes late.  Nurse reminded her that she had received a phone call to inform her to check in at least 15 minute early so that she could be roomed on time.  Patient left without rescheduling.

## 2023-04-04 DIAGNOSIS — I10 ESSENTIAL HYPERTENSION: ICD-10-CM

## 2023-04-04 RX ORDER — OLMESARTAN MEDOXOMIL AND HYDROCHLOROTHIAZIDE 40/25 40; 25 MG/1; MG/1
TABLET ORAL
Qty: 90 TABLET | Refills: 1 | Status: SHIPPED | OUTPATIENT
Start: 2023-04-04 | End: 2023-06-03 | Stop reason: SDUPTHER

## 2023-04-04 NOTE — TELEPHONE ENCOUNTER
No new care gaps identified.  [facility] Embedded Care Gaps. Reference number: [id number]. 4/04/2023   12:13:42 AM LARAT

## 2023-04-04 NOTE — TELEPHONE ENCOUNTER
Refill Decision Note   Rachel Conroy  is requesting a refill authorization.  Brief Assessment and Rationale for Refill:  Approve     Medication Therapy Plan:       Medication Reconciliation Completed: No   Comments:     No Care Gaps recommended.     Note composed:1:38 PM 04/04/2023

## 2023-04-06 ENCOUNTER — PATIENT MESSAGE (OUTPATIENT)
Dept: FAMILY MEDICINE | Facility: CLINIC | Age: 68
End: 2023-04-06
Payer: COMMERCIAL

## 2023-04-06 ENCOUNTER — NURSE TRIAGE (OUTPATIENT)
Dept: ADMINISTRATIVE | Facility: CLINIC | Age: 68
End: 2023-04-06
Payer: COMMERCIAL

## 2023-04-06 NOTE — TELEPHONE ENCOUNTER
Elevated bp. Cg stated they were on the line with the Md office. No triage done. Cg did not complete the verification of .  Reason for Disposition   Caller has already spoken with the PCP (or office), and has no further questions    Protocols used: No Contact or Duplicate Contact Call-A-[place]

## 2023-04-10 NOTE — TELEPHONE ENCOUNTER
Patient can make an appointment with nurse practitioner in regards to her concerns with her blood pressure

## 2023-04-27 ENCOUNTER — TELEPHONE (OUTPATIENT)
Dept: FAMILY MEDICINE | Facility: CLINIC | Age: 68
End: 2023-04-27
Payer: COMMERCIAL

## 2023-04-27 ENCOUNTER — OFFICE VISIT (OUTPATIENT)
Dept: FAMILY MEDICINE | Facility: CLINIC | Age: 68
End: 2023-04-27
Payer: COMMERCIAL

## 2023-04-27 VITALS
WEIGHT: 161.19 LBS | TEMPERATURE: 98 F | HEART RATE: 102 BPM | OXYGEN SATURATION: 96 % | BODY MASS INDEX: 25.3 KG/M2 | SYSTOLIC BLOOD PRESSURE: 134 MMHG | DIASTOLIC BLOOD PRESSURE: 68 MMHG | HEIGHT: 67 IN

## 2023-04-27 DIAGNOSIS — R06.02 SHORTNESS OF BREATH: ICD-10-CM

## 2023-04-27 DIAGNOSIS — Z12.31 ENCOUNTER FOR SCREENING MAMMOGRAM FOR BREAST CANCER: Primary | ICD-10-CM

## 2023-04-27 DIAGNOSIS — R00.2 PALPITATIONS: ICD-10-CM

## 2023-04-27 DIAGNOSIS — I10 ESSENTIAL HYPERTENSION: Primary | ICD-10-CM

## 2023-04-27 PROCEDURE — 3288F FALL RISK ASSESSMENT DOCD: CPT | Mod: CPTII,S$GLB,, | Performed by: NURSE PRACTITIONER

## 2023-04-27 PROCEDURE — 99999 PR PBB SHADOW E&M-EST. PATIENT-LVL V: ICD-10-PCS | Mod: PBBFAC,,, | Performed by: NURSE PRACTITIONER

## 2023-04-27 PROCEDURE — 3288F PR FALLS RISK ASSESSMENT DOCUMENTED: ICD-10-PCS | Mod: CPTII,S$GLB,, | Performed by: NURSE PRACTITIONER

## 2023-04-27 PROCEDURE — 3008F BODY MASS INDEX DOCD: CPT | Mod: CPTII,S$GLB,, | Performed by: NURSE PRACTITIONER

## 2023-04-27 PROCEDURE — 1160F PR REVIEW ALL MEDS BY PRESCRIBER/CLIN PHARMACIST DOCUMENTED: ICD-10-PCS | Mod: CPTII,S$GLB,, | Performed by: NURSE PRACTITIONER

## 2023-04-27 PROCEDURE — 99214 OFFICE O/P EST MOD 30 MIN: CPT | Mod: S$GLB,,, | Performed by: NURSE PRACTITIONER

## 2023-04-27 PROCEDURE — 99999 PR PBB SHADOW E&M-EST. PATIENT-LVL V: CPT | Mod: PBBFAC,,, | Performed by: NURSE PRACTITIONER

## 2023-04-27 PROCEDURE — 1101F PR PT FALLS ASSESS DOC 0-1 FALLS W/OUT INJ PAST YR: ICD-10-PCS | Mod: CPTII,S$GLB,, | Performed by: NURSE PRACTITIONER

## 2023-04-27 PROCEDURE — 3078F PR MOST RECENT DIASTOLIC BLOOD PRESSURE < 80 MM HG: ICD-10-PCS | Mod: CPTII,S$GLB,, | Performed by: NURSE PRACTITIONER

## 2023-04-27 PROCEDURE — 99214 PR OFFICE/OUTPT VISIT, EST, LEVL IV, 30-39 MIN: ICD-10-PCS | Mod: S$GLB,,, | Performed by: NURSE PRACTITIONER

## 2023-04-27 PROCEDURE — 1160F RVW MEDS BY RX/DR IN RCRD: CPT | Mod: CPTII,S$GLB,, | Performed by: NURSE PRACTITIONER

## 2023-04-27 PROCEDURE — 3008F PR BODY MASS INDEX (BMI) DOCUMENTED: ICD-10-PCS | Mod: CPTII,S$GLB,, | Performed by: NURSE PRACTITIONER

## 2023-04-27 PROCEDURE — 1101F PT FALLS ASSESS-DOCD LE1/YR: CPT | Mod: CPTII,S$GLB,, | Performed by: NURSE PRACTITIONER

## 2023-04-27 PROCEDURE — 1159F MED LIST DOCD IN RCRD: CPT | Mod: CPTII,S$GLB,, | Performed by: NURSE PRACTITIONER

## 2023-04-27 PROCEDURE — 1126F AMNT PAIN NOTED NONE PRSNT: CPT | Mod: CPTII,S$GLB,, | Performed by: NURSE PRACTITIONER

## 2023-04-27 PROCEDURE — 3078F DIAST BP <80 MM HG: CPT | Mod: CPTII,S$GLB,, | Performed by: NURSE PRACTITIONER

## 2023-04-27 PROCEDURE — 1159F PR MEDICATION LIST DOCUMENTED IN MEDICAL RECORD: ICD-10-PCS | Mod: CPTII,S$GLB,, | Performed by: NURSE PRACTITIONER

## 2023-04-27 PROCEDURE — 3075F PR MOST RECENT SYSTOLIC BLOOD PRESS GE 130-139MM HG: ICD-10-PCS | Mod: CPTII,S$GLB,, | Performed by: NURSE PRACTITIONER

## 2023-04-27 PROCEDURE — 1126F PR PAIN SEVERITY QUANTIFIED, NO PAIN PRESENT: ICD-10-PCS | Mod: CPTII,S$GLB,, | Performed by: NURSE PRACTITIONER

## 2023-04-27 PROCEDURE — 3075F SYST BP GE 130 - 139MM HG: CPT | Mod: CPTII,S$GLB,, | Performed by: NURSE PRACTITIONER

## 2023-04-27 RX ORDER — METOPROLOL SUCCINATE 50 MG/1
50 TABLET, EXTENDED RELEASE ORAL DAILY
Qty: 30 TABLET | Refills: 11 | Status: SHIPPED | OUTPATIENT
Start: 2023-04-27 | End: 2023-07-26 | Stop reason: SDUPTHER

## 2023-04-27 NOTE — TELEPHONE ENCOUNTER
Pt had ov with DEACON Kaminski , offered to schedule mammogram . She confirmed already completing this year at [facility] .      FAXED NAT request to [facility] for report

## 2023-04-27 NOTE — PROGRESS NOTES
Chief Complaint  Chief Complaint   Patient presents with    Hypertension       HPI    HPI   Ms. Rachel Conroy is a 67 y.o. female with medical problems as listed below. The patient presents to clinic for HTN follow up. She states her BP has been elevated. Has been as high as 165/90. The last time it has been this high was 10 days ago. Since then it has been below 140s systolic but has still not been in range.     She does admit to having increased salt intake and eating more fried food. She also had some crawfish. S    She has a history of palpitations and has been having them more frequently and also has been having more fatigue.     She denies any CP but does feel like she has been having some shortness of breath.     Did not want interpretor. Son at the bedside.     PAST MEDICAL HISTORY:  Past Medical History:   Diagnosis Date    Arthritis     GERD (gastroesophageal reflux disease)     Hyperlipidemia     Hypertension     Irritable bowel syndrome     Joint pain     Knee injury     Migraine headache     Muscle pain, fibromyalgia 03/19/2014    NAFLD (nonalcoholic fatty liver disease)     Other physical therapy     Plantar fasciitis     Sleep apnea     Urinary incontinence        PAST SURGICAL HISTORY:  Past Surgical History:   Procedure Laterality Date    APPENDECTOMY  2018    COLONOSCOPY N/A 11/9/2017    Procedure: COLONOSCOPY;  Surgeon: Mariaelena Harrell MD;  Location: [facility] (4TH FLR);  Service: Endoscopy;  Laterality: N/A;    COLONOSCOPY N/A 2/26/2018    Procedure: COLONOSCOPY;  Surgeon: Nicola Zhu MD;  Location: [facility] (2ND FLR);  Service: Endoscopy;  Laterality: N/A;    HYSTERECTOMY      TOTAL KNEE ARTHROPLASTY Right 11/23/2020    Procedure: ARTHROPLASTY, KNEE, TOTAL-STEFANIE;  Surgeon: Gerardo Stein MD;  Location: [facility] OR;  Service: Orthopedics;  Laterality: Right;  spinal attempted       SOCIAL HISTORY:  Social History     Socioeconomic History    Marital status:    Occupational  History    Occupation: [employer]     Employer: [employer]    Tobacco Use    Smoking status: Never    Smokeless tobacco: Never   Substance and Sexual Activity    Alcohol use: No    Drug use: No    Sexual activity: Not Currently     Partners: Male   Other Topics Concern    Are you pregnant or think you may be? No    Breast-feeding No   Social History Narrative    ** Merged History Encounter **          Social Determinants of Health     Financial Resource Strain: Low Risk     Difficulty of Paying Living Expenses: Not hard at all   Food Insecurity: No Food Insecurity    Worried About Running Out of Food in the Last Year: Never true    Ran Out of Food in the Last Year: Never true   Transportation Needs: No Transportation Needs    Lack of Transportation (Medical): No    Lack of Transportation (Non-Medical): No   Physical Activity: Unknown    Days of Exercise per Week: 0 days   Stress: No Stress Concern Present    Feeling of Stress : Not at all   Social Connections: Unknown    Frequency of Communication with Friends and Family: More than three times a week    Frequency of Social Gatherings with Friends and Family: Once a week    Active Member of Clubs or Organizations: No    Attends Club or Organization Meetings: Never    Marital Status:    Housing Stability: Unknown    Unable to Pay for Housing in the Last Year: No    Unstable Housing in the Last Year: No       FAMILY HISTORY:  Family History   Problem Relation Age of Onset    Arthritis Mother     Alzheimer's disease Mother     Migraines Neg Hx     Melanoma Neg Hx     Psoriasis Neg Hx     Lupus Neg Hx     Eczema Neg Hx     Acne Neg Hx     Colon cancer Neg Hx     Stomach cancer Neg Hx     Esophageal cancer Neg Hx     Liver disease Neg Hx     Crohn's disease Neg Hx     Ulcerative colitis Neg Hx     Celiac disease Neg Hx        ALLERGIES AND MEDICATIONS: updated and reviewed.  Review of patient's allergies indicates:   Allergen Reactions    Tramadol Itching     Current  Outpatient Medications   Medication Sig Dispense Refill    clobetasoL (CLOBEX) 0.05 % shampoo APPLY TOPICALLY TWICE DAILY 236 mL 0    gabapentin (NEURONTIN) 300 MG capsule Take 1 capsule (300 mg total) by mouth 2 (two) times daily. 60 capsule 11    nitrofurantoin (MACRODANTIN) 100 MG capsule Take 1 capsule (100 mg total) by mouth 2 (two) times a day. 10 capsule 0    olmesartan-hydrochlorothiazide (BENICAR HCT) 40-25 mg per tablet TAKE 1 TABLET BY MOUTH EVERY DAY 90 tablet 1    raloxifene (EVISTA) 60 mg tablet Take 60 mg by mouth.      vitamin D (VITAMIN D3) 1000 units Tab Take 1 tablet (1,000 Units total) by mouth once daily. 30 tablet 0    esomeprazole (NEXIUM) 40 MG capsule Take 1 capsule (40 mg total) by mouth 2 (two) times daily. 90 capsule 3    metoprolol succinate (TOPROL-XL) 50 MG 24 hr tablet Take 1 tablet (50 mg total) by mouth once daily. 30 tablet 11    sumatriptan (IMITREX) 100 MG tablet Take 1 tablet (100 mg total) by mouth every 2 (two) hours as needed for Migraine. 9 tablet 5     No current facility-administered medications for this visit.       Patient Care Team:  Maxwell Gonzalez Jr., MD as PCP - General (Family Medicine)  [facility] as Mammography    ROS  Review of Systems   Constitutional:  Negative for chills, fatigue, fever and unexpected weight change.   HENT:  Negative for congestion, ear discharge, ear pain and postnasal drip.    Eyes:  Negative for photophobia, pain and visual disturbance.   Respiratory:  Positive for shortness of breath. Negative for cough and wheezing.    Cardiovascular:  Positive for palpitations. Negative for chest pain and leg swelling.   Gastrointestinal:  Negative for abdominal pain, constipation, diarrhea, nausea and vomiting.   Genitourinary:  Negative for dysuria, frequency, urgency and vaginal discharge.   Musculoskeletal:  Negative for back pain, joint swelling and neck stiffness.   Skin:  Negative for rash.   Neurological:  Negative for weakness and headaches.  "  Psychiatric/Behavioral:  Negative for dysphoric mood and sleep disturbance. The patient is not nervous/anxious.          Physical Exam  Vitals:    04/27/23 1506   BP: 134/68   BP Location: Right arm   Patient Position: Sitting   BP Method: Large (Manual)   Pulse: 102   Temp: 98.2 °F (36.8 °C)   TempSrc: Oral   SpO2: 96%   Weight: 73.1 kg (161 lb 2.5 oz)   Height: 5' 7" (1.702 m)    Body mass index is 25.24 kg/m².  Weight: 73.1 kg (161 lb 2.5 oz)   Height: 5' 7" (170.2 cm)     Physical Exam  Constitutional:       Appearance: She is well-developed.   HENT:      Head: Normocephalic and atraumatic.      Right Ear: External ear normal.      Left Ear: External ear normal.      Nose: Nose normal.   Eyes:      Extraocular Movements: Extraocular movements intact.   Cardiovascular:      Rate and Rhythm: Normal rate.   Pulmonary:      Effort: Pulmonary effort is normal.   Musculoskeletal:         General: Normal range of motion.      Cervical back: Normal range of motion.   Skin:     General: Skin is warm and dry.   Neurological:      Mental Status: She is alert and oriented to person, place, and time.   Psychiatric:         Behavior: Behavior normal.           Health Maintenance         Date Due Completion Date    COVID-19 Vaccine (3 - Booster for Moderna series) 05/29/2021 4/3/2021    Mammogram 06/05/2022 6/5/2020    Pneumococcal Vaccines (Age 65+) (2 - PCV) 07/28/2022 7/28/2021    DEXA Scan 06/05/2023 6/5/2020    Hemoglobin A1c (Diabetic Prevention Screening) 07/21/2024 7/21/2021    Lipid Panel 07/29/2027 7/29/2022    Colorectal Cancer Screening 02/26/2028 2/26/2018    TETANUS VACCINE 08/25/2030 8/25/2020          Health maintenance reviewed at this time. The patient states she already had her mammogram done at [facility]. Will request the records.    Assessment & Plan  Essential hypertension/Palpitations  -     metoprolol succinate (TOPROL-XL) 50 MG 24 hr tablet; Take 1 tablet (50 mg total) by mouth once daily.  Dispense: " 30 tablet; Refill: 11  -     Ambulatory referral/consult to Cardiology; Future; Expected date: 05/04/2023    Elevated BP most likely due to increase in salt intake. Educated on the role of salt in elevated BP. Patient and family verbalized understanding.     Will increase metoprolol from 25 to 50 mg to help with increase in palpitations. .    Shortness of breath  -     Ambulatory referral/consult to Cardiology; Future; Expected date: 05/04/2023       Will have the patient follow up with cardiology due to increase in palpitations, fatigue and SOB.      Follow-up: Follow up if symptoms worsen or fail to improve.

## 2023-04-27 NOTE — LETTER
AUTHORIZATION FOR RELEASE OF   CONFIDENTIAL INFORMATION    Dear [facility],    We are seeing Rachel Conroy, date of birth 1955, in the clinic at [facility]/ INTERNAL MED. Maxwell Gonzalez Jr, MD is the patient's PCP. Rachel Conroy has an outstanding lab/procedure at the time we reviewed her chart. In order to help keep her health information updated, she has authorized us to request the following medical record(s):        ( X )  MAMMOGRAM                                      (  )  COLONOSCOPY      (  )  PAP SMEAR                                          (  )  OUTSIDE LAB RESULTS     (  )  DEXA SCAN                                          (  )  EYE EXAM            (  )  FOOT EXAM                                          (  )  ENTIRE RECORD     (  )  OUTSIDE IMMUNIZATIONS                 (  )  _______________         Please fax records to [facility], Cesar R Roque Jr, MD, [phone]. [address] .     If you have any questions, please contact [address] at [phone].           Patient Name: Rachel Conroy  : 1955  Patient Phone #: [phone]

## 2023-05-01 ENCOUNTER — OFFICE VISIT (OUTPATIENT)
Dept: GASTROENTEROLOGY | Facility: CLINIC | Age: 68
End: 2023-05-01
Payer: COMMERCIAL

## 2023-05-01 ENCOUNTER — PATIENT OUTREACH (OUTPATIENT)
Dept: ADMINISTRATIVE | Facility: HOSPITAL | Age: 68
End: 2023-05-01
Payer: COMMERCIAL

## 2023-05-01 VITALS
DIASTOLIC BLOOD PRESSURE: 82 MMHG | HEIGHT: 67 IN | SYSTOLIC BLOOD PRESSURE: 149 MMHG | WEIGHT: 190.94 LBS | HEART RATE: 75 BPM | BODY MASS INDEX: 29.97 KG/M2

## 2023-05-01 DIAGNOSIS — K58.0 IRRITABLE BOWEL SYNDROME WITH DIARRHEA: Primary | ICD-10-CM

## 2023-05-01 PROCEDURE — 3288F PR FALLS RISK ASSESSMENT DOCUMENTED: ICD-10-PCS | Mod: CPTII,S$GLB,, | Performed by: STUDENT IN AN ORGANIZED HEALTH CARE EDUCATION/TRAINING PROGRAM

## 2023-05-01 PROCEDURE — 1126F PR PAIN SEVERITY QUANTIFIED, NO PAIN PRESENT: ICD-10-PCS | Mod: CPTII,S$GLB,, | Performed by: STUDENT IN AN ORGANIZED HEALTH CARE EDUCATION/TRAINING PROGRAM

## 2023-05-01 PROCEDURE — 1101F PR PT FALLS ASSESS DOC 0-1 FALLS W/OUT INJ PAST YR: ICD-10-PCS | Mod: CPTII,S$GLB,, | Performed by: STUDENT IN AN ORGANIZED HEALTH CARE EDUCATION/TRAINING PROGRAM

## 2023-05-01 PROCEDURE — 99213 PR OFFICE/OUTPT VISIT, EST, LEVL III, 20-29 MIN: ICD-10-PCS | Mod: S$GLB,,, | Performed by: STUDENT IN AN ORGANIZED HEALTH CARE EDUCATION/TRAINING PROGRAM

## 2023-05-01 PROCEDURE — 3008F BODY MASS INDEX DOCD: CPT | Mod: CPTII,S$GLB,, | Performed by: STUDENT IN AN ORGANIZED HEALTH CARE EDUCATION/TRAINING PROGRAM

## 2023-05-01 PROCEDURE — 3077F SYST BP >= 140 MM HG: CPT | Mod: CPTII,S$GLB,, | Performed by: STUDENT IN AN ORGANIZED HEALTH CARE EDUCATION/TRAINING PROGRAM

## 2023-05-01 PROCEDURE — 99213 OFFICE O/P EST LOW 20 MIN: CPT | Mod: S$GLB,,, | Performed by: STUDENT IN AN ORGANIZED HEALTH CARE EDUCATION/TRAINING PROGRAM

## 2023-05-01 PROCEDURE — 3077F PR MOST RECENT SYSTOLIC BLOOD PRESSURE >= 140 MM HG: ICD-10-PCS | Mod: CPTII,S$GLB,, | Performed by: STUDENT IN AN ORGANIZED HEALTH CARE EDUCATION/TRAINING PROGRAM

## 2023-05-01 PROCEDURE — 3288F FALL RISK ASSESSMENT DOCD: CPT | Mod: CPTII,S$GLB,, | Performed by: STUDENT IN AN ORGANIZED HEALTH CARE EDUCATION/TRAINING PROGRAM

## 2023-05-01 PROCEDURE — 3079F DIAST BP 80-89 MM HG: CPT | Mod: CPTII,S$GLB,, | Performed by: STUDENT IN AN ORGANIZED HEALTH CARE EDUCATION/TRAINING PROGRAM

## 2023-05-01 PROCEDURE — 1101F PT FALLS ASSESS-DOCD LE1/YR: CPT | Mod: CPTII,S$GLB,, | Performed by: STUDENT IN AN ORGANIZED HEALTH CARE EDUCATION/TRAINING PROGRAM

## 2023-05-01 PROCEDURE — 1159F PR MEDICATION LIST DOCUMENTED IN MEDICAL RECORD: ICD-10-PCS | Mod: CPTII,S$GLB,, | Performed by: STUDENT IN AN ORGANIZED HEALTH CARE EDUCATION/TRAINING PROGRAM

## 2023-05-01 PROCEDURE — 3079F PR MOST RECENT DIASTOLIC BLOOD PRESSURE 80-89 MM HG: ICD-10-PCS | Mod: CPTII,S$GLB,, | Performed by: STUDENT IN AN ORGANIZED HEALTH CARE EDUCATION/TRAINING PROGRAM

## 2023-05-01 PROCEDURE — 3008F PR BODY MASS INDEX (BMI) DOCUMENTED: ICD-10-PCS | Mod: CPTII,S$GLB,, | Performed by: STUDENT IN AN ORGANIZED HEALTH CARE EDUCATION/TRAINING PROGRAM

## 2023-05-01 PROCEDURE — 1126F AMNT PAIN NOTED NONE PRSNT: CPT | Mod: CPTII,S$GLB,, | Performed by: STUDENT IN AN ORGANIZED HEALTH CARE EDUCATION/TRAINING PROGRAM

## 2023-05-01 PROCEDURE — 99999 PR PBB SHADOW E&M-EST. PATIENT-LVL IV: ICD-10-PCS | Mod: PBBFAC,,, | Performed by: STUDENT IN AN ORGANIZED HEALTH CARE EDUCATION/TRAINING PROGRAM

## 2023-05-01 PROCEDURE — 1159F MED LIST DOCD IN RCRD: CPT | Mod: CPTII,S$GLB,, | Performed by: STUDENT IN AN ORGANIZED HEALTH CARE EDUCATION/TRAINING PROGRAM

## 2023-05-01 PROCEDURE — 99999 PR PBB SHADOW E&M-EST. PATIENT-LVL IV: CPT | Mod: PBBFAC,,, | Performed by: STUDENT IN AN ORGANIZED HEALTH CARE EDUCATION/TRAINING PROGRAM

## 2023-05-01 RX ORDER — AMITRIPTYLINE HYDROCHLORIDE 10 MG/1
10 TABLET, FILM COATED ORAL NIGHTLY
Qty: 30 TABLET | Refills: 11 | Status: SHIPPED | OUTPATIENT
Start: 2023-05-01 | End: 2023-05-31

## 2023-05-01 NOTE — PROGRESS NOTES
[facility] follow up Note    Reason for Consult:  epigastric pain     PCP:   Maxwell Gonzalez Jr   [address] [facility] [id number]    Referring MD:  Maxwell Gonzalez Jr., Md  [address]  [place] [address]    HPI:  This is a 67 y.o. female here for evaluation of worsening reflux and bloating. She was seen in GI clinic 4/2022 for chronic diarrhea and GERD. PMHx of HTN, HLD, FM, NAFLD, NELL. Previous work up diagnosed her with IBS-D. She tried and failed viberzi in the past. Was using bentyl PRN. Also noted to have mobic on medication list for FM and takes this infrequently. Most recent CBC and Cmp from 2022 showed no anemia. Likely elevated ALT to 59. Follow up HFT in 1/2023 was normal. US showed fatty liver.     Last cscope in 2017 showed a submucosal nodule in AO. She had subsequent EUS in 2018 which showed cystic subepithelial lesion in AO without solid component. Otherwise, the cscope in 2017 showed vascular nodule in transverse colon and hemorrhoids. Repeat in 10 years. 2011 EGD showed hiatal hernia and gastritis. She has history of difficult airway.     After 4/2022, we started elavil nightly. She thinks she took this for a while but does not seem completely sure of his. No side effects noted. She is using Levsin PRN. For reflux, we discussed EGD vs conservative approach initially with HP stool testing and change in medications due to her history of difficult airway. She elected for a conservative approach. HP test was then negative. She was treated with PPI BID for 2 months. This did help her reflux symptoms but not she is again having more post prandial discomfort as well as bloating and belching. No Fh of CRC, gastric cancer, celiac or IBD. Beans are a trigger for symptoms. Has also been trying Fdgard without much help. No dysphagia, N/V, weight and appetite are stable. No blood in stool or melena. Has urge to go to bathroom after meals consistently. No blood in stool.         ROS:  Constitutional: No fevers, chills, No weight loss  CV: No chest pain  Pulm: No cough, No shortness of breath  GI: see HPI    Medical History:  has a past medical history of Arthritis, GERD (gastroesophageal reflux disease), Hyperlipidemia, Hypertension, Irritable bowel syndrome, Joint pain, Knee injury, Migraine headache, Muscle pain, fibromyalgia (03/19/2014), NAFLD (nonalcoholic fatty liver disease), Other physical therapy, Plantar fasciitis, Sleep apnea, and Urinary incontinence.    Surgical History:  has a past surgical history that includes Hysterectomy; Colonoscopy (N/A, 11/9/2017); Colonoscopy (N/A, 2/26/2018); Appendectomy (2018); and Total knee arthroplasty (Right, 11/23/2020).    Family History: family history includes Alzheimer's disease in her mother; Arthritis in her mother..     Social History:  reports that she has never smoked. She has never used smokeless tobacco. She reports that she does not drink alcohol and does not use drugs.    Review of patient's allergies indicates:   Allergen Reactions    Tramadol Itching       Current Outpatient Rx   Medication Sig Dispense Refill    clobetasoL (CLOBEX) 0.05 % shampoo APPLY TOPICALLY TWICE DAILY 236 mL 0    gabapentin (NEURONTIN) 300 MG capsule Take 1 capsule (300 mg total) by mouth 2 (two) times daily. 60 capsule 11    metoprolol succinate (TOPROL-XL) 50 MG 24 hr tablet Take 1 tablet (50 mg total) by mouth once daily. 30 tablet 11    nitrofurantoin (MACRODANTIN) 100 MG capsule Take 1 capsule (100 mg total) by mouth 2 (two) times a day. 10 capsule 0    olmesartan-hydrochlorothiazide (BENICAR HCT) 40-25 mg per tablet TAKE 1 TABLET BY MOUTH EVERY DAY 90 tablet 1    raloxifene (EVISTA) 60 mg tablet Take 60 mg by mouth.      vitamin D (VITAMIN D3) 1000 units Tab Take 1 tablet (1,000 Units total) by mouth once daily. 30 tablet 0    amitriptyline (ELAVIL) 10 MG tablet Take 1 tablet (10 mg total) by mouth every evening. 30 tablet 11    esomeprazole  "(NEXIUM) 40 MG capsule Take 1 capsule (40 mg total) by mouth 2 (two) times daily. 90 capsule 3    sumatriptan (IMITREX) 100 MG tablet Take 1 tablet (100 mg total) by mouth every 2 (two) hours as needed for Migraine. 9 tablet 5       Objective Findings:    Vital Signs:  BP (!) 149/82   Pulse 75   Ht 5' 7" (1.702 m)   Wt 86.6 kg (190 lb 14.7 oz)   BMI 29.90 kg/m²   Body mass index is 29.9 kg/m².    Physical Exam:  General Appearance: Well appearing in no acute distress  Head:   Normocephalic, without obvious abnormality  Eyes:    No scleral icterus    Lungs: CTA bilaterally in anterior and posterior fields   Heart:  Regular rate and rhythm, no murmurs heard  Abdomen: Soft, non tender, non distended with positive bowel sounds in all four quadrants.       Labs:  Lab Results   Component Value Date    WBC 5.25 07/29/2022    HGB 13.3 07/29/2022    HCT 40.7 07/29/2022     07/29/2022    CHOL 194 07/29/2022    TRIG 105 07/29/2022    HDL 67 07/29/2022    ALT 42 01/17/2023    AST 24 01/17/2023     07/29/2022    K 3.9 07/29/2022     07/29/2022    CREATININE 0.6 07/29/2022    BUN 12 07/29/2022    CO2 29 07/29/2022    TSH 0.877 07/28/2020    INR 0.9 10/24/2016    HGBA1C 5.2 07/21/2021       Imaging:    US 2022      Impression:     Mildly increased hepatic echogenicity compatible with reported steatosis.  No focal suspicious lesion.    Endoscopy:      2018 Lower EUS   Impression:           - An oval intramural (subepithelial) lesion was                         found in the appendiceal orifice. The lesion was                         anechoic and appear to connect with the appendix.                         The lesion measured approximately 14.4 mm by 9.3                         mm. This lesion appear to be cystic without any                         solid component.     Assessment:    GERD, not controlled    IBS-D     Patient with worsening epigastric abdominal pain and belching despite PPI BID. Negative HP " stool test 4/2022. Plan for EGD with biopsies for further evaluation. Can add pepcid at night.  Discussed how to take PPI correctly. Stressed the importance of wearing CPAP at night. Will again try the elavil at night for IBS-D. Continue levsin PRN     Recommendations:    - EGD with biopsies due to worsening symptoms despite PPI BID. At [facility] or [facility] due to difficult airway history   - Start elavil. Instructed on possible side effects and how to take   - Has levsin for cramping   - Low FODMAP diet, lactaid with dairy intake   - GERD precautions   - Cscope in 10 years for CRC screening 2027   - No NSAIDs     RTC 3 months       Order summary:  Orders Placed This Encounter    amitriptyline (ELAVIL) 10 MG tablet         Thank you so much for allowing me to participate in the care of Rachel Carranza MD  Gastroenterology   [facility]

## 2023-05-10 ENCOUNTER — PATIENT MESSAGE (OUTPATIENT)
Dept: GASTROENTEROLOGY | Facility: CLINIC | Age: 68
End: 2023-05-10
Payer: COMMERCIAL

## 2023-05-23 ENCOUNTER — OFFICE VISIT (OUTPATIENT)
Dept: CARDIOLOGY | Facility: CLINIC | Age: 68
End: 2023-05-23
Payer: COMMERCIAL

## 2023-05-23 ENCOUNTER — TELEPHONE (OUTPATIENT)
Dept: ENDOSCOPY | Facility: HOSPITAL | Age: 68
End: 2023-05-23

## 2023-05-23 ENCOUNTER — TELEPHONE (OUTPATIENT)
Dept: ENDOSCOPY | Facility: HOSPITAL | Age: 68
End: 2023-05-23
Payer: COMMERCIAL

## 2023-05-23 VITALS
SYSTOLIC BLOOD PRESSURE: 114 MMHG | DIASTOLIC BLOOD PRESSURE: 57 MMHG | HEART RATE: 82 BPM | HEIGHT: 67 IN | RESPIRATION RATE: 15 BRPM | WEIGHT: 183 LBS | BODY MASS INDEX: 28.72 KG/M2 | OXYGEN SATURATION: 96 %

## 2023-05-23 DIAGNOSIS — G47.33 OBSTRUCTIVE SLEEP APNEA: ICD-10-CM

## 2023-05-23 DIAGNOSIS — R07.9 CHEST PAIN, UNSPECIFIED TYPE: ICD-10-CM

## 2023-05-23 DIAGNOSIS — R00.2 PALPITATIONS: ICD-10-CM

## 2023-05-23 DIAGNOSIS — I10 PRIMARY HYPERTENSION: Primary | ICD-10-CM

## 2023-05-23 DIAGNOSIS — E78.5 HYPERLIPIDEMIA, UNSPECIFIED HYPERLIPIDEMIA TYPE: ICD-10-CM

## 2023-05-23 DIAGNOSIS — K58.9 IRRITABLE BOWEL SYNDROME, UNSPECIFIED TYPE: ICD-10-CM

## 2023-05-23 DIAGNOSIS — K76.0 FATTY LIVER: ICD-10-CM

## 2023-05-23 DIAGNOSIS — R06.02 SHORTNESS OF BREATH: ICD-10-CM

## 2023-05-23 DIAGNOSIS — R74.8 ELEVATED LIVER ENZYMES: ICD-10-CM

## 2023-05-23 DIAGNOSIS — K21.9 GASTROESOPHAGEAL REFLUX DISEASE WITHOUT ESOPHAGITIS: Primary | ICD-10-CM

## 2023-05-23 PROCEDURE — 3288F PR FALLS RISK ASSESSMENT DOCUMENTED: ICD-10-PCS | Mod: CPTII,S$GLB,, | Performed by: INTERNAL MEDICINE

## 2023-05-23 PROCEDURE — 99204 PR OFFICE/OUTPT VISIT, NEW, LEVL IV, 45-59 MIN: ICD-10-PCS | Mod: S$GLB,,, | Performed by: INTERNAL MEDICINE

## 2023-05-23 PROCEDURE — 1101F PT FALLS ASSESS-DOCD LE1/YR: CPT | Mod: CPTII,S$GLB,, | Performed by: INTERNAL MEDICINE

## 2023-05-23 PROCEDURE — 3078F DIAST BP <80 MM HG: CPT | Mod: CPTII,S$GLB,, | Performed by: INTERNAL MEDICINE

## 2023-05-23 PROCEDURE — 1159F MED LIST DOCD IN RCRD: CPT | Mod: CPTII,S$GLB,, | Performed by: INTERNAL MEDICINE

## 2023-05-23 PROCEDURE — 93000 ELECTROCARDIOGRAM COMPLETE: CPT | Mod: S$GLB,,, | Performed by: INTERNAL MEDICINE

## 2023-05-23 PROCEDURE — 99999 PR PBB SHADOW E&M-EST. PATIENT-LVL V: CPT | Mod: PBBFAC,,, | Performed by: INTERNAL MEDICINE

## 2023-05-23 PROCEDURE — 1160F RVW MEDS BY RX/DR IN RCRD: CPT | Mod: CPTII,S$GLB,, | Performed by: INTERNAL MEDICINE

## 2023-05-23 PROCEDURE — 1101F PR PT FALLS ASSESS DOC 0-1 FALLS W/OUT INJ PAST YR: ICD-10-PCS | Mod: CPTII,S$GLB,, | Performed by: INTERNAL MEDICINE

## 2023-05-23 PROCEDURE — 3008F BODY MASS INDEX DOCD: CPT | Mod: CPTII,S$GLB,, | Performed by: INTERNAL MEDICINE

## 2023-05-23 PROCEDURE — 99999 PR PBB SHADOW E&M-EST. PATIENT-LVL V: ICD-10-PCS | Mod: PBBFAC,,, | Performed by: INTERNAL MEDICINE

## 2023-05-23 PROCEDURE — 3288F FALL RISK ASSESSMENT DOCD: CPT | Mod: CPTII,S$GLB,, | Performed by: INTERNAL MEDICINE

## 2023-05-23 PROCEDURE — 93000 EKG 12-LEAD: ICD-10-PCS | Mod: S$GLB,,, | Performed by: INTERNAL MEDICINE

## 2023-05-23 PROCEDURE — 1126F AMNT PAIN NOTED NONE PRSNT: CPT | Mod: CPTII,S$GLB,, | Performed by: INTERNAL MEDICINE

## 2023-05-23 PROCEDURE — 3078F PR MOST RECENT DIASTOLIC BLOOD PRESSURE < 80 MM HG: ICD-10-PCS | Mod: CPTII,S$GLB,, | Performed by: INTERNAL MEDICINE

## 2023-05-23 PROCEDURE — 99204 OFFICE O/P NEW MOD 45 MIN: CPT | Mod: S$GLB,,, | Performed by: INTERNAL MEDICINE

## 2023-05-23 PROCEDURE — 3008F PR BODY MASS INDEX (BMI) DOCUMENTED: ICD-10-PCS | Mod: CPTII,S$GLB,, | Performed by: INTERNAL MEDICINE

## 2023-05-23 PROCEDURE — 3074F PR MOST RECENT SYSTOLIC BLOOD PRESSURE < 130 MM HG: ICD-10-PCS | Mod: CPTII,S$GLB,, | Performed by: INTERNAL MEDICINE

## 2023-05-23 PROCEDURE — 3074F SYST BP LT 130 MM HG: CPT | Mod: CPTII,S$GLB,, | Performed by: INTERNAL MEDICINE

## 2023-05-23 PROCEDURE — 1160F PR REVIEW ALL MEDS BY PRESCRIBER/CLIN PHARMACIST DOCUMENTED: ICD-10-PCS | Mod: CPTII,S$GLB,, | Performed by: INTERNAL MEDICINE

## 2023-05-23 PROCEDURE — 1159F PR MEDICATION LIST DOCUMENTED IN MEDICAL RECORD: ICD-10-PCS | Mod: CPTII,S$GLB,, | Performed by: INTERNAL MEDICINE

## 2023-05-23 PROCEDURE — 1126F PR PAIN SEVERITY QUANTIFIED, NO PAIN PRESENT: ICD-10-PCS | Mod: CPTII,S$GLB,, | Performed by: INTERNAL MEDICINE

## 2023-05-23 NOTE — PROGRESS NOTES
CARDIOVASCULAR CONSULTATION    REASON FOR CONSULT:   Rachel Conroy is a 67 y.o. female who presents for ***.      HISTORY OF PRESENT ILLNESS:           PAST MEDICAL HISTORY:     Past Medical History:   Diagnosis Date    Arthritis     GERD (gastroesophageal reflux disease)     Hyperlipidemia     Hypertension     Irritable bowel syndrome     Joint pain     Knee injury     Migraine headache     Muscle pain, fibromyalgia 03/19/2014    NAFLD (nonalcoholic fatty liver disease)     Other physical therapy     Plantar fasciitis     Sleep apnea     Urinary incontinence        PAST SURGICAL HISTORY:     Past Surgical History:   Procedure Laterality Date    APPENDECTOMY  2018    COLONOSCOPY N/A 11/9/2017    Procedure: COLONOSCOPY;  Surgeon: Mariaelena Harrell MD;  Location: [facility] ENDO (4TH FLR);  Service: Endoscopy;  Laterality: N/A;    COLONOSCOPY N/A 2/26/2018    Procedure: COLONOSCOPY;  Surgeon: Nicola Zhu MD;  Location: [facility] ENDO (2ND FLR);  Service: Endoscopy;  Laterality: N/A;    HYSTERECTOMY      TOTAL KNEE ARTHROPLASTY Right 11/23/2020    Procedure: ARTHROPLASTY, KNEE, TOTAL-STEFANIE;  Surgeon: Gerardo Stein MD;  Location: [facility] OR;  Service: Orthopedics;  Laterality: Right;  spinal attempted       ALLERGIES AND MEDICATION:     Review of patient's allergies indicates:   Allergen Reactions    Tramadol Itching        Medication List            Accurate as of May 23, 2023 11:29 AM. If you have any questions, ask your nurse or doctor.                CONTINUE taking these medications      amitriptyline 10 MG tablet  Commonly known as: ELAVIL  Take 1 tablet (10 mg total) by mouth every evening.     clobetasoL 0.05 % shampoo  Commonly known as: CLOBEX  APPLY TOPICALLY TWICE DAILY     esomeprazole 40 MG capsule  Commonly known as: NEXIUM  Take 1 capsule (40 mg total) by mouth 2 (two) times daily.     gabapentin 300 MG capsule  Commonly known as: NEURONTIN  Take 1 capsule (300 mg total) by mouth 2 (two) times  daily.     metoprolol succinate 50 MG 24 hr tablet  Commonly known as: TOPROL-XL  Take 1 tablet (50 mg total) by mouth once daily.     nitrofurantoin 100 MG capsule  Commonly known as: MACRODANTIN  Take 1 capsule (100 mg total) by mouth 2 (two) times a day.     olmesartan-hydrochlorothiazide 40-25 mg per tablet  Commonly known as: BENICAR HCT  TAKE 1 TABLET BY MOUTH EVERY DAY     raloxifene 60 mg tablet  Commonly known as: EVISTA     sumatriptan 100 MG tablet  Commonly known as: IMITREX  Take 1 tablet (100 mg total) by mouth every 2 (two) hours as needed for Migraine.     vitamin D 1000 units Tab  Commonly known as: VITAMIN D3  Take 1 tablet (1,000 Units total) by mouth once daily.              SOCIAL HISTORY:     Social History     Socioeconomic History    Marital status:    Occupational History    Occupation: [employer]     Employer: [employer]    Tobacco Use    Smoking status: Never    Smokeless tobacco: Never   Substance and Sexual Activity    Alcohol use: No    Drug use: No    Sexual activity: Not Currently     Partners: Male   Other Topics Concern    Are you pregnant or think you may be? No    Breast-feeding No   Social History Narrative    ** Merged History Encounter **          Social Determinants of Health     Financial Resource Strain: Low Risk     Difficulty of Paying Living Expenses: Not hard at all   Food Insecurity: No Food Insecurity    Worried About Running Out of Food in the Last Year: Never true    Ran Out of Food in the Last Year: Never true   Transportation Needs: No Transportation Needs    Lack of Transportation (Medical): No    Lack of Transportation (Non-Medical): No   Physical Activity: Unknown    Days of Exercise per Week: 0 days   Stress: No Stress Concern Present    Feeling of Stress : Not at all   Social Connections: Unknown    Frequency of Communication with Friends and Family: More than three times a week    Frequency of Social Gatherings with Friends and Family: Once a week     "Active Member of Clubs or Organizations: No    Attends Club or Organization Meetings: Never    Marital Status:    Housing Stability: Unknown    Unable to Pay for Housing in the Last Year: No    Unstable Housing in the Last Year: No       FAMILY HISTORY:     Family History   Problem Relation Age of Onset    Arthritis Mother     Alzheimer's disease Mother     Migraines Neg Hx     Melanoma Neg Hx     Psoriasis Neg Hx     Lupus Neg Hx     Eczema Neg Hx     Acne Neg Hx     Colon cancer Neg Hx     Stomach cancer Neg Hx     Esophageal cancer Neg Hx     Liver disease Neg Hx     Crohn's disease Neg Hx     Ulcerative colitis Neg Hx     Celiac disease Neg Hx        REVIEW OF SYSTEMS:   ROS    A 10 point review of systems was performed and all the pertinent positives have been mentioned. Rest of review of systems was negative.        PHYSICAL EXAM:     Vitals:    05/23/23 1108   BP: (!) 114/57   Pulse: 82   Resp: 15    Body mass index is 28.66 kg/m².  Weight: 83 kg (182 lb 15.7 oz)   Height: 5' 7" (170.2 cm)     Physical Exam      DATA:     Laboratory:  CBC:  Recent Labs   Lab 07/21/21  0818 01/19/22  0902 07/29/22  0845   WBC 4.94 5.22 5.25   Hemoglobin 13.3 13.3 13.3   Hematocrit 41.5 42.6 40.7   Platelets 296 273 293       CHEMISTRIES:  Recent Labs   Lab 07/21/21  0818 01/19/22  0902 07/29/22  0845   Glucose 91 101 93   Sodium 142 137 141   Potassium 3.8 4.0 3.9   BUN 13 15 12   Creatinine 0.7 0.7 0.6   eGFR if African American >60 >60 >60   eGFR if non African American >60 >60 >60   Calcium 8.7 8.7 8.7       CARDIAC BIOMARKERS:        COAGS:        LIPIDS/LFTS:  Recent Labs   Lab 07/21/21  0818 01/19/22  0902 07/29/22  0845 01/17/23  1020   Cholesterol 213 H 222 H 194  --    Triglycerides 107 122 105  --    HDL 76 H 75 67  --    LDL Cholesterol 115.6 122.6 106.0  --    Non-HDL Cholesterol 137 147 127  --    AST 32 25 34 24   ALT 50 H 42 59 H 42       Hemoglobin A1C   Date Value Ref Range Status   07/21/2021 5.2 4.0 - " 5.6 % Final     Comment:     ADA Screening Guidelines:  5.7-6.4%  Consistent with prediabetes  >or=6.5%  Consistent with diabetes    High levels of fetal hemoglobin interfere with the HbA1C  assay. Heterozygous hemoglobin variants (HbS, HgC, etc)do  not significantly interfere with this assay.   However, presence of multiple variants may affect accuracy.     07/28/2020 5.4 4.0 - 5.6 % Final     Comment:     ADA Screening Guidelines:  5.7-6.4%  Consistent with prediabetes  >or=6.5%  Consistent with diabetes  High levels of fetal hemoglobin interfere with the HbA1C  assay. Heterozygous hemoglobin variants (HbS, HgC, etc)do  not significantly interfere with this assay.   However, presence of multiple variants may affect accuracy.     07/31/2018 5.1 4.0 - 5.6 % Final     Comment:     ADA Screening Guidelines:  5.7-6.4%  Consistent with prediabetes  >or=6.5%  Consistent with diabetes  High levels of fetal hemoglobin interfere with the HbA1C  assay. Heterozygous hemoglobin variants (HbS, HgC, etc)do  not significantly interfere with this assay.   However, presence of multiple variants may affect accuracy.         TSH  Recent Labs   Lab 07/28/20  1416   TSH 0.877       The 10-year ASCVD risk score (Valente VILLARREAL, et al., 2019) is: 5.4%    Values used to calculate the score:      Age: 67 years      Sex: Female      Is Non- : No      Diabetic: No      Tobacco smoker: No      Systolic Blood Pressure: 114 mmHg      Is BP treated: Yes      HDL Cholesterol: 67 mg/dL      Total Cholesterol: 194 mg/dL       BNP    No results found for: BNP          ASSESSMENT AND PLAN     Patient Active Problem List   Diagnosis    Migraine headache    Nail fungal infection    Hyperlipidemia    HTN (hypertension)    Fatigue    Unspecified vitamin D deficiency    IBS (irritable bowel syndrome)    Chronic pain of multiple joints    BRBPR (bright red blood per rectum)    Nodule of colon    Cecum mass    Palpitations    Obstructive  sleep apnea    Elevated liver enzymes    Fatty liver    RUQ pain    Chronic right shoulder pain    Decreased strength of upper extremity    Decreased ROM of right shoulder    BMI 29.0-29.9,adult    Aftercare following joint replacement surgery    History of falling    Long term (current) use of opiate analgesic    Presence of right artificial knee joint    Early hepatic fibrosis                 Thank you very much for involving me in the care of your patient.  Please do not hesitate to contact me if there are any questions.      Jess Grubbs MD, FACC, [facility]  Interventional Cardiologist, [facility].           This note was dictated with the help of speech recognition software.  There might be un-intended errors and/or substitutions.

## 2023-05-23 NOTE — PROGRESS NOTES
CARDIOVASCULAR CONSULTATION    REASON FOR CONSULT:   Rachel Conroy is a 67 y.o. female who presents for evaluation    HISTORY OF PRESENT ILLNESS:     Patient is a pleasant 67-year-old lady.  Iraqi speaking.  History obtained with the help of Candelaria .  Coming in complaining of dyspnea on exertion, occasional chest tightness on exertion as well as palpitations about once a week.  Denies orthopnea, PND, swelling of feet.          PAST MEDICAL HISTORY:     Past Medical History:   Diagnosis Date    Arthritis     GERD (gastroesophageal reflux disease)     Hyperlipidemia     Hypertension     Irritable bowel syndrome     Joint pain     Knee injury     Migraine headache     Muscle pain, fibromyalgia 03/19/2014    NAFLD (nonalcoholic fatty liver disease)     Other physical therapy     Plantar fasciitis     Sleep apnea     Urinary incontinence        PAST SURGICAL HISTORY:     Past Surgical History:   Procedure Laterality Date    APPENDECTOMY  2018    COLONOSCOPY N/A 11/9/2017    Procedure: COLONOSCOPY;  Surgeon: Mariaelena Harrell MD;  Location: [facility] ENDO (4TH FLR);  Service: Endoscopy;  Laterality: N/A;    COLONOSCOPY N/A 2/26/2018    Procedure: COLONOSCOPY;  Surgeon: Nicola Zhu MD;  Location: [facility] (2ND FLR);  Service: Endoscopy;  Laterality: N/A;    HYSTERECTOMY      TOTAL KNEE ARTHROPLASTY Right 11/23/2020    Procedure: ARTHROPLASTY, KNEE, TOTAL-STEFANIE;  Surgeon: Gerardo Stein MD;  Location: [facility];  Service: Orthopedics;  Laterality: Right;  spinal attempted       ALLERGIES AND MEDICATION:     Review of patient's allergies indicates:   Allergen Reactions    Tramadol Itching        Medication List            Accurate as of May 23, 2023 11:33 AM. If you have any questions, ask your nurse or doctor.                CONTINUE taking these medications      amitriptyline 10 MG tablet  Commonly known as: ELAVIL  Take 1 tablet (10 mg total) by mouth every evening.     clobetasoL 0.05 %  shampoo  Commonly known as: CLOBEX  APPLY TOPICALLY TWICE DAILY     esomeprazole 40 MG capsule  Commonly known as: NEXIUM  Take 1 capsule (40 mg total) by mouth 2 (two) times daily.     gabapentin 300 MG capsule  Commonly known as: NEURONTIN  Take 1 capsule (300 mg total) by mouth 2 (two) times daily.     metoprolol succinate 50 MG 24 hr tablet  Commonly known as: TOPROL-XL  Take 1 tablet (50 mg total) by mouth once daily.     nitrofurantoin 100 MG capsule  Commonly known as: MACRODANTIN  Take 1 capsule (100 mg total) by mouth 2 (two) times a day.     olmesartan-hydrochlorothiazide 40-25 mg per tablet  Commonly known as: BENICAR HCT  TAKE 1 TABLET BY MOUTH EVERY DAY     raloxifene 60 mg tablet  Commonly known as: EVISTA     sumatriptan 100 MG tablet  Commonly known as: IMITREX  Take 1 tablet (100 mg total) by mouth every 2 (two) hours as needed for Migraine.     vitamin D 1000 units Tab  Commonly known as: VITAMIN D3  Take 1 tablet (1,000 Units total) by mouth once daily.              SOCIAL HISTORY:     Social History     Socioeconomic History    Marital status:    Occupational History    Occupation: [email or web address]     Employer: [employer]    Tobacco Use    Smoking status: Never    Smokeless tobacco: Never   Substance and Sexual Activity    Alcohol use: No    Drug use: No    Sexual activity: Not Currently     Partners: Male   Other Topics Concern    Are you pregnant or think you may be? No    Breast-feeding No   Social History Narrative    ** Merged History Encounter **          Social Determinants of Health     Financial Resource Strain: Low Risk     Difficulty of Paying Living Expenses: Not hard at all   Food Insecurity: No Food Insecurity    Worried About Running Out of Food in the Last Year: Never true    Ran Out of Food in the Last Year: Never true   Transportation Needs: No Transportation Needs    Lack of Transportation (Medical): No    Lack of Transportation (Non-Medical): No   Physical Activity: Unknown     "Days of Exercise per Week: 0 days   Stress: No Stress Concern Present    Feeling of Stress : Not at all   Social Connections: Unknown    Frequency of Communication with Friends and Family: More than three times a week    Frequency of Social Gatherings with Friends and Family: Once a week    Active Member of Clubs or Organizations: No    Attends Club or Organization Meetings: Never    Marital Status:    Housing Stability: Unknown    Unable to Pay for Housing in the Last Year: No    Unstable Housing in the Last Year: No       FAMILY HISTORY:     Family History   Problem Relation Age of Onset    Arthritis Mother     Alzheimer's disease Mother     Migraines Neg Hx     Melanoma Neg Hx     Psoriasis Neg Hx     Lupus Neg Hx     Eczema Neg Hx     Acne Neg Hx     Colon cancer Neg Hx     Stomach cancer Neg Hx     Esophageal cancer Neg Hx     Liver disease Neg Hx     Crohn's disease Neg Hx     Ulcerative colitis Neg Hx     Celiac disease Neg Hx        REVIEW OF SYSTEMS:   ROS    A 10 point review of systems was performed and all the pertinent positives have been mentioned. Rest of review of systems was negative.        PHYSICAL EXAM:     Vitals:    05/23/23 1108   BP: (!) 114/57   Pulse: 82   Resp: 15    Body mass index is 28.66 kg/m².  Weight: 83 kg (182 lb 15.7 oz)   Height: 5' 7" (170.2 cm)     Physical Exam      DATA:     Laboratory:  CBC:  Recent Labs   Lab 07/21/21  0818 01/19/22  0902 07/29/22  0845   WBC 4.94 5.22 5.25   Hemoglobin 13.3 13.3 13.3   Hematocrit 41.5 42.6 40.7   Platelets 296 273 293       CHEMISTRIES:  Recent Labs   Lab 07/21/21  0818 01/19/22  0902 07/29/22  0845   Glucose 91 101 93   Sodium 142 137 141   Potassium 3.8 4.0 3.9   BUN 13 15 12   Creatinine 0.7 0.7 0.6   eGFR if African American >60 >60 >60   eGFR if non African American >60 >60 >60   Calcium 8.7 8.7 8.7       CARDIAC BIOMARKERS:        COAGS:        LIPIDS/LFTS:  Recent Labs   Lab 07/21/21  0818 01/19/22  0902 07/29/22  0845 " 01/17/23  1020   Cholesterol 213 H 222 H 194  --    Triglycerides 107 122 105  --    HDL 76 H 75 67  --    LDL Cholesterol 115.6 122.6 106.0  --    Non-HDL Cholesterol 137 147 127  --    AST 32 25 34 24   ALT 50 H 42 59 H 42       Hemoglobin A1C   Date Value Ref Range Status   07/21/2021 5.2 4.0 - 5.6 % Final     Comment:     ADA Screening Guidelines:  5.7-6.4%  Consistent with prediabetes  >or=6.5%  Consistent with diabetes    High levels of fetal hemoglobin interfere with the HbA1C  assay. Heterozygous hemoglobin variants (HbS, HgC, etc)do  not significantly interfere with this assay.   However, presence of multiple variants may affect accuracy.     07/28/2020 5.4 4.0 - 5.6 % Final     Comment:     ADA Screening Guidelines:  5.7-6.4%  Consistent with prediabetes  >or=6.5%  Consistent with diabetes  High levels of fetal hemoglobin interfere with the HbA1C  assay. Heterozygous hemoglobin variants (HbS, HgC, etc)do  not significantly interfere with this assay.   However, presence of multiple variants may affect accuracy.     07/31/2018 5.1 4.0 - 5.6 % Final     Comment:     ADA Screening Guidelines:  5.7-6.4%  Consistent with prediabetes  >or=6.5%  Consistent with diabetes  High levels of fetal hemoglobin interfere with the HbA1C  assay. Heterozygous hemoglobin variants (HbS, HgC, etc)do  not significantly interfere with this assay.   However, presence of multiple variants may affect accuracy.         TSH  Recent Labs   Lab 07/28/20  1416   TSH 0.877       The 10-year ASCVD risk score (Valente VILLARREAL, et al., 2019) is: 5.4%    Values used to calculate the score:      Age: 67 years      Sex: Female      Is Non- : No      Diabetic: No      Tobacco smoker: No      Systolic Blood Pressure: 114 mmHg      Is BP treated: Yes      HDL Cholesterol: 67 mg/dL      Total Cholesterol: 194 mg/dL       BNP    No results found for: BNP          ASSESSMENT AND PLAN     Patient Active Problem List   Diagnosis     Migraine headache    Nail fungal infection    Hyperlipidemia    HTN (hypertension)    Fatigue    Unspecified vitamin D deficiency    IBS (irritable bowel syndrome)    Chronic pain of multiple joints    BRBPR (bright red blood per rectum)    Nodule of colon    Cecum mass    Palpitations    Obstructive sleep apnea    Elevated liver enzymes    Fatty liver    RUQ pain    Chronic right shoulder pain    Decreased strength of upper extremity    Decreased ROM of right shoulder    BMI 29.0-29.9,adult    Aftercare following joint replacement surgery    History of falling    Long term (current) use of opiate analgesic    Presence of right artificial knee joint    Early hepatic fibrosis       Palpitations, dyspnea on exertion, shortness of breath and occasional chest tightness.  Further evaluation with the help of a stress test, echo and event monitor.  Follow-up after testing          Thank you very much for involving me in the care of your patient.  Please do not hesitate to contact me if there are any questions.      Jess Grubbs MD, FACC, [facility]  Interventional Cardiologist, [facility].           This note was dictated with the help of speech recognition software.  There might be un-intended errors and/or substitutions.

## 2023-05-23 NOTE — TELEPHONE ENCOUNTER
"----- Message from Ronna Carranza MD sent at 2023 10:14 AM CDT -----  Procedure: EGD    Diagnosis: GERD    Procedure Timin-4 weeks    #If within 4 weeks selected, please pantera as high priority#    #If greater than 12 weeks, please select "5-12 weeks" and delay sending until 2 months prior to requested date#     Provider: Any GI provider    Location:  Endo and [facility] 2-Endo - history of difficult airway     Additional Scheduling Information: No scheduling concerns    Prep Specifications:N/A    Have you attached a patient to this message: yes      "

## 2023-05-23 NOTE — TELEPHONE ENCOUNTER
"Ronna Carranza MD  [facility]  Caller: Unspecified (3 weeks ago)  Procedure: EGD     Diagnosis: GERD     Procedure Timin-4 weeks     *If within 4 weeks selected, please pantera as high priority*     *If greater than 12 weeks, please select "5-12 weeks" and delay sending until 2 months prior to requested date*     Provider: Any GI provider     Location:  Endo and [facility] 2-Endo - history of difficult airway     Additional Scheduling Information: No scheduling concerns     Prep Specifications:N/A     Have you attached a patient to this message: yes     "

## 2023-05-24 NOTE — TELEPHONE ENCOUNTER
----- Message from Rosa Stanton sent at 5/23/2023  2:56 PM CDT -----  Regarding: Appt  Contact: [phone] pt  Luna daughter is calling to schedule an appt. Pt was referred by Ronna Carranza. Dx: Gerd. Please call

## 2023-05-29 ENCOUNTER — OFFICE VISIT (OUTPATIENT)
Dept: FAMILY MEDICINE | Facility: CLINIC | Age: 68
End: 2023-05-29
Payer: COMMERCIAL

## 2023-05-29 VITALS
SYSTOLIC BLOOD PRESSURE: 110 MMHG | TEMPERATURE: 98 F | DIASTOLIC BLOOD PRESSURE: 62 MMHG | HEIGHT: 67 IN | WEIGHT: 191.81 LBS | HEART RATE: 108 BPM | BODY MASS INDEX: 30.1 KG/M2 | OXYGEN SATURATION: 96 %

## 2023-05-29 DIAGNOSIS — K76.0 FATTY LIVER: ICD-10-CM

## 2023-05-29 DIAGNOSIS — G47.33 OBSTRUCTIVE SLEEP APNEA: Chronic | ICD-10-CM

## 2023-05-29 DIAGNOSIS — I10 ESSENTIAL HYPERTENSION: Primary | Chronic | ICD-10-CM

## 2023-05-29 DIAGNOSIS — E78.00 PURE HYPERCHOLESTEROLEMIA: Chronic | ICD-10-CM

## 2023-05-29 DIAGNOSIS — E66.09 CLASS 1 OBESITY DUE TO EXCESS CALORIES WITH SERIOUS COMORBIDITY AND BODY MASS INDEX (BMI) OF 30.0 TO 30.9 IN ADULT: Chronic | ICD-10-CM

## 2023-05-29 DIAGNOSIS — R00.2 PALPITATIONS: ICD-10-CM

## 2023-05-29 PROCEDURE — 99214 PR OFFICE/OUTPT VISIT, EST, LEVL IV, 30-39 MIN: ICD-10-PCS | Mod: S$GLB,,, | Performed by: FAMILY MEDICINE

## 2023-05-29 PROCEDURE — 3008F PR BODY MASS INDEX (BMI) DOCUMENTED: ICD-10-PCS | Mod: CPTII,S$GLB,, | Performed by: FAMILY MEDICINE

## 2023-05-29 PROCEDURE — 1160F RVW MEDS BY RX/DR IN RCRD: CPT | Mod: CPTII,S$GLB,, | Performed by: FAMILY MEDICINE

## 2023-05-29 PROCEDURE — 3008F BODY MASS INDEX DOCD: CPT | Mod: CPTII,S$GLB,, | Performed by: FAMILY MEDICINE

## 2023-05-29 PROCEDURE — 1126F PR PAIN SEVERITY QUANTIFIED, NO PAIN PRESENT: ICD-10-PCS | Mod: CPTII,S$GLB,, | Performed by: FAMILY MEDICINE

## 2023-05-29 PROCEDURE — 1126F AMNT PAIN NOTED NONE PRSNT: CPT | Mod: CPTII,S$GLB,, | Performed by: FAMILY MEDICINE

## 2023-05-29 PROCEDURE — 1159F MED LIST DOCD IN RCRD: CPT | Mod: CPTII,S$GLB,, | Performed by: FAMILY MEDICINE

## 2023-05-29 PROCEDURE — 3078F DIAST BP <80 MM HG: CPT | Mod: CPTII,S$GLB,, | Performed by: FAMILY MEDICINE

## 2023-05-29 PROCEDURE — 1160F PR REVIEW ALL MEDS BY PRESCRIBER/CLIN PHARMACIST DOCUMENTED: ICD-10-PCS | Mod: CPTII,S$GLB,, | Performed by: FAMILY MEDICINE

## 2023-05-29 PROCEDURE — 3078F PR MOST RECENT DIASTOLIC BLOOD PRESSURE < 80 MM HG: ICD-10-PCS | Mod: CPTII,S$GLB,, | Performed by: FAMILY MEDICINE

## 2023-05-29 PROCEDURE — 3074F PR MOST RECENT SYSTOLIC BLOOD PRESSURE < 130 MM HG: ICD-10-PCS | Mod: CPTII,S$GLB,, | Performed by: FAMILY MEDICINE

## 2023-05-29 PROCEDURE — 99214 OFFICE O/P EST MOD 30 MIN: CPT | Mod: S$GLB,,, | Performed by: FAMILY MEDICINE

## 2023-05-29 PROCEDURE — 3074F SYST BP LT 130 MM HG: CPT | Mod: CPTII,S$GLB,, | Performed by: FAMILY MEDICINE

## 2023-05-29 PROCEDURE — 1159F PR MEDICATION LIST DOCUMENTED IN MEDICAL RECORD: ICD-10-PCS | Mod: CPTII,S$GLB,, | Performed by: FAMILY MEDICINE

## 2023-05-29 PROCEDURE — 99999 PR PBB SHADOW E&M-EST. PATIENT-LVL IV: CPT | Mod: PBBFAC,,, | Performed by: FAMILY MEDICINE

## 2023-05-29 PROCEDURE — 99999 PR PBB SHADOW E&M-EST. PATIENT-LVL IV: ICD-10-PCS | Mod: PBBFAC,,, | Performed by: FAMILY MEDICINE

## 2023-05-29 NOTE — PATIENT INSTRUCTIONS
Discutido con el paciente que la apnea del sueño puede causar y está asociada con múltiples problemas leo cansancio diurno, problemas de memoria/concentración, nae de gerda matutinos, reflujo ácido, cambios de humor o sentimientos de mal humor, accidentes al conducir, disfunción sexual, dificultad para perder peso, aumento de azúcar en la demarcus/resistencia a la insulina, presión arterial elevada/hipertensión, aumento de la micción nocturna, entre otros problemas. Además, expliqué que, si no se trata, la apnea del sueño puede provocar latidos cardíacos irregulares, lo que incluye fibrilación auricular, insuficiencia cardíaca, infartos de miocardio/ataques cardíacos, derrames cerebrales y muerte prematura.

## 2023-05-29 NOTE — ASSESSMENT & PLAN NOTE
The patient has previously been diagnosed with sleep apnea.  Patient was encouraged to reestablish care with Sleep Medicine. Not using CPAP    Discussed with patient that sleep apnea can cause and is associated with multiple problems such as daytime tiredness, memory/concentration problems, morning headaches, acid reflux, mood swings or feelings of low mood, accidents when driving, sexual dysfunction, difficulty losing weight, elevated blood sugar/insulin resistance, elevated blood pressure/hypertension, increased urination at night, among other problems.  In addition I explained that if left untreated, sleep apnea can result in irregular heartbeat, including atrial fibrillation, heart failure, myocardial infarctions/heart attacks, stroke, and early death.

## 2023-05-29 NOTE — PROGRESS NOTES
"  Patient Name: Rachel Conroy    : 1955  MRN: [id number]      Subjective:     Patient ID: Rachel is a 67 y.o. female    Chief Complaint:  Hypertension    Hypertension  This is a chronic problem. Associated symptoms include malaise/fatigue and palpitations. Pertinent negatives include no anxiety, blurred vision, chest pain, headaches, neck pain, orthopnea, peripheral edema, PND, shortness of breath or sweats. Risk factors for coronary artery disease include post-menopausal state and dyslipidemia. Past treatments include diuretics and angiotensin blockers. The current treatment provides significant improvement. There are no compliance problems.       Review of Systems   Constitutional:  Positive for malaise/fatigue.   Eyes:  Negative for blurred vision.   Respiratory:  Negative for shortness of breath.    Cardiovascular:  Positive for palpitations. Negative for chest pain, orthopnea and PND.   Musculoskeletal:  Negative for neck pain.   Neurological:  Negative for headaches.      Objective:   /62 (BP Location: Left arm, Patient Position: Sitting, BP Method: Medium (Manual))   Pulse 108   Temp 98.1 °F (36.7 °C) (Oral)   Ht 5' 6.73" (1.695 m)   Wt 87 kg (191 lb 12.8 oz)   SpO2 96%   BMI 30.28 kg/m²     Physical Exam  Constitutional:       Appearance: She is well-developed.   HENT:      Head: Normocephalic and atraumatic.      Right Ear: External ear normal.      Left Ear: External ear normal.      Nose: Nose normal.   Eyes:      Extraocular Movements: Extraocular movements intact.   Cardiovascular:      Rate and Rhythm: Tachycardia present.   Pulmonary:      Effort: Pulmonary effort is normal.   Musculoskeletal:         General: Normal range of motion.      Cervical back: Normal range of motion.   Skin:     General: Skin is warm and dry.   Neurological:      Mental Status: She is alert and oriented to person, place, and time.   Psychiatric:         Behavior: Behavior normal.      Assessment  "       ICD-10-CM ICD-9-CM   1. Essential hypertension  I10 401.9   2. Obstructive sleep apnea  G47.33 327.23   3. Pure hypercholesterolemia  E78.00 272.0   4. Palpitations  R00.2 785.1   5. Class 1 obesity due to excess calories with serious comorbidity and body mass index (BMI) of 30.0 to 30.9 in adult  E66.09 278.00    Z68.30 V85.30   6. Fatty liver  K76.0 571.8         Plan:     1. Essential hypertension  Assessment & Plan:  BP Readings from Last 3 Encounters:   05/29/23 110/62   05/23/23 (!) 114/57   05/01/23 (!) 149/82     Continue current regimen    Orders:  -     Comprehensive Metabolic Panel; Future; Expected date: 05/29/2023  -     Lipid Panel; Future; Expected date: 05/29/2023  -     TSH; Future; Expected date: 05/29/2023    2. Obstructive sleep apnea  Assessment & Plan:  The patient has previously been diagnosed with sleep apnea.  Patient was encouraged to reestablish care with Sleep Medicine. Not using CPAP    Discussed with patient that sleep apnea can cause and is associated with multiple problems such as daytime tiredness, memory/concentration problems, morning headaches, acid reflux, mood swings or feelings of low mood, accidents when driving, sexual dysfunction, difficulty losing weight, elevated blood sugar/insulin resistance, elevated blood pressure/hypertension, increased urination at night, among other problems.  In addition I explained that if left untreated, sleep apnea can result in irregular heartbeat, including atrial fibrillation, heart failure, myocardial infarctions/heart attacks, stroke, and early death.      Orders:  -     Ambulatory referral/consult to Sleep Disorders; Future; Expected date: 06/05/2023    3. Pure hypercholesterolemia  Assessment & Plan:  Lab Results   Component Value Date    CHOL 194 07/29/2022    CHOL 222 (H) 01/19/2022    CHOL 213 (H) 07/21/2021     Lab Results   Component Value Date    HDL 67 07/29/2022    HDL 75 01/19/2022    HDL 76 (H) 07/21/2021     Lab Results    Component Value Date    LDLCALC 106.0 07/29/2022    LDLCALC 122.6 01/19/2022    LDLCALC 115.6 07/21/2021     No results found for: DLDL  Lab Results   Component Value Date    TRIG 105 07/29/2022    TRIG 122 01/19/2022    TRIG 107 07/21/2021     The 10-year ASCVD risk score (Valente VILLARREAL, et al., 2019) is: 5%    Values used to calculate the score:      Age: 67 years      Sex: Female      Is Non- : No      Diabetic: No      Tobacco smoker: No      Systolic Blood Pressure: 110 mmHg      Is BP treated: Yes      HDL Cholesterol: 67 mg/dL      Total Cholesterol: 194 mg/dL     Not in statin benefiting group. Continue dietary and lifestyle modifications.       4. Palpitations  Assessment & Plan:  Has seen cardiology. Has tests ordered but not scheduled. Scheduling called with patient and cardiac tests were scheduled.     Orders:  -     Comprehensive Metabolic Panel; Future; Expected date: 05/29/2023  -     Lipid Panel; Future; Expected date: 05/29/2023  -     TSH; Future; Expected date: 05/29/2023    5. Class 1 obesity due to excess calories with serious comorbidity and body mass index (BMI) of 30.0 to 30.9 in adult  Assessment & Plan:  The patient's BMI has been recorded in the chart. The patient has been provided educational materials regarding the benefits of attaining and maintaining a normal weight. We will continue to address and follow this issue during follow up visits.       6. Fatty liver  Overview:  CMP  Lab Results   Component Value Date     07/29/2022    K 3.9 07/29/2022     07/29/2022    CO2 29 07/29/2022    GLU 93 07/29/2022    BUN 12 07/29/2022    CREATININE 0.6 07/29/2022    CALCIUM 8.7 07/29/2022    PROT 6.6 01/17/2023    ALBUMIN 3.7 01/17/2023    BILITOT 0.5 01/17/2023    ALKPHOS 100 01/17/2023    AST 24 01/17/2023    ALT 42 01/17/2023    ANIONGAP 6 (L) 07/29/2022     VIRAL HEPATITIS  Lab Results   Component Value Date    HEPAIGM Negative 08/25/2020    HEPAIGG Positive  (A) 08/25/2020    HEPBCAB Positive (A) 08/25/2020    HEPBSAB Positive (A) 08/25/2020     BLOOD COUNT  Lab Results   Component Value Date    WBC 5.25 07/29/2022    HGB 13.3 07/29/2022    HCT 40.7 07/29/2022     07/29/2022    IRON 52 10/24/2016    TIBC 364 10/24/2016    FERRITIN 237 10/24/2016     AUTO-IMMUNE/GENETIC  Lab Results   Component Value Date    BISHOP Negative 03/04/2009    SMOOTHMUSCAB Negative 1:40 10/24/2016    IGGSERUM 943 10/24/2016     ALCOHOL  No results found for: [id number], [id number]      1- Fibroscan  F2    Assessment & Plan:  Has hepatology follow up in July             -Maxwell Gonzalez Jr., MD, AAHIVS      This office note has been dictated.  This dictation has been generated using M-Modal Fluency Direct dictation; some phonetic errors may occur.         Patient Instructions   Discutido con el paciente que la apnea del sueño puede causar y está asociada con múltiples problemas leo cansancio diurno, problemas de memoria/concentración, nae de gerda matutinos, reflujo ácido, cambios de humor o sentimientos de mal humor, accidentes al conducir, disfunción sexual, dificultad para perder peso, aumento de azúcar en la edmarcus/resistencia a la insulina, presión arterial elevada/hipertensión, aumento de la micción nocturna, entre otros problemas. Además, expliqué que, si no se trata, la apnea del sueño puede provocar latidos cardíacos irregulares, lo que incluye fibrilación auricular, insuficiencia cardíaca, infartos de miocardio/ataques cardíacos, derrames cerebrales y muerte prematura.          Future Appointments   Date Time Provider Department Center   7/19/2023  9:20 AM LAB, APPOINTMENT [facility]   7/19/2023 10:00 AM [facility], [facility] [address]   7/19/2023 10:30 AM [facility] [address]   7/26/2023  1:40 PM Maxwell Gonzalez Jr., MD [facility]   8/29/2023  9:40 AM Noemi Power NP [facility] SLEEP  [facility]

## 2023-06-01 ENCOUNTER — CLINICAL SUPPORT (OUTPATIENT)
Dept: CARDIOLOGY | Facility: HOSPITAL | Age: 68
End: 2023-06-01
Attending: INTERNAL MEDICINE
Payer: COMMERCIAL

## 2023-06-01 DIAGNOSIS — R07.9 CHEST PAIN, UNSPECIFIED TYPE: ICD-10-CM

## 2023-06-01 DIAGNOSIS — R06.02 SHORTNESS OF BREATH: ICD-10-CM

## 2023-06-01 DIAGNOSIS — I10 PRIMARY HYPERTENSION: ICD-10-CM

## 2023-06-01 DIAGNOSIS — R00.2 PALPITATIONS: ICD-10-CM

## 2023-06-03 PROBLEM — Z79.891 LONG TERM (CURRENT) USE OF OPIATE ANALGESIC: Status: RESOLVED | Noted: 2020-12-02 | Resolved: 2023-06-03

## 2023-06-03 PROBLEM — E66.811 CLASS 1 OBESITY DUE TO EXCESS CALORIES WITH SERIOUS COMORBIDITY AND BODY MASS INDEX (BMI) OF 30.0 TO 30.9 IN ADULT: Chronic | Status: ACTIVE | Noted: 2022-09-22

## 2023-06-03 PROBLEM — K62.5 BRBPR (BRIGHT RED BLOOD PER RECTUM): Status: RESOLVED | Noted: 2017-11-09 | Resolved: 2023-06-03

## 2023-06-03 PROBLEM — Z91.81 HISTORY OF FALLING: Status: RESOLVED | Noted: 2020-12-02 | Resolved: 2023-06-03

## 2023-06-03 PROBLEM — K63.89 CECUM MASS: Status: RESOLVED | Noted: 2018-04-16 | Resolved: 2023-06-03

## 2023-06-03 PROBLEM — R10.11 RUQ PAIN: Status: RESOLVED | Noted: 2021-01-13 | Resolved: 2023-06-03

## 2023-06-03 PROBLEM — R74.8 ELEVATED LIVER ENZYMES: Status: RESOLVED | Noted: 2021-01-13 | Resolved: 2023-06-03

## 2023-06-03 PROBLEM — K63.9 NODULE OF COLON: Status: RESOLVED | Noted: 2018-02-26 | Resolved: 2023-06-03

## 2023-06-03 PROBLEM — E66.09 CLASS 1 OBESITY DUE TO EXCESS CALORIES WITH SERIOUS COMORBIDITY AND BODY MASS INDEX (BMI) OF 30.0 TO 30.9 IN ADULT: Chronic | Status: ACTIVE | Noted: 2022-09-22

## 2023-06-03 RX ORDER — OLMESARTAN MEDOXOMIL AND HYDROCHLOROTHIAZIDE 40/25 40; 25 MG/1; MG/1
1 TABLET ORAL DAILY
Qty: 90 TABLET | Refills: 1 | Status: SHIPPED | OUTPATIENT
Start: 2023-06-03 | End: 2023-07-26

## 2023-06-03 NOTE — ASSESSMENT & PLAN NOTE
Has seen cardiology. Has tests ordered but not scheduled. Scheduling called with patient and cardiac tests were scheduled.

## 2023-06-03 NOTE — ASSESSMENT & PLAN NOTE
BP Readings from Last 3 Encounters:   05/29/23 110/62   05/23/23 (!) 114/57   05/01/23 (!) 149/82     Continue current regimen

## 2023-06-03 NOTE — ASSESSMENT & PLAN NOTE
Lab Results   Component Value Date    CHOL 194 07/29/2022    CHOL 222 (H) 01/19/2022    CHOL 213 (H) 07/21/2021     Lab Results   Component Value Date    HDL 67 07/29/2022    HDL 75 01/19/2022    HDL 76 (H) 07/21/2021     Lab Results   Component Value Date    LDLCALC 106.0 07/29/2022    LDLCALC 122.6 01/19/2022    LDLCALC 115.6 07/21/2021     No results found for: DLDL  Lab Results   Component Value Date    TRIG 105 07/29/2022    TRIG 122 01/19/2022    TRIG 107 07/21/2021     The 10-year ASCVD risk score (Valente VILLARREAL, et al., 2019) is: 5%    Values used to calculate the score:      Age: 67 years      Sex: Female      Is Non- : No      Diabetic: No      Tobacco smoker: No      Systolic Blood Pressure: 110 mmHg      Is BP treated: Yes      HDL Cholesterol: 67 mg/dL      Total Cholesterol: 194 mg/dL     Not in statin benefiting group. Continue dietary and lifestyle modifications.

## 2023-06-05 ENCOUNTER — TELEPHONE (OUTPATIENT)
Dept: ENDOSCOPY | Facility: HOSPITAL | Age: 68
End: 2023-06-05
Payer: COMMERCIAL

## 2023-06-05 ENCOUNTER — PATIENT MESSAGE (OUTPATIENT)
Dept: ENDOSCOPY | Facility: HOSPITAL | Age: 68
End: 2023-06-05
Payer: COMMERCIAL

## 2023-06-05 NOTE — TELEPHONE ENCOUNTER
----- Message from Lalita Reddy MA sent at 6/5/2023  9:43 AM CDT -----  Contact: pt daughter bia    ----- Message -----  From: Jaya Killian  Sent: 6/5/2023   9:21 AM CDT  To: Akshat GOMEZ Staff    Pt daughter requesting call back RE: Pt has a holter monitor on and would like to know if it can be worn during procedure or if they need to r/s      Confirmed contact below:  Contact Name:Bia Conroy  Phone Number: [phone]

## 2023-06-21 ENCOUNTER — HOSPITAL ENCOUNTER (OUTPATIENT)
Dept: CARDIOLOGY | Facility: HOSPITAL | Age: 68
Discharge: HOME | End: 2023-06-21
Attending: INTERNAL MEDICINE
Payer: COMMERCIAL

## 2023-06-21 ENCOUNTER — HOSPITAL ENCOUNTER (OUTPATIENT)
Dept: RADIOLOGY | Facility: HOSPITAL | Age: 68
Discharge: HOME | End: 2023-06-21
Attending: INTERNAL MEDICINE
Payer: COMMERCIAL

## 2023-06-21 DIAGNOSIS — I10 PRIMARY HYPERTENSION: ICD-10-CM

## 2023-06-21 DIAGNOSIS — R07.9 CHEST PAIN, UNSPECIFIED TYPE: ICD-10-CM

## 2023-06-21 DIAGNOSIS — R06.02 SHORTNESS OF BREATH: ICD-10-CM

## 2023-06-21 DIAGNOSIS — R00.2 PALPITATIONS: ICD-10-CM

## 2023-06-21 LAB
ASCENDING AORTA: 3.06 CM
AV INDEX (PROSTH): 0.72
AV MEAN GRADIENT: 5 MMHG
AV PEAK GRADIENT: 8 MMHG
AV VALVE AREA: 2.87 CM2
AV VELOCITY RATIO: 0.64
CV ECHO LV RWT: 0.37 CM
CV STRESS BASE HR: 77 BPM
DIASTOLIC BLOOD PRESSURE: 85 MMHG
DOP CALC AO PEAK VEL: 1.38 M/S
DOP CALC AO VTI: 33.4 CM
DOP CALC LVOT AREA: 4 CM2
DOP CALC LVOT DIAMETER: 2.25 CM
DOP CALC LVOT PEAK VEL: 0.88 M/S
DOP CALC LVOT STROKE VOLUME: 95.77 CM3
DOP CALCLVOT PEAK VEL VTI: 24.1 CM
E WAVE DECELERATION TIME: 212.92 MSEC
E/A RATIO: 0.74
E/E' RATIO: 7.76 M/S
ECHO LV POSTERIOR WALL: 0.85 CM (ref 0.6–1.1)
EJECTION FRACTION: 55 %
FRACTIONAL SHORTENING: 26 % (ref 28–44)
INTERVENTRICULAR SEPTUM: 0.73 CM (ref 0.6–1.1)
IVC DIAMETER: 2.08 CM
IVRT: 139.87 MSEC
LA MAJOR: 5.4 CM
LA MINOR: 3.71 CM
LA WIDTH: 3.7 CM
LEFT ATRIUM SIZE: 4.53 CM
LEFT ATRIUM VOLUME: 62.66 CM3
LEFT INTERNAL DIMENSION IN SYSTOLE: 3.43 CM (ref 2.1–4)
LEFT VENTRICLE DIASTOLIC VOLUME: 98.11 ML
LEFT VENTRICLE SYSTOLIC VOLUME: 48.31 ML
LEFT VENTRICULAR INTERNAL DIMENSION IN DIASTOLE: 4.62 CM (ref 3.5–6)
LEFT VENTRICULAR MASS: 116.84 G
LV LATERAL E/E' RATIO: 5.5 M/S
LV SEPTAL E/E' RATIO: 13.2 M/S
LVOT MG: 1.83 MMHG
LVOT MV: 0.63 CM/S
MV PEAK A VEL: 0.89 M/S
MV PEAK E VEL: 0.66 M/S
MV STENOSIS PRESSURE HALF TIME: 61.75 MS
MV VALVE AREA P 1/2 METHOD: 3.56 CM2
OHS CV CPX 1 MINUTE RECOVERY HEART RATE: 131 BPM
OHS CV CPX 85 PERCENT MAX PREDICTED HEART RATE MALE: 126
OHS CV CPX ESTIMATED METS: 9
OHS CV CPX MAX PREDICTED HEART RATE: 149
OHS CV CPX PATIENT IS FEMALE: 1
OHS CV CPX PATIENT IS MALE: 0
OHS CV CPX PEAK DIASTOLIC BLOOD PRESSURE: 81 MMHG
OHS CV CPX PEAK HEAR RATE: 148 BPM
OHS CV CPX PEAK RATE PRESSURE PRODUCT: NORMAL
OHS CV CPX PEAK SYSTOLIC BLOOD PRESSURE: 143 MMHG
OHS CV CPX PERCENT MAX PREDICTED HEART RATE ACHIEVED: 99
OHS CV CPX RATE PRESSURE PRODUCT PRESENTING: NORMAL
PISA TR MAX VEL: 2.07 M/S
PULM VEIN S/D RATIO: 1.18
PV PEAK D VEL: 0.34 M/S
PV PEAK S VEL: 0.4 M/S
PV PEAK VELOCITY: 0.82 CM/S
RA MAJOR: 4.38 CM
RA PRESSURE: 15 MMHG
RA WIDTH: 3.84 CM
RIGHT VENTRICULAR END-DIASTOLIC DIMENSION: 3.26 CM
SINUS: 3.28 CM
STJ: 2.47 CM
STRESS ECHO POST EXERCISE DUR MIN: 7 MINUTES
STRESS ECHO POST EXERCISE DUR SEC: 17 SECONDS
SYSTOLIC BLOOD PRESSURE: 168 MMHG
TDI LATERAL: 0.12 M/S
TDI SEPTAL: 0.05 M/S
TDI: 0.09 M/S
TR MAX PG: 17 MMHG
TRICUSPID ANNULAR PLANE SYSTOLIC EXCURSION: 1.85 CM
TV PEAK GRADIENT: 2.06 MMHG
TV REST PULMONARY ARTERY PRESSURE: 32 MMHG

## 2023-06-21 PROCEDURE — 78452 NUCLEAR STRESS - CARDIOLOGY INTERPRETED (CUPID ONLY): ICD-10-PCS | Mod: 26,,, | Performed by: INTERNAL MEDICINE

## 2023-06-21 PROCEDURE — 93017 CV STRESS TEST TRACING ONLY: CPT

## 2023-06-21 PROCEDURE — 78452 HT MUSCLE IMAGE SPECT MULT: CPT | Mod: 26,,, | Performed by: INTERNAL MEDICINE

## 2023-06-21 PROCEDURE — 93018 NUCLEAR STRESS - CARDIOLOGY INTERPRETED (CUPID ONLY): ICD-10-PCS | Mod: ,,, | Performed by: INTERNAL MEDICINE

## 2023-06-21 PROCEDURE — 93016 CV STRESS TEST SUPVJ ONLY: CPT | Mod: ,,, | Performed by: INTERNAL MEDICINE

## 2023-06-21 PROCEDURE — A9502 TC99M TETROFOSMIN: HCPCS

## 2023-06-21 PROCEDURE — 93306 ECHO (CUPID ONLY): ICD-10-PCS | Mod: 26,,, | Performed by: INTERNAL MEDICINE

## 2023-06-21 PROCEDURE — 78452 HT MUSCLE IMAGE SPECT MULT: CPT

## 2023-06-21 PROCEDURE — 93306 TTE W/DOPPLER COMPLETE: CPT

## 2023-06-21 PROCEDURE — 93018 CV STRESS TEST I&R ONLY: CPT | Mod: ,,, | Performed by: INTERNAL MEDICINE

## 2023-06-21 PROCEDURE — 93016 NUCLEAR STRESS - CARDIOLOGY INTERPRETED (CUPID ONLY): ICD-10-PCS | Mod: ,,, | Performed by: INTERNAL MEDICINE

## 2023-06-21 PROCEDURE — 93306 TTE W/DOPPLER COMPLETE: CPT | Mod: 26,,, | Performed by: INTERNAL MEDICINE

## 2023-06-27 ENCOUNTER — LAB VISIT (OUTPATIENT)
Dept: LAB | Facility: HOSPITAL | Age: 68
End: 2023-06-27
Attending: FAMILY MEDICINE
Payer: COMMERCIAL

## 2023-06-27 DIAGNOSIS — R00.2 PALPITATIONS: ICD-10-CM

## 2023-06-27 DIAGNOSIS — I10 ESSENTIAL HYPERTENSION: Chronic | ICD-10-CM

## 2023-06-27 LAB
ALBUMIN SERPL BCP-MCNC: 3.9 G/DL (ref 3.5–5.2)
ALP SERPL-CCNC: 88 U/L (ref 55–135)
ALT SERPL W/O P-5'-P-CCNC: 29 U/L (ref 10–44)
ANION GAP SERPL CALC-SCNC: 6 MMOL/L (ref 8–16)
AST SERPL-CCNC: 21 U/L (ref 10–40)
BILIRUB SERPL-MCNC: 0.5 MG/DL (ref 0.1–1)
BUN SERPL-MCNC: 14 MG/DL (ref 8–23)
CALCIUM SERPL-MCNC: 9.2 MG/DL (ref 8.7–10.5)
CHLORIDE SERPL-SCNC: 105 MMOL/L (ref 95–110)
CHOLEST SERPL-MCNC: 224 MG/DL (ref 120–199)
CHOLEST/HDLC SERPL: 2.8 {RATIO} (ref 2–5)
CO2 SERPL-SCNC: 27 MMOL/L (ref 23–29)
CREAT SERPL-MCNC: 0.7 MG/DL (ref 0.5–1.4)
EST. GFR  (NO RACE VARIABLE): >60 ML/MIN/1.73 M^2
GLUCOSE SERPL-MCNC: 98 MG/DL (ref 70–110)
HDLC SERPL-MCNC: 81 MG/DL (ref 40–75)
HDLC SERPL: 36.2 % (ref 20–50)
LDLC SERPL CALC-MCNC: 124.2 MG/DL (ref 63–159)
NONHDLC SERPL-MCNC: 143 MG/DL
POTASSIUM SERPL-SCNC: 3.8 MMOL/L (ref 3.5–5.1)
PROT SERPL-MCNC: 7 G/DL (ref 6–8.4)
SODIUM SERPL-SCNC: 138 MMOL/L (ref 136–145)
TRIGL SERPL-MCNC: 94 MG/DL (ref 30–150)
TSH SERPL DL<=0.005 MIU/L-ACNC: 0.67 UIU/ML (ref 0.4–4)

## 2023-06-27 PROCEDURE — 80061 LIPID PANEL: CPT | Performed by: FAMILY MEDICINE

## 2023-06-27 PROCEDURE — 80053 COMPREHEN METABOLIC PANEL: CPT | Performed by: FAMILY MEDICINE

## 2023-06-27 PROCEDURE — 84443 ASSAY THYROID STIM HORMONE: CPT | Performed by: FAMILY MEDICINE

## 2023-06-27 PROCEDURE — 36415 COLL VENOUS BLD VENIPUNCTURE: CPT | Mod: PN | Performed by: FAMILY MEDICINE

## 2023-07-08 ENCOUNTER — PATIENT MESSAGE (OUTPATIENT)
Dept: CARDIOLOGY | Facility: CLINIC | Age: 68
End: 2023-07-08
Payer: COMMERCIAL

## 2023-07-09 ENCOUNTER — ANESTHESIA EVENT (OUTPATIENT)
Dept: ENDOSCOPY | Facility: HOSPITAL | Age: 68
End: 2023-07-09
Payer: COMMERCIAL

## 2023-07-11 ENCOUNTER — HOSPITAL ENCOUNTER (OUTPATIENT)
Facility: HOSPITAL | Age: 68
Discharge: HOME | End: 2023-07-11
Attending: INTERNAL MEDICINE | Admitting: INTERNAL MEDICINE
Payer: COMMERCIAL

## 2023-07-11 ENCOUNTER — ANESTHESIA (OUTPATIENT)
Dept: ENDOSCOPY | Facility: HOSPITAL | Age: 68
End: 2023-07-11
Payer: COMMERCIAL

## 2023-07-11 VITALS
DIASTOLIC BLOOD PRESSURE: 56 MMHG | RESPIRATION RATE: 14 BRPM | SYSTOLIC BLOOD PRESSURE: 129 MMHG | TEMPERATURE: 98 F | OXYGEN SATURATION: 98 % | HEART RATE: 67 BPM

## 2023-07-11 DIAGNOSIS — Z98.890 HISTORY OF ESOPHAGOGASTRODUODENOSCOPY (EGD): ICD-10-CM

## 2023-07-11 PROCEDURE — 43239 PR EGD, FLEX, W/BIOPSY, SGL/MULTI: ICD-10-PCS | Mod: ,,, | Performed by: INTERNAL MEDICINE

## 2023-07-11 PROCEDURE — D9220A PRA ANESTHESIA: Mod: CRNA,,, | Performed by: NURSE ANESTHETIST, CERTIFIED REGISTERED

## 2023-07-11 PROCEDURE — 63600175 PHARM REV CODE 636 W HCPCS: Performed by: NURSE ANESTHETIST, CERTIFIED REGISTERED

## 2023-07-11 PROCEDURE — 37000008 HC ANESTHESIA 1ST 15 MINUTES: Performed by: INTERNAL MEDICINE

## 2023-07-11 PROCEDURE — 43239 EGD BIOPSY SINGLE/MULTIPLE: CPT | Performed by: INTERNAL MEDICINE

## 2023-07-11 PROCEDURE — 88305 TISSUE EXAM BY PATHOLOGIST: CPT | Performed by: PATHOLOGY

## 2023-07-11 PROCEDURE — D9220A PRA ANESTHESIA: ICD-10-PCS | Mod: CRNA,,, | Performed by: NURSE ANESTHETIST, CERTIFIED REGISTERED

## 2023-07-11 PROCEDURE — 25000003 PHARM REV CODE 250: Performed by: ANESTHESIOLOGY

## 2023-07-11 PROCEDURE — 88305 TISSUE EXAM BY PATHOLOGIST: ICD-10-PCS | Mod: 26,,, | Performed by: PATHOLOGY

## 2023-07-11 PROCEDURE — 43239 EGD BIOPSY SINGLE/MULTIPLE: CPT | Mod: ,,, | Performed by: INTERNAL MEDICINE

## 2023-07-11 PROCEDURE — D9220A PRA ANESTHESIA: Mod: ANES,,, | Performed by: ANESTHESIOLOGY

## 2023-07-11 PROCEDURE — 25000003 PHARM REV CODE 250

## 2023-07-11 PROCEDURE — 25000003 PHARM REV CODE 250: Performed by: INTERNAL MEDICINE

## 2023-07-11 PROCEDURE — 88305 TISSUE EXAM BY PATHOLOGIST: CPT | Mod: 26,,, | Performed by: PATHOLOGY

## 2023-07-11 PROCEDURE — 37000009 HC ANESTHESIA EA ADD 15 MINS: Performed by: INTERNAL MEDICINE

## 2023-07-11 PROCEDURE — D9220A PRA ANESTHESIA: ICD-10-PCS | Mod: ANES,,, | Performed by: ANESTHESIOLOGY

## 2023-07-11 PROCEDURE — S0028 INJECTION, FAMOTIDINE, 20 MG: HCPCS

## 2023-07-11 PROCEDURE — 25000003 PHARM REV CODE 250: Performed by: NURSE ANESTHETIST, CERTIFIED REGISTERED

## 2023-07-11 PROCEDURE — 27201012 HC FORCEPS, HOT/COLD, DISP: Performed by: INTERNAL MEDICINE

## 2023-07-11 RX ORDER — PROPOFOL 10 MG/ML
VIAL (ML) INTRAVENOUS
Status: DISCONTINUED | OUTPATIENT
Start: 2023-07-11 | End: 2023-07-11

## 2023-07-11 RX ORDER — PROPOFOL 10 MG/ML
INJECTION, EMULSION INTRAVENOUS
Status: DISCONTINUED
Start: 2023-07-11 | End: 2023-07-11 | Stop reason: HOSPADM

## 2023-07-11 RX ORDER — FAMOTIDINE 20 MG/50ML
INJECTION, SOLUTION INTRAVENOUS
Status: COMPLETED
Start: 2023-07-11 | End: 2023-07-11

## 2023-07-11 RX ORDER — FAMOTIDINE 10 MG/ML
INJECTION INTRAVENOUS
Status: DISCONTINUED | OUTPATIENT
Start: 2023-07-11 | End: 2023-07-11

## 2023-07-11 RX ORDER — LIDOCAINE HYDROCHLORIDE 20 MG/ML
INJECTION INTRAVENOUS
Status: DISCONTINUED | OUTPATIENT
Start: 2023-07-11 | End: 2023-07-11

## 2023-07-11 RX ORDER — LIDOCAINE HYDROCHLORIDE 20 MG/ML
INJECTION, SOLUTION EPIDURAL; INFILTRATION; INTRACAUDAL; PERINEURAL
Status: DISCONTINUED
Start: 2023-07-11 | End: 2023-07-11 | Stop reason: HOSPADM

## 2023-07-11 RX ORDER — SODIUM CHLORIDE 9 MG/ML
INJECTION, SOLUTION INTRAVENOUS CONTINUOUS
Status: DISCONTINUED | OUTPATIENT
Start: 2023-07-11 | End: 2023-07-11 | Stop reason: HOSPADM

## 2023-07-11 RX ADMIN — PROPOFOL 100 MG: 10 INJECTION, EMULSION INTRAVENOUS at 11:07

## 2023-07-11 RX ADMIN — PROPOFOL 50 MG: 10 INJECTION, EMULSION INTRAVENOUS at 11:07

## 2023-07-11 RX ADMIN — LIDOCAINE HYDROCHLORIDE 100 MG: 20 INJECTION, SOLUTION INTRAVENOUS at 11:07

## 2023-07-11 RX ADMIN — FAMOTIDINE 20 MG: 10 INJECTION INTRAVENOUS at 10:07

## 2023-07-11 RX ADMIN — GLYCOPYRROLATE 0.2 MG: 0.2 INJECTION, SOLUTION INTRAMUSCULAR; INTRAVITREAL at 11:07

## 2023-07-11 RX ADMIN — SODIUM CHLORIDE: 9 INJECTION, SOLUTION INTRAVENOUS at 10:07

## 2023-07-11 RX ADMIN — FAMOTIDINE 20 MG: 20 INJECTION, SOLUTION INTRAVENOUS at 10:07

## 2023-07-11 NOTE — PLAN OF CARE
Procedure and recovery complete. Awake and alert. No c/o pain or discomfort. Discharge instructions given with use of . Verbalized understanding. No acute distress noted.

## 2023-07-11 NOTE — ANESTHESIA POSTPROCEDURE EVALUATION
Anesthesia Post Evaluation    Patient: Rachel Conroy    Procedure(s) Performed: Procedure(s) (LRB):  EGD (ESOPHAGOGASTRODUODENOSCOPY) (N/A)    Final Anesthesia Type: general      Patient location during evaluation: GI PACU  Patient participation: Yes- Able to Participate  Level of consciousness: awake and alert  Post-procedure vital signs: reviewed and stable  Pain management: adequate  Airway patency: patent    PONV status at discharge: No PONV  Anesthetic complications: no      Cardiovascular status: hemodynamically stable  Respiratory status: unassisted and spontaneous ventilation  Hydration status: euvolemic  Follow-up not needed.          Vitals Value Taken Time   /71 07/11/23 1135   Temp 36.7 °C (98 °F) 07/11/23 1130   Pulse 89 07/11/23 1135   Resp 17 07/11/23 1135   SpO2 95 % 07/11/23 1135         No case tracking events are documented in the log.      Pain/Shahzad Score: Shahzad Score: 10 (7/11/2023 11:35 AM)

## 2023-07-11 NOTE — ANESTHESIA PREPROCEDURE EVALUATION
07/11/2023  Rachel Conroy is a 67 y.o., female.      Pre-op Assessment    I have reviewed the Patient Summary Reports.     I have reviewed the Nursing Notes.       Review of Systems  Anesthesia Hx:  H/o difficult intubation 2/2 anterior airway; easy ventilation via LMA Denies Family Hx of Anesthesia complications.  Personal Hx of Anesthesia complications  Difficult Intubation   Social:  Non-Smoker    Cardiovascular:   Hypertension Denies MI.    Denies Angina. hyperlipidemia 06/2023 stress: negative for ischemia; EF 55%; PAP 36   Pulmonary:   Sleep Apnea    Hepatic/GI:   GERD Liver Disease, NAFLD   Neurological:   Neuromuscular Disease, Headaches    Endocrine:  Endocrine Normal        Physical Exam  General: Well nourished, Cooperative, Alert and Oriented    Airway:  Mallampati: II   Mouth Opening: Normal  TM Distance: 4 - 6 cm  Tongue: Normal  Neck ROM: Normal ROM    Dental:  Intact    Chest/Lungs:  Normal Respiratory Rate, Clear to auscultation    Heart:  Rate: Normal  Rhythm: Regular Rhythm      Wt Readings from Last 3 Encounters:   05/29/23 87 kg (191 lb 12.8 oz)   05/23/23 83 kg (182 lb 15.7 oz)   05/01/23 86.6 kg (190 lb 14.7 oz)     Temp Readings from Last 3 Encounters:   05/29/23 36.7 °C (98.1 °F) (Oral)   04/27/23 36.8 °C (98.2 °F) (Oral)   02/20/23 36.5 °C (97.7 °F) (Oral)     BP Readings from Last 3 Encounters:   05/29/23 110/62   05/23/23 (!) 114/57   05/01/23 (!) 149/82     Pulse Readings from Last 3 Encounters:   05/29/23 108   05/23/23 82   05/01/23 75         Anesthesia Plan  Type of Anesthesia, risks & benefits discussed:    Anesthesia Type: Gen Natural Airway, MAC  Intra-op Monitoring Plan: Standard ASA Monitors  Post Op Pain Control Plan: multimodal analgesia  Induction:  IV  Informed Consent: Informed consent signed with the Patient and all parties understand the risks and agree  with anesthesia plan.  All questions answered.   ASA Score: 3  Day of Surgery Review of History & Physical: H&P Update referred to the surgeon/provider.  Anesthesia Plan Notes: H&P and informed consent obtained with help from Albanian interpretor, Annalee ID#[id number].    Ready For Surgery From Anesthesia Perspective.     .

## 2023-07-11 NOTE — TRANSFER OF CARE
Anesthesia Transfer of Care Note    Patient: Rachel Conroy    Procedure(s) Performed: Procedure(s) (LRB):  EGD (ESOPHAGOGASTRODUODENOSCOPY) (N/A)    Patient location: GI    Anesthesia Type: general    Transport from OR: Transported from OR on room air with adequate spontaneous ventilation    Post pain: adequate analgesia    Post assessment: no apparent anesthetic complications    Post vital signs: stable    Level of consciousness: responds to stimulation and sedated    Nausea/Vomiting: no nausea/vomiting    Complications: none          Last vitals:   Visit Vitals  /68 (BP Location: Left arm, Patient Position: Lying)   Pulse 92   Temp 36.7 °C (98 °F) (Oral)   Resp 16   SpO2 96%   Breastfeeding No

## 2023-07-11 NOTE — H&P
Short Stay Endoscopy   Pre-Procedure Note    PCP - Maxwell Gonzalez Jr, MD  Referring Physician - Ronna Carranza MD  [address] [place] [address]    Procedure - Endoscopy    ASA - per anesthesia  Mallampati - per anesthesia    HPI  67 y.o. female scheduled for endoscopy for evaluation of    1. History of esophagogastroduodenoscopy (EGD)         Medical History:  has a past medical history of Arthritis, GERD (gastroesophageal reflux disease), Hyperlipidemia, Hypertension, Irritable bowel syndrome, Joint pain, Knee injury, Migraine headache, Muscle pain, fibromyalgia (03/19/2014), NAFLD (nonalcoholic fatty liver disease), Other physical therapy, Plantar fasciitis, Sleep apnea, and Urinary incontinence.    Surgical History:  has a past surgical history that includes Hysterectomy; Colonoscopy (N/A, 11/9/2017); Colonoscopy (N/A, 2/26/2018); Appendectomy (2018); and Total knee arthroplasty (Right, 11/23/2020).    Family History: family history includes Alzheimer's disease in her mother; Arthritis in her mother..    Social History:  reports that she has never smoked. She has never used smokeless tobacco. She reports that she does not drink alcohol and does not use drugs.    Review of patient's allergies indicates:   Allergen Reactions    Tramadol Itching       Medications:   Medications Prior to Admission   Medication Sig Dispense Refill Last Dose    clobetasoL (CLOBEX) 0.05 % shampoo APPLY TOPICALLY TWICE DAILY 236 mL 0 7/10/2023    gabapentin (NEURONTIN) 300 MG capsule Take 1 capsule (300 mg total) by mouth 2 (two) times daily. 60 capsule 11 7/10/2023    metoprolol succinate (TOPROL-XL) 50 MG 24 hr tablet Take 1 tablet (50 mg total) by mouth once daily. 30 tablet 11 7/10/2023    olmesartan-hydrochlorothiazide (BENICAR HCT) 40-25 mg per tablet Take 1 tablet by mouth once daily. 90 tablet 1 7/10/2023    amitriptyline (ELAVIL) 10 MG tablet Take 1 tablet (10 mg total) by mouth every evening. 30 tablet 11      esomeprazole (NEXIUM) 40 MG capsule Take 1 capsule (40 mg total) by mouth 2 (two) times daily. 90 capsule 3     nitrofurantoin (MACRODANTIN) 100 MG capsule Take 1 capsule (100 mg total) by mouth 2 (two) times a day. 10 capsule 0     raloxifene (EVISTA) 60 mg tablet Take 60 mg by mouth.       sumatriptan (IMITREX) 100 MG tablet Take 1 tablet (100 mg total) by mouth every 2 (two) hours as needed for Migraine. 9 tablet 5     vitamin D (VITAMIN D3) 1000 units Tab Take 1 tablet (1,000 Units total) by mouth once daily. 30 tablet 0          Labs:  Lab Results   Component Value Date    WBC 5.25 07/29/2022    HGB 13.3 07/29/2022    HCT 40.7 07/29/2022     07/29/2022    CHOL 224 (H) 06/27/2023    TRIG 94 06/27/2023    HDL 81 (H) 06/27/2023    ALT 29 06/27/2023    AST 21 06/27/2023     06/27/2023    K 3.8 06/27/2023     06/27/2023    CREATININE 0.7 06/27/2023    BUN 14 06/27/2023    CO2 27 06/27/2023    TSH 0.671 06/27/2023    INR 0.9 10/24/2016    HGBA1C 5.2 07/21/2021       Risks and benefits of this endoscopic procedure, including but not limited to bleeding, inflammation, infection, perforation, and death, explained to the patient prior to procedure      Hector Barnes MD

## 2023-07-11 NOTE — PROVATION PATIENT INSTRUCTIONS
Discharge Summary/Instructions after an Endoscopic Procedure  Patient Name: Rachel Conroy  Patient MRN: [id number]  Patient YOB: 1955 Tuesday, July 11, 2023  Hector Barnes MD  Dear patient,  As a result of recent federal legislation (The Federal Cures Act), you may   receive lab or pathology results from your procedure in your [facility]   account before your physician is able to contact you. Your physician or   their representative will relay the results to you with their   recommendations at their soonest availability.  Thank you,  RESTRICTIONS:  During your procedure today, you received medications for sedation.  These   medications may affect your judgment, balance and coordination.  Therefore,   for 24 hours, you have the following restrictions:   - DO NOT drive a car, operate machinery, make legal/financial decisions,   sign important papers or drink alcohol.    ACTIVITY:  Today: no heavy lifting, straining or running due to procedural   sedation/anesthesia.  The following day: return to full activity including work.  DIET:  Eat and drink normally unless instructed otherwise.     TREATMENT FOR COMMON SIDE EFFECTS:  - Mild abdominal pain, nausea, belching, bloating or excessive gas:  rest,   eat lightly and use a heating pad.  - Sore Throat: treat with throat lozenges and/or gargle with warm salt   water.  - Because air was used during the procedure, expelling large amounts of air   from your rectum or belching is normal.  - If a bowel prep was taken, you may not have a bowel movement for 1-3 days.    This is normal.  SYMPTOMS TO WATCH FOR AND REPORT TO YOUR PHYSICIAN:  1. Abdominal pain or bloating, other than gas cramps.  2. Chest pain.  3. Back pain.  4. Signs of infection such as: chills or fever occurring within 24 hours   after the procedure.  5. Rectal bleeding, which would show as bright red, maroon, or black stools.   (A tablespoon of blood from the rectum is not serious, especially if    hemorrhoids are present.)  6. Vomiting.  7. Weakness or dizziness.  GO DIRECTLY TO THE NEAREST EMERGENCY ROOM IF YOU HAVE ANY OF THE FOLLOWING:      Difficulty breathing              Chills and/or fever over 101 F   Persistent vomiting and/or vomiting blood   Severe abdominal pain   Severe chest pain   Black, tarry stools   Bleeding- more than one tablespoon   Any other symptom or condition that you feel may need urgent attention  Your doctor recommends these additional instructions:  If any biopsies were taken, your doctors clinic will contact you in 1 to 2   weeks with any results.  - Discharge patient to home.   - Resume previous diet.   - Continue present medications.   - Await pathology results.   - Return to GI clinic.  For questions, problems or results please call your physician - Hector Barnes MD at Work:  [phone].  [facility] can be reached at [phone]     IF A COMPLICATION OR EMERGENCY SITUATION ARISES AND YOU ARE UNABLE TO REACH   YOUR PHYSICIAN - GO DIRECTLY TO THE EMERGENCY ROOM.  Hector Barnes MD  7/11/2023 11:31:40 AM  This report has been verified and signed electronically.  Dear patient,  As a result of recent federal legislation (The Federal Cures Act), you may   receive lab or pathology results from your procedure in your [facility]   account before your physician is able to contact you. Your physician or   their representative will relay the results to you with their   recommendations at their soonest availability.  Thank you,  PROVATION

## 2023-07-12 LAB
FINAL PATHOLOGIC DIAGNOSIS: NORMAL
GROSS: NORMAL
Lab: NORMAL

## 2023-07-19 ENCOUNTER — PROCEDURE VISIT (OUTPATIENT)
Dept: HEPATOLOGY | Facility: CLINIC | Age: 68
End: 2023-07-19
Payer: COMMERCIAL

## 2023-07-19 ENCOUNTER — LAB VISIT (OUTPATIENT)
Dept: LAB | Facility: HOSPITAL | Age: 68
End: 2023-07-19
Attending: NURSE PRACTITIONER
Payer: COMMERCIAL

## 2023-07-19 ENCOUNTER — OFFICE VISIT (OUTPATIENT)
Dept: HEPATOLOGY | Facility: CLINIC | Age: 68
End: 2023-07-19
Payer: COMMERCIAL

## 2023-07-19 DIAGNOSIS — K74.01 EARLY HEPATIC FIBROSIS: ICD-10-CM

## 2023-07-19 DIAGNOSIS — I10 ESSENTIAL HYPERTENSION: Chronic | ICD-10-CM

## 2023-07-19 DIAGNOSIS — K76.0 FATTY LIVER: ICD-10-CM

## 2023-07-19 DIAGNOSIS — I10 PRIMARY HYPERTENSION: ICD-10-CM

## 2023-07-19 DIAGNOSIS — E66.09 CLASS 1 OBESITY DUE TO EXCESS CALORIES WITH SERIOUS COMORBIDITY AND BODY MASS INDEX (BMI) OF 30.0 TO 30.9 IN ADULT: Chronic | ICD-10-CM

## 2023-07-19 DIAGNOSIS — E78.5 HYPERLIPIDEMIA, UNSPECIFIED HYPERLIPIDEMIA TYPE: ICD-10-CM

## 2023-07-19 DIAGNOSIS — E78.00 PURE HYPERCHOLESTEROLEMIA: Chronic | ICD-10-CM

## 2023-07-19 DIAGNOSIS — K76.0 FATTY LIVER: Primary | ICD-10-CM

## 2023-07-19 LAB
ALBUMIN SERPL BCP-MCNC: 3.9 G/DL (ref 3.5–5.2)
ALP SERPL-CCNC: 97 U/L (ref 55–135)
ALT SERPL W/O P-5'-P-CCNC: 24 U/L (ref 10–44)
AST SERPL-CCNC: 20 U/L (ref 10–40)
BILIRUB DIRECT SERPL-MCNC: 0.2 MG/DL (ref 0.1–0.3)
BILIRUB SERPL-MCNC: 0.5 MG/DL (ref 0.1–1)
PROT SERPL-MCNC: 6.9 G/DL (ref 6–8.4)

## 2023-07-19 PROCEDURE — 99999 PR PBB SHADOW E&M-EST. PATIENT-LVL III: ICD-10-PCS | Mod: PBBFAC,,, | Performed by: NURSE PRACTITIONER

## 2023-07-19 PROCEDURE — 1159F PR MEDICATION LIST DOCUMENTED IN MEDICAL RECORD: ICD-10-PCS | Mod: CPTII,S$GLB,, | Performed by: NURSE PRACTITIONER

## 2023-07-19 PROCEDURE — 99214 PR OFFICE/OUTPT VISIT, EST, LEVL IV, 30-39 MIN: ICD-10-PCS | Mod: S$GLB,,, | Performed by: NURSE PRACTITIONER

## 2023-07-19 PROCEDURE — 36415 COLL VENOUS BLD VENIPUNCTURE: CPT | Performed by: NURSE PRACTITIONER

## 2023-07-19 PROCEDURE — 1160F PR REVIEW ALL MEDS BY PRESCRIBER/CLIN PHARMACIST DOCUMENTED: ICD-10-PCS | Mod: CPTII,S$GLB,, | Performed by: NURSE PRACTITIONER

## 2023-07-19 PROCEDURE — 76981 USE PARENCHYMA: CPT | Mod: S$GLB,,, | Performed by: NURSE PRACTITIONER

## 2023-07-19 PROCEDURE — 1159F MED LIST DOCD IN RCRD: CPT | Mod: CPTII,S$GLB,, | Performed by: NURSE PRACTITIONER

## 2023-07-19 PROCEDURE — 80076 HEPATIC FUNCTION PANEL: CPT | Performed by: NURSE PRACTITIONER

## 2023-07-19 PROCEDURE — 76981 FIBROSCAN NEW ORLEANS (VIBRATION CONTROLLED TRANSIENT ELASTOGRAPHY): ICD-10-PCS | Mod: S$GLB,,, | Performed by: NURSE PRACTITIONER

## 2023-07-19 PROCEDURE — 99214 OFFICE O/P EST MOD 30 MIN: CPT | Mod: S$GLB,,, | Performed by: NURSE PRACTITIONER

## 2023-07-19 PROCEDURE — 1160F RVW MEDS BY RX/DR IN RCRD: CPT | Mod: CPTII,S$GLB,, | Performed by: NURSE PRACTITIONER

## 2023-07-19 PROCEDURE — 99999 PR PBB SHADOW E&M-EST. PATIENT-LVL III: CPT | Mod: PBBFAC,,, | Performed by: NURSE PRACTITIONER

## 2023-07-19 NOTE — PROGRESS NOTES
[facility] HEPATOLOG[facility] CLIN[facility] VISIT ESTABLISHED PT NOTE    REFERRING PROVIDER:  No ref. provider found    CHIEF COMPLAINT: Fatty Liver    I was assisted during the visit by a Professional .    HPI: This is a 67 y.o.  female with PMH noted below, presenting for follow up in the management of fatty liver. She was accompanied by her son, and was last seen in clinic by myself in 1/2023. Fibroscan at initial visit was suggestive of F0-F1 fibrosis. Unfortunately she has regained the weight that she previously lost (15-20 pounds) and her liver enzymes became mildly elevated again (had normalized previously). Repeat Fibroscan in 1/2021 was suggestive of F0-F1 fibrosis with mild hepatic steatosis (S1).     Most recent abdominal ultrasound in 1/2023 continues to show mild hepatomegaly (17.6 cm), due to fatty infiltration of the liver. She rarely drinks alcohol. She is immune to HAV (has received 1 vaccination) and is also immune to HBV through prior exposure. She denies any known family history of liver disease. Her weight has been stable since last visit. Her LFT's have essentially normalized again.     Repeat Fibroscan in 1/2023 was worsened and is suggestive of moderate fatty infiltration of the liver, with F2 (moderate) fibrosis, concerning for progression of her liver disease. Follow up Fibroscan today is suggestive of severe fatty infiltration of the liver (S3), with F0-F1 fibrosis, and a low likelihood of cirrhosis. She is well appearing and has no signs or symptoms of decompensated liver disease including jaundice, dark urine, abdominal distention, lower extremity edema, hematemesis, melena, or periods of confusion suggestive of hepatic encephalopathy.      Review of patient's allergies indicates:   Allergen Reactions    Tramadol Itching     Current Outpatient Medications on File Prior to Visit   Medication Sig Dispense Refill    amitriptyline (ELAVIL) 10 MG tablet Take 1 tablet (10 mg total) by  mouth every evening. 30 tablet 11    clobetasoL (CLOBEX) 0.05 % shampoo APPLY TOPICALLY TWICE DAILY 236 mL 0    esomeprazole (NEXIUM) 40 MG capsule Take 1 capsule (40 mg total) by mouth 2 (two) times daily. 90 capsule 3    gabapentin (NEURONTIN) 300 MG capsule Take 1 capsule (300 mg total) by mouth 2 (two) times daily. 60 capsule 11    metoprolol succinate (TOPROL-XL) 50 MG 24 hr tablet Take 1 tablet (50 mg total) by mouth once daily. 30 tablet 11    olmesartan-hydrochlorothiazide (BENICAR HCT) 40-25 mg per tablet Take 1 tablet by mouth once daily. 90 tablet 1    raloxifene (EVISTA) 60 mg tablet Take 60 mg by mouth.      sumatriptan (IMITREX) 100 MG tablet Take 1 tablet (100 mg total) by mouth every 2 (two) hours as needed for Migraine. 9 tablet 5    vitamin D (VITAMIN D3) 1000 units Tab Take 1 tablet (1,000 Units total) by mouth once daily. 30 tablet 0    [DISCONTINUED] nitrofurantoin (MACRODANTIN) 100 MG capsule Take 1 capsule (100 mg total) by mouth 2 (two) times a day. 10 capsule 0     No current facility-administered medications on file prior to visit.     PMHX:  has a past medical history of Arthritis, GERD (gastroesophageal reflux disease), Hyperlipidemia, Hypertension, Irritable bowel syndrome, Joint pain, Knee injury, Migraine headache, Muscle pain, fibromyalgia (03/19/2014), NAFLD (nonalcoholic fatty liver disease), Other physical therapy, Plantar fasciitis, Sleep apnea, and Urinary incontinence.    PSHX:  has a past surgical history that includes Hysterectomy; Colonoscopy (N/A, 11/9/2017); Colonoscopy (N/A, 2/26/2018); Appendectomy (2018); Total knee arthroplasty (Right, 11/23/2020); and Esophagogastroduodenoscopy (N/A, 7/11/2023).    FAMILY HISTORY: Negative for liver disease, reviewed in [facility]    SOCIAL HISTORY:   Social History     Tobacco Use   Smoking Status Never   Smokeless Tobacco Never     Social History     Substance and Sexual Activity   Alcohol Use No     Social History     Substance and Sexual  Activity   Drug Use No     ROS:   GENERAL: Denies fever, chills, weight loss/gain, fatigue  HEENT: Denies headaches, dizziness, vision/hearing changes  CARDIOVASCULAR: Denies chest pain, palpitations, or edema  RESPIRATORY: Denies dyspnea, cough  GI: Denies rectal bleeding, nausea, vomiting. No change in bowel pattern or color. + Intermittent RUQ pain - followed by GI.  : Denies dysuria, hematuria   SKIN: Denies rash, itching   NEURO: Denies confusion, memory loss, or mood changes  PSYCH: Denies depression or anxiety  HEME/LYMPH: Denies easy bruising or bleeding    PHYSICAL EXAM:   Friendly  female, in no acute distress; alert and oriented to person, place and time  VITALS: There were no vitals taken for this visit.  HENT: Normocephalic, without obvious abnormality.   EYES: Sclerae anicteric.   NECK: Supple.   CARDIOVASCULAR: No peripheral edema.  RESPIRATORY: Normal respiratory effort.   GI: Soft, non-tender, non-distended.   EXTREMITIES:  No clubbing, cyanosis or edema.  SKIN: Warm and dry. No jaundice. No rashes noted to exposed skin.   NEURO:  Normal gait. No asterixis.  PSYCH:  Memory intact. Thought and speech pattern appropriate. Behavior normal. No depression or anxiety noted.    DIAGNOSTIC STUDIES:    FIBROSCAN 10/24/2016:    Fibroscan readin.3 KPa     IQR/med: 19 %     Fibrosis:F 0-1      FIBROSCAN 2021:    Findings  Median liver stiffness score:  4.9  CAP Reading: dB/m:  236     IQR/med %:  16  Interpretation  Fibrosis interpretation is based on medial liver stiffness - Kilopascal (kPa).     Fibrosis Stage:  F 0-1  Steatosis interpretation is based on controlled attenuation parameter - (dB/m).     Steatosis Grade:  S1    US ABDOMEN COMPLETE 2021:    FINDINGS:    Demonstrated portions of the pancreas are unremarkable.     The liver is enlarged in size measuring 17.6 cm with homogeneous parenchymal echotexture.  Hepatorenal index is elevated at 1.6.  Gallbladder demonstrates no  calculi, wall thickening, or pericholecystic fluid.  Negative sonographic Kirby sign.  There is no intra or extrahepatic biliary ductal dilatation and the common bile duct measures 2 mm in diameter.     The aorta and IVC appear within normal limits.     Kidneys are normal in size measuring 12.3 cm on the right and 11.9 cm on the left.  A 3 mm echogenic focus at the right renal midpole, likely a nonobstructing nephrolith.  No solid masses or hydronephrosis.     The spleen is normal in size measuring 11.5 x 4.2 cm with homogeneous echotexture.     No abdominal free fluid identified.     Impression:     Hepatomegaly and hepatic steatosis.     Right renal nonobstructing nephrolith.    US ABDOMEN LIMITED 1/17/2023:    FINDINGS:    Exam is somewhat degraded due to prominent overlying bowel gas.     The visualized portion of the pancreas is unremarkable.     The liver is upper limit of normal for size measuring 17.6 cm.  The liver demonstrates uniform echotexture with mildly increased echogenicity.  No focal hepatic lesions are seen.     The gallbladder is unremarkable with no evidence of calculi.  The common duct is not dilated, measuring 2 mm.  The gallbladder wall is not thickened.  There is no sonographic Kirby sign.  No dilated intrahepatic radicles are seen.  No pericholecystic fluid.     The spleen is normal in size measuring 10.8 x 4.0 cm with a homogeneous echotexture.     There is no evidence of ascites.     Impression:     Mildly increased hepatic echogenicity compatible with reported steatosis.  No focal suspicious lesion.     FIBROSCAN 1/19/2023:    Findings  Median liver stiffness score:  8.2  CAP Reading: dB/m:  281     IQR/med %:  11  Interpretation  Fibrosis interpretation is based on medial liver stiffness - Kilopascal (kPa).     Fibrosis Stage:  F2  Steatosis interpretation is based on controlled attenuation parameter - (dB/m).     Steatosis Grade:  S2    FIBROSCAN 7/19/2023:    4.8 kPa with an IQR of  17%; CAP score 342 dB/m     ASSESSMENT & PLAN:  67 y.o. White female with:    1. Fatty liver  FibroScan [place] (Vibration Controlled Transient Elastography)    Hepatic Function Panel      2. Class 1 obesity due to excess calories with serious comorbidity and body mass index (BMI) of 30.0 to 30.9 in adult  FibroScan [place] (Vibration Controlled Transient Elastography)    Hepatic Function Panel      3. Pure hypercholesterolemia  FibroScan [place] (Vibration Controlled Transient Elastography)    Hepatic Function Panel      4. Essential hypertension  FibroScan [place] (Vibration Controlled Transient Elastography)    Hepatic Function Panel        - Schedule Fibroscan to restage liver disease in 1 year.  - Recommend weight loss of 15-20 pounds, through diet and exercise.  - Repeat labs in 1 year to monitor liver function.  - Recommend an abdominal ultrasound every 2-3 years.  - Avoid alcohol and herbal supplements/alternative remedies.  - Return to clinic in 1 year.    Thank you for allowing me to participate in the care of Rachel Conroy       Hepatology Nurse Practitioner  [facility]    CC'ed note to:   No ref. provider found  Maxwell Gonzalez Jr, MD

## 2023-07-19 NOTE — PATIENT INSTRUCTIONS
1. Fibroscan today to assess for fat and scar tissue in the liver  2. Ultrasound of the liver every 2-3 years.  3. Repeat liver function tests in 1 year.   4. Avoid alcohol and herbal supplements/alternative remedies.  5. Return to clinic in 1 year.    There is no FDA approved therapy for non-alcoholic fatty liver disease. Therefore, these things are important:  1. Weight loss goal of 20 lbs.  2. Low carb/sugar, high fiber and protein diet.Try to limit your carb intake to LESS than 30-45 grams of carbs with a meal or LESS than 5-10 grams with any snack (total of any snack foods eaten during that time). Use MyFitness Pal carlos to add up your carbs through the day. Do NOT drink any beverages with calories or carbs (this can lead to high blood sugar and weight gain). Also, some of our patients have been very successful with weight loss using the pre made/planned meal planning services that limit calories and portion size (one example is Sensible Meals but there are many out there).  3. Exercise, 5 days per week, 30 minutes per day, as tolerated.  4. Recommend good control of cholesterol, blood pressure, & blood sugar levels.    In some people, fatty liver can progress to steatohepatitis (inflamatory fatty liver) and possibly to cirrhosis, putting one at increased risk for liver cancer, liver failure, and death. Therefore, the lifestyle changes are very important to decrease this risk.     Website with information about fatty liver and inflammation related to fatty liver ([place]) = www.nashtruth.com  AND www.NASHactually.com

## 2023-07-19 NOTE — PROCEDURES
FibroScan [place] (Vibration Controlled Transient Elastography)    Date/Time: 7/19/2023 10:00 AM  Performed by: Jeanie Paredes NP  Authorized by: Jeanie Paredes NP     Diagnosis:  NAFLD    Probe:  XL    Universal Protocol: Patient's identity, procedure and site were verified, confirmatory pause was performed.  Discussed procedure including risks and potential complications.  Questions answered.  Patient verbalizes understanding and wishes to proceed with VCTE.     Procedure: After providing explanations of the procedure, patient was placed in the supine position with right arm in maximum abduction to allow optimal exposure of right lateral abdomen.  Patient was briefly assessed, Testing was performed in the mid-axillary location, 50Hz Shear Wave pulses were applied and the resulting Shear Wave and Propagation Speed detected with a 3.5 MHz ultrasonic signal, using the FibroScan probe, Skin to liver capsule distance and liver parenchyma were accessed during the entire examination with the FibroScan probe, Patient was instructed to breathe normally and to abstain from sudden movements during the procedure, allowing for random measurements of liver stiffness. At least 10 Shear Waves were produced, Individual measurements of each Shear Wave were calculated.  Patient tolerated the procedure well with no complications.  Meets discharge criteria as was dismissed.  Rates pain 0 out of 10.  Patient will follow up with ordering provider to review results.    Findings  Median liver stiffness score:  4.8  CAP Reading: dB/m:  342    IQR/med %:  17  Interpretation  Fibrosis interpretation is based on medial liver stiffness - Kilopascal (kPa).    Fibrosis Stage:  F 0-1  Steatosis interpretation is based on controlled attenuation parameter - (dB/m).    Steatosis Grade:  S3

## 2023-07-26 ENCOUNTER — OFFICE VISIT (OUTPATIENT)
Dept: FAMILY MEDICINE | Facility: CLINIC | Age: 68
End: 2023-07-26
Payer: COMMERCIAL

## 2023-07-26 VITALS
DIASTOLIC BLOOD PRESSURE: 79 MMHG | HEART RATE: 85 BPM | OXYGEN SATURATION: 98 % | RESPIRATION RATE: 18 BRPM | SYSTOLIC BLOOD PRESSURE: 129 MMHG | WEIGHT: 189 LBS | BODY MASS INDEX: 29.66 KG/M2 | HEIGHT: 67 IN | TEMPERATURE: 99 F

## 2023-07-26 DIAGNOSIS — E78.5 DYSLIPIDEMIA: Chronic | ICD-10-CM

## 2023-07-26 DIAGNOSIS — Z00.00 ROUTINE HEALTH MAINTENANCE: Primary | ICD-10-CM

## 2023-07-26 DIAGNOSIS — G43.809 OTHER MIGRAINE, NOT INTRACTABLE, WITHOUT STATUS MIGRAINOSUS: Chronic | ICD-10-CM

## 2023-07-26 DIAGNOSIS — I10 ESSENTIAL HYPERTENSION: Chronic | ICD-10-CM

## 2023-07-26 DIAGNOSIS — R00.2 PALPITATIONS: ICD-10-CM

## 2023-07-26 DIAGNOSIS — E66.3 OVERWEIGHT WITH BODY MASS INDEX (BMI) OF 29 TO 29.9 IN ADULT: Chronic | ICD-10-CM

## 2023-07-26 DIAGNOSIS — K76.0 FATTY LIVER: ICD-10-CM

## 2023-07-26 DIAGNOSIS — M85.88 OSTEOPENIA OF LUMBAR SPINE: ICD-10-CM

## 2023-07-26 PROCEDURE — 1159F MED LIST DOCD IN RCRD: CPT | Mod: CPTII,S$GLB,, | Performed by: FAMILY MEDICINE

## 2023-07-26 PROCEDURE — 3008F PR BODY MASS INDEX (BMI) DOCUMENTED: ICD-10-PCS | Mod: CPTII,S$GLB,, | Performed by: FAMILY MEDICINE

## 2023-07-26 PROCEDURE — 3288F FALL RISK ASSESSMENT DOCD: CPT | Mod: CPTII,S$GLB,, | Performed by: FAMILY MEDICINE

## 2023-07-26 PROCEDURE — 3078F PR MOST RECENT DIASTOLIC BLOOD PRESSURE < 80 MM HG: ICD-10-PCS | Mod: CPTII,S$GLB,, | Performed by: FAMILY MEDICINE

## 2023-07-26 PROCEDURE — 4010F ACE/ARB THERAPY RXD/TAKEN: CPT | Mod: CPTII,S$GLB,, | Performed by: FAMILY MEDICINE

## 2023-07-26 PROCEDURE — 99397 PR PREVENTIVE VISIT,EST,65 & OVER: ICD-10-PCS | Mod: S$GLB,,, | Performed by: FAMILY MEDICINE

## 2023-07-26 PROCEDURE — 3078F DIAST BP <80 MM HG: CPT | Mod: CPTII,S$GLB,, | Performed by: FAMILY MEDICINE

## 2023-07-26 PROCEDURE — 3008F BODY MASS INDEX DOCD: CPT | Mod: CPTII,S$GLB,, | Performed by: FAMILY MEDICINE

## 2023-07-26 PROCEDURE — 3288F PR FALLS RISK ASSESSMENT DOCUMENTED: ICD-10-PCS | Mod: CPTII,S$GLB,, | Performed by: FAMILY MEDICINE

## 2023-07-26 PROCEDURE — 1101F PT FALLS ASSESS-DOCD LE1/YR: CPT | Mod: CPTII,S$GLB,, | Performed by: FAMILY MEDICINE

## 2023-07-26 PROCEDURE — 99999 PR PBB SHADOW E&M-EST. PATIENT-LVL V: ICD-10-PCS | Mod: PBBFAC,,, | Performed by: FAMILY MEDICINE

## 2023-07-26 PROCEDURE — 99397 PER PM REEVAL EST PAT 65+ YR: CPT | Mod: S$GLB,,, | Performed by: FAMILY MEDICINE

## 2023-07-26 PROCEDURE — 3074F PR MOST RECENT SYSTOLIC BLOOD PRESSURE < 130 MM HG: ICD-10-PCS | Mod: CPTII,S$GLB,, | Performed by: FAMILY MEDICINE

## 2023-07-26 PROCEDURE — 1101F PR PT FALLS ASSESS DOC 0-1 FALLS W/OUT INJ PAST YR: ICD-10-PCS | Mod: CPTII,S$GLB,, | Performed by: FAMILY MEDICINE

## 2023-07-26 PROCEDURE — 3074F SYST BP LT 130 MM HG: CPT | Mod: CPTII,S$GLB,, | Performed by: FAMILY MEDICINE

## 2023-07-26 PROCEDURE — 1159F PR MEDICATION LIST DOCUMENTED IN MEDICAL RECORD: ICD-10-PCS | Mod: CPTII,S$GLB,, | Performed by: FAMILY MEDICINE

## 2023-07-26 PROCEDURE — 1160F RVW MEDS BY RX/DR IN RCRD: CPT | Mod: CPTII,S$GLB,, | Performed by: FAMILY MEDICINE

## 2023-07-26 PROCEDURE — 1126F PR PAIN SEVERITY QUANTIFIED, NO PAIN PRESENT: ICD-10-PCS | Mod: CPTII,S$GLB,, | Performed by: FAMILY MEDICINE

## 2023-07-26 PROCEDURE — 1126F AMNT PAIN NOTED NONE PRSNT: CPT | Mod: CPTII,S$GLB,, | Performed by: FAMILY MEDICINE

## 2023-07-26 PROCEDURE — 4010F PR ACE/ARB THEARPY RXD/TAKEN: ICD-10-PCS | Mod: CPTII,S$GLB,, | Performed by: FAMILY MEDICINE

## 2023-07-26 PROCEDURE — 99999 PR PBB SHADOW E&M-EST. PATIENT-LVL V: CPT | Mod: PBBFAC,,, | Performed by: FAMILY MEDICINE

## 2023-07-26 PROCEDURE — 1160F PR REVIEW ALL MEDS BY PRESCRIBER/CLIN PHARMACIST DOCUMENTED: ICD-10-PCS | Mod: CPTII,S$GLB,, | Performed by: FAMILY MEDICINE

## 2023-07-26 RX ORDER — METOPROLOL SUCCINATE 100 MG/1
100 TABLET, EXTENDED RELEASE ORAL DAILY
Qty: 90 TABLET | Refills: 0 | Status: SHIPPED | OUTPATIENT
Start: 2023-07-26 | End: 2023-08-16 | Stop reason: SDUPTHER

## 2023-07-26 RX ORDER — OLMESARTAN MEDOXOMIL 40 MG/1
40 TABLET ORAL DAILY
Qty: 90 TABLET | Refills: 0 | Status: SHIPPED | OUTPATIENT
Start: 2023-07-26 | End: 2023-08-16 | Stop reason: SDUPTHER

## 2023-07-26 NOTE — PATIENT INSTRUCTIONS
Cuando mida saha presión, no la mida dentro de los 30 minutos de despertarse, ducharse, comer o hacer actividad física.    Dobbins el Metoprolol despues de comer maurizio. Cambie wendy a 100 mg meghan vez al isidoro    Suspender el olmesartan-hydrochlorothiazide y lo estoy cambiando a olmesartán solo de 40 mg    Volveremos a controlar saha presión arterial en 4 a 6 semanas.    Get a 1.5 L to 2 L thermos or water bottle and fill with water in the morning and make sure you finish by the end of the day    English  When you check your pressure, do not check within 30 minutes of waking up, showering, eating, or doing physical activity.    Take the Metoprolol right after dinner. I changed this to 100 mg once daily    Discontinue the olmesartan-hydrochlorothiazide and I am changing it to olmesartan 40 mg alone.    We will recheck your blood pressure in 4-6 weeks.

## 2023-07-30 PROBLEM — M85.88 OSTEOPENIA OF LUMBAR SPINE: Status: ACTIVE | Noted: 2023-07-30

## 2023-07-30 PROBLEM — E66.3 OVERWEIGHT WITH BODY MASS INDEX (BMI) OF 29 TO 29.9 IN ADULT: Chronic | Status: ACTIVE | Noted: 2022-09-22

## 2023-07-30 PROBLEM — M25.611 DECREASED ROM OF RIGHT SHOULDER: Status: RESOLVED | Noted: 2021-07-19 | Resolved: 2023-07-30

## 2023-07-30 PROBLEM — M25.511 CHRONIC RIGHT SHOULDER PAIN: Status: RESOLVED | Noted: 2021-07-19 | Resolved: 2023-07-30

## 2023-07-30 PROBLEM — G89.29 CHRONIC PAIN OF MULTIPLE JOINTS: Status: RESOLVED | Noted: 2017-10-11 | Resolved: 2023-07-30

## 2023-07-30 PROBLEM — M85.88 OSTEOPENIA OF LUMBAR SPINE: Chronic | Status: ACTIVE | Noted: 2023-07-30

## 2023-07-30 PROBLEM — G89.29 CHRONIC RIGHT SHOULDER PAIN: Status: RESOLVED | Noted: 2021-07-19 | Resolved: 2023-07-30

## 2023-07-30 PROBLEM — Z47.1 AFTERCARE FOLLOWING JOINT REPLACEMENT SURGERY: Status: RESOLVED | Noted: 2020-12-01 | Resolved: 2023-07-30

## 2023-07-30 PROBLEM — Z96.651 PRESENCE OF RIGHT ARTIFICIAL KNEE JOINT: Status: RESOLVED | Noted: 2020-12-02 | Resolved: 2023-07-30

## 2023-07-30 PROBLEM — R00.2 PALPITATIONS: Chronic | Status: ACTIVE | Noted: 2019-10-17

## 2023-07-30 PROBLEM — M25.50 CHRONIC PAIN OF MULTIPLE JOINTS: Status: RESOLVED | Noted: 2017-10-11 | Resolved: 2023-07-30

## 2023-07-30 PROBLEM — K76.0 FATTY LIVER: Chronic | Status: ACTIVE | Noted: 2021-01-13

## 2023-07-30 NOTE — PROGRESS NOTES
Patient Name: Rachel Conroy    : 1955  MRN: [id number]      Subjective:     Patient ID: Rachel is a 67 y.o. female    Chief Complaint:  Annual Exam    67 year old female, with conditions as per problem list comes in for annual wellness visit.    Hypertension  This is a chronic problem. The problem is controlled. Associated symptoms include palpitations. Pertinent negatives include no anxiety, blurred vision, chest pain, headaches, malaise/fatigue, neck pain, orthopnea, peripheral edema, PND, shortness of breath or sweats. (Frequent urination after taking olmesartan-hydrochlorthiazide) Risk factors for coronary artery disease include post-menopausal state and dyslipidemia. Past treatments include diuretics, angiotensin blockers and beta blockers. The current treatment provides significant improvement. There are no compliance problems.  There is no history of angina, kidney disease or heart failure. There is no history of chronic renal disease.   Hyperlipidemia  This is a chronic problem. The current episode started more than 1 year ago. She has no history of chronic renal disease, diabetes, hypothyroidism or nephrotic syndrome. Factors aggravating her hyperlipidemia include thiazides. Pertinent negatives include no chest pain, focal sensory loss, myalgias or shortness of breath. Current antihyperlipidemic treatment includes statins. The current treatment provides significant improvement of lipids. There are no compliance problems.  Risk factors for coronary artery disease include dyslipidemia and post-menopausal.        Review of Systems   Constitutional:  Negative for malaise/fatigue and unexpected weight change.   HENT:  Negative for ear pain and sore throat.    Eyes:  Negative for blurred vision.   Respiratory:  Negative for shortness of breath.    Cardiovascular:  Positive for palpitations. Negative for chest pain, orthopnea and PND.   Gastrointestinal:  Negative for abdominal pain and blood in  "stool.   Genitourinary:  Negative for dysuria and frequency.   Musculoskeletal:  Negative for myalgias and neck pain.   Integumentary:  Negative for rash.   Neurological:  Negative for weakness, numbness and headaches.   Hematological:  Negative for adenopathy.   Psychiatric/Behavioral:  Negative for suicidal ideas.         Objective:   /79 (BP Location: Left arm, Patient Position: Sitting, BP Method: Large (Manual))   Pulse 85   Temp 98.5 °F (36.9 °C) (Oral)   Resp 18   Ht 5' 6.73" (1.695 m)   Wt 85.7 kg (189 lb)   SpO2 98%   BMI 29.84 kg/m²     Physical Exam  Constitutional:       General: She is not in acute distress.     Appearance: She is well-developed. She is not ill-appearing.   HENT:      Head: Normocephalic and atraumatic.      Right Ear: External ear normal.      Left Ear: External ear normal.      Nose: Nose normal.      Mouth/Throat:      Mouth: Mucous membranes are moist.   Eyes:      Extraocular Movements: Extraocular movements intact.      Pupils: Pupils are equal, round, and reactive to light.   Cardiovascular:      Rate and Rhythm: Normal rate and regular rhythm.      Pulses: Normal pulses.   Pulmonary:      Effort: Pulmonary effort is normal.   Abdominal:      General: Abdomen is flat. Bowel sounds are normal. There is no distension.      Tenderness: There is no abdominal tenderness. There is no guarding.   Musculoskeletal:         General: Normal range of motion.      Cervical back: Normal range of motion. No rigidity.   Lymphadenopathy:      Cervical: No cervical adenopathy.   Skin:     General: Skin is warm and dry.   Neurological:      Mental Status: She is alert and oriented to person, place, and time.   Psychiatric:         Mood and Affect: Mood normal.         Behavior: Behavior normal.        Lab Visit on 07/19/2023   Component Date Value Ref Range Status    Total Protein 07/19/2023 6.9  6.0 - 8.4 g/dL Final    Albumin 07/19/2023 3.9  3.5 - 5.2 g/dL Final    Total Bilirubin " 07/19/2023 0.5  0.1 - 1.0 mg/dL Final    Comment: For infants and newborns, interpretation of results should be based  on gestational age, weight and in agreement with clinical  observations.    Premature Infant recommended reference ranges:  Up to 24 hours.............<8.0 mg/dL  Up to 48 hours............<12.0 mg/dL  3-5 days..................<15.0 mg/dL  6-29 days.................<15.0 mg/dL      Bilirubin, Direct 07/19/2023 0.2  0.1 - 0.3 mg/dL Final    AST 07/19/2023 20  10 - 40 U/L Final    ALT 07/19/2023 24  10 - 44 U/L Final    Alkaline Phosphatase 07/19/2023 97  55 - 135 U/L Final   Admission on 07/11/2023, Discharged on 07/11/2023   Component Date Value Ref Range Status    Final Pathologic Diagnosis 07/11/2023    Final                    Value:1. Gastric, biopsy:  Gastric mucosa with mild chronic gastritis  No Helicobacter pylori organisms identified on routine stain     2. Distal esophagus, biopsy:  Chronically inflamed gastroesophageal junctional mucosa  No definitive intraepithelial eosinophils identified in the squamous component   Negative for intestinal metaplasia, dysplasia, or malignancy    3. Proximal esophagus, biopsy:   Squamous mucosa with minimal chronic inflammation  No intraepithelial eosinophils identified   Negative for malignancy      Interp By Denia Everett D.O., Signed on 07/12/2023 at 14:00    Gross 07/11/2023    Final                    Value:1: Container Label: Surgery MRN:  [id number] and Pathology MRN:  [id number]  Received in formalin labeled  &quot;gastric biopsy, rule out H pylori&quot; and consists of 2 brown tan soft tissue fragments measuring 0.3 cm and 0.3 cm in greatest dimension. Entirely submitted in cassette KAH--1A.   2: Container Label: Surgery MRN:  [id number] and Pathology MRN:  [id number]  Received in formalin labeled  &quot;distal esophageal biopsy&quot; and consists of 3 pale tan soft tissue fragments measuring 0.3 cm, 0.2 cm and 0.2 cm in greatest dimension.  Entirely submitted in cassette XIH--2A.   3: Container Label: Surgery MRN:  [id number] and Pathology MRN:  [id number]  Received in formalin labeled  &quot;proximal esophageal biopsy&quot; and consists of 2 minute pale tan soft tissue fragments measuring 0.1 cm and less than 0.1 cm in greatest dimension. Entirely submitted in cassette EOL--3A.  Brad Verma      Disclaimer 07/11/2023 Unless the case is a 'gross only' or additional testing only, the final diagnosis for each specimen is based on a microscopic examination of appropriate tissue sections.   Final   Lab Visit on 06/27/2023   Component Date Value Ref Range Status    Sodium 06/27/2023 138  136 - 145 mmol/L Final    Potassium 06/27/2023 3.8  3.5 - 5.1 mmol/L Final    Chloride 06/27/2023 105  95 - 110 mmol/L Final    CO2 06/27/2023 27  23 - 29 mmol/L Final    Glucose 06/27/2023 98  70 - 110 mg/dL Final    BUN 06/27/2023 14  8 - 23 mg/dL Final    Creatinine 06/27/2023 0.7  0.5 - 1.4 mg/dL Final    Calcium 06/27/2023 9.2  8.7 - 10.5 mg/dL Final    Total Protein 06/27/2023 7.0  6.0 - 8.4 g/dL Final    Albumin 06/27/2023 3.9  3.5 - 5.2 g/dL Final    Total Bilirubin 06/27/2023 0.5  0.1 - 1.0 mg/dL Final    Comment: For infants and newborns, interpretation of results should be based  on gestational age, weight and in agreement with clinical  observations.    Premature Infant recommended reference ranges:  Up to 24 hours.............<8.0 mg/dL  Up to 48 hours............<12.0 mg/dL  3-5 days..................<15.0 mg/dL  6-29 days.................<15.0 mg/dL      Alkaline Phosphatase 06/27/2023 88  55 - 135 U/L Final    AST 06/27/2023 21  10 - 40 U/L Final    ALT 06/27/2023 29  10 - 44 U/L Final    eGFR 06/27/2023 >60  >60 mL/min/1.73 m^2 Final    Anion Gap 06/27/2023 6 (L)  8 - 16 mmol/L Final    Cholesterol 06/27/2023 224 (H)  120 - 199 mg/dL Final    Comment: The National Cholesterol Education Program (NCEP) has set the  following guidelines (reference  ranges) for Cholesterol:  Optimal.....................<200 mg/dL  Borderline High.............200-239 mg/dL  High........................> or = 240 mg/dL      Triglycerides 06/27/2023 94  30 - 150 mg/dL Final    Comment: The National Cholesterol Education Program (NCEP) has set the  following guidelines (reference values) for triglycerides:  Normal......................<150 mg/dL  Borderline High.............150-199 mg/dL  High........................200-499 mg/dL      HDL 06/27/2023 81 (H)  40 - 75 mg/dL Final    Comment: The National Cholesterol Education Program (NCEP) has set the  following guidelines (reference values) for HDL Cholesterol:  Low...............<40 mg/dL  Optimal...........>60 mg/dL      LDL Cholesterol 06/27/2023 124.2  63.0 - 159.0 mg/dL Final    Comment: The National Cholesterol Education Program (NCEP) has set the  following guidelines (reference values) for LDL Cholesterol:  Optimal.......................<130 mg/dL  Borderline High...............130-159 mg/dL  High..........................160-189 mg/dL  Very High.....................>190 mg/dL      HDL/Cholesterol Ratio 06/27/2023 36.2  20.0 - 50.0 % Final    Total Cholesterol/HDL Ratio 06/27/2023 2.8  2.0 - 5.0 Final    Non-HDL Cholesterol 06/27/2023 143  mg/dL Final    Comment: Risk category and Non-HDL cholesterol goals:  Coronary heart disease (CHD)or equivalent (10-year risk of CHD >20%):  Non-HDL cholesterol goal     <130 mg/dL  Two or more CHD risk factors and 10-year risk of CHD <= 20%:  Non-HDL cholesterol goal     <160 mg/dL  0 to 1 CHD risk factor:  Non-HDL cholesterol goal     <190 mg/dL      TSH 06/27/2023 0.671  0.400 - 4.000 uIU/mL Final      Assessment        ICD-10-CM ICD-9-CM   1. Routine health maintenance  Z00.00 V70.0   2. Essential hypertension  I10 401.9   3. Dyslipidemia  E78.5 272.4   4. Palpitations  R00.2 785.1   5. Other migraine, not intractable, without status migrainosus  G43.809 346.80   6. Overweight with  body mass index (BMI) of 29 to 29.9 in adult  E66.3 278.02    Z68.29 V85.25   7. Osteopenia of lumbar spine  M85.88 733.90   8. Fatty liver  K76.0 571.8         Plan:     1. Routine health maintenance  Assessment & Plan:  Age appropriate screenings, vaccinations, and recommendations reviewed and discussed.  Appropriate orders placed and previous results reviewed.       2. Essential hypertension  Overview:  BP Readings from Last 3 Encounters:   07/26/23 129/79   07/11/23 (!) 129/56   05/29/23 110/62       Assessment & Plan:  Althoughg blood pressure shows good control, she states that she is still having palpitations and olmesartan-hydrochlorothiazide makes her urinate too much. Because of these concerns will increase metoprolol to 100 mg daily and D/C hydrochlorothiazide from olmesartan-hctz.   Recheck blood pressure in 2 weeks.     Orders:  -     metoprolol succinate (TOPROL-XL) 100 MG 24 hr tablet; Take 1 tablet (100 mg total) by mouth once daily. With a meal  Dispense: 90 tablet; Refill: 0  -     olmesartan (BENICAR) 40 MG tablet; Take 1 tablet (40 mg total) by mouth once daily.  Dispense: 90 tablet; Refill: 0    3. Dyslipidemia  Overview:  Lab Results   Component Value Date    CHOL 224 (H) 06/27/2023    CHOL 194 07/29/2022    CHOL 222 (H) 01/19/2022     Lab Results   Component Value Date    HDL 81 (H) 06/27/2023    HDL 67 07/29/2022    HDL 75 01/19/2022     Lab Results   Component Value Date    LDLCALC 124.2 06/27/2023    LDLCALC 106.0 07/29/2022    LDLCALC 122.6 01/19/2022     Lab Results   Component Value Date    TRIG 94 06/27/2023    TRIG 105 07/29/2022    TRIG 122 01/19/2022     Lab Results   Component Value Date    CHOLHDL 36.2 06/27/2023    CHOLHDL 34.5 07/29/2022    CHOLHDL 33.8 01/19/2022        The 10-year ASCVD risk score (Valente VILLARREAL, et al., 2019) is: 6.8%    Values used to calculate the score:      Age: 67 years      Sex: Female      Is Non- : No      Diabetic: No      Tobacco  "smoker: No      Systolic Blood Pressure: 129 mmHg      Is BP treated: Yes      HDL Cholesterol: 81 mg/dL      Total Cholesterol: 224 mg/dL      Assessment & Plan:  ASCVD score not in a statin benefiting group.   Discussed dietary and lifestyle modifications.   Continue diet/lifestyle modifications and repeat panel in 12 months.       4. Palpitations  Assessment & Plan:  Reports palpitations improved but still has episodes weekly. As such will increase Metoprolol from 50 milligrams daily to 100 milligrams daily.   Recently had cardiac testing and has appointment to review results next week.     Orders:  -     metoprolol succinate (TOPROL-XL) 100 MG 24 hr tablet; Take 1 tablet (100 mg total) by mouth once daily. With a meal  Dispense: 90 tablet; Refill: 0    5. Other migraine, not intractable, without status migrainosus  Assessment & Plan:  Improved with beta blocker      6. Overweight with body mass index (BMI) of 29 to 29.9 in adult  Overview:  Wt Readings from Last 3 Encounters:   07/26/23 1342 85.7 kg (189 lb)   05/29/23 1437 87 kg (191 lb 12.8 oz)   05/23/23 1108 83 kg (182 lb 15.7 oz)        Estimated body mass index is 29.84 kg/m² as calculated from the following:    Height as of this encounter: 5' 6.73" (1.695 m).    Weight as of this encounter: 85.7 kg (189 lb).  Ideal body weight: 61 kg (134 lb 6.9 oz)     Assessment & Plan:  The patient's BMI has been recorded in the chart. The patient has been provided educational materials regarding the benefits of attaining and maintaining a normal weight. We will continue to address and follow this issue during follow up visits.      7. Osteopenia of lumbar spine  Overview:  11-  DEXA  The average trabecular bone mineral density of the lumbar spine from L2-L4 was 1.072 g/cm2. This represents a T-score of -1.2 and a Z-score of  -0.3.     The average trabecular bone mineral density measured at the proximal femurs was 0.913 g/cm2. This represents a T-score of -0.8 " "and Z-score of -0.1.     FRAX Result (10 year probability of fracture) for females 40-90 years of age:   Major osteoporotic fracture: 5.0%   Hip fracture: 0.6%     Assessment & Plan:  Calcium and Vit D recommendations discussed      8. Fatty liver  Overview:  CMP  Lab Results   Component Value Date     06/27/2023    K 3.8 06/27/2023     06/27/2023    CO2 27 06/27/2023    GLU 98 06/27/2023    BUN 14 06/27/2023    CREATININE 0.7 06/27/2023    CALCIUM 9.2 06/27/2023    PROT 6.9 07/19/2023    ALBUMIN 3.9 07/19/2023    BILITOT 0.5 07/19/2023    ALKPHOS 97 07/19/2023    AST 20 07/19/2023    ALT 24 07/19/2023    ANIONGAP 6 (L) 06/27/2023    EGFRNORACEVR >60 06/27/2023     VIRAL HEPATITIS  Lab Results   Component Value Date    HEPAIGM Negative 08/25/2020    HEPAIGG Positive (A) 08/25/2020    HEPBCAB Positive (A) 08/25/2020    HEPBSAB Positive (A) 08/25/2020     BLOOD COUNT  Lab Results   Component Value Date    WBC 5.25 07/29/2022    HGB 13.3 07/29/2022    HCT 40.7 07/29/2022     07/29/2022    IRON 52 10/24/2016    TIBC 364 10/24/2016    FERRITIN 237 10/24/2016     AUTO-IMMUNE/GENETIC  Lab Results   Component Value Date    BISHOP Negative 03/04/2009    SMOOTHMUSCAB Negative 1:40 10/24/2016    IGGSERUM 943 10/24/2016     ALCOHOL  No results found for: "[id number]", "[id number]"        07-  FibroScan  Fibrosis Stage:   F 0-1  Steatosis Grade:   S3    01-  FibroScan  Fibrosis Stage: F2    Assessment & Plan:  Continue follow up with hepatology             -Maxwell Gonzalez Jr., MD, AAHIVS      This office note has been dictated.  This dictation has been generated using M-Modal Fluency Direct dictation; some phonetic errors may occur.         Patient Instructions   Cuando mida saha presión, no la mida dentro de los 30 minutos de despertarse, ducharse, comer o hacer actividad física.    [place] el Metoprolol despues de comer maurizio. Cambie wendy a 100 mg meghan vez al isidoro    Suspender el " olmesartan-hydrochlorothiazide y lo estoy cambiando a olmesartán solo de 40 mg    Volveremos a controlar saha presión arterial en 4 a 6 semanas.    Get a 1.5 L to 2 L thermos or water bottle and fill with water in the morning and make sure you finish by the end of the day    English  When you check your pressure, do not check within 30 minutes of waking up, showering, eating, or doing physical activity.    Take the Metoprolol right after dinner. I changed this to 100 mg once daily    Discontinue the olmesartan-hydrochlorothiazide and I am changing it to olmesartan 40 mg alone.    We will recheck your blood pressure in 4-6 weeks.    Future Appointments   Date Time Provider Department Center   8/11/2023 10:20 AM Jess Grubbs MD [facility] CARDIO [facility]   8/16/2023  3:40 PM Maxwell Gonzalez Jr., MD [facility]   8/29/2023  9:40 AM Noemi Power NP [facility] SLEEP [facility]   8/31/2023 12:30 PM Ronna Carranza MD [facility] GASTRO [place]   7/19/2024  9:40 AM LAB, TRANSPLANT [facility] LABTX [place]   7/19/2024  9:45 AM [facility] HEPATOLOGY, FIBROSCAN [facility] HEPAT [place] [facility]   7/19/2024 10:00 AM Jeanie Paredes NP [facility] HEPAT [facility]              Clinical References      Patient Education       Cómo revisar la presión arterial en casa   Conceptos Básicos   Redactado por los médicos y editores de [employer]   ¿Cómo se mide la presión arterial? -- Generalmente la presión arterial se mide con un dispositivo que se coloca alrededor de la parte superior del brazo. [place] con frecuencia se realiza en el consultorio del médico, itzel también hay personas que se revisan la presión arterial por saha cuenta, en saha casa o en el trabajo.  La presión arterial se explica con dos números. Por ejemplo, saha presión arterial podría ser de 140 sobre 90. El primer número (superior) es la presión dentro de las arterias cuando el corazón se contrae. El drew número (inferior) es la presión dentro de las arterias cuando el corazón se  "relaja. En la tabla se observa cómo los médicos y enfermeros definen la presión arterial melany y la normal (tabla 1).  Si saha presión arterial es demasiado melany, lo pone en riesgo de sufrir un infarto, un accidente cerebrovascular (derrame) y enfermedad renal. La presión arterial melany generalmente no causa ningún síntoma, itzel puede ser grave.  ¿Qué es un medidor hogareño de la presión arterial? -- Un medidor (o "monitor") hogareño de la presión arterial es un dispositivo que usted puede utilizar para medirse la presión arterial. Tiene un brazalete que se coloca alrededor de la parte superior del brazo (figura 1). Algunos dispositivos tienen un brazalete que se coloca en la fernando, itzel los médicos no saben con certeza si saha eficacia es la misma. El medidor también tiene meghan pequeña pantalla, o dial, en la que se muestran los números de la presión arterial.  También hay algunos medidores especiales que se pueden llevar puestos sandi deonte o dos días. Son diferentes ya que miden automáticamente la presión arterial sandi el día y la noche, incluso mientras usted duerme. Si saha médico prakash que le convendría usar deonte de esos dispositivos, le indicará cómo hacerlo.  ¿Por qué necesito revisarme la presión arterial en casa? -- Si el médico sabe o sospecha que usted tiene la presión arterial melany, es posible que quiera que se la revise en saha casa por meghan variedad de motivos. Es posible que el médico quiera blue:  Si la medición de la presión arterial es la misma en saha casa y en el consultorio  Con qué eficacia están actuando vibha medicinas para la presión arterial  Si hay cambios en saha presión arterial, por ejemplo si sube y baja  Las personas que se revisan la presión arterial en casa generalmente son más eficaces para mantenerla baja.  ¿Cómo elijo un medidor hogareño de la presión arterial? -- A la hora de elegir un medidor hogareño de la presión arterial probablemente le convenga tener en cuenta lo siguiente:  Costo - " Algunos dispositivos son más costosos que otros. También debería averiguar si saha seguro lo ayudará a pagar el dispositivo.  Tamaño - Es importante que se asegure de que el brazalete tenga un tamaño adecuado para saha brazo y sea cómodo. El médico o enfermero puede ayudarlo a determinar esto.  Facilidad de uso - Debe asegurarse de comprender cómo se utiliza el dispositivo. También debe poder leer los números que aparecen en la pantalla.  No se necesita meghan receta para comprar un medidor hogareño de la presión arterial. Puede comprarlo en la mayoría de las farmacias o por Internet. El médico o enfermero puede ayudarlo a elegir el dispositivo adecuado para usted.  ¿Cómo me reviso la presión arterial en casa? -- Meghan vez que tenga el medidor hogareño de la presión arterial, el médico o enfermero debe inspeccionarlo para asegurarse de que tenga el tamaño adecuado para usted y funcione correctamente.  Al momento de revisarse la presión arterial:  [place], vaya al baño y vacíe la vejiga. Tener la vejiga llena puede aumentar temporalmente la presión arterial y afectar la precisión de la medición.  Siéntese en meghan silla con las plantas de los pies totalmente apoyadas en el piso.  Trate de respirar normalmente y estar tranquilo.  Colóquese el brazalete en el brazo, directamente sobre la piel y no sobre la ropa. El brazalete debe estar lo suficientemente ajustado leo para no caerse, itzel no al punto de causarle incomodidad.  Siéntese y relájese con el brazalete puesto sandi alrededor de 3 a 5 minutos.  Siga las instrucciones que vienen con el dispositivo para comenzar a medirse la presión arterial. Quizás tenga que apretar el bulbo ubicado en el extremo del tubo para inflar el brazalete (llenarlo de aire). Con algunos monitores solo se necesita presionar un botón para inflar el brazalete. Cuando el brazalete se llene de aire, sentirá leo si alguien le estuviera apretando el brazo, itzel no debería dolerle. Luego, desinfle  lentamente el brazalete (deje salir el aire), o mio se desinflará solo. Los números de saha presión arterial aparecerán en la pantalla o dial.  Quédese sentado y relajado sandi 1 minuto, y luego vuelva a medirse la presión arterial.  ¿Con qué frecuencia eligio revisarme la presión arterial? -- Depende. La frecuencia varía según la persona. Saha médico o enfermero le dirá con qué frecuencia debe revisarse la presión arterial y cuándo hacerlo. Algunas personas necesitan revisarse la presión arterial dos veces por día, por la mañana y por la tarde.  Probablemente saha médico o enfermero le dirá que lleve un registro de saha presión arterial sandi unos días leo mínimo (tabla 2). Luego observará los números. [place] se debe a que es normal que la presión arterial cambie levemente de un día al otro. Por ejemplo, los números podrían cambiar si acaba de beber cafeína, hacer ejercicio o se siente estresado. Si se mide la presión arterial sandi varios días, o incluso sandi más tiempo, saha médico o enfermero tendrá meghan idea más precisa de cuáles son vibha números promedio.  ¿Cómo puedo llevar un registro de mi presión arterial? -- Algunos medidores de la presión arterial registran los números o los envían a meghan computadora o teléfono inteligente. Si saha dispositivo no hace esto, usted tendrá que escribir los números. El médico o enfermero puede ayudarlo a determinar cuál es la mejor manera de llevar un registro de los números.  ¿Qué ocurre si tengo la presión arterial melany? -- El médico o enfermero le dirá qué debe hacer si tiene la presión arterial melany cuando se la revise en saha casa. Si el número es más alto de lo normal, vuelva a medirse la presión para verificarlo. Si es muy alto (superior a cierto número que saha médico o enfermero le indicará para que esté atento), deberá llamar al médico de inmediato.  Si solo tiene la presión arterial levemente melany, el médico o enfermero podría indicarle que siga revisándola sandi algunos  "días o semanas más, y que lo llame si no baja para que pueda ayudarlo a decidir qué hacer a continuación.  Todos los artículos se actualizan a medida que se descubre nueva evidencia y culmina nuestro proceso de evaluación por homólogos   [place] artículo se recuperó de UpToDate el: Sep 21, 2021.  Artículo [id number] Versión [id number].es-419.1  Release: 29.4.2 - C29.263  © 2021 [employer] Todos los derechos reservados.  tabla 1: Definición de presión arterial normal y ioana  Nivel  Número más alto  Número más bajo    Ioana 130 o más 80 o más   Elevada 120 a 129 79 o menos   Normal 119 o menos 79 o menos   Estas definiciones son del Colegio Est[place]oun[place]ense de Cardiología (American College of Cardiology)/Asociación [place]ense del [place] (American Heart Association). Las definiciones de otros grupos de expertos podrían ser ligeramente diferentes.  "Presión arterial elevada" es un término que los médicos o enfermeros utilizan leo advertencia. Significa que aunque ya no tiene la presión arterial ioana, saha presión arterial no es todo lo baja que debería ser para tener buena rosemary.  Gráfico [id number] Versión 6.0  figura 1: Cómo usar un medidor hogareño de la presión arterial     [place] es un ejemplo de meghan persona que usa un medidor hogareño de la presión arterial.  Gráfico [id number] Versión 1.0    tabla 2: Diario de 7 días para revisar la presión arterial en casa  Día 1  Día 2  Día 3  Día 4  Día 5  Día 6  Día 7    Mañana  [place] medición Mañana  [place] medic[place] Mañana  [place] medic[place] Mañana  [place] medic[facility] Mañana  [place] medic[place] Mañana  [place] medición Mañana  [place] medición   Sistólica: __________ Sistólica: __________ Sistólica: __________ Sistólica: __________ Sistólica: __________ Sistólica: __________ Sistólica: __________   Diastólica: __________ Diastólica: __________ Diastólica: __________ Diastólica: __________ Diastólica: __________ Diastólica: __________ Diastólica: __________   Pulso: __________ Pulso: __________ " Pulso: __________ Pulso: __________ Pulso: __________ Pulso: __________ Pulso: __________   Mañana  Segunda medición Mañana  Segunda medición Mañana  Segunda medición Mañana  Segunda medición Mañana  Segunda medición Mañana  Segunda medición Mañana  Segunda medición   Sistólica: __________ Sistólica: __________ Sistólica: __________ Sistólica: __________ Sistólica: __________ Sistólica: __________ Sistólica: __________   Diastólica: __________ Diastólica: __________ Diastólica: __________ Diastólica: __________ Diastólica: __________ Diastólica: __________ Diastólica: __________   Pulso: __________ Pulso: __________ Pulso: __________ Pulso: __________ Pulso: __________ Pulso: __________ Pulso: __________   Tarde  [place] medición Tarde  Gully medición Tarde  Gully medición Tarde  Gully medición Tarde  Gully medición Tarde  Gully medición Tarde  [place] medición   Sistólica: __________ Sistólica: __________ Sistólica: __________ Sistólica: __________ Sistólica: __________ Sistólica: __________ Sistólica: __________   Diastólica: __________ Diastólica: __________ Diastólica: __________ Diastólica: __________ Diastólica: __________ Diastólica: __________ Diastólica: __________   Pulso: __________ Pulso: __________ Pulso: __________ Pulso: __________ Pulso: __________ Pulso: __________ Pulso: __________   Tarde  Segunda medición Tarde  Segunda medición Tarde  Segunda medición Tarde  Segunda medición Tarde  Segunda medición Tarde  Segunda medición Tarde  Segunda medición   Sistólica: __________ Sistólica: __________ Sistólica: __________ Sistólica: __________ Sistólica: __________ Sistólica: __________ Sistólica: __________   Diastólica: __________ Diastólica: __________ Diastólica: __________ Diastólica: __________ Diastólica: __________ Diastólica: __________ Diastólica: __________   Pulso: __________ Pulso: __________ Pulso: __________ Pulso: __________ Pulso: __________ Pulso: __________ Pulso: __________    Notas    Notas    Notas    Notas    Notas    Notas    Notas      ____________________ ____________________ ____________________ ____________________ ____________________ ____________________ ____________________   ____________________ ____________________ ____________________ ____________________ ____________________ ____________________ ____________________   ____________________ ____________________ ____________________ ____________________ ____________________ ____________________ ____________________   Nombre del paciente: ______________________________     Identificación del paciente: ________________________________    Profesional de atención primaria: _______________________    PA promedio: _______________________________    Gráfico [id number] Versión 1.0  Exención de responsabilidad y uso de la información del consumidor   Esta información no es un consejo médico específico ni es un sustituto de la información que le sylwia saha proveedor de atención médica. Es solamente un resumen breve de datos generales. NO incluye toda la información sobre padecimientos, enfermedades, lesiones, pruebas, procedimientos, tratamientos, terapias, instrucciones de melany u opciones de estilo de luz que podrían corresponder en saha shannan. Debe hablar con saha proveedor de atención médica para obtener información completa sobre asha rosemary y vibha opciones de tratamiento. Esta información no debe utilizarse para decidir si aceptar o no el consejo, las instrucciones o las recomendaciones de saha proveedor de atención médica, y solo él posee los conocimientos y la capacitación para darle consejos adecuados para usted.El uso de wendy sitio web se rige por los Términos de uso de UpToDate ©2021 [employer]. Todos los derechos reservados.  Copyright   © 2021 UpToDate, Inc. Todos los derechos reservados.

## 2023-07-30 NOTE — ASSESSMENT & PLAN NOTE
ASCVD score not in a statin benefiting group.   Discussed dietary and lifestyle modifications.   Continue diet/lifestyle modifications and repeat panel in 12 months.

## 2023-07-30 NOTE — ASSESSMENT & PLAN NOTE
Reports palpitations improved but still has episodes weekly. As such will increase Metoprolol from 50 milligrams daily to 100 milligrams daily.   Recently had cardiac testing and has appointment to review results next week.

## 2023-07-30 NOTE — ASSESSMENT & PLAN NOTE
Althoughg blood pressure shows good control, she states that she is still having palpitations and olmesartan-hydrochlorothiazide makes her urinate too much. Because of these concerns will increase metoprolol to 100 mg daily and D/C hydrochlorothiazide from olmesartan-hctz.   Recheck blood pressure in 2 weeks.

## 2023-08-11 ENCOUNTER — OFFICE VISIT (OUTPATIENT)
Dept: CARDIOLOGY | Facility: CLINIC | Age: 68
End: 2023-08-11
Payer: COMMERCIAL

## 2023-08-11 VITALS
DIASTOLIC BLOOD PRESSURE: 60 MMHG | SYSTOLIC BLOOD PRESSURE: 130 MMHG | HEIGHT: 67 IN | HEART RATE: 81 BPM | WEIGHT: 191.5 LBS | OXYGEN SATURATION: 98 % | RESPIRATION RATE: 18 BRPM | BODY MASS INDEX: 30.06 KG/M2

## 2023-08-11 DIAGNOSIS — R00.2 PALPITATIONS: Chronic | ICD-10-CM

## 2023-08-11 DIAGNOSIS — G47.33 OBSTRUCTIVE SLEEP APNEA: ICD-10-CM

## 2023-08-11 DIAGNOSIS — K76.0 FATTY LIVER: Chronic | ICD-10-CM

## 2023-08-11 DIAGNOSIS — I10 ESSENTIAL HYPERTENSION: Primary | Chronic | ICD-10-CM

## 2023-08-11 PROCEDURE — 1160F RVW MEDS BY RX/DR IN RCRD: CPT | Mod: CPTII,S$GLB,, | Performed by: INTERNAL MEDICINE

## 2023-08-11 PROCEDURE — 4010F PR ACE/ARB THEARPY RXD/TAKEN: ICD-10-PCS | Mod: CPTII,S$GLB,, | Performed by: INTERNAL MEDICINE

## 2023-08-11 PROCEDURE — 99999 PR PBB SHADOW E&M-EST. PATIENT-LVL IV: ICD-10-PCS | Mod: PBBFAC,,, | Performed by: INTERNAL MEDICINE

## 2023-08-11 PROCEDURE — 1159F MED LIST DOCD IN RCRD: CPT | Mod: CPTII,S$GLB,, | Performed by: INTERNAL MEDICINE

## 2023-08-11 PROCEDURE — 1126F PR PAIN SEVERITY QUANTIFIED, NO PAIN PRESENT: ICD-10-PCS | Mod: CPTII,S$GLB,, | Performed by: INTERNAL MEDICINE

## 2023-08-11 PROCEDURE — 1101F PR PT FALLS ASSESS DOC 0-1 FALLS W/OUT INJ PAST YR: ICD-10-PCS | Mod: CPTII,S$GLB,, | Performed by: INTERNAL MEDICINE

## 2023-08-11 PROCEDURE — 1160F PR REVIEW ALL MEDS BY PRESCRIBER/CLIN PHARMACIST DOCUMENTED: ICD-10-PCS | Mod: CPTII,S$GLB,, | Performed by: INTERNAL MEDICINE

## 2023-08-11 PROCEDURE — 3008F PR BODY MASS INDEX (BMI) DOCUMENTED: ICD-10-PCS | Mod: CPTII,S$GLB,, | Performed by: INTERNAL MEDICINE

## 2023-08-11 PROCEDURE — 1159F PR MEDICATION LIST DOCUMENTED IN MEDICAL RECORD: ICD-10-PCS | Mod: CPTII,S$GLB,, | Performed by: INTERNAL MEDICINE

## 2023-08-11 PROCEDURE — 3288F FALL RISK ASSESSMENT DOCD: CPT | Mod: CPTII,S$GLB,, | Performed by: INTERNAL MEDICINE

## 2023-08-11 PROCEDURE — 3078F PR MOST RECENT DIASTOLIC BLOOD PRESSURE < 80 MM HG: ICD-10-PCS | Mod: CPTII,S$GLB,, | Performed by: INTERNAL MEDICINE

## 2023-08-11 PROCEDURE — 3078F DIAST BP <80 MM HG: CPT | Mod: CPTII,S$GLB,, | Performed by: INTERNAL MEDICINE

## 2023-08-11 PROCEDURE — 3075F SYST BP GE 130 - 139MM HG: CPT | Mod: CPTII,S$GLB,, | Performed by: INTERNAL MEDICINE

## 2023-08-11 PROCEDURE — 3288F PR FALLS RISK ASSESSMENT DOCUMENTED: ICD-10-PCS | Mod: CPTII,S$GLB,, | Performed by: INTERNAL MEDICINE

## 2023-08-11 PROCEDURE — 99214 OFFICE O/P EST MOD 30 MIN: CPT | Mod: S$GLB,,, | Performed by: INTERNAL MEDICINE

## 2023-08-11 PROCEDURE — 1126F AMNT PAIN NOTED NONE PRSNT: CPT | Mod: CPTII,S$GLB,, | Performed by: INTERNAL MEDICINE

## 2023-08-11 PROCEDURE — 99214 PR OFFICE/OUTPT VISIT, EST, LEVL IV, 30-39 MIN: ICD-10-PCS | Mod: S$GLB,,, | Performed by: INTERNAL MEDICINE

## 2023-08-11 PROCEDURE — 3075F PR MOST RECENT SYSTOLIC BLOOD PRESS GE 130-139MM HG: ICD-10-PCS | Mod: CPTII,S$GLB,, | Performed by: INTERNAL MEDICINE

## 2023-08-11 PROCEDURE — 3008F BODY MASS INDEX DOCD: CPT | Mod: CPTII,S$GLB,, | Performed by: INTERNAL MEDICINE

## 2023-08-11 PROCEDURE — 1101F PT FALLS ASSESS-DOCD LE1/YR: CPT | Mod: CPTII,S$GLB,, | Performed by: INTERNAL MEDICINE

## 2023-08-11 PROCEDURE — 4010F ACE/ARB THERAPY RXD/TAKEN: CPT | Mod: CPTII,S$GLB,, | Performed by: INTERNAL MEDICINE

## 2023-08-11 PROCEDURE — 99999 PR PBB SHADOW E&M-EST. PATIENT-LVL IV: CPT | Mod: PBBFAC,,, | Performed by: INTERNAL MEDICINE

## 2023-08-11 NOTE — PROGRESS NOTES
CARDIOVASCULAR CONSULTATION    REASON FOR CONSULT:   Rachel Conroy is a 67 y.o. female who presents for evaluation    HISTORY OF PRESENT ILLNESS:     Patient is a pleasant 67-year-old lady.  Lithuanian speaking.  History obtained with the help of Candelaria .  Coming in complaining of dyspnea on exertion, occasional chest tightness on exertion as well as palpitations about once a week.  Denies orthopnea, PND, swelling of feet.    Aug 23: Patient here for follow-up.  Doing fine.  History obtained with the help of Candelaria .  Main complaint is tiredness.  No more chest pains.  Stress test did not show any significant arrhythmia, echo showed normal left ventricle systolic function.  Event monitor showed brief episodes of atrial tachycardia.    Sinus rhythm with very rare PACs/PVCs.  2 episodes of nonsustained atrial tachycardia lasting up to 5 beats in duration.    The left ventricle is normal in size with normal systolic function.  The estimated ejection fraction is 55%.  Normal right ventricular size with normal right ventricular systolic function.  The estimated PA systolic pressure is 32 mmHg.         The patient exercised for 7 minutes 17 seconds on a Andrea protocol, corresponding to a functional capacity of 9 METS, achieving a peak heart rate of 148 bpm, which is 99 % of the age predicted maximum heart rate.    Normal myocardial perfusion scan. There is no evidence of myocardial ischemia or infarction.    The visually estimated ejection fraction is normal during stress.    There is normal wall motion post stress.    The ECG portion of the study is negative for ischemia.    The patient reported no chest pain during the stress test.    During stress, occasional PVCs are noted.        PAST MEDICAL HISTORY:     Past Medical History:   Diagnosis Date    Arthritis     GERD (gastroesophageal reflux disease)     Hyperlipidemia     Hypertension     Irritable bowel syndrome     Joint pain     Knee  injury     Migraine headache     Muscle pain, fibromyalgia 2014    NAFLD (nonalcoholic fatty liver disease)     Other physical therapy     Plantar fasciitis     Sleep apnea     Urinary incontinence        PAST SURGICAL HISTORY:     Past Surgical History:   Procedure Laterality Date    APPENDECTOMY  2018    COLONOSCOPY N/A 2017    Procedure: COLONOSCOPY;  Surgeon: Mariaelena Harrell MD;  Location: [facility] ENDO (4TH FLR);  Service: Endoscopy;  Laterality: N/A;    COLONOSCOPY N/A 2018    Procedure: COLONOSCOPY;  Surgeon: Nicola Zhu MD;  Location: [facility] ENDO (2ND FLR);  Service: Endoscopy;  Laterality: N/A;    ESOPHAGOGASTRODUODENOSCOPY N/A 2023    Procedure: EGD (ESOPHAGOGASTRODUODENOSCOPY);  Surgeon: Hector Barnes MD;  Location: [facility] ENDO;  Service: Endoscopy;  Laterality: N/A;  Procedure Timin-4 weeks    history of difficult airway    Referrd by Dr. Carranza/Prep instructions mailed to home and to portal. AS    HYSTERECTOMY      TOTAL KNEE ARTHROPLASTY Right 2020    Procedure: ARTHROPLASTY, KNEE, TOTAL-STEFANIE;  Surgeon: Gerardo Stein MD;  Location: [facility] OR;  Service: Orthopedics;  Laterality: Right;  spinal attempted       ALLERGIES AND MEDICATION:     Review of patient's allergies indicates:   Allergen Reactions    Tramadol Itching        Medication List            Accurate as of 2023 10:56 AM. If you have any questions, ask your nurse or doctor.                CONTINUE taking these medications      amitriptyline 10 MG tablet  Commonly known as: ELAVIL  Take 1 tablet (10 mg total) by mouth every evening.     clobetasoL 0.05 % shampoo  Commonly known as: CLOBEX  APPLY TOPICALLY TWICE DAILY     esomeprazole 40 MG capsule  Commonly known as: NEXIUM  Take 1 capsule (40 mg total) by mouth 2 (two) times daily.     gabapentin 300 MG capsule  Commonly known as: NEURONTIN  Take 1 capsule (300 mg total) by mouth 2 (two) times daily.     metoprolol succinate 100 MG 24 hr  tablet  Commonly known as: TOPROL-XL  Take 1 tablet (100 mg total) by mouth once daily. With a meal     olmesartan 40 MG tablet  Commonly known as: BENICAR  Take 1 tablet (40 mg total) by mouth once daily.     raloxifene 60 mg tablet  Commonly known as: EVISTA     sumatriptan 100 MG tablet  Commonly known as: IMITREX  Take 1 tablet (100 mg total) by mouth every 2 (two) hours as needed for Migraine.     vitamin D 1000 units Tab  Commonly known as: VITAMIN D3  Take 1 tablet (1,000 Units total) by mouth once daily.              SOCIAL HISTORY:     Social History     Socioeconomic History    Marital status:    Occupational History    Occupation: [employer]     Employer: [employer]    Tobacco Use    Smoking status: Never    Smokeless tobacco: Never   Substance and Sexual Activity    Alcohol use: No    Drug use: No    Sexual activity: Not Currently     Partners: Male   Other Topics Concern    Are you pregnant or think you may be? No    Breast-feeding No   Social History Narrative    ** Merged History Encounter **          Social Determinants of Health     Financial Resource Strain: Low Risk  (8/18/2022)    Overall Financial Resource Strain (CARDIA)     Difficulty of Paying Living Expenses: Not hard at all   Food Insecurity: No Food Insecurity (8/18/2022)    Hunger Vital Sign     Worried About Running Out of Food in the Last Year: Never true     Ran Out of Food in the Last Year: Never true   Transportation Needs: No Transportation Needs (8/18/2022)    PRAPARE - Transportation     Lack of Transportation (Medical): No     Lack of Transportation (Non-Medical): No   Physical Activity: Unknown (8/18/2022)    Exercise Vital Sign     Days of Exercise per Week: 0 days   Stress: No Stress Concern Present (8/18/2022)    [place] of Occupational Health - Occupational Stress Questionnaire     Feeling of Stress : Not at all   Social Connections: Unknown (8/18/2022)    Social Connection and Isolation Panel [NHANES]      "Frequency of Communication with Friends and Family: More than three times a week     Frequency of Social Gatherings with Friends and Family: Once a week     Active Member of Clubs or Organizations: No     Attends Club or Organization Meetings: Never     Marital Status:    Housing Stability: Unknown (8/18/2022)    Housing Stability Vital Sign     Unable to Pay for Housing in the Last Year: No     Unstable Housing in the Last Year: No       FAMILY HISTORY:     Family History   Problem Relation Age of Onset    Arthritis Mother     Alzheimer's disease Mother     Migraines Neg Hx     Melanoma Neg Hx     Psoriasis Neg Hx     Lupus Neg Hx     Eczema Neg Hx     Acne Neg Hx     Colon cancer Neg Hx     Stomach cancer Neg Hx     Esophageal cancer Neg Hx     Liver disease Neg Hx     Crohn's disease Neg Hx     Ulcerative colitis Neg Hx     Celiac disease Neg Hx        REVIEW OF SYSTEMS:   Review of Systems   Constitutional: Negative.   HENT: Negative.     Eyes: Negative.    Endocrine: Negative.    Hematologic/Lymphatic: Negative.    Skin: Negative.    Musculoskeletal: Negative.    Gastrointestinal: Negative.    Genitourinary: Negative.    Neurological: Negative.    Psychiatric/Behavioral: Negative.     Allergic/Immunologic: Negative.        A 10 point review of systems was performed and all the pertinent positives have been mentioned. Rest of review of systems was negative.        PHYSICAL EXAM:     Vitals:    08/11/23 1029   BP: 130/60   Pulse: 81   Resp: 18    Body mass index is 30.23 kg/m².  Weight: 86.8 kg (191 lb 7.5 oz)   Height: 5' 6.73" (169.5 cm)     Physical Exam  Constitutional:       Appearance: Normal appearance. She is well-developed.   HENT:      Head: Normocephalic.   Eyes:      Pupils: Pupils are equal, round, and reactive to light.   Cardiovascular:      Rate and Rhythm: Normal rate and regular rhythm.   Pulmonary:      Effort: Pulmonary effort is normal.      Breath sounds: Normal breath sounds. "   Abdominal:      General: Bowel sounds are normal.      Palpations: Abdomen is soft.      Tenderness: There is no abdominal tenderness.   Musculoskeletal:         General: Normal range of motion.      Cervical back: Normal range of motion and neck supple.   Skin:     General: Skin is warm.   Neurological:      Mental Status: She is alert and oriented to person, place, and time.           DATA:     Laboratory:  CBC:  Recent Labs   Lab 07/21/21  0818 01/19/22  0902 07/29/22  0845   WBC 4.94 5.22 5.25   Hemoglobin 13.3 13.3 13.3   Hematocrit 41.5 42.6 40.7   Platelets 296 273 293         CHEMISTRIES:  Recent Labs   Lab 07/21/21  0818 01/19/22  0902 07/29/22  0845 06/27/23  0934   Glucose 91 101 93 98   Sodium 142 137 141 138   Potassium 3.8 4.0 3.9 3.8   BUN 13 15 12 14   Creatinine 0.7 0.7 0.6 0.7   eGFR if  >60 >60 >60  --    eGFR if non African American >60 >60 >60  --    Calcium 8.7 8.7 8.7 9.2         CARDIAC BIOMARKERS:        COAGS:        LIPIDS/LFTS:  Recent Labs   Lab 01/19/22  0902 07/29/22  0845 01/17/23  1020 06/27/23  0934 07/19/23  0914   Cholesterol 222 H 194  --  224 H  --    Triglycerides 122 105  --  94  --    HDL 75 67  --  81 H  --    LDL Cholesterol 122.6 106.0  --  124.2  --    Non-HDL Cholesterol 147 127  --  143  --    AST 25 34 24 21 20   ALT 42 59 H 42 29 24         Hemoglobin A1C   Date Value Ref Range Status   07/21/2021 5.2 4.0 - 5.6 % Final     Comment:     ADA Screening Guidelines:  5.7-6.4%  Consistent with prediabetes  >or=6.5%  Consistent with diabetes    High levels of fetal hemoglobin interfere with the HbA1C  assay. Heterozygous hemoglobin variants (HbS, HgC, etc)do  not significantly interfere with this assay.   However, presence of multiple variants may affect accuracy.     07/28/2020 5.4 4.0 - 5.6 % Final     Comment:     ADA Screening Guidelines:  5.7-6.4%  Consistent with prediabetes  >or=6.5%  Consistent with diabetes  High levels of fetal hemoglobin  "interfere with the HbA1C  assay. Heterozygous hemoglobin variants (HbS, HgC, etc)do  not significantly interfere with this assay.   However, presence of multiple variants may affect accuracy.     07/31/2018 5.1 4.0 - 5.6 % Final     Comment:     ADA Screening Guidelines:  5.7-6.4%  Consistent with prediabetes  >or=6.5%  Consistent with diabetes  High levels of fetal hemoglobin interfere with the HbA1C  assay. Heterozygous hemoglobin variants (HbS, HgC, etc)do  not significantly interfere with this assay.   However, presence of multiple variants may affect accuracy.         TSH  Recent Labs   Lab 06/27/23  0934   TSH 0.671         The 10-year ASCVD risk score (Valente VILLARREAL, et al., 2019) is: 6.9%    Values used to calculate the score:      Age: 67 years      Sex: Female      Is Non- : No      Diabetic: No      Tobacco smoker: No      Systolic Blood Pressure: 130 mmHg      Is BP treated: Yes      HDL Cholesterol: 81 mg/dL      Total Cholesterol: 224 mg/dL       BNP    No results found for: "BNP"          ASSESSMENT AND PLAN     Patient Active Problem List   Diagnosis    Other migraine, not intractable, without status migrainosus    Dyslipidemia    Essential hypertension    Palpitations    Obstructive sleep apnea    Fatty liver    Decreased strength of upper extremity    Overweight with body mass index (BMI) of 29 to 29.9 in adult    Early hepatic fibrosis    Osteopenia of lumbar spine       Palpitations, dyspnea on exertion, shortness of breath and occasional chest tightness.  Further evaluation with the help of a stress test, echo and event monitor  Was done.  Stress test did not show any significant ischemia.  Echo showed normal left ventricle systolic function.  Event monitor did not show any significant arrhythmia.  Apart from some brief episodes of atrial tachycardia.  Patient already on metoprolol.  Continue metoprolol     Follow-up in 6 months        Thank you very much for involving me in " the care of your patient.  Please do not hesitate to contact me if there are any questions.      Jess Grubbs MD, FAC, [facility]  Interventional Cardiologist, [facility].           This note was dictated with the help of speech recognition software.  There might be un-intended errors and/or substitutions.

## 2023-08-16 ENCOUNTER — OFFICE VISIT (OUTPATIENT)
Dept: FAMILY MEDICINE | Facility: CLINIC | Age: 68
End: 2023-08-16
Payer: COMMERCIAL

## 2023-08-16 VITALS
DIASTOLIC BLOOD PRESSURE: 84 MMHG | WEIGHT: 194.25 LBS | HEIGHT: 67 IN | HEART RATE: 99 BPM | TEMPERATURE: 99 F | BODY MASS INDEX: 30.49 KG/M2 | SYSTOLIC BLOOD PRESSURE: 125 MMHG | OXYGEN SATURATION: 96 %

## 2023-08-16 DIAGNOSIS — R00.2 PALPITATIONS: ICD-10-CM

## 2023-08-16 DIAGNOSIS — I10 ESSENTIAL HYPERTENSION: Primary | Chronic | ICD-10-CM

## 2023-08-16 DIAGNOSIS — E66.09 CLASS 1 OBESITY DUE TO EXCESS CALORIES WITH SERIOUS COMORBIDITY AND BODY MASS INDEX (BMI) OF 30.0 TO 30.9 IN ADULT: Chronic | ICD-10-CM

## 2023-08-16 DIAGNOSIS — E78.5 DYSLIPIDEMIA: Chronic | ICD-10-CM

## 2023-08-16 DIAGNOSIS — R22.9 SUBCUTANEOUS MASS: ICD-10-CM

## 2023-08-16 PROCEDURE — 4010F PR ACE/ARB THEARPY RXD/TAKEN: ICD-10-PCS | Mod: CPTII,S$GLB,, | Performed by: FAMILY MEDICINE

## 2023-08-16 PROCEDURE — 3288F PR FALLS RISK ASSESSMENT DOCUMENTED: ICD-10-PCS | Mod: CPTII,S$GLB,, | Performed by: FAMILY MEDICINE

## 2023-08-16 PROCEDURE — 1159F MED LIST DOCD IN RCRD: CPT | Mod: CPTII,S$GLB,, | Performed by: FAMILY MEDICINE

## 2023-08-16 PROCEDURE — 3074F SYST BP LT 130 MM HG: CPT | Mod: CPTII,S$GLB,, | Performed by: FAMILY MEDICINE

## 2023-08-16 PROCEDURE — 99214 PR OFFICE/OUTPT VISIT, EST, LEVL IV, 30-39 MIN: ICD-10-PCS | Mod: S$GLB,,, | Performed by: FAMILY MEDICINE

## 2023-08-16 PROCEDURE — 1101F PR PT FALLS ASSESS DOC 0-1 FALLS W/OUT INJ PAST YR: ICD-10-PCS | Mod: CPTII,S$GLB,, | Performed by: FAMILY MEDICINE

## 2023-08-16 PROCEDURE — 1101F PT FALLS ASSESS-DOCD LE1/YR: CPT | Mod: CPTII,S$GLB,, | Performed by: FAMILY MEDICINE

## 2023-08-16 PROCEDURE — 3288F FALL RISK ASSESSMENT DOCD: CPT | Mod: CPTII,S$GLB,, | Performed by: FAMILY MEDICINE

## 2023-08-16 PROCEDURE — 1126F PR PAIN SEVERITY QUANTIFIED, NO PAIN PRESENT: ICD-10-PCS | Mod: CPTII,S$GLB,, | Performed by: FAMILY MEDICINE

## 2023-08-16 PROCEDURE — 99214 OFFICE O/P EST MOD 30 MIN: CPT | Mod: S$GLB,,, | Performed by: FAMILY MEDICINE

## 2023-08-16 PROCEDURE — 99999 PR PBB SHADOW E&M-EST. PATIENT-LVL V: ICD-10-PCS | Mod: PBBFAC,,, | Performed by: FAMILY MEDICINE

## 2023-08-16 PROCEDURE — 3079F PR MOST RECENT DIASTOLIC BLOOD PRESSURE 80-89 MM HG: ICD-10-PCS | Mod: CPTII,S$GLB,, | Performed by: FAMILY MEDICINE

## 2023-08-16 PROCEDURE — 4010F ACE/ARB THERAPY RXD/TAKEN: CPT | Mod: CPTII,S$GLB,, | Performed by: FAMILY MEDICINE

## 2023-08-16 PROCEDURE — 1160F RVW MEDS BY RX/DR IN RCRD: CPT | Mod: CPTII,S$GLB,, | Performed by: FAMILY MEDICINE

## 2023-08-16 PROCEDURE — 1126F AMNT PAIN NOTED NONE PRSNT: CPT | Mod: CPTII,S$GLB,, | Performed by: FAMILY MEDICINE

## 2023-08-16 PROCEDURE — 3008F BODY MASS INDEX DOCD: CPT | Mod: CPTII,S$GLB,, | Performed by: FAMILY MEDICINE

## 2023-08-16 PROCEDURE — 3008F PR BODY MASS INDEX (BMI) DOCUMENTED: ICD-10-PCS | Mod: CPTII,S$GLB,, | Performed by: FAMILY MEDICINE

## 2023-08-16 PROCEDURE — 3079F DIAST BP 80-89 MM HG: CPT | Mod: CPTII,S$GLB,, | Performed by: FAMILY MEDICINE

## 2023-08-16 PROCEDURE — 99999 PR PBB SHADOW E&M-EST. PATIENT-LVL V: CPT | Mod: PBBFAC,,, | Performed by: FAMILY MEDICINE

## 2023-08-16 PROCEDURE — 1159F PR MEDICATION LIST DOCUMENTED IN MEDICAL RECORD: ICD-10-PCS | Mod: CPTII,S$GLB,, | Performed by: FAMILY MEDICINE

## 2023-08-16 PROCEDURE — 3074F PR MOST RECENT SYSTOLIC BLOOD PRESSURE < 130 MM HG: ICD-10-PCS | Mod: CPTII,S$GLB,, | Performed by: FAMILY MEDICINE

## 2023-08-16 PROCEDURE — 1160F PR REVIEW ALL MEDS BY PRESCRIBER/CLIN PHARMACIST DOCUMENTED: ICD-10-PCS | Mod: CPTII,S$GLB,, | Performed by: FAMILY MEDICINE

## 2023-08-16 RX ORDER — OLMESARTAN MEDOXOMIL 40 MG/1
40 TABLET ORAL DAILY
Qty: 90 TABLET | Refills: 0 | Status: SHIPPED | OUTPATIENT
Start: 2023-08-16 | End: 2023-12-05 | Stop reason: SDUPTHER

## 2023-08-16 RX ORDER — METOPROLOL SUCCINATE 100 MG/1
100 TABLET, EXTENDED RELEASE ORAL DAILY
Qty: 90 TABLET | Refills: 0 | Status: SHIPPED | OUTPATIENT
Start: 2023-08-16

## 2023-08-16 NOTE — PATIENT INSTRUCTIONS
Pregúntele al proveedor del sueño si se podría usar un dispositivo de avance mandibular en saha shannan para la apnea del sueño, ya que está teniendo dificultades con el CPAP.      English  Ask the sleep provider if a Mandibular Advancement Device could be used in your case for the sleep apnea as you are having a hard time with the CPAP.

## 2023-08-16 NOTE — PROGRESS NOTES
Patient Name: Rachel Conroy    : 1955  MRN: [id number]      Subjective:     Patient ID: Rachel is a 67 y.o. female    Chief Complaint:  Hypertension and Hyperlipidemia    Hypertension  This is a chronic problem. The problem is controlled. Pertinent negatives include no anxiety, blurred vision, chest pain, headaches, malaise/fatigue, neck pain, orthopnea, palpitations, peripheral edema, PND, shortness of breath or sweats. (Frequent urination after taking olmesartan-hydrochlorthiazide) Risk factors for coronary artery disease include post-menopausal state and dyslipidemia. Past treatments include diuretics, angiotensin blockers and beta blockers. The current treatment provides significant improvement. There are no compliance problems.  There is no history of angina, kidney disease or heart failure. There is no history of chronic renal disease.   Hyperlipidemia  This is a chronic problem. The current episode started more than 1 year ago. She has no history of chronic renal disease, diabetes, hypothyroidism or nephrotic syndrome. Factors aggravating her hyperlipidemia include thiazides. Pertinent negatives include no chest pain, focal sensory loss, myalgias or shortness of breath. Current antihyperlipidemic treatment includes statins. The current treatment provides significant improvement of lipids. There are no compliance problems.  Risk factors for coronary artery disease include dyslipidemia, post-menopausal, obesity and hypertension.   Cyst  This is a new problem. Progression since onset: states she feels it is a bit larger. Pertinent negatives include no chest pain, headaches, myalgias or neck pain.        Review of Systems   Constitutional:  Negative for malaise/fatigue.   Eyes:  Negative for blurred vision.   Respiratory:  Negative for shortness of breath.    Cardiovascular:  Negative for chest pain, palpitations, orthopnea and PND.   Musculoskeletal:  Negative for myalgias and neck pain.  "  Neurological:  Negative for headaches.        Objective:   /84 (BP Location: Left arm, Patient Position: Sitting)   Pulse 99   Temp 98.6 °F (37 °C) (Oral)   Ht 5' 6.73" (1.695 m)   Wt 88.1 kg (194 lb 3.6 oz)   SpO2 96%   BMI 30.67 kg/m²     Physical Exam  Vitals reviewed.   Constitutional:       General: She is not in acute distress.     Appearance: She is well-developed. She is obese.   HENT:      Head: Normocephalic and atraumatic.      Right Ear: External ear normal.      Left Ear: External ear normal.      Nose: Nose normal.      Mouth/Throat:      Pharynx: No oropharyngeal exudate.   Eyes:      General:         Right eye: No discharge.         Left eye: No discharge.      Conjunctiva/sclera: Conjunctivae normal.      Pupils: Pupils are equal, round, and reactive to light.   Neck:      Trachea: No tracheal deviation.   Cardiovascular:      Rate and Rhythm: Normal rate and regular rhythm.   Pulmonary:      Effort: Pulmonary effort is normal.      Breath sounds: Normal breath sounds. No wheezing or rales.   Abdominal:      General: Bowel sounds are normal.      Palpations: Abdomen is soft. Abdomen is not rigid. There is no mass.      Tenderness: There is no abdominal tenderness. There is no guarding.   Musculoskeletal:      Cervical back: Normal range of motion and neck supple.   Lymphadenopathy:      Cervical: No cervical adenopathy.   Neurological:      Mental Status: She is alert and oriented to person, place, and time.      Gait: Gait normal.        Lab Visit on 07/19/2023   Component Date Value Ref Range Status    Total Protein 07/19/2023 6.9  6.0 - 8.4 g/dL Final    Albumin 07/19/2023 3.9  3.5 - 5.2 g/dL Final    Total Bilirubin 07/19/2023 0.5  0.1 - 1.0 mg/dL Final    Comment: For infants and newborns, interpretation of results should be based  on gestational age, weight and in agreement with clinical  observations.    Premature Infant recommended reference ranges:  Up to 24 " hours.............<8.0 mg/dL  Up to 48 hours............<12.0 mg/dL  3-5 days..................<15.0 mg/dL  6-29 days.................<15.0 mg/dL      Bilirubin, Direct 07/19/2023 0.2  0.1 - 0.3 mg/dL Final    AST 07/19/2023 20  10 - 40 U/L Final    ALT 07/19/2023 24  10 - 44 U/L Final    Alkaline Phosphatase 07/19/2023 97  55 - 135 U/L Final       The 10-year ASCVD risk score (Valente VILLARREAL, et al., 2019) is: 6.4%    Values used to calculate the score:      Age: 67 years      Sex: Female      Is Non- : No      Diabetic: No      Tobacco smoker: No      Systolic Blood Pressure: 125 mmHg      Is BP treated: Yes      HDL Cholesterol: 81 mg/dL      Total Cholesterol: 224 mg/dL   Assessment        ICD-10-CM ICD-9-CM   1. Essential hypertension  I10 401.9   2. Class 1 obesity due to excess calories with serious comorbidity and body mass index (BMI) of 30.0 to 30.9 in adult  E66.09 278.00    Z68.30 V85.30   3. Dyslipidemia  E78.5 272.4   4. Palpitations  R00.2 785.1   5. Subcutaneous mass  R22.9 782.2         Plan:     1. Essential hypertension  Overview:  BP Readings from Last 3 Encounters:   07/26/23 129/79   07/11/23 (!) 129/56   05/29/23 110/62       Orders:  -     Comprehensive Metabolic Panel; Future; Expected date: 02/16/2024  -     Lipid Panel; Future; Expected date: 02/16/2024  -     metoprolol succinate (TOPROL-XL) 100 MG 24 hr tablet; Take 1 tablet (100 mg total) by mouth once daily. With a meal  Dispense: 90 tablet; Refill: 0  -     olmesartan (BENICAR) 40 MG tablet; Take 1 tablet (40 mg total) by mouth once daily.  Dispense: 90 tablet; Refill: 0    2. Class 1 obesity due to excess calories with serious comorbidity and body mass index (BMI) of 30.0 to 30.9 in adult  Overview:  Wt Readings from Last 3 Encounters:   07/26/23 1342 85.7 kg (189 lb)   05/29/23 1437 87 kg (191 lb 12.8 oz)   05/23/23 1108 83 kg (182 lb 15.7 oz)        Estimated body mass index is 29.84 kg/m² as calculated from the  "following:    Height as of this encounter: 5' 6.73" (1.695 m).    Weight as of this encounter: 85.7 kg (189 lb).  Ideal body weight: 61 kg (134 lb 6.9 oz)     Orders:  -     Ambulatory referral/consult to Bariatric Medicine; Future; Expected date: 08/23/2023    3. Dyslipidemia  Overview:  Lab Results   Component Value Date    CHOL 224 (H) 06/27/2023    CHOL 194 07/29/2022    CHOL 222 (H) 01/19/2022     Lab Results   Component Value Date    HDL 81 (H) 06/27/2023    HDL 67 07/29/2022    HDL 75 01/19/2022     Lab Results   Component Value Date    LDLCALC 124.2 06/27/2023    LDLCALC 106.0 07/29/2022    LDLCALC 122.6 01/19/2022     Lab Results   Component Value Date    TRIG 94 06/27/2023    TRIG 105 07/29/2022    TRIG 122 01/19/2022     Lab Results   Component Value Date    CHOLHDL 36.2 06/27/2023    CHOLHDL 34.5 07/29/2022    CHOLHDL 33.8 01/19/2022        The 10-year ASCVD risk score (Valente VILLARREAL, et al., 2019) is: 6.4%    Values used to calculate the score:      Age: 67 years      Sex: Female      Is Non- : No      Diabetic: No      Tobacco smoker: No      Systolic Blood Pressure: 125 mmHg      Is BP treated: Yes      HDL Cholesterol: 81 mg/dL      Total Cholesterol: 224 mg/dL      Orders:  -     Comprehensive Metabolic Panel; Future; Expected date: 02/16/2024  -     Lipid Panel; Future; Expected date: 02/16/2024    4. Palpitations  Assessment & Plan:  Controlled with Toprol XL    Orders:  -     metoprolol succinate (TOPROL-XL) 100 MG 24 hr tablet; Take 1 tablet (100 mg total) by mouth once daily. With a meal  Dispense: 90 tablet; Refill: 0    5. Subcutaneous mass  -     Ambulatory referral/consult to General Surgery; Future; Expected date: 08/23/2023  Requested referral for surgical evaluation placed.         -Maxwell Gonzalez Jr., MD, AAHIVS      This office note has been dictated.  This dictation has been generated using M-Modal Fluency Direct dictation; some phonetic errors may occur. "         Patient Instructions   Pregúntele al proveedor del sueño si se podría usar un dispositivo de avance mandibular en saha shannan para la apnea del sueño, ya que está teniendo dificultades con el CPAP.      English  Ask the sleep provider if a Mandibular Advancement Device could be used in your case for the sleep apnea as you are having a hard time with the CPAP.    Future Appointments   Date Time Provider Department Center   8/29/2023  9:40 AM Noemi Power, NP [facility] SLEEP [facility]   8/31/2023 12:30 PM Ronna Carranza MD [facility] GASTRO [place]   9/18/2023  3:00 PM BARIATRIC SURG FIN [facility], [facility] BAR[facility]AT [place] [facility]   2/9/2024  7:30 AM LAB, [facility] DRAW STATION [facility] LAB [facility]   2/16/2024  2:40 PM Maxwell Gonzalez Jr., MD [facility]   7/19/2024  9:40 AM LAB, [facility] [place] [facility]   7/19/2024  9:45 AM [facility] HEPATOLOGY, [facility] HEPAT [place] [facility]   7/19/2024 10:00 AM Jeanie Paredes NP [facility] HEPAT [place]

## 2023-08-17 ENCOUNTER — TELEPHONE (OUTPATIENT)
Dept: BARIATRICS | Facility: CLINIC | Age: 68
End: 2023-08-17
Payer: COMMERCIAL

## 2023-08-18 ENCOUNTER — TELEPHONE (OUTPATIENT)
Dept: BARIATRICS | Facility: CLINIC | Age: 68
End: 2023-08-18
Payer: COMMERCIAL

## 2023-08-29 ENCOUNTER — PATIENT MESSAGE (OUTPATIENT)
Dept: SLEEP MEDICINE | Facility: CLINIC | Age: 68
End: 2023-08-29

## 2023-08-29 ENCOUNTER — OFFICE VISIT (OUTPATIENT)
Dept: SLEEP MEDICINE | Facility: CLINIC | Age: 68
End: 2023-08-29
Payer: COMMERCIAL

## 2023-08-29 VITALS
HEIGHT: 66 IN | HEART RATE: 65 BPM | WEIGHT: 194.25 LBS | DIASTOLIC BLOOD PRESSURE: 73 MMHG | SYSTOLIC BLOOD PRESSURE: 123 MMHG | BODY MASS INDEX: 31.22 KG/M2

## 2023-08-29 DIAGNOSIS — I10 ESSENTIAL HYPERTENSION: Primary | Chronic | ICD-10-CM

## 2023-08-29 DIAGNOSIS — G47.33 OBSTRUCTIVE SLEEP APNEA: Chronic | ICD-10-CM

## 2023-08-29 DIAGNOSIS — G43.009 MIGRAINE WITHOUT AURA AND WITHOUT STATUS MIGRAINOSUS, NOT INTRACTABLE: ICD-10-CM

## 2023-08-29 PROCEDURE — 99214 PR OFFICE/OUTPT VISIT, EST, LEVL IV, 30-39 MIN: ICD-10-PCS | Mod: S$GLB,,, | Performed by: NURSE PRACTITIONER

## 2023-08-29 PROCEDURE — 3078F PR MOST RECENT DIASTOLIC BLOOD PRESSURE < 80 MM HG: ICD-10-PCS | Mod: CPTII,S$GLB,, | Performed by: NURSE PRACTITIONER

## 2023-08-29 PROCEDURE — 1126F PR PAIN SEVERITY QUANTIFIED, NO PAIN PRESENT: ICD-10-PCS | Mod: CPTII,S$GLB,, | Performed by: NURSE PRACTITIONER

## 2023-08-29 PROCEDURE — 3074F SYST BP LT 130 MM HG: CPT | Mod: CPTII,S$GLB,, | Performed by: NURSE PRACTITIONER

## 2023-08-29 PROCEDURE — 99999 PR PBB SHADOW E&M-EST. PATIENT-LVL III: ICD-10-PCS | Mod: PBBFAC,,, | Performed by: NURSE PRACTITIONER

## 2023-08-29 PROCEDURE — 3074F PR MOST RECENT SYSTOLIC BLOOD PRESSURE < 130 MM HG: ICD-10-PCS | Mod: CPTII,S$GLB,, | Performed by: NURSE PRACTITIONER

## 2023-08-29 PROCEDURE — 99214 OFFICE O/P EST MOD 30 MIN: CPT | Mod: S$GLB,,, | Performed by: NURSE PRACTITIONER

## 2023-08-29 PROCEDURE — 1159F PR MEDICATION LIST DOCUMENTED IN MEDICAL RECORD: ICD-10-PCS | Mod: CPTII,S$GLB,, | Performed by: NURSE PRACTITIONER

## 2023-08-29 PROCEDURE — 1126F AMNT PAIN NOTED NONE PRSNT: CPT | Mod: CPTII,S$GLB,, | Performed by: NURSE PRACTITIONER

## 2023-08-29 PROCEDURE — 3078F DIAST BP <80 MM HG: CPT | Mod: CPTII,S$GLB,, | Performed by: NURSE PRACTITIONER

## 2023-08-29 PROCEDURE — 3288F FALL RISK ASSESSMENT DOCD: CPT | Mod: CPTII,S$GLB,, | Performed by: NURSE PRACTITIONER

## 2023-08-29 PROCEDURE — 99999 PR PBB SHADOW E&M-EST. PATIENT-LVL III: CPT | Mod: PBBFAC,,, | Performed by: NURSE PRACTITIONER

## 2023-08-29 PROCEDURE — 1101F PT FALLS ASSESS-DOCD LE1/YR: CPT | Mod: CPTII,S$GLB,, | Performed by: NURSE PRACTITIONER

## 2023-08-29 PROCEDURE — 3288F PR FALLS RISK ASSESSMENT DOCUMENTED: ICD-10-PCS | Mod: CPTII,S$GLB,, | Performed by: NURSE PRACTITIONER

## 2023-08-29 PROCEDURE — 3008F BODY MASS INDEX DOCD: CPT | Mod: CPTII,S$GLB,, | Performed by: NURSE PRACTITIONER

## 2023-08-29 PROCEDURE — 1101F PR PT FALLS ASSESS DOC 0-1 FALLS W/OUT INJ PAST YR: ICD-10-PCS | Mod: CPTII,S$GLB,, | Performed by: NURSE PRACTITIONER

## 2023-08-29 PROCEDURE — 3008F PR BODY MASS INDEX (BMI) DOCUMENTED: ICD-10-PCS | Mod: CPTII,S$GLB,, | Performed by: NURSE PRACTITIONER

## 2023-08-29 PROCEDURE — 4010F PR ACE/ARB THEARPY RXD/TAKEN: ICD-10-PCS | Mod: CPTII,S$GLB,, | Performed by: NURSE PRACTITIONER

## 2023-08-29 PROCEDURE — 4010F ACE/ARB THERAPY RXD/TAKEN: CPT | Mod: CPTII,S$GLB,, | Performed by: NURSE PRACTITIONER

## 2023-08-29 PROCEDURE — 1159F MED LIST DOCD IN RCRD: CPT | Mod: CPTII,S$GLB,, | Performed by: NURSE PRACTITIONER

## 2023-08-29 RX ORDER — SUMATRIPTAN SUCCINATE 100 MG/1
100 TABLET ORAL
Qty: 9 TABLET | Refills: 5 | Status: SHIPPED | OUTPATIENT
Start: 2023-08-29 | End: 2023-09-28

## 2023-08-29 RX ORDER — TOPIRAMATE SPINKLE 15 MG/1
15 CAPSULE ORAL SEE ADMIN INSTRUCTIONS
Qty: 60 CAPSULE | Refills: 2 | Status: SHIPPED | OUTPATIENT
Start: 2023-08-29 | End: 2023-09-28

## 2023-08-29 NOTE — PROGRESS NOTES
Last seen 4/2021  CC:NELL. Son present, interpreting today.     She uses her apap machine here and there but still hard to use/keep on due to noise. Hard to sleep with it. ESS=8. When was on topamax her headaches were less frequent. Imitrex prn helped but run out.Was using Eson mask. Machine recalled  Headaches similar nature to 2015    Previous 0davg 2-3h/night. AHI 4.9-7.7, 100% mask fit. 90% tile 9-10cm    HST: Aug '15: AHI 9 (RDI 26)  Findings on this study suggest that the degree of sleep disordered breathing is in the moderate severity range, when RDI is measured. Also, there was noted of frequent episodes of what appeared to be prolonged exhalation, lasting 10-15 seconds, during periods of the night. This could be a manifestation of central apnea, but could not be further investigated by this testing modality  CPap titration 2/24/21: Effective control of sleep disordered breathing was seen during supine REM sleep on 9 cm H2O       IMPRESSION:   1. NELL mild-mod, apap limited adherence due to  Mask removal/headache factors affecting use    Medical co-morbidities: HTN, migraines, fibromyalgia    PLAN:   Showed how to register machine online/recall  REFER to ESVIN CINTRON, plan HST using OA once she notes benefit  Topamax 15mg qhs 1-2 wks then begin 30mg qhs thereafter  Resume prn imitrex 100mg 1/2-1 tab +- NSAID  Rtc re-eval 3mos

## 2023-08-30 NOTE — PROGRESS NOTES
[facility] Follow-UP Note    Reason for Follow-Up:  abdominal pain     PCP:   Maxwell Gonzalez Jr.         HPI:  This is a 68 y.o. female last seen in GI clinic on 5/2023 for abdominal pain and bloating. She was seen in GI clinic 4/2022 for chronic diarrhea and GERD. PMHx of HTN, HLD, FM, NAFLD, NELL. Previous work up diagnosed her with IBS-D. She tried and failed viberzi in the past. Was using bentyl PRN. Also noted to have mobic on medication list for FM and takes this infrequently. Most recent CBC and Cmp from 2022 showed no anemia. Likely elevated ALT to 59. Follow up HFT in 1/2023 was normal. US showed fatty liver. Last cscope in 2017 showed a submucosal nodule in AO. She had subsequent EUS in 2018 which showed cystic subepithelial lesion in AO without solid component. Otherwise, the cscope in 2017 showed vascular nodule in transverse colon and hemorrhoids. Repeat in 10 years. 2011 EGD showed hiatal hernia and gastritis. She has history of difficult airway.      After 4/2022, we started elavil nightly. She thinks she took this for a while but does not seem completely sure of his. No side effects noted. She is using Levsin PRN. For reflux, we discussed EGD vs conservative approach initially with HP stool testing and change in medications due to her history of difficult airway. She elected for a conservative approach. HP test was then negative. She was treated with PPI BID for 2 months. This did help her reflux symptoms but not she is again having more post prandial discomfort as well as bloating and belching. No Fh of CRC, gastric cancer, celiac or IBD. Beans are a trigger for symptoms. Has also been trying Fdgard without much help. No dysphagia, N/V, weight and appetite are stable. No blood in stool or melena. Has urge to go to bathroom after meals consistently. No blood in stool.      After last visit, she had an EGD which was endoscopically normal. Biopsies from stomach and esophagus  "showed mild reflux inflammation but negative for HP or EoE. She is on PPI daily already.     Interval History:  Since EGD, reflux relatively controlled with PPI daily. Her main concern today is diarrhea soon after eating. Stools are loose or mushy and there is abdominal cramping. No blood in stool. She felt the elavil was too sedating and stopped using. Does not remember using levsin or bentyl despite levsin being documented in chart.  used for discussion. Not as much issue with reflux currently.     Objective Findings:    Vital Signs:  /74   Pulse 64   Ht 5' 7" (1.702 m)   Wt 87.5 kg (192 lb 14.4 oz)   BMI 30.21 kg/m²   Body mass index is 30.21 kg/m².    Physical Exam:  General Appearance: Well appearing in no acute distress  Head:   Normocephalic, without obvious abnormality  Eyes:    No scleral icterus    Lungs: CTA bilaterally in anterior and posterior fields   Heart:  Regular rate and rhythm, no murmurs heard  Abdomen: Soft, non tender, non distended with positive bowel sounds in all four quadrants.     Imaging:     US 2022      Impression:     Mildly increased hepatic echogenicity compatible with reported steatosis.  No focal suspicious lesion.     Endoscopy:      EGD 2023   Findings:        The examined esophagus was normal. Biopsies were obtained from the        proximal and distal esophagus with cold forceps for histology.        The entire examined stomach was normal. Biopsies were taken with a        cold forceps for Helicobacter pylori testing.        The examined duodenum was normal.      2018 Lower EUS   Impression:           - An oval intramural (subepithelial) lesion was                         found in the appendiceal orifice. The lesion was                         anechoic and appear to connect with the appendix.                         The lesion measured approximately 14.4 mm by 9.3                         mm. This lesion appear to be cystic without any                        "  solid component.      Assessment:     Functional Dyspepsia    IBS-D      Patient at last visit with worsening epigastric abdominal pain and belching despite PPI BID. Negative HP stool test 4/2022. EGD was done which was normal including negative HP biopsies. Likely functional dyspepsia. Change elavil to nortriptyline for better side effect profile. Continue once daily PPI and pepcid for now. Continue to use CPAP. Also refer to GI nutrition. Bentyl PRN     Recommendations:     - Change to nortriptyline for functional GI issues including dyspepsia and IBS-D. We had tried elavil in past but found it too sedating   - Bentyl PRN   - Low FODMAP diet, lactaid with dairy intake   - GI nutrition appt for reflux and low FODMAP   - GERD precautions   - Cscope in 10 years for CRC screening 2027   - CPAP compliance     RTC 3 months     Order summary:  Orders Placed This Encounter    dicyclomine (BENTYL) 10 MG capsule    nortriptyline (PAMELOR) 10 MG capsule         Thank you so much for allowing me to participate in the care of Rachel Carranza MD  Gastroenterology   [facility]

## 2023-08-31 ENCOUNTER — OFFICE VISIT (OUTPATIENT)
Dept: GASTROENTEROLOGY | Facility: CLINIC | Age: 68
End: 2023-08-31
Payer: COMMERCIAL

## 2023-08-31 VITALS
HEIGHT: 67 IN | HEART RATE: 64 BPM | BODY MASS INDEX: 30.27 KG/M2 | SYSTOLIC BLOOD PRESSURE: 113 MMHG | WEIGHT: 192.88 LBS | DIASTOLIC BLOOD PRESSURE: 74 MMHG

## 2023-08-31 DIAGNOSIS — K30 FUNCTIONAL DYSPEPSIA: ICD-10-CM

## 2023-08-31 DIAGNOSIS — R10.9 ABDOMINAL CRAMPING: Primary | ICD-10-CM

## 2023-08-31 PROCEDURE — 99213 PR OFFICE/OUTPT VISIT, EST, LEVL III, 20-29 MIN: ICD-10-PCS | Mod: S$GLB,,, | Performed by: STUDENT IN AN ORGANIZED HEALTH CARE EDUCATION/TRAINING PROGRAM

## 2023-08-31 PROCEDURE — 3074F PR MOST RECENT SYSTOLIC BLOOD PRESSURE < 130 MM HG: ICD-10-PCS | Mod: CPTII,S$GLB,, | Performed by: STUDENT IN AN ORGANIZED HEALTH CARE EDUCATION/TRAINING PROGRAM

## 2023-08-31 PROCEDURE — 3078F DIAST BP <80 MM HG: CPT | Mod: CPTII,S$GLB,, | Performed by: STUDENT IN AN ORGANIZED HEALTH CARE EDUCATION/TRAINING PROGRAM

## 2023-08-31 PROCEDURE — 4010F PR ACE/ARB THEARPY RXD/TAKEN: ICD-10-PCS | Mod: CPTII,S$GLB,, | Performed by: STUDENT IN AN ORGANIZED HEALTH CARE EDUCATION/TRAINING PROGRAM

## 2023-08-31 PROCEDURE — 3008F PR BODY MASS INDEX (BMI) DOCUMENTED: ICD-10-PCS | Mod: CPTII,S$GLB,, | Performed by: STUDENT IN AN ORGANIZED HEALTH CARE EDUCATION/TRAINING PROGRAM

## 2023-08-31 PROCEDURE — 99999 PR PBB SHADOW E&M-EST. PATIENT-LVL III: ICD-10-PCS | Mod: PBBFAC,,, | Performed by: STUDENT IN AN ORGANIZED HEALTH CARE EDUCATION/TRAINING PROGRAM

## 2023-08-31 PROCEDURE — 99213 OFFICE O/P EST LOW 20 MIN: CPT | Mod: S$GLB,,, | Performed by: STUDENT IN AN ORGANIZED HEALTH CARE EDUCATION/TRAINING PROGRAM

## 2023-08-31 PROCEDURE — 99999 PR PBB SHADOW E&M-EST. PATIENT-LVL III: CPT | Mod: PBBFAC,,, | Performed by: STUDENT IN AN ORGANIZED HEALTH CARE EDUCATION/TRAINING PROGRAM

## 2023-08-31 PROCEDURE — 3078F PR MOST RECENT DIASTOLIC BLOOD PRESSURE < 80 MM HG: ICD-10-PCS | Mod: CPTII,S$GLB,, | Performed by: STUDENT IN AN ORGANIZED HEALTH CARE EDUCATION/TRAINING PROGRAM

## 2023-08-31 PROCEDURE — 1159F PR MEDICATION LIST DOCUMENTED IN MEDICAL RECORD: ICD-10-PCS | Mod: CPTII,S$GLB,, | Performed by: STUDENT IN AN ORGANIZED HEALTH CARE EDUCATION/TRAINING PROGRAM

## 2023-08-31 PROCEDURE — 3074F SYST BP LT 130 MM HG: CPT | Mod: CPTII,S$GLB,, | Performed by: STUDENT IN AN ORGANIZED HEALTH CARE EDUCATION/TRAINING PROGRAM

## 2023-08-31 PROCEDURE — 4010F ACE/ARB THERAPY RXD/TAKEN: CPT | Mod: CPTII,S$GLB,, | Performed by: STUDENT IN AN ORGANIZED HEALTH CARE EDUCATION/TRAINING PROGRAM

## 2023-08-31 PROCEDURE — 1126F AMNT PAIN NOTED NONE PRSNT: CPT | Mod: CPTII,S$GLB,, | Performed by: STUDENT IN AN ORGANIZED HEALTH CARE EDUCATION/TRAINING PROGRAM

## 2023-08-31 PROCEDURE — 3008F BODY MASS INDEX DOCD: CPT | Mod: CPTII,S$GLB,, | Performed by: STUDENT IN AN ORGANIZED HEALTH CARE EDUCATION/TRAINING PROGRAM

## 2023-08-31 PROCEDURE — 1159F MED LIST DOCD IN RCRD: CPT | Mod: CPTII,S$GLB,, | Performed by: STUDENT IN AN ORGANIZED HEALTH CARE EDUCATION/TRAINING PROGRAM

## 2023-08-31 PROCEDURE — 3288F FALL RISK ASSESSMENT DOCD: CPT | Mod: CPTII,S$GLB,, | Performed by: STUDENT IN AN ORGANIZED HEALTH CARE EDUCATION/TRAINING PROGRAM

## 2023-08-31 PROCEDURE — 1101F PT FALLS ASSESS-DOCD LE1/YR: CPT | Mod: CPTII,S$GLB,, | Performed by: STUDENT IN AN ORGANIZED HEALTH CARE EDUCATION/TRAINING PROGRAM

## 2023-08-31 PROCEDURE — 1101F PR PT FALLS ASSESS DOC 0-1 FALLS W/OUT INJ PAST YR: ICD-10-PCS | Mod: CPTII,S$GLB,, | Performed by: STUDENT IN AN ORGANIZED HEALTH CARE EDUCATION/TRAINING PROGRAM

## 2023-08-31 PROCEDURE — 3288F PR FALLS RISK ASSESSMENT DOCUMENTED: ICD-10-PCS | Mod: CPTII,S$GLB,, | Performed by: STUDENT IN AN ORGANIZED HEALTH CARE EDUCATION/TRAINING PROGRAM

## 2023-08-31 PROCEDURE — 1126F PR PAIN SEVERITY QUANTIFIED, NO PAIN PRESENT: ICD-10-PCS | Mod: CPTII,S$GLB,, | Performed by: STUDENT IN AN ORGANIZED HEALTH CARE EDUCATION/TRAINING PROGRAM

## 2023-08-31 RX ORDER — NORTRIPTYLINE HYDROCHLORIDE 10 MG/1
10 CAPSULE ORAL NIGHTLY
Qty: 30 CAPSULE | Refills: 11 | Status: SHIPPED | OUTPATIENT
Start: 2023-08-31 | End: 2024-08-30

## 2023-08-31 RX ORDER — DICYCLOMINE HYDROCHLORIDE 10 MG/1
10 CAPSULE ORAL
Qty: 120 CAPSULE | Refills: 0 | Status: SHIPPED | OUTPATIENT
Start: 2023-08-31 | End: 2023-09-30

## 2023-09-06 ENCOUNTER — CLINICAL SUPPORT (OUTPATIENT)
Dept: GASTROENTEROLOGY | Facility: CLINIC | Age: 68
End: 2023-09-06
Payer: COMMERCIAL

## 2023-09-06 DIAGNOSIS — K30 FUNCTIONAL DYSPEPSIA: ICD-10-CM

## 2023-09-06 DIAGNOSIS — K76.0 FATTY LIVER: Chronic | ICD-10-CM

## 2023-09-06 DIAGNOSIS — K21.9 GASTROESOPHAGEAL REFLUX DISEASE, UNSPECIFIED WHETHER ESOPHAGITIS PRESENT: ICD-10-CM

## 2023-09-06 DIAGNOSIS — R10.9 ABDOMINAL CRAMPING: Primary | ICD-10-CM

## 2023-09-06 DIAGNOSIS — K58.0 IRRITABLE BOWEL SYNDROME WITH DIARRHEA: ICD-10-CM

## 2023-09-06 NOTE — PROGRESS NOTES
Gastroentrology  Nutrition Consult   used for appt via [facility]  Referring Provider: Ronna Carranza MD  Reason for Nutrition Referral:  IBS- D   [facility]  # [id number] assisted with virtual visit . However, patient and her son could not hear the .  stated she had the volume as high as it could be set. Disconnection with  occurred 10 minutes into the call .For the remainder of the session patient's son ,Michael, assisted with translation.     Post prandial belching , diarrhea ( mushy stools)  Reflux - controlled with PPI and Pepcid   Consultation Time: 45 Minutes  Accompanied by Son. (Michael)   The patient location is: home  The chief complaint leading to consultation is: gas, bl;oating and diarrhea after meals    Visit type: audiovisual   Face to Face time with patient: 35 min  mintues 45  minutes of total time spent on the encounter, which includes face to face time and non-face to face time preparing to see the patient (eg, review of records), Obtaining and/or reviewing separately obtained history, Documenting clinical information in the electronic or other health record, Independently interpreting results (not separately reported) and communicating results to the patient/family/caregiver, or Care coordination (not separately reported).    Each patient to whom he or she provides medical services by telemedicine is:  (1) informed of the relationship between the physician and patient and the respective role of any other health care provider with respect to management of the patient; and (2) notified that he or she may decline to receive medical services by telemedicine and may withdraw from such care at any time.   Notes: Belarusian version of low Fodmap diet emailed to patient before the start of session today.      A = NUTRITION ASSESSMENT   Patient Information:    Rachel Conroy  68 y.o.-year-old  White female    Social Data: lives with  son .   .  Anthropometrics:  "  Relevant Wt hx: weight has been stable     Usual Weight:   has been stable  There is no height or weight on file to calculate BMI.  Estimated body mass index is 30.21 kg/m² as calculated from the following:    Height as of 8/31/23: 5' 7" (1.702 m).    Weight as of 8/31/23: 87.5 kg (192 lb 14.4 oz).  Ht Readings from Last 1 Encounters:   08/31/23 5' 7" (1.702 m)     Wt Readings from Last 12 Encounters:   08/31/23 87.5 kg (192 lb 14.4 oz)   08/29/23 88.1 kg (194 lb 3.6 oz)   08/16/23 88.1 kg (194 lb 3.6 oz)   08/11/23 86.8 kg (191 lb 7.5 oz)   07/26/23 85.7 kg (189 lb)   05/29/23 87 kg (191 lb 12.8 oz)   05/23/23 83 kg (182 lb 15.7 oz)   05/01/23 86.6 kg (190 lb 14.7 oz)   04/27/23 73.1 kg (161 lb 2.5 oz)   02/20/23 86 kg (189 lb 9.5 oz)   01/20/23 87.3 kg (192 lb 7.4 oz)   01/19/23 86.2 kg (190 lb 0.6 oz)                                     BMI: There is no height or weight on file to calculate BMI.   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 Watermelon is consumed daily as are beans, garlic and onion , broccoli, cauliflower and salad . Son noted mother works as a cook and she consumes significant fruit at work with watermelon her daily favorite .    Appetite: Good but is anxious about eating due to loose stools ( mushy texture) after eating   Fluid Intake: Adequate- no carbonated drinks, does consume coffee - 1 cup per day - no milk ; and iced green    tea.   Diet Recall:  Breakfast: coffee, egg ( cooked in a little oil), bread or oatmeal   Lunch: Meat, salad, fruit    Dinner: Beans  cooked with onion, green bell pepper , garlic  ( " sometimes in a pressure cooker )   Snacks: 2-3x/day fruit primarily   Drinks: water, green tea, coffee; she has tasted ensure in the past but says it was too sweet and gave her gas.   ONS:none after trying Ensure     Dietary preferences:   Vegetables: Likes a variety. Eats daily.  Fruits: Likes a variety. Eats daily.  Fluid Intake/Beverages: water, sweet tea, and coffee with sugar  Dining out:  infrequently     Cooking at home: Daily. Mostly baked and grilled meat, fish, starchy CHO, and vegetables.  Grocery Shopping and food prep done by patient   Patient Notes/Reports: stress makes symptoms worse ; reports discontinuing Elavil /nortriptyline as it made her groggy for work      Medical Hx  Past Medical History:   Diagnosis Date    Arthritis     GERD (gastroesophageal reflux disease)     Hyperlipidemia     Hypertension     Irritable bowel syndrome     Joint pain     Knee injury     Migraine headache     Muscle pain, fibromyalgia 2014    NAFLD (nonalcoholic fatty liver disease)     Other physical therapy     Plantar fasciitis     Sleep apnea     Urinary incontinence       Past Surgical History:   Procedure Laterality Date    APPENDECTOMY      COLONOSCOPY N/A 2017    Procedure: COLONOSCOPY;  Surgeon: Mariaelena Harrell MD;  Location: [facility] (4TH FLR);  Service: Endoscopy;  Laterality: N/A;    COLONOSCOPY N/A 2018    Procedure: COLONOSCOPY;  Surgeon: Nicola Zhu MD;  Location: [facility] (2ND FLR);  Service: Endoscopy;  Laterality: N/A;    ESOPHAGOGASTRODUODENOSCOPY N/A 2023    Procedure: EGD (ESOPHAGOGASTRODUODENOSCOPY);  Surgeon: Hector Barnes MD;  Location: [facility];  Service: Endoscopy;  Laterality: N/A;  Procedure Timin-4 weeks    history of difficult airway    Referrd by Dr. Carranza/Prep instructions mailed to home and to portal. AS    HYSTERECTOMY      TOTAL KNEE ARTHROPLASTY Right 2020    Procedure: ARTHROPLASTY, KNEE, TOTAL-STEFANIE;  Surgeon: eGrardo Stein,  "MD;  Location: [facility] OR;  Service: Orthopedics;  Laterality: Right;  spinal attempted        Labs:   Lab Results   Component Value Date    GLU 98 06/27/2023    K 3.8 06/27/2023    MG 2.1 10/11/2017    CHOL 224 (H) 06/27/2023    HDL 81 (H) 06/27/2023    TRIG 94 06/27/2023    ALBUMIN 3.9 07/19/2023    HGBA1C 5.2 07/21/2021    CALCIUM 9.2 06/27/2023     Lab Results   Component Value Date    KVMNCFGA95BU 30 07/28/2020    VEOYAETY94 493 10/11/2017     Lab Results   Component Value Date    HGB 13.3 07/29/2022    HCT 40.7 07/29/2022    MCV 88 07/29/2022     Lab Results   Component Value Date    CREATININE 0.7 06/27/2023    ALBUMIN 3.9 07/19/2023     Hemoglobin A1C   Date Value Ref Range Status   07/21/2021 5.2 4.0 - 5.6 % Final     Comment:     ADA Screening Guidelines:  5.7-6.4%  Consistent with prediabetes  >or=6.5%  Consistent with diabetes    High levels of fetal hemoglobin interfere with the HbA1C  assay. Heterozygous hemoglobin variants (HbS, HgC, etc)do  not significantly interfere with this assay.   However, presence of multiple variants may affect accuracy.     07/28/2020 5.4 4.0 - 5.6 % Final     Comment:     ADA Screening Guidelines:  5.7-6.4%  Consistent with prediabetes  >or=6.5%  Consistent with diabetes  High levels of fetal hemoglobin interfere with the HbA1C  assay. Heterozygous hemoglobin variants (HbS, HgC, etc)do  not significantly interfere with this assay.   However, presence of multiple variants may affect accuracy.       Lab Results   Component Value Date    CRP 4.1 05/05/2017     No components found for: "FROLATE"  Iron   Date Value Ref Range Status   10/24/2016 52 30 - 160 ug/dL Final        D = NUTRITION DIAGNOSIS    PES Statement:   Primary Problem: Altered GI function  related to Multiple bowel movements up to 3per day  after meals as evidenced by diet recall.      Secondary Problem: Gas and bloating reported following consumption of high Fodmap foods ( fat, onion, garlic, cruciferous " vegetables, fruit )as evidenced by diet recall.      I = NUTRITION INTERVENTION     Estimated Energy/Fluid Requirements:   Weight used: AIBW  73.9  kg  Calories:  1600  kcal/day (21 kcal/kg BMR)  Protein:  70  g/day (1.0 g/kg DRI)  Fluid:  1800  mL/day or  60  oz/day      Reduce intake of gas producing foods, limit use of alcohol sweeteners ( in sugar free products such as chewing gum, Low carb/Keto bars ) increased awareness of eating habits involving rapid food and drink consumption, straw use , carbonated beverage intake . consumption of hand- held foods ( sandwiches, protein /granola bars) which encourage large bites and inefficient chewing .   Reduce digestive effort by stomach and bowel by cooking foods in slow cooker or instapot ; chopping into smaller pieces or mechanically modifying by using an immersion  or  ; avoiding whole seeds and nuts , dried fruit and peeling fruits and vegetables with tough skin .    Limit High fat foods -avocado, nuts and nut butters, fried foods , gravies, cheese, fatty snack foods (potato chips, dips ), high fat meats such as sausage, hot dogs, barbecued fatty meats , heavy use of condiments such as mayonnaise, butter , vegetable spread, sour cream    Reflux -avoid caffeine, chocolate, large meals, high fat foods - fried, high fat meats ; casseroles which may contain high fat cheese; carbonated beverages, mint containing foods , alcohol . Equally important is to allow 2-3 hours after meal before laying down ; limit exercising involving bending from waist, jumping - modify exercises to sit upright .    Fatty Liver - reduce simple sugar intake and limit fat intake , increase intake of lean animal proteins, increase plant based proteins ; reducing overall weight by 10% will improve symptoms    Trial of Lactaid milk or calcium supplementation for bone health if unable to consume milk.      Education Materials Provided and Discussed: Malaysian version of Low Fodmap  diet   Education Needs Satisfied: Reviewed and yes   Patient Verbalizes understanding: Reviewed and yes   Barriers to Learning: language but assisted by son and materials in Syriac      M/E = NUTRITION MONITORING AND EVALUATION     SMART Goal 1: Patient adherence to low fodmap  diet for dx of IBS without GI discomfort OR with decrease in GI symptoms by next RD visit.   Indicator: Weight/BMI    SMART Goal 2: Reduction in frequency/intensity of Bloating and Gas by 50%   Indicator: Diet Recall     Follow Up:  2 Weeks in person at [place] Location with      Communication with provider via Epic  Delisa Judd RD, MPH, CDE, LPC  Diagnoses:  1. Abdominal cramping    2. Functional dyspepsia    3. Irritable bowel syndrome with diarrhea    4. Fatty liver    5. Gastroesophageal reflux disease, unspecified whether esophagitis present

## 2023-09-15 ENCOUNTER — TELEPHONE (OUTPATIENT)
Dept: SURGERY | Facility: CLINIC | Age: 68
End: 2023-09-15
Payer: COMMERCIAL

## 2023-09-20 ENCOUNTER — TELEPHONE (OUTPATIENT)
Dept: SURGERY | Facility: CLINIC | Age: 68
End: 2023-09-20
Payer: COMMERCIAL

## 2023-10-14 DIAGNOSIS — L30.9 DERMATITIS: ICD-10-CM

## 2023-10-14 NOTE — TELEPHONE ENCOUNTER
No care due was identified.  Health [facility] Embedded Care Due Messages. Reference number: [id number].   10/14/2023 8:23:10 AM CDT

## 2023-10-16 RX ORDER — CLOBETASOL PROPIONATE 0.05 G/100ML
1 SHAMPOO TOPICAL 2 TIMES DAILY
Qty: 118 ML | Refills: 2 | Status: SHIPPED | OUTPATIENT
Start: 2023-10-16

## 2023-10-16 NOTE — TELEPHONE ENCOUNTER
Refill Decision Note   Rachel Marycarmen  is requesting a refill authorization.  Brief Assessment and Rationale for Refill:  Approve     Medication Therapy Plan:         Comments:     Note composed:11:37 AM 10/16/2023

## 2023-10-20 ENCOUNTER — OFFICE VISIT (OUTPATIENT)
Dept: FAMILY MEDICINE | Facility: CLINIC | Age: 68
End: 2023-10-20
Payer: COMMERCIAL

## 2023-10-20 VITALS
HEART RATE: 63 BPM | OXYGEN SATURATION: 97 % | HEIGHT: 67 IN | TEMPERATURE: 98 F | BODY MASS INDEX: 30.04 KG/M2 | WEIGHT: 191.38 LBS | DIASTOLIC BLOOD PRESSURE: 80 MMHG | SYSTOLIC BLOOD PRESSURE: 146 MMHG

## 2023-10-20 DIAGNOSIS — M54.41 CHRONIC BILATERAL LOW BACK PAIN WITH BILATERAL SCIATICA: Primary | ICD-10-CM

## 2023-10-20 DIAGNOSIS — G89.29 CHRONIC BILATERAL LOW BACK PAIN WITH BILATERAL SCIATICA: Primary | ICD-10-CM

## 2023-10-20 DIAGNOSIS — M54.42 CHRONIC BILATERAL LOW BACK PAIN WITH BILATERAL SCIATICA: Primary | ICD-10-CM

## 2023-10-20 PROCEDURE — 3008F BODY MASS INDEX DOCD: CPT | Mod: CPTII,S$GLB,, | Performed by: FAMILY MEDICINE

## 2023-10-20 PROCEDURE — 1101F PR PT FALLS ASSESS DOC 0-1 FALLS W/OUT INJ PAST YR: ICD-10-PCS | Mod: CPTII,S$GLB,, | Performed by: FAMILY MEDICINE

## 2023-10-20 PROCEDURE — 3077F SYST BP >= 140 MM HG: CPT | Mod: CPTII,S$GLB,, | Performed by: FAMILY MEDICINE

## 2023-10-20 PROCEDURE — 3079F PR MOST RECENT DIASTOLIC BLOOD PRESSURE 80-89 MM HG: ICD-10-PCS | Mod: CPTII,S$GLB,, | Performed by: FAMILY MEDICINE

## 2023-10-20 PROCEDURE — 1101F PT FALLS ASSESS-DOCD LE1/YR: CPT | Mod: CPTII,S$GLB,, | Performed by: FAMILY MEDICINE

## 2023-10-20 PROCEDURE — 4010F PR ACE/ARB THEARPY RXD/TAKEN: ICD-10-PCS | Mod: CPTII,S$GLB,, | Performed by: FAMILY MEDICINE

## 2023-10-20 PROCEDURE — 99213 OFFICE O/P EST LOW 20 MIN: CPT | Mod: S$GLB,,, | Performed by: FAMILY MEDICINE

## 2023-10-20 PROCEDURE — 3288F FALL RISK ASSESSMENT DOCD: CPT | Mod: CPTII,S$GLB,, | Performed by: FAMILY MEDICINE

## 2023-10-20 PROCEDURE — 1160F PR REVIEW ALL MEDS BY PRESCRIBER/CLIN PHARMACIST DOCUMENTED: ICD-10-PCS | Mod: CPTII,S$GLB,, | Performed by: FAMILY MEDICINE

## 2023-10-20 PROCEDURE — 4010F ACE/ARB THERAPY RXD/TAKEN: CPT | Mod: CPTII,S$GLB,, | Performed by: FAMILY MEDICINE

## 2023-10-20 PROCEDURE — 3008F PR BODY MASS INDEX (BMI) DOCUMENTED: ICD-10-PCS | Mod: CPTII,S$GLB,, | Performed by: FAMILY MEDICINE

## 2023-10-20 PROCEDURE — 99213 PR OFFICE/OUTPT VISIT, EST, LEVL III, 20-29 MIN: ICD-10-PCS | Mod: S$GLB,,, | Performed by: FAMILY MEDICINE

## 2023-10-20 PROCEDURE — 99999 PR PBB SHADOW E&M-EST. PATIENT-LVL V: ICD-10-PCS | Mod: PBBFAC,,, | Performed by: FAMILY MEDICINE

## 2023-10-20 PROCEDURE — 1126F PR PAIN SEVERITY QUANTIFIED, NO PAIN PRESENT: ICD-10-PCS | Mod: CPTII,S$GLB,, | Performed by: FAMILY MEDICINE

## 2023-10-20 PROCEDURE — 1160F RVW MEDS BY RX/DR IN RCRD: CPT | Mod: CPTII,S$GLB,, | Performed by: FAMILY MEDICINE

## 2023-10-20 PROCEDURE — 3288F PR FALLS RISK ASSESSMENT DOCUMENTED: ICD-10-PCS | Mod: CPTII,S$GLB,, | Performed by: FAMILY MEDICINE

## 2023-10-20 PROCEDURE — 1126F AMNT PAIN NOTED NONE PRSNT: CPT | Mod: CPTII,S$GLB,, | Performed by: FAMILY MEDICINE

## 2023-10-20 PROCEDURE — 99999 PR PBB SHADOW E&M-EST. PATIENT-LVL V: CPT | Mod: PBBFAC,,, | Performed by: FAMILY MEDICINE

## 2023-10-20 PROCEDURE — 1159F PR MEDICATION LIST DOCUMENTED IN MEDICAL RECORD: ICD-10-PCS | Mod: CPTII,S$GLB,, | Performed by: FAMILY MEDICINE

## 2023-10-20 PROCEDURE — 3077F PR MOST RECENT SYSTOLIC BLOOD PRESSURE >= 140 MM HG: ICD-10-PCS | Mod: CPTII,S$GLB,, | Performed by: FAMILY MEDICINE

## 2023-10-20 PROCEDURE — 3079F DIAST BP 80-89 MM HG: CPT | Mod: CPTII,S$GLB,, | Performed by: FAMILY MEDICINE

## 2023-10-20 PROCEDURE — 1159F MED LIST DOCD IN RCRD: CPT | Mod: CPTII,S$GLB,, | Performed by: FAMILY MEDICINE

## 2023-10-20 RX ORDER — METHOCARBAMOL 500 MG/1
TABLET, FILM COATED ORAL
Qty: 60 TABLET | Refills: 2 | Status: SHIPPED | OUTPATIENT
Start: 2023-10-20

## 2023-10-20 NOTE — PROGRESS NOTES
"  Patient Name: Rachel Conroy    : 1955  MRN: [id number]      Subjective:     Patient ID: Rachel is a 68 y.o. female    Chief Complaint:  Back Pain    Back Pain  This is a recurrent problem. The current episode started more than 1 year ago. The problem occurs daily. The pain is present in the gluteal. The quality of the pain is described as aching and shooting. The pain radiates to the left thigh and right thigh. The pain is at a severity of 7/10. The pain is The same all the time. The symptoms are aggravated by standing and bending. Stiffness is present All day. Associated symptoms include leg pain and tingling. Pertinent negatives include no abdominal pain, bladder incontinence, bowel incontinence, chest pain, dysuria, fever, paresis, pelvic pain, perianal numbness or weakness.        Review of Systems   Constitutional:  Negative for fever.   Cardiovascular:  Negative for chest pain.   Gastrointestinal:  Negative for abdominal pain and bowel incontinence.   Genitourinary:  Negative for bladder incontinence, dysuria and pelvic pain.   Musculoskeletal:  Positive for back pain and leg pain.   Neurological:  Positive for tingling. Negative for weakness.        Objective:   BP (!) 146/80 (BP Location: Left arm, Patient Position: Sitting, BP Method: Medium (Manual))   Pulse 63   Temp 97.7 °F (36.5 °C) (Oral)   Ht 5' 7" (1.702 m)   Wt 86.8 kg (191 lb 5.8 oz)   SpO2 97%   BMI 29.97 kg/m²     Physical Exam  Vitals reviewed.   Constitutional:       Appearance: She is well-developed.   HENT:      Head: Normocephalic and atraumatic.      Right Ear: External ear normal.      Left Ear: External ear normal.      Nose: Nose normal.      Mouth/Throat:      Pharynx: No oropharyngeal exudate.   Eyes:      General:         Right eye: No discharge.         Left eye: No discharge.      Conjunctiva/sclera: Conjunctivae normal.      Pupils: Pupils are equal, round, and reactive to light.   Neck:      Trachea: No " tracheal deviation.   Cardiovascular:      Rate and Rhythm: Normal rate and regular rhythm.      Heart sounds: Normal heart sounds. No murmur heard.  Pulmonary:      Effort: Pulmonary effort is normal.      Breath sounds: Normal breath sounds. No wheezing or rales.   Abdominal:      General: Bowel sounds are normal.      Palpations: Abdomen is soft. Abdomen is not rigid. There is no mass.      Tenderness: There is no abdominal tenderness. There is no guarding.   Musculoskeletal:      Cervical back: Normal range of motion and neck supple.      Lumbar back: Tenderness present. No bony tenderness. Normal range of motion.   Lymphadenopathy:      Cervical: No cervical adenopathy.   Neurological:      Mental Status: She is oriented to person, place, and time.      Gait: Gait normal.        Assessment        ICD-10-CM ICD-9-CM   1. Chronic bilateral low back pain with bilateral sciatica  M54.42 724.2    M54.41 724.3    G89.29 338.29         Plan:     1. Chronic bilateral low back pain with bilateral sciatica  -     methocarbamoL (ROBAXIN) 500 MG Tab; Take 1-2 tablets PO Q8 PRN for low back and leg pain  Dispense: 60 tablet; Refill: 2    Printed home exercises for patient.  Advised patient to do these daily.    May use methocarbamol as needed.        -Maxwell Gonzalez Jr., MD, AAHIVS      This office note has been dictated.  This dictation has been generated using M-Modal Fluency Direct dictation; some phonetic errors may occur.         There are no Patient Instructions on file for this visit.      Future Appointments   Date Time Provider Department Center   12/6/2023  9:40 AM Noemi Power NP [facility] SLEEP [facility]   2/9/2024  7:30 AM LAB, [facility] DRAW STATION [facility] LAB [facility]   2/16/2024  2:40 PM Maxwell Gonzalez Jr., MD [facility]   7/19/2024  9:40 AM LAB, [facility]   7/19/2024  9:45 AM [facility] HEPATOLOGY, [facility] HEPAT [facility]   7/19/2024 10:00 AM Jeanie Paredes  [facility] HEPAT [facility]re Jha

## 2023-11-06 ENCOUNTER — PATIENT MESSAGE (OUTPATIENT)
Dept: ADMINISTRATIVE | Facility: HOSPITAL | Age: 68
End: 2023-11-06
Payer: COMMERCIAL

## 2023-12-05 ENCOUNTER — PATIENT MESSAGE (OUTPATIENT)
Dept: FAMILY MEDICINE | Facility: CLINIC | Age: 68
End: 2023-12-05
Payer: COMMERCIAL

## 2023-12-05 DIAGNOSIS — I10 ESSENTIAL HYPERTENSION: Chronic | ICD-10-CM

## 2023-12-05 NOTE — TELEPHONE ENCOUNTER
Last office visit 10/20/23  
No care due was identified.  Health [facility] Embedded Care Due Messages. Reference number: [id number].   12/05/2023 3:39:20 PM CST  
Unknown

## 2023-12-06 RX ORDER — OLMESARTAN MEDOXOMIL 40 MG/1
40 TABLET ORAL DAILY
Qty: 90 TABLET | Refills: 3 | Status: SHIPPED | OUTPATIENT
Start: 2023-12-06 | End: 2024-02-16 | Stop reason: SDUPTHER

## 2023-12-20 ENCOUNTER — PATIENT MESSAGE (OUTPATIENT)
Dept: SLEEP MEDICINE | Facility: CLINIC | Age: 68
End: 2023-12-20
Payer: COMMERCIAL

## 2023-12-21 ENCOUNTER — TELEPHONE (OUTPATIENT)
Dept: SLEEP MEDICINE | Facility: CLINIC | Age: 68
End: 2023-12-21
Payer: COMMERCIAL

## 2024-02-12 ENCOUNTER — LAB VISIT (OUTPATIENT)
Dept: LAB | Facility: HOSPITAL | Age: 69
End: 2024-02-12
Attending: FAMILY MEDICINE
Payer: MEDICARE

## 2024-02-12 DIAGNOSIS — I10 ESSENTIAL HYPERTENSION: Chronic | ICD-10-CM

## 2024-02-12 DIAGNOSIS — E78.5 DYSLIPIDEMIA: Chronic | ICD-10-CM

## 2024-02-12 LAB
ALBUMIN SERPL BCP-MCNC: 3.9 G/DL (ref 3.5–5.2)
ALP SERPL-CCNC: 98 U/L (ref 55–135)
ALT SERPL W/O P-5'-P-CCNC: 32 U/L (ref 10–44)
ANION GAP SERPL CALC-SCNC: 7 MMOL/L (ref 8–16)
AST SERPL-CCNC: 21 U/L (ref 10–40)
BILIRUB SERPL-MCNC: 0.6 MG/DL (ref 0.1–1)
BUN SERPL-MCNC: 10 MG/DL (ref 8–23)
CALCIUM SERPL-MCNC: 9.2 MG/DL (ref 8.7–10.5)
CHLORIDE SERPL-SCNC: 105 MMOL/L (ref 95–110)
CHOLEST SERPL-MCNC: 242 MG/DL (ref 120–199)
CHOLEST/HDLC SERPL: 2.8 {RATIO} (ref 2–5)
CO2 SERPL-SCNC: 25 MMOL/L (ref 23–29)
CREAT SERPL-MCNC: 0.6 MG/DL (ref 0.5–1.4)
EST. GFR  (NO RACE VARIABLE): >60 ML/MIN/1.73 M^2
GLUCOSE SERPL-MCNC: 87 MG/DL (ref 70–110)
HDLC SERPL-MCNC: 86 MG/DL (ref 40–75)
HDLC SERPL: 35.5 % (ref 20–50)
LDLC SERPL CALC-MCNC: 134.6 MG/DL (ref 63–159)
NONHDLC SERPL-MCNC: 156 MG/DL
POTASSIUM SERPL-SCNC: 3.9 MMOL/L (ref 3.5–5.1)
PROT SERPL-MCNC: 7.1 G/DL (ref 6–8.4)
SODIUM SERPL-SCNC: 137 MMOL/L (ref 136–145)
TRIGL SERPL-MCNC: 107 MG/DL (ref 30–150)

## 2024-02-12 PROCEDURE — 36415 COLL VENOUS BLD VENIPUNCTURE: CPT | Mod: PO | Performed by: FAMILY MEDICINE

## 2024-02-12 PROCEDURE — 80053 COMPREHEN METABOLIC PANEL: CPT | Performed by: FAMILY MEDICINE

## 2024-02-12 PROCEDURE — 80061 LIPID PANEL: CPT | Performed by: FAMILY MEDICINE

## 2024-02-16 ENCOUNTER — OFFICE VISIT (OUTPATIENT)
Dept: FAMILY MEDICINE | Facility: CLINIC | Age: 69
End: 2024-02-16
Payer: MEDICARE

## 2024-02-16 VITALS
HEIGHT: 67 IN | HEART RATE: 86 BPM | BODY MASS INDEX: 31.15 KG/M2 | WEIGHT: 198.44 LBS | DIASTOLIC BLOOD PRESSURE: 78 MMHG | SYSTOLIC BLOOD PRESSURE: 122 MMHG | OXYGEN SATURATION: 97 % | TEMPERATURE: 98 F

## 2024-02-16 DIAGNOSIS — E78.5 DYSLIPIDEMIA: Chronic | ICD-10-CM

## 2024-02-16 DIAGNOSIS — I10 ESSENTIAL HYPERTENSION: Primary | Chronic | ICD-10-CM

## 2024-02-16 DIAGNOSIS — M79.7 FIBROMYALGIA: Chronic | ICD-10-CM

## 2024-02-16 DIAGNOSIS — E66.09 CLASS 1 OBESITY DUE TO EXCESS CALORIES WITH SERIOUS COMORBIDITY AND BODY MASS INDEX (BMI) OF 31.0 TO 31.9 IN ADULT: ICD-10-CM

## 2024-02-16 PROCEDURE — 99999 PR PBB SHADOW E&M-EST. PATIENT-LVL IV: CPT | Mod: PBBFAC,,, | Performed by: FAMILY MEDICINE

## 2024-02-16 PROCEDURE — 99214 OFFICE O/P EST MOD 30 MIN: CPT | Mod: S$GLB,,, | Performed by: FAMILY MEDICINE

## 2024-02-16 RX ORDER — GABAPENTIN 300 MG/1
300 CAPSULE ORAL 2 TIMES DAILY
Qty: 60 CAPSULE | Refills: 11 | Status: SHIPPED | OUTPATIENT
Start: 2024-02-16

## 2024-02-16 RX ORDER — OLMESARTAN MEDOXOMIL 40 MG/1
40 TABLET ORAL DAILY
Qty: 90 TABLET | Refills: 3 | Status: SHIPPED | OUTPATIENT
Start: 2024-02-16 | End: 2024-06-10 | Stop reason: SDUPTHER

## 2024-02-16 NOTE — PROGRESS NOTES
"  Patient Name: Rachel Conroy    : 1955  MRN: [id number]      Subjective:     Patient ID: Rachel is a 68 y.o. female    Chief Complaint:  Hypertension (6mth f/u )    68-year-old female presents for follow-up on hypertension.  She reports compliance with her medications.  She reports no side effects from medication.  Patient is also reporting continue fibromyalgia symptoms which she was diagnosed with by her previous PCP.  States that previously she was on gabapentin which had helped some.  Has not been on gabapentin for over a year.  She reports that she would like to retry medication.         Review of Systems   Constitutional:  Negative for diaphoresis, fever and unexpected weight change.   Cardiovascular:  Negative for chest pain and palpitations.   Gastrointestinal:  Negative for abdominal pain and blood in stool.   Genitourinary:  Negative for bladder incontinence, dysuria and pelvic pain.   Musculoskeletal:  Positive for back pain, leg pain and myalgias. Negative for joint swelling and joint deformity.   Neurological:  Negative for weakness.        Objective:   /78 (BP Location: Left arm, Patient Position: Sitting, BP Method: Large (Manual))   Pulse 86   Temp 97.9 °F (36.6 °C) (Oral)   Ht 5' 7" (1.702 m)   Wt 90 kg (198 lb 6.6 oz)   SpO2 97%   BMI 31.08 kg/m²     Physical Exam  Vitals reviewed.   Constitutional:       Appearance: She is well-developed.   HENT:      Head: Normocephalic and atraumatic.      Right Ear: External ear normal.      Left Ear: External ear normal.      Nose: Nose normal.      Mouth/Throat:      Pharynx: No oropharyngeal exudate.   Eyes:      General:         Right eye: No discharge.         Left eye: No discharge.      Conjunctiva/sclera: Conjunctivae normal.      Pupils: Pupils are equal, round, and reactive to light.   Neck:      Trachea: No tracheal deviation.   Cardiovascular:      Rate and Rhythm: Normal rate and regular rhythm.      Heart sounds: Normal " heart sounds. No murmur heard.  Pulmonary:      Effort: Pulmonary effort is normal.      Breath sounds: Normal breath sounds. No wheezing or rales.   Abdominal:      General: Bowel sounds are normal.      Palpations: Abdomen is soft. Abdomen is not rigid. There is no mass.      Tenderness: There is no abdominal tenderness. There is no guarding.   Musculoskeletal:      Cervical back: Normal range of motion and neck supple.      Lumbar back: Tenderness present. No bony tenderness. Normal range of motion.   Lymphadenopathy:      Cervical: No cervical adenopathy.   Neurological:      Mental Status: She is oriented to person, place, and time.      Gait: Gait normal.        Lab Visit on 02/12/2024   Component Date Value Ref Range Status    Sodium 02/12/2024 137  136 - 145 mmol/L Final    Potassium 02/12/2024 3.9  3.5 - 5.1 mmol/L Final    Chloride 02/12/2024 105  95 - 110 mmol/L Final    CO2 02/12/2024 25  23 - 29 mmol/L Final    Glucose 02/12/2024 87  70 - 110 mg/dL Final    BUN 02/12/2024 10  8 - 23 mg/dL Final    Creatinine 02/12/2024 0.6  0.5 - 1.4 mg/dL Final    Calcium 02/12/2024 9.2  8.7 - 10.5 mg/dL Final    Total Protein 02/12/2024 7.1  6.0 - 8.4 g/dL Final    Albumin 02/12/2024 3.9  3.5 - 5.2 g/dL Final    Total Bilirubin 02/12/2024 0.6  0.1 - 1.0 mg/dL Final    Comment: For infants and newborns, interpretation of results should be based  on gestational age, weight and in agreement with clinical  observations.    Premature Infant recommended reference ranges:  Up to 24 hours.............<8.0 mg/dL  Up to 48 hours............<12.0 mg/dL  3-5 days..................<15.0 mg/dL  6-29 days.................<15.0 mg/dL      Alkaline Phosphatase 02/12/2024 98  55 - 135 U/L Final    AST 02/12/2024 21  10 - 40 U/L Final    ALT 02/12/2024 32  10 - 44 U/L Final    eGFR 02/12/2024 >60.0  >60 mL/min/1.73 m^2 Final    Anion Gap 02/12/2024 7 (L)  8 - 16 mmol/L Final    Cholesterol 02/12/2024 242 (H)  120 - 199 mg/dL Final     Comment: The National Cholesterol Education Program (NCEP) has set the  following guidelines (reference ranges) for Cholesterol:  Optimal.....................<200 mg/dL  Borderline High.............200-239 mg/dL  High........................> or = 240 mg/dL      Triglycerides 02/12/2024 107  30 - 150 mg/dL Final    Comment: The National Cholesterol Education Program (NCEP) has set the  following guidelines (reference values) for triglycerides:  Normal......................<150 mg/dL  Borderline High.............150-199 mg/dL  High........................200-499 mg/dL      HDL 02/12/2024 86 (H)  40 - 75 mg/dL Final    Comment: The National Cholesterol Education Program (NCEP) has set the  following guidelines (reference values) for HDL Cholesterol:  Low...............<40 mg/dL  Optimal...........>60 mg/dL      LDL Cholesterol 02/12/2024 134.6  63.0 - 159.0 mg/dL Final    Comment: The National Cholesterol Education Program (NCEP) has set the  following guidelines (reference values) for LDL Cholesterol:  Optimal.......................<130 mg/dL  Borderline High...............130-159 mg/dL  High..........................160-189 mg/dL  Very High.....................>190 mg/dL      HDL/Cholesterol Ratio 02/12/2024 35.5  20.0 - 50.0 % Final    Total Cholesterol/HDL Ratio 02/12/2024 2.8  2.0 - 5.0 Final    Non-HDL Cholesterol 02/12/2024 156  mg/dL Final    Comment: Risk category and Non-HDL cholesterol goals:  Coronary heart disease (CHD)or equivalent (10-year risk of CHD >20%):  Non-HDL cholesterol goal     <130 mg/dL  Two or more CHD risk factors and 10-year risk of CHD <= 20%:  Non-HDL cholesterol goal     <160 mg/dL  0 to 1 CHD risk factor:  Non-HDL cholesterol goal     <190 mg/dL           The 10-year ASCVD risk score (Valente VILLARREAL, et al., 2019) is: 7%    Values used to calculate the score:      Age: 68 years      Sex: Female      Is Non- : No      Diabetic: No      Tobacco smoker: No       Systolic Blood Pressure: 122 mmHg      Is BP treated: Yes      HDL Cholesterol: 86 mg/dL      Total Cholesterol: 242 mg/dL    Assessment        ICD-10-CM ICD-9-CM   1. Essential hypertension  I10 401.9   2. Dyslipidemia  E78.5 272.4   3. Fibromyalgia  M79.7 729.1   4. Class 1 obesity due to excess calories with serious comorbidity and body mass index (BMI) of 31.0 to 31.9 in adult  E66.09 278.00    Z68.31 V85.31         Plan:     1. Essential hypertension  Overview:  BP Readings from Last 3 Encounters:   07/26/23 129/79   07/11/23 (!) 129/56   05/29/23 110/62       Orders:  -     olmesartan (BENICAR) 40 MG tablet; Take 1 tablet (40 mg total) by mouth once daily.  Dispense: 90 tablet; Refill: 3    2. Dyslipidemia  Overview:  Lab Results   Component Value Date    CHOL 242 (H) 02/12/2024    CHOL 224 (H) 06/27/2023    CHOL 194 07/29/2022     Lab Results   Component Value Date    HDL 86 (H) 02/12/2024    HDL 81 (H) 06/27/2023    HDL 67 07/29/2022     Lab Results   Component Value Date    LDLCALC 134.6 02/12/2024    LDLCALC 124.2 06/27/2023    LDLCALC 106.0 07/29/2022     Lab Results   Component Value Date    TRIG 107 02/12/2024    TRIG 94 06/27/2023    TRIG 105 07/29/2022     Lab Results   Component Value Date    CHOLHDL 35.5 02/12/2024    CHOLHDL 36.2 06/27/2023    CHOLHDL 34.5 07/29/2022      The 10-year ASCVD risk score (Valente VILLARREAL, et al., 2019) is: 7%    Values used to calculate the score:      Age: 68 years      Sex: Female      Is Non- : No      Diabetic: No      Tobacco smoker: No      Systolic Blood Pressure: 122 mmHg      Is BP treated: Yes      HDL Cholesterol: 86 mg/dL      Total Cholesterol: 242 mg/dL      Assessment & Plan:  Lipids remain elevated.  However she has a statin benefit group.  Will continue to monitor.  Advised on lifestyle and dietary changes.      3. Fibromyalgia  Overview:  Diagnosed by Rheumatology October 2014    Assessment & Plan:  Will re-initiate gabapentin 300  "milligrams twice a day.  Advised patient to started at 300 milligrams at bedtime for three nights and then titrate up to twice a day.  Side effects discussed.  Will re-evaluate in four weeks.    Orders:  -     gabapentin (NEURONTIN) 300 MG capsule; Take 1 capsule (300 mg total) by mouth 2 (two) times daily.  Dispense: 60 capsule; Refill: 11    4. Class 1 obesity due to excess calories with serious comorbidity and body mass index (BMI) of 31.0 to 31.9 in adult  Assessment & Plan:  Wt Readings from Last 3 Encounters:   02/16/24 1502 90 kg (198 lb 6.6 oz)   10/20/23 1043 86.8 kg (191 lb 5.8 oz)   08/31/23 1236 87.5 kg (192 lb 14.4 oz)      Estimated body mass index is 31.08 kg/m² as calculated from the following:    Height as of this encounter: 5' 7" (1.702 m).    Weight as of this encounter: 90 kg (198 lb 6.6 oz).  Ideal body weight: 61.6 kg (135 lb 12.9 oz)     The patient's BMI has been recorded in the chart. The patient has been provided educational materials regarding the benefits of attaining and maintaining a normal weight. We will continue to address and follow this issue during follow up visits.                  -Maxwell Gonzalez Jr., MD, AAHIVS      This office note has been dictated.  This dictation has been generated using M-Modal Fluency Direct dictation; some phonetic errors may occur.         There are no Patient Instructions on file for this visit.    Follow Up  No follow-ups on file.    Future Appointments   Date Time Provider Department Center   3/11/2024  3:20 PM Maxwell Gonzalez Jr., MD [facility] - B   5/29/2024  1:40 PM Noemi Power NP [facility]   7/19/2024  9:40 AM LAB, TRANSPLANT [facility]   7/19/2024  9:45 AM [facility] HEPATOLOGY, FIBROSCAN [facility] HEPAT [facility]   7/19/2024 10:00 AM Jeanie Paredes NP [facility] HEPAT [facility]            "

## 2024-03-01 NOTE — ASSESSMENT & PLAN NOTE
"Wt Readings from Last 3 Encounters:   02/16/24 1502 90 kg (198 lb 6.6 oz)   10/20/23 1043 86.8 kg (191 lb 5.8 oz)   08/31/23 1236 87.5 kg (192 lb 14.4 oz)      Estimated body mass index is 31.08 kg/m² as calculated from the following:    Height as of this encounter: 5' 7" (1.702 m).    Weight as of this encounter: 90 kg (198 lb 6.6 oz).  Ideal body weight: 61.6 kg (135 lb 12.9 oz)     The patient's BMI has been recorded in the chart. The patient has been provided educational materials regarding the benefits of attaining and maintaining a normal weight. We will continue to address and follow this issue during follow up visits.   "

## 2024-03-01 NOTE — ASSESSMENT & PLAN NOTE
Will re-initiate gabapentin 300 milligrams twice a day.  Advised patient to started at 300 milligrams at bedtime for three nights and then titrate up to twice a day.  Side effects discussed.  Will re-evaluate in four weeks.

## 2024-03-01 NOTE — ASSESSMENT & PLAN NOTE
Lipids remain elevated.  However she has a statin benefit group.  Will continue to monitor.  Advised on lifestyle and dietary changes.

## 2024-04-17 DIAGNOSIS — K21.9 GASTROESOPHAGEAL REFLUX DISEASE WITHOUT ESOPHAGITIS: ICD-10-CM

## 2024-04-17 DIAGNOSIS — K30 FUNCTIONAL DYSPEPSIA: ICD-10-CM

## 2024-04-17 RX ORDER — ESOMEPRAZOLE MAGNESIUM 40 MG/1
40 CAPSULE, DELAYED RELEASE ORAL 2 TIMES DAILY
Qty: 90 CAPSULE | Refills: 3 | Status: SHIPPED | OUTPATIENT
Start: 2024-04-17 | End: 2024-10-14

## 2024-04-17 RX ORDER — NORTRIPTYLINE HYDROCHLORIDE 10 MG/1
10 CAPSULE ORAL NIGHTLY
Qty: 30 CAPSULE | Refills: 11 | Status: SHIPPED | OUTPATIENT
Start: 2024-04-17 | End: 2025-04-17

## 2024-04-25 DIAGNOSIS — G43.009 MIGRAINE WITHOUT AURA AND WITHOUT STATUS MIGRAINOSUS, NOT INTRACTABLE: ICD-10-CM

## 2024-04-25 NOTE — TELEPHONE ENCOUNTER
Requested Prescriptions     Pending Prescriptions Disp Refills    sumatriptan (IMITREX) 100 MG tablet 9 tablet 5     Sig: Take 1 tablet (100 mg total) by mouth every 2 (two) hours as needed for Migraine.     Lov

## 2024-04-26 RX ORDER — SUMATRIPTAN SUCCINATE 100 MG/1
100 TABLET ORAL
Qty: 9 TABLET | Refills: 5 | Status: SHIPPED | OUTPATIENT
Start: 2024-04-26 | End: 2024-05-26

## 2024-05-28 DIAGNOSIS — Z00.00 ENCOUNTER FOR MEDICARE ANNUAL WELLNESS EXAM: ICD-10-CM

## 2024-06-10 DIAGNOSIS — I10 ESSENTIAL HYPERTENSION: Chronic | ICD-10-CM

## 2024-06-10 RX ORDER — OLMESARTAN MEDOXOMIL 40 MG/1
40 TABLET ORAL DAILY
Qty: 90 TABLET | Refills: 0 | Status: SHIPPED | OUTPATIENT
Start: 2024-06-10 | End: 2025-06-10

## 2024-06-10 NOTE — TELEPHONE ENCOUNTER
No care due was identified.  [facility] Embedded Care Due Messages. Reference number: [id number].   6/10/2024 8:41:17 AM CDT

## 2024-07-11 ENCOUNTER — OFFICE VISIT (OUTPATIENT)
Dept: FAMILY MEDICINE | Facility: CLINIC | Age: 69
End: 2024-07-11
Payer: MEDICARE

## 2024-07-11 VITALS
OXYGEN SATURATION: 98 % | HEIGHT: 67 IN | SYSTOLIC BLOOD PRESSURE: 118 MMHG | WEIGHT: 203.06 LBS | HEART RATE: 85 BPM | RESPIRATION RATE: 18 BRPM | BODY MASS INDEX: 31.87 KG/M2 | DIASTOLIC BLOOD PRESSURE: 76 MMHG

## 2024-07-11 DIAGNOSIS — R00.2 PALPITATIONS: ICD-10-CM

## 2024-07-11 DIAGNOSIS — I10 ESSENTIAL HYPERTENSION: Primary | Chronic | ICD-10-CM

## 2024-07-11 DIAGNOSIS — E66.09 CLASS 1 OBESITY DUE TO EXCESS CALORIES WITH SERIOUS COMORBIDITY AND BODY MASS INDEX (BMI) OF 31.0 TO 31.9 IN ADULT: Chronic | ICD-10-CM

## 2024-07-11 DIAGNOSIS — M79.7 FIBROMYALGIA: Chronic | ICD-10-CM

## 2024-07-11 DIAGNOSIS — L21.9 SEBORRHEIC DERMATITIS: Chronic | ICD-10-CM

## 2024-07-11 DIAGNOSIS — E78.5 DYSLIPIDEMIA: Chronic | ICD-10-CM

## 2024-07-11 PROCEDURE — 3074F SYST BP LT 130 MM HG: CPT | Mod: CPTII,S$GLB,, | Performed by: FAMILY MEDICINE

## 2024-07-11 PROCEDURE — 3008F BODY MASS INDEX DOCD: CPT | Mod: CPTII,S$GLB,, | Performed by: FAMILY MEDICINE

## 2024-07-11 PROCEDURE — 1126F AMNT PAIN NOTED NONE PRSNT: CPT | Mod: CPTII,S$GLB,, | Performed by: FAMILY MEDICINE

## 2024-07-11 PROCEDURE — 99214 OFFICE O/P EST MOD 30 MIN: CPT | Mod: S$GLB,,, | Performed by: FAMILY MEDICINE

## 2024-07-11 PROCEDURE — 1159F MED LIST DOCD IN RCRD: CPT | Mod: CPTII,S$GLB,, | Performed by: FAMILY MEDICINE

## 2024-07-11 PROCEDURE — 1101F PT FALLS ASSESS-DOCD LE1/YR: CPT | Mod: CPTII,S$GLB,, | Performed by: FAMILY MEDICINE

## 2024-07-11 PROCEDURE — 3288F FALL RISK ASSESSMENT DOCD: CPT | Mod: CPTII,S$GLB,, | Performed by: FAMILY MEDICINE

## 2024-07-11 PROCEDURE — 99999 PR PBB SHADOW E&M-EST. PATIENT-LVL V: CPT | Mod: PBBFAC,,, | Performed by: FAMILY MEDICINE

## 2024-07-11 PROCEDURE — G2211 COMPLEX E/M VISIT ADD ON: HCPCS | Mod: S$GLB,,, | Performed by: FAMILY MEDICINE

## 2024-07-11 PROCEDURE — 4010F ACE/ARB THERAPY RXD/TAKEN: CPT | Mod: CPTII,S$GLB,, | Performed by: FAMILY MEDICINE

## 2024-07-11 PROCEDURE — 1160F RVW MEDS BY RX/DR IN RCRD: CPT | Mod: CPTII,S$GLB,, | Performed by: FAMILY MEDICINE

## 2024-07-11 PROCEDURE — 3078F DIAST BP <80 MM HG: CPT | Mod: CPTII,S$GLB,, | Performed by: FAMILY MEDICINE

## 2024-07-11 RX ORDER — KETOCONAZOLE 20 MG/ML
SHAMPOO, SUSPENSION TOPICAL
Qty: 120 ML | Refills: 11 | Status: SHIPPED | OUTPATIENT
Start: 2024-07-11

## 2024-07-11 NOTE — PATIENT INSTRUCTIONS
Llame al [phone] para hacer yany con terapia fisica    Llame al equipo de referencias al [phone] para programar referencia al doctor bariatrico

## 2024-07-12 ENCOUNTER — TELEPHONE (OUTPATIENT)
Dept: BARIATRICS | Facility: CLINIC | Age: 69
End: 2024-07-12
Payer: MEDICARE

## 2024-07-19 ENCOUNTER — OFFICE VISIT (OUTPATIENT)
Dept: HEPATOLOGY | Facility: CLINIC | Age: 69
End: 2024-07-19
Payer: MEDICARE

## 2024-07-19 ENCOUNTER — PROCEDURE VISIT (OUTPATIENT)
Dept: HEPATOLOGY | Facility: CLINIC | Age: 69
End: 2024-07-19
Payer: MEDICARE

## 2024-07-19 ENCOUNTER — LAB VISIT (OUTPATIENT)
Dept: LAB | Facility: HOSPITAL | Age: 69
End: 2024-07-19
Payer: MEDICARE

## 2024-07-19 VITALS — WEIGHT: 199.75 LBS | BODY MASS INDEX: 31.35 KG/M2 | HEIGHT: 67 IN

## 2024-07-19 DIAGNOSIS — K76.0 FATTY LIVER: Primary | Chronic | ICD-10-CM

## 2024-07-19 DIAGNOSIS — K76.0 FATTY LIVER: ICD-10-CM

## 2024-07-19 DIAGNOSIS — I10 ESSENTIAL HYPERTENSION: Chronic | ICD-10-CM

## 2024-07-19 DIAGNOSIS — E78.00 PURE HYPERCHOLESTEROLEMIA: Chronic | ICD-10-CM

## 2024-07-19 DIAGNOSIS — K76.0 FATTY LIVER: Primary | ICD-10-CM

## 2024-07-19 DIAGNOSIS — E66.09 CLASS 1 OBESITY DUE TO EXCESS CALORIES WITH SERIOUS COMORBIDITY AND BODY MASS INDEX (BMI) OF 31.0 TO 31.9 IN ADULT: Chronic | ICD-10-CM

## 2024-07-19 DIAGNOSIS — E66.09 CLASS 1 OBESITY DUE TO EXCESS CALORIES WITH SERIOUS COMORBIDITY AND BODY MASS INDEX (BMI) OF 30.0 TO 30.9 IN ADULT: Chronic | ICD-10-CM

## 2024-07-19 LAB
ALBUMIN SERPL BCP-MCNC: 3.9 G/DL (ref 3.5–5.2)
ALP SERPL-CCNC: 125 U/L (ref 55–135)
ALT SERPL W/O P-5'-P-CCNC: 54 U/L (ref 10–44)
AST SERPL-CCNC: 30 U/L (ref 10–40)
BILIRUB DIRECT SERPL-MCNC: 0.2 MG/DL (ref 0.1–0.3)
BILIRUB SERPL-MCNC: 0.6 MG/DL (ref 0.1–1)
PROT SERPL-MCNC: 7.1 G/DL (ref 6–8.4)

## 2024-07-19 PROCEDURE — 1126F AMNT PAIN NOTED NONE PRSNT: CPT | Mod: CPTII,S$GLB,, | Performed by: NURSE PRACTITIONER

## 2024-07-19 PROCEDURE — 3288F FALL RISK ASSESSMENT DOCD: CPT | Mod: CPTII,S$GLB,, | Performed by: NURSE PRACTITIONER

## 2024-07-19 PROCEDURE — 80076 HEPATIC FUNCTION PANEL: CPT | Performed by: NURSE PRACTITIONER

## 2024-07-19 PROCEDURE — 99214 OFFICE O/P EST MOD 30 MIN: CPT | Mod: S$GLB,,, | Performed by: NURSE PRACTITIONER

## 2024-07-19 PROCEDURE — 1101F PT FALLS ASSESS-DOCD LE1/YR: CPT | Mod: CPTII,S$GLB,, | Performed by: NURSE PRACTITIONER

## 2024-07-19 PROCEDURE — 3008F BODY MASS INDEX DOCD: CPT | Mod: CPTII,S$GLB,, | Performed by: NURSE PRACTITIONER

## 2024-07-19 PROCEDURE — 1160F RVW MEDS BY RX/DR IN RCRD: CPT | Mod: CPTII,S$GLB,, | Performed by: NURSE PRACTITIONER

## 2024-07-19 PROCEDURE — 1159F MED LIST DOCD IN RCRD: CPT | Mod: CPTII,S$GLB,, | Performed by: NURSE PRACTITIONER

## 2024-07-19 PROCEDURE — 99999 PR PBB SHADOW E&M-EST. PATIENT-LVL III: CPT | Mod: PBBFAC,,, | Performed by: NURSE PRACTITIONER

## 2024-07-19 PROCEDURE — 4010F ACE/ARB THERAPY RXD/TAKEN: CPT | Mod: CPTII,S$GLB,, | Performed by: NURSE PRACTITIONER

## 2024-07-19 PROCEDURE — 91200 LIVER ELASTOGRAPHY: CPT | Mod: S$GLB,,, | Performed by: NURSE PRACTITIONER

## 2024-07-19 PROCEDURE — 36415 COLL VENOUS BLD VENIPUNCTURE: CPT | Performed by: NURSE PRACTITIONER

## 2024-07-19 NOTE — PROCEDURES
FibroScan Transplant Hepatology    Date/Time: 7/19/2024 9:45 AM    Performed by: Jeanie Paredes NP  Authorized by: Jeanie Paredes NP    Diagnosis:  NAFLD    Probe:  XL    Universal Protocol: Patient's identity, procedure and site were verified, confirmatory pause was performed.  Discussed procedure including risks and potential complications.  Questions answered.  Patient verbalizes understanding and wishes to proceed with VCTE.     Procedure: After providing explanations of the procedure, patient was placed in the supine position with right arm in maximum abduction to allow optimal exposure of right lateral abdomen.  Patient was briefly assessed, Testing was performed in the mid-axillary location, 50Hz Shear Wave pulses were applied and the resulting Shear Wave and Propagation Speed detected with a 3.5 MHz ultrasonic signal, using the FibroScan probe, Skin to liver capsule distance and liver parenchyma were accessed during the entire examination with the FibroScan probe, Patient was instructed to breathe normally and to abstain from sudden movements during the procedure, allowing for random measurements of liver stiffness. At least 10 Shear Waves were produced, Individual measurements of each Shear Wave were calculated.  Patient tolerated the procedure well with no complications.  Meets discharge criteria as was dismissed.  Rates pain 0 out of 10.  Patient will follow up with ordering provider to review results.    Findings  Median liver stiffness score:  3.9  CAP Reading: dB/m:  295    IQR/med %:  15  Interpretation  Fibrosis interpretation is based on medial liver stiffness - Kilopascal (kPa).    Fibrosis Stage:  F 0-1  Steatosis interpretation is based on controlled attenuation parameter - (dB/m).    Steatosis Grade:  <S1

## 2024-07-19 NOTE — PROGRESS NOTES
[facility] PT [facility]    REFERRING PROVIDER:  No ref. provider found    CHIEF COMPLAINT: Fatty Liver    I was assisted during the visit by a Professional .    HPI: This is a 68 y.o.  female with PMH noted below, presenting for follow up in the management of fatty liver. She is unaccompanied, and was last seen in clinic by myself in 7/2023. Serial Fibroscans have been suggestive of mild to severe fatty infiltration of the liver, but with F0-F1 fibrosis, and a low likelihood of cirrhosis. She has never undergone a liver biopsy.    FIB-4 Calculation: 0.92 at 7/19/2024 11:09 AM   Calculated from:  Last SGOT/AST : 30 at 7/19/2024 11:09 AM  Last SGPT/ALT: 54 at 7/19/2024 11:09 AM   Platelets: 293 at 7/19/2024 11:09 AM   Age: 68 y.o.     FIB-4 below 1.30 is considered as low-risk for advanced fibrosis  FIB-4 over 2.67 is considered as high-risk for advanced fibrosis  FIB-4 values between 1.30 and 2.67 are considered as intermediate-risk of advanced fibrosis for ages 36-64.     For ages > 64 the cut-off for low-risk goes to < 2.  This is a screening tool and clinical judgement should be used in the interpretation of these results.    Most recent abdominal ultrasound in 1/2023 continues to show mild hepatomegaly (17.6 cm), due to fatty infiltration of the liver. She rarely drinks alcohol. She is immune to HAV (has received 1 vaccination) and is also immune to HBV through prior exposure. She denies any known family history of liver disease. B/P is well controlled on current regimen. Her other metabolic risk factors are well controlled without medication.    Unfortunately she has regained the weight that she previously lost, and her liver enzymes became mildly elevated again (had normalized previously with weight loss). She has an appointment with Bariatric medicine to discuss medical weight loss options.    She is well appearing and has no signs or symptoms of  decompensated liver disease including jaundice, dark urine, abdominal distention, lower extremity edema, hematemesis, melena, or periods of confusion suggestive of hepatic encephalopathy.      Review of patient's allergies indicates:   Allergen Reactions    Tramadol Itching     Current Outpatient Medications on File Prior to Visit   Medication Sig Dispense Refill    clobetasoL (CLOBEX) 0.05 % shampoo Apply 1 application  topically 2 (two) times daily. Apply to affected area 118 mL 2    gabapentin (NEURONTIN) 300 MG capsule Take 1 capsule (300 mg total) by mouth 2 (two) times daily. 60 capsule 11    ketoconazole (NIZORAL) 2 % shampoo Apply topically twice a week. Apply small amount to scalp, lather, and leave on for 5 minutes, then rinse off. Use twice weekly 120 mL 11    methocarbamoL (ROBAXIN) 500 MG Tab Take 1-2 tablets PO Q8 PRN for low back and leg pain 60 tablet 2    metoprolol succinate (TOPROL-XL) 100 MG 24 hr tablet Take 1 tablet (100 mg total) by mouth once daily. With a meal 90 tablet 0    nortriptyline (PAMELOR) 10 MG capsule Take 1 capsule (10 mg total) by mouth every evening. 30 capsule 11    olmesartan (BENICAR) 40 MG tablet Take 1 tablet (40 mg total) by mouth once daily. 90 tablet 0    amitriptyline (ELAVIL) 10 MG tablet Take 1 tablet (10 mg total) by mouth every evening. 30 tablet 11    esomeprazole (NEXIUM) 40 MG capsule Take 1 capsule (40 mg total) by mouth 2 (two) times daily. (Patient not taking: Reported on 7/19/2024) 90 capsule 3    raloxifene (EVISTA) 60 mg tablet Take 60 mg by mouth. (Patient not taking: Reported on 7/19/2024)      sumatriptan (IMITREX) 100 MG tablet Take 1 tablet (100 mg total) by mouth every 2 (two) hours as needed for Migraine. 9 tablet 5    topiramate (TOPAMAX) 15 MG capsule Take 1 capsule (15 mg total) by mouth As instructed. 1 cap PO bedtime for  1-2 weeks then 2 caps PO bedtime thereafter 60 capsule 2    vitamin D (VITAMIN D3) 1000 units Tab Take 1 tablet (1,000 Units  "total) by mouth once daily. (Patient not taking: Reported on 7/19/2024) 30 tablet 0     No current facility-administered medications on file prior to visit.     PMHX:  has a past medical history of Arthritis, GERD (gastroesophageal reflux disease), Hyperlipidemia, Hypertension, Irritable bowel syndrome, Joint pain, Knee injury, Migraine headache, Muscle pain, fibromyalgia (03/19/2014), NAFLD (nonalcoholic fatty liver disease), Other physical therapy, Plantar fasciitis, Sleep apnea, and Urinary incontinence.    PSHX:  has a past surgical history that includes Hysterectomy; Colonoscopy (N/A, 11/9/2017); Colonoscopy (N/A, 2/26/2018); Appendectomy (2018); Total knee arthroplasty (Right, 11/23/2020); and Esophagogastroduodenoscopy (N/A, 7/11/2023).    FAMILY HISTORY: Negative for liver disease, reviewed in Psychiatric    SOCIAL HISTORY:   Social History     Tobacco Use   Smoking Status Never   Smokeless Tobacco Never     Social History     Substance and Sexual Activity   Alcohol Use No     Social History     Substance and Sexual Activity   Drug Use No     ROS:   GENERAL: Denies fever, chills, weight loss/gain, fatigue  HEENT: Denies headaches, dizziness, vision/hearing changes  CARDIOVASCULAR: Denies chest pain, palpitations, or edema  RESPIRATORY: Denies dyspnea, cough  GI: Denies rectal bleeding, nausea, vomiting. No change in bowel pattern or color. + Intermittent RUQ pain - followed by GI.  : Denies dysuria, hematuria   SKIN: Denies rash, itching   NEURO: Denies confusion, memory loss, or mood changes  PSYCH: Denies depression or anxiety  HEME/LYMPH: Denies easy bruising or bleeding    PHYSICAL EXAM:   Friendly  female, in no acute distress; alert and oriented to person, place and time  VITALS: Ht 5' 7" (1.702 m)   Wt 90.6 kg (199 lb 11.8 oz)   BMI 31.28 kg/m²   HENT: Normocephalic, without obvious abnormality.   EYES: Sclerae anicteric.   NECK: Supple.   CARDIOVASCULAR: No peripheral edema.  RESPIRATORY: Normal " respiratory effort.   GI: Non-distended abdomen.  EXTREMITIES:  No clubbing, cyanosis or edema.  SKIN: Warm and dry. No jaundice. No rashes noted to exposed skin.   NEURO:  Normal gait. No asterixis.  PSYCH:  Memory intact. Thought and speech pattern appropriate. Behavior normal. No depression or anxiety noted.    DIAGNOSTIC STUDIES:    FIBROSCAN 10/24/2016:    Fibroscan readin.3 KPa     IQR/med: 19 %     Fibrosis:F 0-1      FIBROSCAN 2021:    Findings  Median liver stiffness score:  4.9  CAP Reading: dB/m:  236     IQR/med %:  16  Interpretation  Fibrosis interpretation is based on medial liver stiffness - Kilopascal (kPa).     Fibrosis Stage:  F 0-1  Steatosis interpretation is based on controlled attenuation parameter - (dB/m).     Steatosis Grade:  S1    US ABDOMEN COMPLETE 2021:    FINDINGS:    Demonstrated portions of the pancreas are unremarkable.     The liver is enlarged in size measuring 17.6 cm with homogeneous parenchymal echotexture.  Hepatorenal index is elevated at 1.6.  Gallbladder demonstrates no calculi, wall thickening, or pericholecystic fluid.  Negative sonographic Kirby sign.  There is no intra or extrahepatic biliary ductal dilatation and the common bile duct measures 2 mm in diameter.     The aorta and IVC appear within normal limits.     Kidneys are normal in size measuring 12.3 cm on the right and 11.9 cm on the left.  A 3 mm echogenic focus at the right renal midpole, likely a nonobstructing nephrolith.  No solid masses or hydronephrosis.     The spleen is normal in size measuring 11.5 x 4.2 cm with homogeneous echotexture.     No abdominal free fluid identified.     Impression:     Hepatomegaly and hepatic steatosis.     Right renal nonobstructing nephrolith.    US ABDOMEN LIMITED 2023:    FINDINGS:    Exam is somewhat degraded due to prominent overlying bowel gas.     The visualized portion of the pancreas is unremarkable.     The liver is upper limit of normal for  size measuring 17.6 cm.  The liver demonstrates uniform echotexture with mildly increased echogenicity.  No focal hepatic lesions are seen.     The gallbladder is unremarkable with no evidence of calculi.  The common duct is not dilated, measuring 2 mm.  The gallbladder wall is not thickened.  There is no sonographic Kirby sign.  No dilated intrahepatic radicles are seen.  No pericholecystic fluid.     The spleen is normal in size measuring 10.8 x 4.0 cm with a homogeneous echotexture.     There is no evidence of ascites.     Impression:     Mildly increased hepatic echogenicity compatible with reported steatosis.  No focal suspicious lesion.     FIBROSCAN 1/19/2023:    Findings  Median liver stiffness score:  8.2  CAP Reading: dB/m:  281     IQR/med %:  11  Interpretation  Fibrosis interpretation is based on medial liver stiffness - Kilopascal (kPa).     Fibrosis Stage:  F2  Steatosis interpretation is based on controlled attenuation parameter - (dB/m).     Steatosis Grade:  S2    FIBROSCAN 7/19/2023:    4.8 kPa with an IQR of 17%; CAP score 342 dB/m    FIBROSCAN 7/19/2024:    Findings  Median liver stiffness score:  3.9  CAP Reading: dB/m:  295     IQR/med %:  15  Interpretation  Fibrosis interpretation is based on medial liver stiffness - Kilopascal (kPa).     Fibrosis Stage:  F 0-1  Steatosis interpretation is based on controlled attenuation parameter - (dB/m).     Steatosis Grade:  <S1     ASSESSMENT & PLAN:  68 y.o. White female with:    1. Fatty liver        2. Class 1 obesity due to excess calories with serious comorbidity and body mass index (BMI) of 31.0 to 31.9 in adult        3. Essential hypertension          - Fibroscan today to non-invasively restage liver disease.  - Agree with referral to Bariatric Medicine to discuss medical weight loss options.  - Recommend weight loss of 20 pounds, through diet and exercise.  - Recommend LFT's be checked every 6-12 months (can be done through PCP).  - Recommend  good control of blood pressure, blood sugar and lipids.  - Avoid alcohol and herbal supplements/alternative remedies.  - Return to clinic PRN.    Thank you for allowing me to participate in the care of Rachel Conroy       Hepatology Nurse Practitioner  [facility]    CC'ed note to:   No ref. provider found  Maxwell Gonzalez Jr., MD

## 2024-07-21 PROBLEM — L21.9 SEBORRHEIC DERMATITIS: Chronic | Status: ACTIVE | Noted: 2024-07-21

## 2024-07-21 NOTE — ASSESSMENT & PLAN NOTE
The patient's BMI has been recorded in the chart. The patient has been provided educational materials regarding the benefits of attaining and maintaining a normal weight. We will continue to address and follow this issue during follow up visits.  Referral to Bariatric spike

## 2024-07-21 NOTE — PROGRESS NOTES
Patient Name: Rachel Conroy    : 1955  MRN: [id number]      Subjective:     Patient ID: Rachel is a 68 y.o. female    Chief Complaint:  Hypertension and Hyperlipidemia    68-year-old female presents for follow-up on hypertension, hyperlipidemia, and fibromyalgia.  She reports compliance with her medications.  She reports that she has been having palpitations and would like to see a cardiologist for evaluation.  She states that the palpitations are random and not related to activity.    Patient also reports that her insurance no longer covers previous shampoo for seborrheic dermatitis, clobetasol and she is requesting an alternative medication.    Hypertension  This is a chronic problem. The current episode started more than 1 year ago. The problem is controlled. Associated symptoms include palpitations. Pertinent negatives include no anxiety, blurred vision, chest pain, headaches, malaise/fatigue, orthopnea, peripheral edema, PND, shortness of breath or sweats. (Frequent urination after taking olmesartan-hydrochlorthiazide) Risk factors for coronary artery disease include post-menopausal state and dyslipidemia. Past treatments include diuretics, angiotensin blockers and beta blockers. The current treatment provides significant improvement. There are no compliance problems.  There is no history of angina, kidney disease or heart failure. There is no history of chronic renal disease.   Hyperlipidemia  This is a chronic problem. The current episode started more than 1 year ago. She has no history of chronic renal disease, diabetes, hypothyroidism or nephrotic syndrome. Factors aggravating her hyperlipidemia include thiazides. Pertinent negatives include no chest pain, focal sensory loss or shortness of breath. Current antihyperlipidemic treatment includes statins. The current treatment provides significant improvement of lipids. There are no compliance problems.  Risk factors for coronary artery disease  "include dyslipidemia, post-menopausal, obesity and hypertension.        Review of Systems   Constitutional:  Negative for malaise/fatigue and unexpected weight change.   HENT:  Negative for ear pain and sore throat.    Eyes:  Negative for blurred vision and visual disturbance.   Respiratory:  Negative for shortness of breath.    Cardiovascular:  Positive for palpitations. Negative for chest pain, orthopnea and PND.   Gastrointestinal:  Negative for abdominal pain and blood in stool.   Genitourinary:  Negative for dysuria and frequency.   Integumentary:  Negative for rash.   Neurological:  Negative for weakness, numbness and headaches.   Hematological:  Negative for adenopathy.   Psychiatric/Behavioral:  Negative for suicidal ideas.         Objective:   /76 (BP Location: Left arm, Patient Position: Sitting, BP Method: Medium (Manual))   Pulse 85   Resp 18   Ht 5' 7" (1.702 m)   Wt 92.1 kg (203 lb 0.7 oz)   SpO2 98%   BMI 31.80 kg/m²     Physical Exam  Vitals reviewed.   Constitutional:       Appearance: She is well-developed.   HENT:      Head: Normocephalic and atraumatic.      Right Ear: External ear normal.      Left Ear: External ear normal.      Nose: Nose normal.      Mouth/Throat:      Pharynx: No oropharyngeal exudate.   Eyes:      General:         Right eye: No discharge.         Left eye: No discharge.      Conjunctiva/sclera: Conjunctivae normal.      Pupils: Pupils are equal, round, and reactive to light.   Neck:      Trachea: No tracheal deviation.   Cardiovascular:      Rate and Rhythm: Normal rate and regular rhythm.      Heart sounds: Normal heart sounds. No murmur heard.  Pulmonary:      Effort: Pulmonary effort is normal.      Breath sounds: Normal breath sounds. No wheezing or rales.   Abdominal:      General: Bowel sounds are normal.      Palpations: Abdomen is soft. Abdomen is not rigid. There is no mass.      Tenderness: There is no abdominal tenderness. There is no guarding. "   Musculoskeletal:      Cervical back: Normal range of motion and neck supple.   Lymphadenopathy:      Cervical: No cervical adenopathy.   Neurological:      Mental Status: She is oriented to person, place, and time.      Gait: Gait normal.        Assessment        ICD-10-CM ICD-9-CM   1. Essential hypertension  I10 401.9   2. Dyslipidemia  E78.5 272.4   3. Seborrheic dermatitis  L21.9 690.10   4. Fibromyalgia  M79.7 729.1   5. Class 1 obesity due to excess calories with serious comorbidity and body mass index (BMI) of 31.0 to 31.9 in adult  E66.09 278.00    Z68.31 V85.31   6. Palpitations  R00.2 785.1         Plan:     1. Essential hypertension  Assessment & Plan:  Good blood pressure control with current regimen.  Continue current regimen.    Orders:  -     Comprehensive Metabolic Panel; Future; Expected date: 11/18/2024  -     Lipid Panel; Future; Expected date: 11/18/2024  -     CBC Auto Differential; Future; Expected date: 11/18/2024    2. Dyslipidemia  Overview:  Lab Results   Component Value Date    CHOL 242 (H) 02/12/2024    CHOL 224 (H) 06/27/2023    CHOL 194 07/29/2022     Lab Results   Component Value Date    HDL 86 (H) 02/12/2024    HDL 81 (H) 06/27/2023    HDL 67 07/29/2022     Lab Results   Component Value Date    LDLCALC 134.6 02/12/2024    LDLCALC 124.2 06/27/2023    LDLCALC 106.0 07/29/2022     Lab Results   Component Value Date    TRIG 107 02/12/2024    TRIG 94 06/27/2023    TRIG 105 07/29/2022     Lab Results   Component Value Date    CHOLHDL 35.5 02/12/2024    CHOLHDL 36.2 06/27/2023    CHOLHDL 34.5 07/29/2022      The 10-year ASCVD risk score (Valente VILLARREAL, et al., 2019) is: 6.6%    Values used to calculate the score:      Age: 68 years      Sex: Female      Is Non- : No      Diabetic: No      Tobacco smoker: No      Systolic Blood Pressure: 118 mmHg      Is BP treated: Yes      HDL Cholesterol: 86 mg/dL      Total Cholesterol: 242 mg/dL      Assessment & Plan:  Not in a  "statin benefit group.  Continue monitoring lipids.    Orders:  -     Comprehensive Metabolic Panel; Future; Expected date: 11/18/2024  -     Lipid Panel; Future; Expected date: 11/18/2024  -     CBC Auto Differential; Future; Expected date: 11/18/2024    3. Seborrheic dermatitis  Assessment & Plan:  Ketoconazole shampoo prescribed.  Patient educated on how to use.    Orders:  -     ketoconazole (NIZORAL) 2 % shampoo; Apply topically twice a week. Apply small amount to scalp, lather, and leave on for 5 minutes, then rinse off. Use twice weekly  Dispense: 120 mL; Refill: 11    4. Fibromyalgia  Overview:  Diagnosed by Rheumatology October 2014    Assessment & Plan:  Continue gabapentin    Orders:  -     Ambulatory referral/consult to Physical/Occupational Therapy; Future; Expected date: 07/18/2024    5. Class 1 obesity due to excess calories with serious comorbidity and body mass index (BMI) of 31.0 to 31.9 in adult  Overview:  Wt Readings from Last 3 Encounters:   07/19/24 0956 90.6 kg (199 lb 11.8 oz)   07/11/24 1048 92.1 kg (203 lb 0.7 oz)   02/16/24 1502 90 kg (198 lb 6.6 oz)      Estimated body mass index is 31.8 kg/m² as calculated from the following:    Height as of this encounter: 5' 7" (1.702 m).    Weight as of this encounter: 92.1 kg (203 lb 0.7 oz).  Ideal body weight: 61.6 kg (135 lb 12.9 oz)     Assessment & Plan:  The patient's BMI has been recorded in the chart. The patient has been provided educational materials regarding the benefits of attaining and maintaining a normal weight. We will continue to address and follow this issue during follow up visits.  Referral to Bariatric spike    Orders:  -     Ambulatory referral/consult to Bariatric/Obesity Medicine; Future; Expected date: 07/18/2024    6. Palpitations  Assessment & Plan:  On beta-blocker.  Continues to have palpitations.  Will get Cardiology evaluation.    Orders:  -     Ambulatory referral/consult to Cardiology; Future; Expected date: " 07/18/2024           -Maxwell Gonzalez Jr., MD, AAHIVS      This office note has been dictated.  This dictation has been generated using M-Modal Fluency Direct dictation; some phonetic errors may occur.         Patient Instructions   Llame al [phone] para hacer yany con terapia fisica    Llame al equipo de referencias al [phone] para programar referencia al doctor bariatrico        Follow up in about 4 months (around 11/18/2024) for Chronic Problems.   Future Appointments   Date Time Provider Department Center   7/25/2024  2:00 PM BARIATRIC SURG [facility]   11/8/2024  8:00 AM LAB, [facility]   11/12/2024  9:20 AM Maxwell Gonzalez Jr., MD [facility]

## 2024-07-29 ENCOUNTER — TELEPHONE (OUTPATIENT)
Dept: BARIATRICS | Facility: CLINIC | Age: 69
End: 2024-07-29
Payer: MEDICARE

## 2024-08-05 DIAGNOSIS — R00.2 PALPITATIONS: ICD-10-CM

## 2024-08-05 DIAGNOSIS — I10 ESSENTIAL HYPERTENSION: Chronic | ICD-10-CM

## 2024-08-05 DIAGNOSIS — M79.7 FIBROMYALGIA: Chronic | ICD-10-CM

## 2024-08-05 RX ORDER — METOPROLOL SUCCINATE 100 MG/1
100 TABLET, EXTENDED RELEASE ORAL DAILY
Qty: 90 TABLET | Refills: 3 | Status: SHIPPED | OUTPATIENT
Start: 2024-08-05

## 2024-08-13 ENCOUNTER — TELEPHONE (OUTPATIENT)
Dept: BARIATRICS | Facility: CLINIC | Age: 69
End: 2024-08-13
Payer: MEDICARE

## 2024-08-13 RX ORDER — GABAPENTIN 300 MG/1
300 CAPSULE ORAL 2 TIMES DAILY
Qty: 180 CAPSULE | Refills: 3 | Status: SHIPPED | OUTPATIENT
Start: 2024-08-13

## 2024-08-14 DIAGNOSIS — L21.9 SEBORRHEIC DERMATITIS: Chronic | ICD-10-CM

## 2024-08-14 DIAGNOSIS — M79.7 FIBROMYALGIA: Chronic | ICD-10-CM

## 2024-08-14 RX ORDER — GABAPENTIN 300 MG/1
300 CAPSULE ORAL 2 TIMES DAILY
Qty: 180 CAPSULE | Refills: 3 | Status: CANCELLED | OUTPATIENT
Start: 2024-08-14

## 2024-08-14 NOTE — TELEPHONE ENCOUNTER
No care due was identified.  [facility] Embedded Care Due Messages. Reference number: [id number].   8/14/2024 5:36:56 PM CDT

## 2024-08-15 NOTE — TELEPHONE ENCOUNTER
Refill Routing Note   Medication(s) are not appropriate for processing by [facility] for the following reason(s):        Outside of protocol    [facility] action(s):  Route             Appointments  past 12m or future 3m with PCP    Date Provider   Last Visit   7/11/2024 Maxwell Gonzalez Jr., MD   Next Visit   11/12/2024 Maxwell Gonzalez Jr., MD   ED visits in past 90 days: 0        Note composed:7:56 AM 08/15/2024

## 2024-08-18 RX ORDER — KETOCONAZOLE 20 MG/ML
SHAMPOO, SUSPENSION TOPICAL
Qty: 120 ML | Refills: 11 | Status: SHIPPED | OUTPATIENT
Start: 2024-08-19

## 2024-08-29 ENCOUNTER — OFFICE VISIT (OUTPATIENT)
Dept: BARIATRICS | Facility: CLINIC | Age: 69
End: 2024-08-29
Payer: MEDICARE

## 2024-08-29 VITALS — OXYGEN SATURATION: 98 % | HEIGHT: 67 IN | HEART RATE: 65 BPM | WEIGHT: 199.19 LBS | BODY MASS INDEX: 31.26 KG/M2

## 2024-08-29 DIAGNOSIS — I10 ESSENTIAL HYPERTENSION: Chronic | ICD-10-CM

## 2024-08-29 DIAGNOSIS — K74.01 EARLY HEPATIC FIBROSIS: ICD-10-CM

## 2024-08-29 DIAGNOSIS — E66.09 CLASS 1 OBESITY DUE TO EXCESS CALORIES WITH SERIOUS COMORBIDITY AND BODY MASS INDEX (BMI) OF 31.0 TO 31.9 IN ADULT: Primary | Chronic | ICD-10-CM

## 2024-08-29 DIAGNOSIS — G47.33 OBSTRUCTIVE SLEEP APNEA: ICD-10-CM

## 2024-08-29 DIAGNOSIS — Z71.3 ENCOUNTER FOR WEIGHT LOSS COUNSELING: ICD-10-CM

## 2024-08-29 DIAGNOSIS — K76.0 FATTY LIVER: Chronic | ICD-10-CM

## 2024-08-29 DIAGNOSIS — E78.5 DYSLIPIDEMIA: Chronic | ICD-10-CM

## 2024-08-29 PROCEDURE — 1126F AMNT PAIN NOTED NONE PRSNT: CPT | Mod: CPTII,S$GLB,, | Performed by: STUDENT IN AN ORGANIZED HEALTH CARE EDUCATION/TRAINING PROGRAM

## 2024-08-29 PROCEDURE — 99204 OFFICE O/P NEW MOD 45 MIN: CPT | Mod: S$GLB,,, | Performed by: STUDENT IN AN ORGANIZED HEALTH CARE EDUCATION/TRAINING PROGRAM

## 2024-08-29 PROCEDURE — 99999 PR PBB SHADOW E&M-EST. PATIENT-LVL IV: CPT | Mod: PBBFAC,,, | Performed by: STUDENT IN AN ORGANIZED HEALTH CARE EDUCATION/TRAINING PROGRAM

## 2024-08-29 PROCEDURE — 4010F ACE/ARB THERAPY RXD/TAKEN: CPT | Mod: CPTII,S$GLB,, | Performed by: STUDENT IN AN ORGANIZED HEALTH CARE EDUCATION/TRAINING PROGRAM

## 2024-08-29 PROCEDURE — 1159F MED LIST DOCD IN RCRD: CPT | Mod: CPTII,S$GLB,, | Performed by: STUDENT IN AN ORGANIZED HEALTH CARE EDUCATION/TRAINING PROGRAM

## 2024-08-29 PROCEDURE — 1101F PT FALLS ASSESS-DOCD LE1/YR: CPT | Mod: CPTII,S$GLB,, | Performed by: STUDENT IN AN ORGANIZED HEALTH CARE EDUCATION/TRAINING PROGRAM

## 2024-08-29 PROCEDURE — 3288F FALL RISK ASSESSMENT DOCD: CPT | Mod: CPTII,S$GLB,, | Performed by: STUDENT IN AN ORGANIZED HEALTH CARE EDUCATION/TRAINING PROGRAM

## 2024-08-29 PROCEDURE — 3008F BODY MASS INDEX DOCD: CPT | Mod: CPTII,S$GLB,, | Performed by: STUDENT IN AN ORGANIZED HEALTH CARE EDUCATION/TRAINING PROGRAM

## 2024-08-29 PROCEDURE — 1160F RVW MEDS BY RX/DR IN RCRD: CPT | Mod: CPTII,S$GLB,, | Performed by: STUDENT IN AN ORGANIZED HEALTH CARE EDUCATION/TRAINING PROGRAM

## 2024-08-29 RX ORDER — CYCLOBENZAPRINE HCL 10 MG
10 TABLET ORAL 2 TIMES DAILY PRN
COMMUNITY
Start: 2024-08-25 | End: 2024-09-04

## 2024-08-29 RX ORDER — SEMAGLUTIDE 0.5 MG/.5ML
0.5 INJECTION, SOLUTION SUBCUTANEOUS
Qty: 2 ML | Refills: 0 | Status: SHIPPED | OUTPATIENT
Start: 2024-09-26 | End: 2024-10-18

## 2024-08-29 RX ORDER — SEMAGLUTIDE 1 MG/.5ML
1 INJECTION, SOLUTION SUBCUTANEOUS
Qty: 2 ML | Refills: 0 | Status: SHIPPED | OUTPATIENT
Start: 2024-10-24 | End: 2024-11-15

## 2024-08-29 RX ORDER — SEMAGLUTIDE 0.25 MG/.5ML
0.25 INJECTION, SOLUTION SUBCUTANEOUS
Qty: 2 ML | Refills: 0 | Status: SHIPPED | OUTPATIENT
Start: 2024-08-29 | End: 2024-09-20

## 2024-08-29 NOTE — PROGRESS NOTES
Subjective     Patient ID: Rachel Conroy is a 69 y.o. female.    Chief Complaint: Consult and Obesity    Patient presents for treatment of obesity.     Co-morbidities   HTN  Dyslipidemia  Fatty Liver   NELL  Fibromyalgia  Migraines - topiramate as needed    Negative for thyroid cancer       History of Weight Loss Efforts  About 4-5 years ago, lost about 18 lbs with diet changes    Current Physical Activity  No regular exercise routine  Walks and stands at work    Current Eating Habits  Breakfast - black coffee, 2 eggs, grits, sweet bread  Lunch - rice, vegetable, salad, protein (chicken, red meat, fish) usually baked  Dinner - tortillas, beans, eggs, cheese, plaintans  Snacks - sweet bread (around 10am), coffee and sweet bread (4-5 pm)  Beverages - black coffee, water, hot tea    Medical Weight Loss  8/29/2024: 199.2 lbs, BMI 31.2, BFP 42.2%, BFM 83.9 lbs, SMM 63.7 lbs, BMR 1499 kcal      Review of Systems   Constitutional:  Negative for chills and fever.   Respiratory:  Negative for shortness of breath.    Cardiovascular:  Negative for chest pain and palpitations.   Gastrointestinal:  Negative for abdominal pain, nausea and vomiting.   Neurological:  Negative for dizziness and light-headedness.   Psychiatric/Behavioral:  The patient is not nervous/anxious.           Objective    Latest Reference Range & Units 06/27/23 09:34 07/19/23 09:14 02/12/24 11:32 07/19/24 09:45   Sodium 136 - 145 mmol/L 138  137    Potassium 3.5 - 5.1 mmol/L 3.8  3.9    Chloride 95 - 110 mmol/L 105  105    CO2 23 - 29 mmol/L 27  25    Anion Gap 8 - 16 mmol/L 6 (L)  7 (L)    BUN 8 - 23 mg/dL 14  10    Creatinine 0.5 - 1.4 mg/dL 0.7  0.6    eGFR >60 mL/min/1.73 m^2 >60  >60.0    Glucose 70 - 110 mg/dL 98  87    Calcium 8.7 - 10.5 mg/dL 9.2  9.2    ALP 55 - 135 U/L 88 97 98 125   PROTEIN TOTAL 6.0 - 8.4 g/dL 7.0 6.9 7.1 7.1   Albumin 3.5 - 5.2 g/dL 3.9 3.9 3.9 3.9   BILIRUBIN TOTAL 0.1 - 1.0 mg/dL 0.5 0.5 0.6 0.6   Bilirubin Direct 0.1  "- 0.3 mg/dL  0.2  0.2   AST 10 - 40 U/L 21 20 21 30   ALT 10 - 44 U/L 29 24 32 54 (H)   Cholesterol Total 120 - 199 mg/dL 224 (H)  242 (H)    HDL 40 - 75 mg/dL 81 (H)  86 (H)    HDL/Cholesterol Ratio 20.0 - 50.0 % 36.2  35.5    Non-HDL Cholesterol mg/dL 143  156    Total Cholesterol/HDL Ratio 2.0 - 5.0  2.8  2.8    Triglycerides 30 - 150 mg/dL 94  107    LDL Cholesterol 63.0 - 159.0 mg/dL 124.2  134.6    TSH 0.400 - 4.000 uIU/mL 0.671      (L): Data is abnormally low  (H): Data is abnormally high    Vitals:    08/29/24 1408   Pulse: 65       Physical Exam  Vitals reviewed.   Constitutional:       General: She is not in acute distress.     Appearance: Normal appearance. She is obese. She is not ill-appearing, toxic-appearing or diaphoretic.   HENT:      Head: Normocephalic and atraumatic.   Cardiovascular:      Rate and Rhythm: Normal rate.   Pulmonary:      Effort: Pulmonary effort is normal. No respiratory distress.   Skin:     General: Skin is warm and dry.   Neurological:      Mental Status: She is alert and oriented to person, place, and time.            Assessment and Plan     1. Class 1 obesity due to excess calories with serious comorbidity and body mass index (BMI) of 31.0 to 31.9 in adult  Overview:  Wt Readings from Last 3 Encounters:   07/19/24 0956 90.6 kg (199 lb 11.8 oz)   07/11/24 1048 92.1 kg (203 lb 0.7 oz)   02/16/24 1502 90 kg (198 lb 6.6 oz)      Estimated body mass index is 31.8 kg/m² as calculated from the following:    Height as of this encounter: 5' 7" (1.702 m).    Weight as of this encounter: 92.1 kg (203 lb 0.7 oz).  Ideal body weight: 61.6 kg (135 lb 12.9 oz)     Orders:  -     Ambulatory referral/consult to Bariatric/Obesity Medicine  -     semaglutide, weight loss, (WEGOVY) 0.25 mg/0.5 mL PnIj; Inject 0.25 mg into the skin every 7 days. for 4 doses  Dispense: 2 mL; Refill: 0  -     semaglutide, weight loss, (WEGOVY) 0.5 mg/0.5 mL PnIj; Inject 0.5 mg into the skin every 7 days. for 4 " doses  Dispense: 2 mL; Refill: 0  -     semaglutide, weight loss, (WEGOVY) 1 mg/0.5 mL PnIj; Inject 1 mg into the skin every 7 days. for 4 doses  Dispense: 2 mL; Refill: 0    2. Dyslipidemia  Overview:  Lab Results   Component Value Date    CHOL 242 (H) 02/12/2024    CHOL 224 (H) 06/27/2023    CHOL 194 07/29/2022     Lab Results   Component Value Date    HDL 86 (H) 02/12/2024    HDL 81 (H) 06/27/2023    HDL 67 07/29/2022     Lab Results   Component Value Date    LDLCALC 134.6 02/12/2024    LDLCALC 124.2 06/27/2023    LDLCALC 106.0 07/29/2022     Lab Results   Component Value Date    TRIG 107 02/12/2024    TRIG 94 06/27/2023    TRIG 105 07/29/2022     Lab Results   Component Value Date    CHOLHDL 35.5 02/12/2024    CHOLHDL 36.2 06/27/2023    CHOLHDL 34.5 07/29/2022      The 10-year ASCVD risk score (Valente VILLARREAL, et al., 2019) is: 6.6%    Values used to calculate the score:      Age: 68 years      Sex: Female      Is Non- : No      Diabetic: No      Tobacco smoker: No      Systolic Blood Pressure: 118 mmHg      Is BP treated: Yes      HDL Cholesterol: 86 mg/dL      Total Cholesterol: 242 mg/dL      Orders:  -     semaglutide, weight loss, (WEGOVY) 0.25 mg/0.5 mL PnIj; Inject 0.25 mg into the skin every 7 days. for 4 doses  Dispense: 2 mL; Refill: 0  -     semaglutide, weight loss, (WEGOVY) 0.5 mg/0.5 mL PnIj; Inject 0.5 mg into the skin every 7 days. for 4 doses  Dispense: 2 mL; Refill: 0  -     semaglutide, weight loss, (WEGOVY) 1 mg/0.5 mL PnIj; Inject 1 mg into the skin every 7 days. for 4 doses  Dispense: 2 mL; Refill: 0    3. Essential hypertension  -     semaglutide, weight loss, (WEGOVY) 0.25 mg/0.5 mL PnIj; Inject 0.25 mg into the skin every 7 days. for 4 doses  Dispense: 2 mL; Refill: 0  -     semaglutide, weight loss, (WEGOVY) 0.5 mg/0.5 mL PnIj; Inject 0.5 mg into the skin every 7 days. for 4 doses  Dispense: 2 mL; Refill: 0  -     semaglutide, weight loss, (WEGOVY) 1 mg/0.5 mL PnIj;  "Inject 1 mg into the skin every 7 days. for 4 doses  Dispense: 2 mL; Refill: 0    4. Fatty liver  Overview:  CMP  Lab Results   Component Value Date     06/27/2023    K 3.8 06/27/2023     06/27/2023    CO2 27 06/27/2023    GLU 98 06/27/2023    BUN 14 06/27/2023    CREATININE 0.7 06/27/2023    CALCIUM 9.2 06/27/2023    PROT 6.9 07/19/2023    ALBUMIN 3.9 07/19/2023    BILITOT 0.5 07/19/2023    ALKPHOS 97 07/19/2023    AST 20 07/19/2023    ALT 24 07/19/2023    ANIONGAP 6 (L) 06/27/2023    EGFRNORACEVR >60 06/27/2023     VIRAL HEPATITIS  Lab Results   Component Value Date    HEPAIGM Negative 08/25/2020    HEPAIGG Positive (A) 08/25/2020    HEPBCAB Positive (A) 08/25/2020    HEPBSAB Positive (A) 08/25/2020     BLOOD COUNT  Lab Results   Component Value Date    WBC 5.25 07/29/2022    HGB 13.3 07/29/2022    HCT 40.7 07/29/2022     07/29/2022    IRON 52 10/24/2016    TIBC 364 10/24/2016    FERRITIN 237 10/24/2016     AUTO-IMMUNE/GENETIC  Lab Results   Component Value Date    BISHOP Negative 03/04/2009    SMOOTHMUSCAB Negative 1:40 10/24/2016    IGGSERUM 943 10/24/2016     ALCOHOL  No results found for: "[id number]", "[id number]"        07-  FibroScan  Fibrosis Stage:   F 0-1  Steatosis Grade:   S3    01-  FibroScan  Fibrosis Stage: F2    Orders:  -     semaglutide, weight loss, (WEGOVY) 0.25 mg/0.5 mL PnIj; Inject 0.25 mg into the skin every 7 days. for 4 doses  Dispense: 2 mL; Refill: 0  -     semaglutide, weight loss, (WEGOVY) 0.5 mg/0.5 mL PnIj; Inject 0.5 mg into the skin every 7 days. for 4 doses  Dispense: 2 mL; Refill: 0  -     semaglutide, weight loss, (WEGOVY) 1 mg/0.5 mL PnIj; Inject 1 mg into the skin every 7 days. for 4 doses  Dispense: 2 mL; Refill: 0    5. Obstructive sleep apnea  -     semaglutide, weight loss, (WEGOVY) 0.25 mg/0.5 mL PnIj; Inject 0.25 mg into the skin every 7 days. for 4 doses  Dispense: 2 mL; Refill: 0  -     semaglutide, weight loss, (WEGOVY) 0.5 mg/0.5 mL PnIj; " Inject 0.5 mg into the skin every 7 days. for 4 doses  Dispense: 2 mL; Refill: 0  -     semaglutide, weight loss, (WEGOVY) 1 mg/0.5 mL PnIj; Inject 1 mg into the skin every 7 days. for 4 doses  Dispense: 2 mL; Refill: 0    6. Encounter for weight loss counseling  -     semaglutide, weight loss, (WEGOVY) 0.25 mg/0.5 mL PnIj; Inject 0.25 mg into the skin every 7 days. for 4 doses  Dispense: 2 mL; Refill: 0  -     semaglutide, weight loss, (WEGOVY) 0.5 mg/0.5 mL PnIj; Inject 0.5 mg into the skin every 7 days. for 4 doses  Dispense: 2 mL; Refill: 0  -     semaglutide, weight loss, (WEGOVY) 1 mg/0.5 mL PnIj; Inject 1 mg into the skin every 7 days. for 4 doses  Dispense: 2 mL; Refill: 0    7. Early hepatic fibrosis  -     semaglutide, weight loss, (WEGOVY) 0.25 mg/0.5 mL PnIj; Inject 0.25 mg into the skin every 7 days. for 4 doses  Dispense: 2 mL; Refill: 0  -     semaglutide, weight loss, (WEGOVY) 0.5 mg/0.5 mL PnIj; Inject 0.5 mg into the skin every 7 days. for 4 doses  Dispense: 2 mL; Refill: 0  -     semaglutide, weight loss, (WEGOVY) 1 mg/0.5 mL PnIj; Inject 1 mg into the skin every 7 days. for 4 doses  Dispense: 2 mL; Refill: 0

## 2024-08-29 NOTE — PATIENT INSTRUCTIONS
Start Wegovy 0.25 mg once a week x 4 weeks, then 0.5 mg once a week for 4 weeks, then 1 mg once a week x 4 weeks.    Decrease portions as soon as you start Wegovy. Avoid fried, greasy, fatty foods.     Some nausea in the first couple weeks is not unusual as your body adjusts to the medication.     If you experience persistent severe abdominal pain that radiates to your back, please call the office.             Copyright © 2011, [facility]. For more information about The Healthy Eating Plate, please see The Nutrition Source, Department of Nutrition, [employer], www.thenutritionsource.org, and [employer], www.health.[employer].edu.      Meal Planning & Grocery Shopping    Meal planning builds the foundation for healthy eating. When you have structured ideas for healthy meals and foods available at home to prepare those meals, weight control becomes easier.  If only healthy foods are available at home, then you will be much more likely to eat healthy foods. And you will be less likely to go to a restaurant or  a fast food meal, which tend to be unhealthy and higher in calories than meals prepared at home.      Take 5-10 minutes each week to plan meals for the next 7 days.  Make a grocery list based on the meal plan.    Grocery Shopping Tips:  Shop on a full stomach.  Schedule your shopping for times when you are most motivated and able to be disciplined about your purchases. For example, after a stressful day at work it may be difficult to make the healthiest choices. Shopping at other times, such as early in the morning or after dinner, may be easier.  Focus your shopping on the outside aisles of the store, which tend to contain more fresh foods and lower calorie foods. The inside aisles tend to have more processed foods.  Stick to your list. Avoid buying unhealthy items just because they are on sale.   Compare nutrition labels to check the number of calories  and percentage of fat.      What to buy:    Vegetables  Fresh vegetables  Frozen vegetables with no sauce or added salt  Canned vegetables with no sauce or added salt    Protein  Lean meats, such as chicken and turkey  Limit red meats, such as beef to no more than 1x/week  Limit processed meats, such as cold cuts, suarez, sausage, and hot dogs. Look for brands that have no nitrites and are minimally processed. Consider turkey sausage or turkey suarez.  Fish and Shellfish  Eggs  Dried beans  Canned beans (reduced sodium)    Fat  Use healthy oils, such as olive oil or canola oil, for cooking, salad dressings, etc.  Unflavored nuts and seeds  Nut butters (no added sugar)    Dairy  Yogurt (no sugar added)  Cheese  Low-fat milk  Unsweetened nondairy milks (almond milk, soy milk, etc)    Fruit  Fresh Fruit  Frozen fruit with no added sugar  Canned fruit with no added sugar  Dried fruit with no added sugar  100% fruit juice    Whole Grains  Single ingredient grains, such as oats, quinoa, brown rice  Whole-wheat pasta  Sprouted whole-grain bread    What to avoid:  - Avoid fried foods  - Avoid foods with added sugar  - Avoid sugar-sweetened beverages  - Avoid ultra-processed foods    SEATED RESISTANCE BAND EXERCISES    If you do not have a resistance band, or do not feel comfortable using a resistance band, these exercises can also be done holding a light hand weight or water bottle.  If you are just starting to exercise, you may want to go through the motions without any weights or resistance till you become comfortable with the movements.    Do each of the movements shown 10 times (10 repetitions). You can repeat the exercises a second or  third time as well for greater benefit. The amount of tension on the resistance bands should be adjusted so  you can complete one set of 10 repetitions with effort. Increase the tension every few weeks. Do these  exercises 2 or 3 times a week.    * If an exercise hurts your back or  joints, stop doing that particular exercise, but keep doing all the others *      CHEST EXERCISE        Start Position:   Sit tall, with feet shoulder width apart and feet in front of knees.   Belly pulled in.   Place the band around your upper back, grasp band in each hand, knuckles (rings) facing front. Arms  should be bent so that knuckles are in front of elbows   Slide shoulder blades down your back and slightly together (as if making a V).   Relax your neck.    To Perform This Exercise:   Press hands forward to lengthen arms using chest muscles (not arms!).   Dont arch your back!)   Return to start position and repeat 10 times.   Breathe!        BACK EXERCISE        Start Position:   Sit tall, with feet shoulder width apart and feet in front of knees.   Belly pulled in.   Grasp band in each hand and raise overhead. Arms should be slightly wider than shoulder width and no  slack in the band.   Slide shoulder blades down your back and slightly together (as if making a V).   Relax your neck.    To Perform This Exercise:   Open arms pulling down towards chest using upper back muscles (not arms!).   Squeeze through shoulder blades at the bottom of the movement (dont arch your back!).   Return to start position and repeat 10 times.   Breathe!        SHOULDER EXERCISE        Start Position:   Sit tall, with feet shoulder width apart and feet in front of knees.   Belly pulled in.   Sit on band so that you can grasp band in one hand with tension on the band, but not so much tension that  you cannot straighten the arm. It may take a few tries to find the right amount of tension.   Slide shoulder blades down your back and slightly together (as if making a V).   Relax your neck.    To Perform This Exercise:   Press fist to the ceiling, slightly in front of the body.   SLOWLY return to start position and repeat 10 times.   Switch sides and repeat on the other side.   Breathe!        TRICEPS EXERCISE        Start  Position:   Sit tall, with feet shoulder width apart and feet in front of knees.   Belly pulled in.   Sit on one end of the band. Grasp other end of band in one hand.   Point elbow directly toward the ceiling (if this is difficult, you may support the upper arm with the opposite  hand)   Be sure there is no slack in the band in the starting position.   Slide shoulder blades down your back and slightly together (as if making a V).   Relax your neck.    To Perform This Exercise:   Extend your arm up to the ceiling, as shown.   Squeeze through shoulder blades at the bottom of the movement (dont arch your back!).   SLOWLY return to start position and repeat 10 times.   Repeat on the other side.   Breathe!        BICEP EXERCISE        Start Position:   Sit tall, with feet shoulder width apart and feet in front of knees.   Belly pulled in.   Place center of exercise band under one foot and step the end of the band under the other foot.   Grasp each end of the band with one hand. Make sure there is no slack between your foot and the hand  that holds the band.   Hold your elbows to your sides.   Pull abdominals in, lift chest, press shoulders down and back.    To Perform This Exercise:   As you curl up, keep your wrist from changing position in relation to your forearm and your arm stable  from the shoulder to the elbow.   Bend and straighten your elbow in a slow and controlled movement. Repeat this motion 10 times.   Repeat on the other side.   Breathe!

## 2024-09-04 ENCOUNTER — PATIENT MESSAGE (OUTPATIENT)
Dept: BARIATRICS | Facility: CLINIC | Age: 69
End: 2024-09-04
Payer: MEDICARE

## 2024-09-04 DIAGNOSIS — G43.009 MIGRAINE WITHOUT AURA AND WITHOUT STATUS MIGRAINOSUS, NOT INTRACTABLE: ICD-10-CM

## 2024-09-09 RX ORDER — TOPIRAMATE 25 MG/1
25 TABLET ORAL 2 TIMES DAILY
Qty: 180 TABLET | Refills: 0 | Status: SHIPPED | OUTPATIENT
Start: 2024-09-09 | End: 2024-12-08

## 2024-09-10 ENCOUNTER — TELEPHONE (OUTPATIENT)
Dept: FAMILY MEDICINE | Facility: CLINIC | Age: 69
End: 2024-09-10

## 2024-09-10 ENCOUNTER — OFFICE VISIT (OUTPATIENT)
Dept: FAMILY MEDICINE | Facility: CLINIC | Age: 69
End: 2024-09-10
Payer: MEDICARE

## 2024-09-10 VITALS
WEIGHT: 199.5 LBS | RESPIRATION RATE: 19 BRPM | DIASTOLIC BLOOD PRESSURE: 74 MMHG | BODY MASS INDEX: 31.31 KG/M2 | HEART RATE: 87 BPM | HEIGHT: 67 IN | SYSTOLIC BLOOD PRESSURE: 130 MMHG | TEMPERATURE: 98 F | OXYGEN SATURATION: 98 %

## 2024-09-10 DIAGNOSIS — G44.86 CERVICOGENIC HEADACHE: ICD-10-CM

## 2024-09-10 DIAGNOSIS — M54.2 NECK PAIN ON LEFT SIDE: Primary | ICD-10-CM

## 2024-09-10 PROCEDURE — 99999 PR PBB SHADOW E&M-EST. PATIENT-LVL V: CPT | Mod: PBBFAC,,, | Performed by: NURSE PRACTITIONER

## 2024-09-10 PROCEDURE — 1125F AMNT PAIN NOTED PAIN PRSNT: CPT | Mod: CPTII,S$GLB,, | Performed by: NURSE PRACTITIONER

## 2024-09-10 PROCEDURE — 1101F PT FALLS ASSESS-DOCD LE1/YR: CPT | Mod: CPTII,S$GLB,, | Performed by: NURSE PRACTITIONER

## 2024-09-10 PROCEDURE — 3078F DIAST BP <80 MM HG: CPT | Mod: CPTII,S$GLB,, | Performed by: NURSE PRACTITIONER

## 2024-09-10 PROCEDURE — 3075F SYST BP GE 130 - 139MM HG: CPT | Mod: CPTII,S$GLB,, | Performed by: NURSE PRACTITIONER

## 2024-09-10 PROCEDURE — 1159F MED LIST DOCD IN RCRD: CPT | Mod: CPTII,S$GLB,, | Performed by: NURSE PRACTITIONER

## 2024-09-10 PROCEDURE — 4010F ACE/ARB THERAPY RXD/TAKEN: CPT | Mod: CPTII,S$GLB,, | Performed by: NURSE PRACTITIONER

## 2024-09-10 PROCEDURE — 99213 OFFICE O/P EST LOW 20 MIN: CPT | Mod: S$GLB,,, | Performed by: NURSE PRACTITIONER

## 2024-09-10 PROCEDURE — 3008F BODY MASS INDEX DOCD: CPT | Mod: CPTII,S$GLB,, | Performed by: NURSE PRACTITIONER

## 2024-09-10 PROCEDURE — 3288F FALL RISK ASSESSMENT DOCD: CPT | Mod: CPTII,S$GLB,, | Performed by: NURSE PRACTITIONER

## 2024-09-10 PROCEDURE — 1160F RVW MEDS BY RX/DR IN RCRD: CPT | Mod: CPTII,S$GLB,, | Performed by: NURSE PRACTITIONER

## 2024-09-10 RX ORDER — MELOXICAM 15 MG/1
15 TABLET ORAL DAILY PRN
Qty: 30 TABLET | Refills: 0 | Status: SHIPPED | OUTPATIENT
Start: 2024-09-10

## 2024-09-10 NOTE — PROGRESS NOTES
Routine Office Visit    Patient Name: Rachel Conroy    : 1955  MRN: [id number]    Chief Complaint:  Neck pain    Subjective:  Rachel is a 69 y.o. female who presents today for:    Neck pain - patient who is new to me with medical history as documented below reports today with her son Michael for evaluation.  She declines  today, stating her son can interpret for her.  For the last 2 months she has been having some intermittent left-sided aching neck pain without any inciting event or injury.  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  The pain feels like a muscle tension starting at the posterior aspect of the left shoulder radiating up the left neck to the base of the skull.  She has been seeing a chiropractor for this but adjustments have only helped modestly.  She did go to the emergency room for this and was given Flexeril which does help somewhat but makes her very sleepy.  She notes that the pain does radiate up to the left side of her head and sometimes up to the right side of her head.  She denies any associated radiation of the pain down the extremities, numbness, tingling, weakness, or slurred speech.  No vision issues.  No fevers or chills or other acute concerns.    Past Medical History  Past Medical History:   Diagnosis Date    Arthritis     GERD (gastroesophageal reflux disease)     Hyperlipidemia     Hypertension     Irritable bowel syndrome     Joint pain     Knee injury     Migraine headache     Muscle pain, fibromyalgia 2014    NAFLD (nonalcoholic fatty liver disease)     Other physical therapy     Plantar fasciitis     Sleep apnea     Urinary incontinence        Family History  Family History   Problem Relation Name Age of Onset    Arthritis Mother Na     Alzheimer's disease Mother Na     Migraines Neg Hx      Melanoma Neg Hx      Psoriasis Neg Hx      Lupus Neg Hx      Eczema Neg Hx      Acne Neg Hx      Colon cancer Neg  Hx      Stomach cancer Neg Hx      Esophageal cancer Neg Hx      Liver disease Neg Hx      Crohn's disease Neg Hx      Ulcerative colitis Neg Hx      Celiac disease Neg Hx         Current Medications  Current Outpatient Medications on File Prior to Visit   Medication Sig Dispense Refill    clobetasoL (CLOBEX) 0.05 % shampoo Apply 1 application  topically 2 (two) times daily. Apply to affected area 118 mL 2    gabapentin (NEURONTIN) 300 MG capsule Take 1 capsule (300 mg total) by mouth 2 (two) times daily. 180 capsule 3    ketoconazole (NIZORAL) 2 % shampoo Apply topically twice a week. Apply small amount to scalp, lather, and leave on for 5 minutes, then rinse off. Use twice weekly 120 mL 11    methocarbamoL (ROBAXIN) 500 MG Tab Take 1-2 tablets PO Q8 PRN for low back and leg pain 60 tablet 2    metoprolol succinate (TOPROL-XL) 100 MG 24 hr tablet Take 1 tablet (100 mg total) by mouth once daily. With a meal 90 tablet 3    olmesartan (BENICAR) 40 MG tablet Take 1 tablet (40 mg total) by mouth once daily. 90 tablet 0    topiramate (TOPAMAX) 25 MG tablet Take 1 tablet (25 mg total) by mouth 2 (two) times daily. 180 tablet 0    vitamin D (VITAMIN D3) 1000 units Tab Take 1 tablet (1,000 Units total) by mouth once daily. 30 tablet 0    amitriptyline (ELAVIL) 10 MG tablet Take 1 tablet (10 mg total) by mouth every evening. 30 tablet 11    esomeprazole (NEXIUM) 40 MG capsule Take 1 capsule (40 mg total) by mouth 2 (two) times daily. (Patient not taking: Reported on 7/19/2024) 90 capsule 3    nortriptyline (PAMELOR) 10 MG capsule Take 1 capsule (10 mg total) by mouth every evening. 30 capsule 11    sumatriptan (IMITREX) 100 MG tablet Take 1 tablet (100 mg total) by mouth every 2 (two) hours as needed for Migraine. 9 tablet 5     No current facility-administered medications on file prior to visit.       Allergies   Review of patient's allergies indicates:   Allergen Reactions    Tramadol Itching       Review of Systems  "(Pertinent positives)  Review of Systems   Constitutional:  Negative for chills and fever.   HENT: Negative.     Eyes: Negative.    Respiratory:  Negative for cough, hemoptysis and sputum production.    Cardiovascular: Negative.  Negative for chest pain, palpitations and orthopnea.   Gastrointestinal: Negative.    Genitourinary: Negative.  Negative for dysuria, frequency and urgency.   Musculoskeletal:  Positive for back pain and neck pain. Negative for joint pain and myalgias.   Skin: Negative.    Neurological:  Positive for headaches. Negative for dizziness, tingling, tremors, sensory change and speech change.       /74 (BP Location: Right arm, Patient Position: Sitting, BP Method: Medium (Manual))   Pulse 87   Temp 97.5 °F (36.4 °C) (Oral)   Resp 19   Ht 5' 7" (1.702 m)   Wt 90.5 kg (199 lb 8.3 oz)   SpO2 98%   BMI 31.25 kg/m²     Physical Exam  Vitals reviewed.   Constitutional:       General: She is not in acute distress.     Appearance: Normal appearance. She is not ill-appearing, toxic-appearing or diaphoretic.   HENT:      Head: Normocephalic and atraumatic.   Cardiovascular:      Rate and Rhythm: Normal rate and regular rhythm.      Pulses: Normal pulses.      Heart sounds: Normal heart sounds.   Pulmonary:      Effort: Pulmonary effort is normal. No respiratory distress.      Breath sounds: Normal breath sounds. No wheezing.   Musculoskeletal:      Cervical back: Normal range of motion. No signs of trauma, rigidity or torticollis. Pain with movement and muscular tenderness (Left trapezius) present. No spinous process tenderness. Normal range of motion.      Comments: Thoracic spine does exhibit some increased kyphosis   Skin:     General: Skin is warm and dry.      Capillary Refill: Capillary refill takes less than 2 seconds.   Neurological:      General: No focal deficit present.      Mental Status: She is alert and oriented to person, place, and time.   Psychiatric:         Mood and Affect: " Mood normal.         Behavior: Behavior normal.          Assessment/Plan:  Rachel Conroy is a 69 y.o. female who presents today for :    Rachel was seen today for muscle pain.    Diagnoses and all orders for this visit:    Neck pain on left side  -     X-Ray Cervical Spine AP And Lateral; Future  -     Ambulatory referral/consult to Physical/Occupational Therapy; Future  -     meloxicam (MOBIC) 15 MG tablet; Take 1 tablet (15 mg total) by mouth daily as needed for Pain.    Cervicogenic headache  -     X-Ray Cervical Spine AP And Lateral; Future  -     meloxicam (MOBIC) 15 MG tablet; Take 1 tablet (15 mg total) by mouth daily as needed for Pain.    - check neck x-ray today  - treat with meloxicam; potential side effects of NSAIDs discussed  - given posture would recommend physical therapy  - can refer to pain clinic in the future if needed  - can consider head imaging if headache does not improve although headache likely cervicogenic in nature; no alarming signs or symptoms  - all questions answered        This office note has been dictated.  This dictation has been generated using M-Modal Fluency Direct dictation; some phonetic errors may occur.

## 2024-09-10 NOTE — TELEPHONE ENCOUNTER
Patient notified of xray results, verbalized understanding.  ----- Message from Delonte Love NP sent at 9/10/2024 12:58 PM CDT -----  Please call patient and let her know that her x-ray does not show any acute changes.  Thank you

## 2024-09-25 ENCOUNTER — OFFICE VISIT (OUTPATIENT)
Dept: DERMATOLOGY | Facility: CLINIC | Age: 69
End: 2024-09-25
Payer: MEDICARE

## 2024-09-25 DIAGNOSIS — L29.9 PRURITUS: ICD-10-CM

## 2024-09-25 DIAGNOSIS — L56.8 PHOTOSENSITIVITY DERMATITIS: Primary | ICD-10-CM

## 2024-09-25 PROCEDURE — 4010F ACE/ARB THERAPY RXD/TAKEN: CPT | Mod: CPTII,95,, | Performed by: DERMATOLOGY

## 2024-09-25 PROCEDURE — 99203 OFFICE O/P NEW LOW 30 MIN: CPT | Mod: 95,,, | Performed by: DERMATOLOGY

## 2024-09-25 PROCEDURE — 1160F RVW MEDS BY RX/DR IN RCRD: CPT | Mod: CPTII,95,, | Performed by: DERMATOLOGY

## 2024-09-25 PROCEDURE — 1159F MED LIST DOCD IN RCRD: CPT | Mod: CPTII,95,, | Performed by: DERMATOLOGY

## 2024-09-25 RX ORDER — TRIAMCINOLONE ACETONIDE 1 MG/G
CREAM TOPICAL
Qty: 80 G | Refills: 0 | Status: SHIPPED | OUTPATIENT
Start: 2024-09-25

## 2024-09-25 RX ORDER — PRAMOXINE HYDROCHLORIDE 10 MG/ML
LOTION TOPICAL
Qty: 1 EACH | Refills: 1 | Status: SHIPPED | OUTPATIENT
Start: 2024-09-25

## 2024-09-25 NOTE — PROGRESS NOTES
The patient location is: home  The chief complaint leading to consultation is: rash  Visit type: virtual visit with synchronous audio and video  Total time spent with patient: 6 minutes  Each patient to whom I provide medical services by telemedicine is:  (1) informed of the relationship between the physician and patient and the respective role of any other health care provider with respect to management of the patient; and (2) notified that he or she may decline to receive medical services by telemedicine and may withdraw from such care at any time.      Patient Information  Name: Rachel Conroy  : 1955  MRN: [id number]     Referring Physician:  Referring   Primary Care Physician:  Maxwell Gonzalez Jr., MD   Date of Visit: 2024      Subjective:     History of Present lllness:    Rachel Conroy is a 69 y.o. female who presents with a chief complaint of rash.    Location: R arm  Duration: < 7 days  Signs/Symptoms: itching and redness  Exacerbating factors: scratching makes it itch more  Relieving factors/Prior treatments: antihistamines, anti-itch cream and OTC moisturizer, A&D ointment    Rash started after taking meloxicam. She only took 2 pills.    Clinical documentation obtained by nursing staff reviewed.    Review of Systems    Objective:   Physical Exam     Diagram Legend     Erythematous scaling macule/papule c/w actinic keratosis       Vascular papule c/w angioma      Pigmented verrucoid papule/plaque c/w seborrheic keratosis      Yellow umbilicated papule c/w sebaceous hyperplasia      Irregularly shaped tan macule c/w lentigo     1-2 mm smooth white papules consistent with Milia      Movable subcutaneous cyst with punctum c/w epidermal inclusion cyst      Subcutaneous movable cyst c/w pilar cyst      Firm pink to brown papule c/w dermatofibroma      Pedunculated fleshy papule(s) c/w skin tag(s)      Evenly pigmented macule c/w junctional nevus     Mildly variegated pigmented,  slightly irregular-bordered macule c/w mildly atypical nevus      Flesh colored to evenly pigmented papule c/w intradermal nevus       Pink pearly papule/plaque c/w basal cell carcinoma      Erythematous hyperkeratotic cursted plaque c/w SCC      Surgical scar with no sign of skin cancer recurrence      Open and closed comedones      Inflammatory papules and pustules      Verrucoid papule consistent consistent with wart     Erythematous eczematous patches and plaques     Dystrophic onycholytic nail with subungual debris c/w onychomycosis     Umbilicated papule    Erythematous-base heme-crusted tan verrucoid plaque consistent with inflamed seborrheic keratosis     Erythematous Silvery Scaling Plaque c/w Psoriasis     See annotation              [] Data reviewed  [] Prior external notes reviewed  [] Independent review of test  [] Management discussed with another provider  [] Independent historian    Assessment / Plan:        Photosensitivity dermatitis  - acute, uncomplicated problem  Suspect due to meloxicam.  -     triamcinolone acetonide 0.1% (KENALOG) 0.1 % cream; Apply to affected areas of arm BID prn rash. Do not use on face, underarms, or groin.  Dispense: 80 g; Refill: 0  Side effects from the overuse of topical steroids include thinning of skin, easy tearing/bruising of skin, stretch marks, spider veins, etc. Use the topical steroid no more than 2 days per week if used long-term and/or take breaks from the topical steroid, especially if any of the above side effects are noticed.    Recommend using a broad-spectrum, zinc oxide-based, water-resistant sunscreen with SPF of 30 or higher--reapply every 2 hours. Seek shade and wear sun-protective clothing.    Pruritus  -     pramoxine (SARNA SENSITIVE) 1 % Lotn; Apply to arm 4-6x/day as needed for itching.  Dispense: 1 each; Refill: 1      Follow up if symptoms worsen or fail to improve.      Ashley Ozuna MD, FAAD  [facility]

## 2024-09-26 DIAGNOSIS — L21.9 SEBORRHEIC DERMATITIS: Chronic | ICD-10-CM

## 2024-09-27 NOTE — TELEPHONE ENCOUNTER
Refill Routing Note   Medication(s) are not appropriate for processing by [facility] for the following reason(s):        Outside of protocol    [facility] action(s):  Route               Appointments  past 12m or future 3m with PCP    Date Provider   Last Visit   7/11/2024 Maxwell Gonzalez Jr., MD   Next Visit   11/12/2024 Maxwell Gonzalez Jr., MD   ED visits in past 90 days: 0        Note composed:1:46 PM 09/27/2024

## 2024-10-06 RX ORDER — KETOCONAZOLE 20 MG/ML
SHAMPOO, SUSPENSION TOPICAL
Qty: 120 ML | Refills: 11 | Status: SHIPPED | OUTPATIENT
Start: 2024-10-07

## 2024-11-18 ENCOUNTER — LAB VISIT (OUTPATIENT)
Dept: LAB | Facility: HOSPITAL | Age: 69
End: 2024-11-18
Attending: FAMILY MEDICINE
Payer: MEDICARE

## 2024-11-18 DIAGNOSIS — I10 ESSENTIAL HYPERTENSION: Chronic | ICD-10-CM

## 2024-11-18 DIAGNOSIS — E78.5 DYSLIPIDEMIA: Chronic | ICD-10-CM

## 2024-11-18 LAB
ALBUMIN SERPL BCP-MCNC: 3.8 G/DL (ref 3.5–5.2)
ALP SERPL-CCNC: 120 U/L (ref 40–150)
ALT SERPL W/O P-5'-P-CCNC: 47 U/L (ref 10–44)
ANION GAP SERPL CALC-SCNC: 8 MMOL/L (ref 8–16)
AST SERPL-CCNC: 30 U/L (ref 10–40)
BASOPHILS # BLD AUTO: 0.04 K/UL (ref 0–0.2)
BASOPHILS NFR BLD: 0.7 % (ref 0–1.9)
BILIRUB SERPL-MCNC: 0.4 MG/DL (ref 0.1–1)
BUN SERPL-MCNC: 13 MG/DL (ref 8–23)
CALCIUM SERPL-MCNC: 9 MG/DL (ref 8.7–10.5)
CHLORIDE SERPL-SCNC: 108 MMOL/L (ref 95–110)
CHOLEST SERPL-MCNC: 238 MG/DL (ref 120–199)
CHOLEST/HDLC SERPL: 2.8 {RATIO} (ref 2–5)
CO2 SERPL-SCNC: 24 MMOL/L (ref 23–29)
CREAT SERPL-MCNC: 0.7 MG/DL (ref 0.5–1.4)
DIFFERENTIAL METHOD BLD: NORMAL
EOSINOPHIL # BLD AUTO: 0.1 K/UL (ref 0–0.5)
EOSINOPHIL NFR BLD: 1.5 % (ref 0–8)
ERYTHROCYTE [DISTWIDTH] IN BLOOD BY AUTOMATED COUNT: 12.4 % (ref 11.5–14.5)
EST. GFR  (NO RACE VARIABLE): >60 ML/MIN/1.73 M^2
GLUCOSE SERPL-MCNC: 105 MG/DL (ref 70–110)
HCT VFR BLD AUTO: 42.6 % (ref 37–48.5)
HDLC SERPL-MCNC: 85 MG/DL (ref 40–75)
HDLC SERPL: 35.7 % (ref 20–50)
HGB BLD-MCNC: 13.9 G/DL (ref 12–16)
IMM GRANULOCYTES # BLD AUTO: 0.02 K/UL (ref 0–0.04)
IMM GRANULOCYTES NFR BLD AUTO: 0.3 % (ref 0–0.5)
LDLC SERPL CALC-MCNC: 137.8 MG/DL (ref 63–159)
LYMPHOCYTES # BLD AUTO: 2.2 K/UL (ref 1–4.8)
LYMPHOCYTES NFR BLD: 36 % (ref 18–48)
MCH RBC QN AUTO: 29.8 PG (ref 27–31)
MCHC RBC AUTO-ENTMCNC: 32.6 G/DL (ref 32–36)
MCV RBC AUTO: 91 FL (ref 82–98)
MONOCYTES # BLD AUTO: 0.5 K/UL (ref 0.3–1)
MONOCYTES NFR BLD: 7.8 % (ref 4–15)
NEUTROPHILS # BLD AUTO: 3.2 K/UL (ref 1.8–7.7)
NEUTROPHILS NFR BLD: 53.7 % (ref 38–73)
NONHDLC SERPL-MCNC: 153 MG/DL
NRBC BLD-RTO: 0 /100 WBC
PLATELET # BLD AUTO: 278 K/UL (ref 150–450)
PMV BLD AUTO: 9.8 FL (ref 9.2–12.9)
POTASSIUM SERPL-SCNC: 4.7 MMOL/L (ref 3.5–5.1)
PROT SERPL-MCNC: 7 G/DL (ref 6–8.4)
RBC # BLD AUTO: 4.66 M/UL (ref 4–5.4)
SODIUM SERPL-SCNC: 140 MMOL/L (ref 136–145)
TRIGL SERPL-MCNC: 76 MG/DL (ref 30–150)
WBC # BLD AUTO: 6.02 K/UL (ref 3.9–12.7)

## 2024-11-18 PROCEDURE — 36415 COLL VENOUS BLD VENIPUNCTURE: CPT | Mod: PN | Performed by: FAMILY MEDICINE

## 2024-11-18 PROCEDURE — 80053 COMPREHEN METABOLIC PANEL: CPT | Performed by: FAMILY MEDICINE

## 2024-11-18 PROCEDURE — 85025 COMPLETE CBC W/AUTO DIFF WBC: CPT | Performed by: FAMILY MEDICINE

## 2024-11-18 PROCEDURE — 80061 LIPID PANEL: CPT | Performed by: FAMILY MEDICINE

## 2024-11-21 ENCOUNTER — OFFICE VISIT (OUTPATIENT)
Dept: FAMILY MEDICINE | Facility: CLINIC | Age: 69
End: 2024-11-21
Payer: MEDICARE

## 2024-11-21 VITALS
SYSTOLIC BLOOD PRESSURE: 138 MMHG | BODY MASS INDEX: 31.45 KG/M2 | RESPIRATION RATE: 19 BRPM | WEIGHT: 200.38 LBS | HEIGHT: 67 IN | OXYGEN SATURATION: 96 % | HEART RATE: 70 BPM | DIASTOLIC BLOOD PRESSURE: 74 MMHG

## 2024-11-21 DIAGNOSIS — I10 ESSENTIAL HYPERTENSION: Chronic | ICD-10-CM

## 2024-11-21 DIAGNOSIS — Z23 NEED FOR VACCINATION: ICD-10-CM

## 2024-11-21 DIAGNOSIS — R73.9 HYPERGLYCEMIA: ICD-10-CM

## 2024-11-21 DIAGNOSIS — E78.5 DYSLIPIDEMIA: Primary | Chronic | ICD-10-CM

## 2024-11-21 DIAGNOSIS — R51.9 RECURRENT HEADACHE: ICD-10-CM

## 2024-11-21 DIAGNOSIS — L21.9 SEBORRHEIC DERMATITIS: Chronic | ICD-10-CM

## 2024-11-21 DIAGNOSIS — K76.0 FATTY LIVER: Chronic | ICD-10-CM

## 2024-11-21 PROCEDURE — 3008F BODY MASS INDEX DOCD: CPT | Mod: CPTII,S$GLB,, | Performed by: FAMILY MEDICINE

## 2024-11-21 PROCEDURE — 99214 OFFICE O/P EST MOD 30 MIN: CPT | Mod: S$GLB,,, | Performed by: FAMILY MEDICINE

## 2024-11-21 PROCEDURE — G0008 ADMIN INFLUENZA VIRUS VAC: HCPCS | Mod: S$GLB,,, | Performed by: FAMILY MEDICINE

## 2024-11-21 PROCEDURE — 3075F SYST BP GE 130 - 139MM HG: CPT | Mod: CPTII,S$GLB,, | Performed by: FAMILY MEDICINE

## 2024-11-21 PROCEDURE — 90653 IIV ADJUVANT VACCINE IM: CPT | Mod: S$GLB,,, | Performed by: FAMILY MEDICINE

## 2024-11-21 PROCEDURE — 99999 PR PBB SHADOW E&M-EST. PATIENT-LVL V: CPT | Mod: PBBFAC,,, | Performed by: FAMILY MEDICINE

## 2024-11-21 PROCEDURE — 1126F AMNT PAIN NOTED NONE PRSNT: CPT | Mod: CPTII,S$GLB,, | Performed by: FAMILY MEDICINE

## 2024-11-21 PROCEDURE — 4010F ACE/ARB THERAPY RXD/TAKEN: CPT | Mod: CPTII,S$GLB,, | Performed by: FAMILY MEDICINE

## 2024-11-21 PROCEDURE — 1159F MED LIST DOCD IN RCRD: CPT | Mod: CPTII,S$GLB,, | Performed by: FAMILY MEDICINE

## 2024-11-21 PROCEDURE — G2211 COMPLEX E/M VISIT ADD ON: HCPCS | Mod: S$GLB,,, | Performed by: FAMILY MEDICINE

## 2024-11-21 PROCEDURE — 91320 SARSCV2 VAC 30MCG TRS-SUC IM: CPT | Mod: S$GLB,,, | Performed by: FAMILY MEDICINE

## 2024-11-21 PROCEDURE — 3288F FALL RISK ASSESSMENT DOCD: CPT | Mod: CPTII,S$GLB,, | Performed by: FAMILY MEDICINE

## 2024-11-21 PROCEDURE — 1101F PT FALLS ASSESS-DOCD LE1/YR: CPT | Mod: CPTII,S$GLB,, | Performed by: FAMILY MEDICINE

## 2024-11-21 PROCEDURE — 1160F RVW MEDS BY RX/DR IN RCRD: CPT | Mod: CPTII,S$GLB,, | Performed by: FAMILY MEDICINE

## 2024-11-21 PROCEDURE — 90480 ADMN SARSCOV2 VAC 1/ONLY CMP: CPT | Mod: S$GLB,,, | Performed by: FAMILY MEDICINE

## 2024-11-21 PROCEDURE — 3078F DIAST BP <80 MM HG: CPT | Mod: CPTII,S$GLB,, | Performed by: FAMILY MEDICINE

## 2024-11-21 RX ORDER — KETOCONAZOLE 20 MG/ML
SHAMPOO, SUSPENSION TOPICAL
Qty: 120 ML | Refills: 11 | Status: SHIPPED | OUTPATIENT
Start: 2024-11-21

## 2024-11-21 RX ORDER — CLOBETASOL PROPIONATE 0.05 G/100ML
1 SHAMPOO TOPICAL 2 TIMES DAILY
Qty: 118 ML | Refills: 2 | Status: CANCELLED | OUTPATIENT
Start: 2024-11-21

## 2024-11-21 NOTE — PROGRESS NOTES
Patient Name: Rachel Conroy    : 1955  MRN: [id number]      Subjective:     Patient ID: Rachel is a 69 y.o. female    Chief Complaint:  Follow-up    History of Present Illness    Rachel presents today for follow-up on blood pressure, cholesterol, and labs.    She reports unchanged cholesterol levels, noting high HDL. Her calculated 10-year risk of heart attack is 10%. She expresses concern about potential liver effects of cholesterol medication and prefers to reassess in a few months before considering medication initiation.    She reports experiencing exaggerated and persistent pain and numbness in her side, with a possible electric shock sensation. She wonders if it could be stress or muscle-related. She also mentions joint pain, for which meloxicam was prescribed. However, she expresses apprehension about taking meloxicam due to perceived risks and side effects, denying improvement with its use. She prefers Tylenol, believing it has fewer risks when taken within recommended dosage limits.    She is currently using Topamax for headache management and appetite suppression. She also uses ketoconazole shampoo, which is covered by her insurance, replacing a previously prescribed medication that is no longer covered.    She reports difficulty losing weight despite dietary efforts. She expresses frustration with inability to stop eating, particularly when stressed. She acknowledges the importance of reducing carbohydrate and fat intake, specifically mentioning sweets, bread, and rice. She understands the benefits of incorporating more vegetables and green smoothies into her diet. She indicates willingness to make dietary changes and follow up in four months to reassess cholesterol levels before considering medication.      ROS:  General: -fever, -chills, -fatigue, -weight gain, -weight loss  Eyes: -vision changes, -redness, -discharge  ENT: -ear pain, -nasal congestion, -sore throat  Cardiovascular:  "-chest pain, -palpitations, -lower extremity edema  Respiratory: -cough, -shortness of breath  Gastrointestinal: -abdominal pain, -nausea, -vomiting, -diarrhea, -constipation, -blood in stool  Genitourinary: -dysuria, -hematuria, -frequency  Musculoskeletal: +joint pain, -muscle pain  Skin: -rash, -lesion  Neurological: -headache, -dizziness, -numbness, -tingling  Psychiatric: -anxiety, -depression, -sleep difficulty         Objective:   /74 (BP Location: Left arm, Patient Position: Sitting)   Pulse 70   Resp 19   Ht 5' 7" (1.702 m)   Wt 90.9 kg (200 lb 6.4 oz)   SpO2 96%   BMI 31.39 kg/m²     Physical Exam  Vitals reviewed.   Constitutional:       Appearance: She is well-developed.   HENT:      Head: Normocephalic and atraumatic.      Right Ear: External ear normal.      Left Ear: External ear normal.      Nose: Nose normal.      Mouth/Throat:      Pharynx: No oropharyngeal exudate.   Eyes:      General:         Right eye: No discharge.         Left eye: No discharge.      Conjunctiva/sclera: Conjunctivae normal.      Pupils: Pupils are equal, round, and reactive to light.   Neck:      Trachea: No tracheal deviation.   Cardiovascular:      Rate and Rhythm: Normal rate and regular rhythm.      Heart sounds: Normal heart sounds. No murmur heard.  Pulmonary:      Effort: Pulmonary effort is normal.      Breath sounds: Normal breath sounds. No wheezing or rales.   Abdominal:      General: Bowel sounds are normal.      Palpations: Abdomen is soft. Abdomen is not rigid. There is no mass.      Tenderness: There is no abdominal tenderness. There is no guarding.   Musculoskeletal:      Cervical back: Normal range of motion and neck supple.   Lymphadenopathy:      Cervical: No cervical adenopathy.   Neurological:      Mental Status: She is oriented to person, place, and time.      Gait: Gait normal.        Lab Visit on 11/18/2024   Component Date Value Ref Range Status    Sodium 11/18/2024 140  136 - 145 mmol/L " Final    Potassium 11/18/2024 4.7  3.5 - 5.1 mmol/L Final    Chloride 11/18/2024 108  95 - 110 mmol/L Final    CO2 11/18/2024 24  23 - 29 mmol/L Final    Glucose 11/18/2024 105  70 - 110 mg/dL Final    BUN 11/18/2024 13  8 - 23 mg/dL Final    Creatinine 11/18/2024 0.7  0.5 - 1.4 mg/dL Final    Calcium 11/18/2024 9.0  8.7 - 10.5 mg/dL Final    Total Protein 11/18/2024 7.0  6.0 - 8.4 g/dL Final    Albumin 11/18/2024 3.8  3.5 - 5.2 g/dL Final    Total Bilirubin 11/18/2024 0.4  0.1 - 1.0 mg/dL Final    Comment: For infants and newborns, interpretation of results should be based  on gestational age, weight and in agreement with clinical  observations.    Premature Infant recommended reference ranges:  Up to 24 hours.............<8.0 mg/dL  Up to 48 hours............<12.0 mg/dL  3-5 days..................<15.0 mg/dL  6-29 days.................<15.0 mg/dL      Alkaline Phosphatase 11/18/2024 120  40 - 150 U/L Final    AST 11/18/2024 30  10 - 40 U/L Final    ALT 11/18/2024 47 (H)  10 - 44 U/L Final    eGFR 11/18/2024 >60  >60 mL/min/1.73 m^2 Final    Anion Gap 11/18/2024 8  8 - 16 mmol/L Final    Cholesterol 11/18/2024 238 (H)  120 - 199 mg/dL Final    Comment: The National Cholesterol Education Program (NCEP) has set the  following guidelines (reference ranges) for Cholesterol:  Optimal.....................<200 mg/dL  Borderline High.............200-239 mg/dL  High........................> or = 240 mg/dL      Triglycerides 11/18/2024 76  30 - 150 mg/dL Final    Comment: The National Cholesterol Education Program (NCEP) has set the  following guidelines (reference values) for triglycerides:  Normal......................<150 mg/dL  Borderline High.............150-199 mg/dL  High........................200-499 mg/dL      HDL 11/18/2024 85 (H)  40 - 75 mg/dL Final    Comment: The National Cholesterol Education Program (NCEP) has set the  following guidelines (reference values) for HDL Cholesterol:  Low...............<40  mg/dL  Optimal...........>60 mg/dL      LDL Cholesterol 11/18/2024 137.8  63.0 - 159.0 mg/dL Final    Comment: The National Cholesterol Education Program (NCEP) has set the  following guidelines (reference values) for LDL Cholesterol:  Optimal.......................<130 mg/dL  Borderline High...............130-159 mg/dL  High..........................160-189 mg/dL  Very High.....................>190 mg/dL      HDL/Cholesterol Ratio 11/18/2024 35.7  20.0 - 50.0 % Final    Total Cholesterol/HDL Ratio 11/18/2024 2.8  2.0 - 5.0 Final    Non-HDL Cholesterol 11/18/2024 153  mg/dL Final    Comment: Risk category and Non-HDL cholesterol goals:  Coronary heart disease (CHD)or equivalent (10-year risk of CHD >20%):  Non-HDL cholesterol goal     <130 mg/dL  Two or more CHD risk factors and 10-year risk of CHD <= 20%:  Non-HDL cholesterol goal     <160 mg/dL  0 to 1 CHD risk factor:  Non-HDL cholesterol goal     <190 mg/dL      WBC 11/18/2024 6.02  3.90 - 12.70 K/uL Final    RBC 11/18/2024 4.66  4.00 - 5.40 M/uL Final    Hemoglobin 11/18/2024 13.9  12.0 - 16.0 g/dL Final    Hematocrit 11/18/2024 42.6  37.0 - 48.5 % Final    MCV 11/18/2024 91  82 - 98 fL Final    MCH 11/18/2024 29.8  27.0 - 31.0 pg Final    MCHC 11/18/2024 32.6  32.0 - 36.0 g/dL Final    RDW 11/18/2024 12.4  11.5 - 14.5 % Final    Platelets 11/18/2024 278  150 - 450 K/uL Final    MPV 11/18/2024 9.8  9.2 - 12.9 fL Final    Immature Granulocytes 11/18/2024 0.3  0.0 - 0.5 % Final    Gran # (ANC) 11/18/2024 3.2  1.8 - 7.7 K/uL Final    Immature Grans (Abs) 11/18/2024 0.02  0.00 - 0.04 K/uL Final    Comment: Mild elevation in immature granulocytes is non specific and   can be seen in a variety of conditions including stress response,   acute inflammation, trauma and pregnancy. Correlation with other   laboratory and clinical findings is essential.      Lymph # 11/18/2024 2.2  1.0 - 4.8 K/uL Final    Mono # 11/18/2024 0.5  0.3 - 1.0 K/uL Final    Eos # 11/18/2024 0.1   0.0 - 0.5 K/uL Final    Baso # 11/18/2024 0.04  0.00 - 0.20 K/uL Final    nRBC 11/18/2024 0  0 /100 WBC Final    Gran % 11/18/2024 53.7  38.0 - 73.0 % Final    Lymph % 11/18/2024 36.0  18.0 - 48.0 % Final    Mono % 11/18/2024 7.8  4.0 - 15.0 % Final    Eosinophil % 11/18/2024 1.5  0.0 - 8.0 % Final    Basophil % 11/18/2024 0.7  0.0 - 1.9 % Final    Differential Method 11/18/2024 Automated   Final       The 10-year ASCVD risk score (Valente VILLARREAL, et al., 2019) is: 10%    Values used to calculate the score:      Age: 69 years      Sex: Female      Is Non- : No      Diabetic: No      Tobacco smoker: No      Systolic Blood Pressure: 138 mmHg      Is BP treated: Yes      HDL Cholesterol: 85 mg/dL      Total Cholesterol: 238 mg/dL      Assessment        ICD-10-CM ICD-9-CM   1. Dyslipidemia  E78.5 272.4   2. Essential hypertension  I10 401.9   3. Fatty liver  K76.0 571.8   4. Seborrheic dermatitis  L21.9 690.10   5. Recurrent headache  R51.9 784.0   6. Hyperglycemia  R73.9 790.29   7. Need for vaccination  Z23 V05.9         Plan:   Assessment & Plan    IMPRESSION:  Assessed cardiovascular risk, calculating 10% risk of heart attack within 10 years  Considered initiating statin therapy due to elevated risk, but opted to reassess in 4 months after lifestyle modifications  Evaluated liver function, noting previous discharge from liver specialist and current good levels  Reviewed cholesterol levels, noting elevated HDL cholesterol  Assessed diabetes risk, noting excellent glucose levels    HYPERCHOLESTEROLEMIA:  Explained that cardiovascular risk increases with age, even if cholesterol numbers remain constant.  Discussed potential liver effects of statin medications, noting that most people are not affected.  Educated on the importance of reducing carbohydrate and fat intake for cholesterol management.  Rachel to reduce excess carbohydrates (e.g., sweets, bread) and fats in diet.  Rachel to decrease rice  and bread intake without complete elimination.  Follow up in 4 months to reassess cholesterol levels and cardiovascular risk.    HYPERTENSION:  Continue olmesartan 40 milligrams daily.    Continue metoprolol succinate 100 milligrams daily.    FATTY LIVER:   Continue monitoring liver function    HEADACHES:  Will get Neurology evaluation    SEBORRHEIC DERMATITIS:  Refilled ketoconazole (previously prescribed medication).    IMMUNIZATIONS:  Flu vaccine administered in office.  COVID-19 vaccine administered in office.  Follow up in 2-3 weeks for pneumonia vaccine administration.          1. Dyslipidemia  Overview:  Lab Results   Component Value Date    CHOL 238 (H) 11/18/2024    CHOL 242 (H) 02/12/2024    CHOL 224 (H) 06/27/2023     Lab Results   Component Value Date    HDL 85 (H) 11/18/2024    HDL 86 (H) 02/12/2024    HDL 81 (H) 06/27/2023     Lab Results   Component Value Date    LDLCALC 137.8 11/18/2024    LDLCALC 134.6 02/12/2024    LDLCALC 124.2 06/27/2023     Lab Results   Component Value Date    TRIG 76 11/18/2024    TRIG 107 02/12/2024    TRIG 94 06/27/2023     Lab Results   Component Value Date    CHOLHDL 35.7 11/18/2024    CHOLHDL 35.5 02/12/2024    CHOLHDL 36.2 06/27/2023      The 10-year ASCVD risk score (Valente VILLARREAL, et al., 2019) is: 10%    Values used to calculate the score:      Age: 69 years      Sex: Female      Is Non- : No      Diabetic: No      Tobacco smoker: No      Systolic Blood Pressure: 138 mmHg      Is BP treated: Yes      HDL Cholesterol: 85 mg/dL      Total Cholesterol: 238 mg/dL      Orders:  -     Comprehensive Metabolic Panel; Future; Expected date: 02/21/2025  -     Lipid Panel; Future; Expected date: 02/21/2025    2. Essential hypertension  -     Comprehensive Metabolic Panel; Future; Expected date: 02/21/2025  -     Lipid Panel; Future; Expected date: 02/21/2025    3. Fatty liver  Overview:  CMP  Lab Results   Component Value Date     11/18/2024    K 4.7  "11/18/2024     11/18/2024    CO2 24 11/18/2024     11/18/2024    BUN 13 11/18/2024    CREATININE 0.7 11/18/2024    CALCIUM 9.0 11/18/2024    PROT 7.0 11/18/2024    ALBUMIN 3.8 11/18/2024    BILITOT 0.4 11/18/2024    ALKPHOS 120 11/18/2024    AST 30 11/18/2024    ALT 47 (H) 11/18/2024    ANIONGAP 8 11/18/2024    EGFRNORACEVR >60 11/18/2024     VIRAL HEPATITIS  Lab Results   Component Value Date    HEPAIGM Negative 08/25/2020    HEPAIGG Positive (A) 08/25/2020    HEPBCAB Positive (A) 08/25/2020    HEPBSAB Positive (A) 08/25/2020     BLOOD COUNT  Lab Results   Component Value Date    WBC 6.02 11/18/2024    HGB 13.9 11/18/2024    HCT 42.6 11/18/2024     11/18/2024    IRON 52 10/24/2016    TIBC 364 10/24/2016    FERRITIN 237 10/24/2016     AUTO-IMMUNE/GENETIC  Lab Results   Component Value Date    BISHOP Negative 03/04/2009    SMOOTHMUSCAB Negative 1:40 10/24/2016    IGGSERUM 943 10/24/2016     ALCOHOL  No results found for: "[id number]", "[id number]"        07-  FibroScan  Fibrosis Stage:   F 0-1  Steatosis Grade:   S3    01-  FibroScan  Fibrosis Stage: F2      4. Seborrheic dermatitis  -     ketoconazole (NIZORAL) 2 % shampoo; Apply topically twice a week. Apply small amount to scalp, lather, and leave on for 5 minutes, then rinse off. Use twice weekly  Dispense: 120 mL; Refill: 11    5. Recurrent headache  -     Ambulatory referral/consult to Neurology; Future; Expected date: 11/28/2024    6. Hyperglycemia  -     Hemoglobin A1C; Future; Expected date: 02/21/2025    7. Need for vaccination  -     influenza (adjuvanted) (Fluad) 45 mcg/0.5 mL IM vaccine (> or = 64 yo) 0.5 mL  -     COVID-19 (Pfizer) 30 mcg/0.3 mL IM vaccine (>/= 11 yo) 0.3 mL             -Maxwell Gonzlaez Jr., MD, AAHIVS      This note was generated with the assistance of ambient listening technology. Verbal consent was obtained by the patient and accompanying visitor(s) for the recording of patient appointment to facilitate " this note. I attest to having reviewed and edited the generated note for accuracy, though some syntax or spelling errors may persist. Please contact the author of this note for any clarification.      Patient Instructions         Follow up in about 3 months (around 2/21/2025) for Lipids, Hypertension (labs 1 week prior).   Future Appointments   Date Time Provider Department Center   12/12/2024  9:20 AM NURSE, [facility] [place] FAM MED/INT MED [facility]   2/21/2025  9:00 AM LAB, [facility] DRAW STATION [facility] LAB [facility] [place]   2/27/2025 10:00 AM Maxwell Gonzalez Jr., MD [facility]

## 2024-11-21 NOTE — PROGRESS NOTES
Verified patient's name, , and allergies. Patient given flu vaccine to left deltoid, Covid vaccine to right deltoid, no complaints or reactions noted. Vis given 21 to patient. Instructed to wait in clinic for 15 minutes to note for reactions, verbalized understanding.

## 2024-12-12 ENCOUNTER — CLINICAL SUPPORT (OUTPATIENT)
Dept: FAMILY MEDICINE | Facility: CLINIC | Age: 69
End: 2024-12-12
Payer: MEDICARE

## 2024-12-12 DIAGNOSIS — Z23 NEED FOR VACCINATION: Primary | ICD-10-CM

## 2024-12-12 PROCEDURE — G0009 ADMIN PNEUMOCOCCAL VACCINE: HCPCS | Mod: S$GLB,,, | Performed by: FAMILY MEDICINE

## 2024-12-12 PROCEDURE — 90677 PCV20 VACCINE IM: CPT | Mod: S$GLB,,, | Performed by: FAMILY MEDICINE

## 2024-12-12 NOTE — PROGRESS NOTES
Pneumococcal-20 Vaccine given to left deltoid IM. Instructed patient to wait 15 min per monitoring. No adverse drug reaction noted. VIS given to patient.

## 2025-01-17 ENCOUNTER — OFFICE VISIT (OUTPATIENT)
Dept: CARDIOLOGY | Facility: CLINIC | Age: 70
End: 2025-01-17
Payer: MEDICARE

## 2025-01-17 VITALS
BODY MASS INDEX: 26.68 KG/M2 | RESPIRATION RATE: 15 BRPM | WEIGHT: 170 LBS | DIASTOLIC BLOOD PRESSURE: 82 MMHG | HEART RATE: 77 BPM | OXYGEN SATURATION: 96 % | SYSTOLIC BLOOD PRESSURE: 144 MMHG | HEIGHT: 67 IN

## 2025-01-17 DIAGNOSIS — I10 ESSENTIAL HYPERTENSION: Primary | ICD-10-CM

## 2025-01-17 DIAGNOSIS — R00.2 PALPITATIONS: ICD-10-CM

## 2025-01-17 PROCEDURE — 1126F AMNT PAIN NOTED NONE PRSNT: CPT | Mod: CPTII,S$GLB,, | Performed by: INTERNAL MEDICINE

## 2025-01-17 PROCEDURE — 1101F PT FALLS ASSESS-DOCD LE1/YR: CPT | Mod: CPTII,S$GLB,, | Performed by: INTERNAL MEDICINE

## 2025-01-17 PROCEDURE — 99999 PR PBB SHADOW E&M-EST. PATIENT-LVL IV: CPT | Mod: PBBFAC,,, | Performed by: INTERNAL MEDICINE

## 2025-01-17 PROCEDURE — 3077F SYST BP >= 140 MM HG: CPT | Mod: CPTII,S$GLB,, | Performed by: INTERNAL MEDICINE

## 2025-01-17 PROCEDURE — G2211 COMPLEX E/M VISIT ADD ON: HCPCS | Mod: S$GLB,,, | Performed by: INTERNAL MEDICINE

## 2025-01-17 PROCEDURE — 3288F FALL RISK ASSESSMENT DOCD: CPT | Mod: CPTII,S$GLB,, | Performed by: INTERNAL MEDICINE

## 2025-01-17 PROCEDURE — 99214 OFFICE O/P EST MOD 30 MIN: CPT | Mod: S$GLB,,, | Performed by: INTERNAL MEDICINE

## 2025-01-17 PROCEDURE — 3079F DIAST BP 80-89 MM HG: CPT | Mod: CPTII,S$GLB,, | Performed by: INTERNAL MEDICINE

## 2025-01-17 PROCEDURE — 93000 ELECTROCARDIOGRAM COMPLETE: CPT | Mod: S$GLB,,, | Performed by: INTERNAL MEDICINE

## 2025-01-17 PROCEDURE — 1159F MED LIST DOCD IN RCRD: CPT | Mod: CPTII,S$GLB,, | Performed by: INTERNAL MEDICINE

## 2025-01-17 PROCEDURE — 4010F ACE/ARB THERAPY RXD/TAKEN: CPT | Mod: CPTII,S$GLB,, | Performed by: INTERNAL MEDICINE

## 2025-01-17 PROCEDURE — 3008F BODY MASS INDEX DOCD: CPT | Mod: CPTII,S$GLB,, | Performed by: INTERNAL MEDICINE

## 2025-01-17 RX ORDER — METOPROLOL SUCCINATE 50 MG/1
50 TABLET, EXTENDED RELEASE ORAL DAILY
Qty: 90 TABLET | Refills: 3 | Status: SHIPPED | OUTPATIENT
Start: 2025-01-17

## 2025-01-17 NOTE — PROGRESS NOTES
CARDIOVASCULAR CONSULTATION    REASON FOR CONSULT:   Rachel Conroy is a 69 y.o. female who presents for evaluation    HISTORY OF PRESENT ILLNESS:     Patient is a pleasant 67-year-old lady.  Martiniquais speaking.  History obtained with the help of [facility] .  Coming in complaining of dyspnea on exertion, occasional chest tightness on exertion as well as palpitations about once a week.  Denies orthopnea, PND, swelling of feet.    Aug 23: Patient here for follow-up.  Doing fine.  History obtained with the help of Candelaria .  Main complaint is tiredness.  No more chest pains.  Stress test did not show any significant arrhythmia, echo showed normal left ventricle systolic function.  Event monitor showed brief episodes of atrial tachycardia.    Sinus rhythm with very rare PACs/PVCs.  2 episodes of nonsustained atrial tachycardia lasting up to 5 beats in duration.    The left ventricle is normal in size with normal systolic function.  The estimated ejection fraction is 55%.  Normal right ventricular size with normal right ventricular systolic function.  The estimated PA systolic pressure is 32 mmHg.         The patient exercised for 7 minutes 17 seconds on a Andrea protocol, corresponding to a functional capacity of 9 METS, achieving a peak heart rate of 148 bpm, which is 99 % of the age predicted maximum heart rate.    Normal myocardial perfusion scan. There is no evidence of myocardial ischemia or infarction.    The visually estimated ejection fraction is normal during stress.    There is normal wall motion post stress.    The ECG portion of the study is negative for ischemia.    The patient reported no chest pain during the stress test.    During stress, occasional PVCs are noted.    Jan 25:  History obtained with the help of     History of Present Illness    CHIEF COMPLAINT:  Rachel presents today for follow-up of palpitations.    PALPITATIONS AND ASSOCIATED SYMPTOMS:  She experiences  "palpitations approximately 30 times per week, describing them as rapid heart "flips" . The severity has increased since . These episodes are associated with fatigue and morning headaches. She reports increased fatigue compared to previous year    SLEEP APNEA:  She uses a CPAP machine for sleep apnea management.  occasionally removes it due to discomfort and sleep difficulties.    MEDICAL HISTORY:  She has a history of hypertension    WEIGHT MANAGEMENT:  She reports recent weight gain causing concern.             PAST MEDICAL HISTORY:     Past Medical History:   Diagnosis Date    Arthritis     GERD (gastroesophageal reflux disease)     Hyperlipidemia     Hypertension     Irritable bowel syndrome     Joint pain     Knee injury     Migraine headache     Muscle pain, fibromyalgia 2014    NAFLD (nonalcoholic fatty liver disease)     Other physical therapy     Plantar fasciitis     Sleep apnea     Urinary incontinence        PAST SURGICAL HISTORY:     Past Surgical History:   Procedure Laterality Date    APPENDECTOMY      COLONOSCOPY N/A 2017    Procedure: COLONOSCOPY;  Surgeon: Mariaelena Harrell MD;  Location: [facility] (4TH FLR);  Service: Endoscopy;  Laterality: N/A;    COLONOSCOPY N/A 2018    Procedure: COLONOSCOPY;  Surgeon: Nicola Zhu MD;  Location: [facility] (2ND FLR);  Service: Endoscopy;  Laterality: N/A;    ESOPHAGOGASTRODUODENOSCOPY N/A 2023    Procedure: EGD (ESOPHAGOGASTRODUODENOSCOPY);  Surgeon: Hector Barnes MD;  Location: [facility];  Service: Endoscopy;  Laterality: N/A;  Procedure Timin-4 weeks    history of difficult airway    Referrd by Dr. Carranza/Prep instructions mailed to home and to portal. AS    HYSTERECTOMY      TOTAL KNEE ARTHROPLASTY Right 2020    Procedure: ARTHROPLASTY, KNEE, TOTAL-STEFANIE;  Surgeon: Gerardo Stein MD;  Location: [facility] OR;  Service: Orthopedics;  Laterality: Right;  spinal attempted       ALLERGIES AND MEDICATION: "     Review of patient's allergies indicates:   Allergen Reactions    Tramadol Itching        Medication List            Accurate as of January 17, 2025  9:14 AM. If you have any questions, ask your nurse or doctor.                CONTINUE taking these medications      clobetasoL 0.05 % shampoo  Commonly known as: CLOBEX  Apply 1 application  topically 2 (two) times daily. Apply to affected area     gabapentin 300 MG capsule  Commonly known as: NEURONTIN  Take 1 capsule (300 mg total) by mouth 2 (two) times daily.     ketoconazole 2 % shampoo  Commonly known as: NIZORAL  Apply topically twice a week. Apply small amount to scalp, lather, and leave on for 5 minutes, then rinse off. Use twice weekly     meloxicam 15 MG tablet  Commonly known as: MOBIC  Take 1 tablet (15 mg total) by mouth daily as needed for Pain.     methocarbamoL 500 MG Tab  Commonly known as: ROBAXIN  Take 1-2 tablets PO Q8 PRN for low back and leg pain     metoprolol succinate 100 MG 24 hr tablet  Commonly known as: TOPROL-XL  Take 1 tablet (100 mg total) by mouth once daily. With a meal     olmesartan 40 MG tablet  Commonly known as: BENICAR  Take 1 tablet (40 mg total) by mouth once daily.     pramoxine 1 % Lotn  Commonly known as: SARNA SENSITIVE  Apply to arm 4-6x/day as needed for itching.     sumatriptan 100 MG tablet  Commonly known as: IMITREX  Take 1 tablet (100 mg total) by mouth every 2 (two) hours as needed for Migraine.     topiramate 25 MG tablet  Commonly known as: TOPAMAX  Take 1 tablet (25 mg total) by mouth 2 (two) times daily.     triamcinolone acetonide 0.1% 0.1 % cream  Commonly known as: KENALOG  Apply to affected areas of arm BID prn rash. Do not use on face, underarms, or groin.     vitamin D 1000 units Tab  Commonly known as: VITAMIN D3  Take 1 tablet (1,000 Units total) by mouth once daily.              SOCIAL HISTORY:     Social History     Socioeconomic History    Marital status:    Occupational History     Occupation: [employer]     Employer: [employer]    Tobacco Use    Smoking status: Never    Smokeless tobacco: Never   Substance and Sexual Activity    Alcohol use: No    Drug use: No    Sexual activity: Not Currently     Partners: Male   Other Topics Concern    Are you pregnant or think you may be? No    Breast-feeding No   Social History Narrative    ** Merged History Encounter **          Social Drivers of Health     Financial Resource Strain: Low Risk  (7/25/2024)    Overall Financial Resource Strain (CARDIA)     Difficulty of Paying Living Expenses: Not hard at all   Food Insecurity: No Food Insecurity (7/25/2024)    Hunger Vital Sign     Worried About Running Out of Food in the Last Year: Never true     Ran Out of Food in the Last Year: Never true   Transportation Needs: No Transportation Needs (8/18/2022)    PRAPARE - Transportation     Lack of Transportation (Medical): No     Lack of Transportation (Non-Medical): No   Physical Activity: Inactive (7/25/2024)    Exercise Vital Sign     Days of Exercise per Week: 0 days     Minutes of Exercise per Session: 0 min   Stress: No Stress Concern Present (7/25/2024)    [employer] [employer] - Occupational Stress Questionnaire     Feeling of Stress : Only a little   Housing Stability: Unknown (8/18/2022)    Housing Stability Vital Sign     Unable to Pay for Housing in the Last Year: No     Unstable Housing in the Last Year: No       FAMILY HISTORY:     Family History   Problem Relation Name Age of Onset    Arthritis Mother Na     Alzheimer's disease Mother Na     Migraines Neg Hx      Melanoma Neg Hx      Psoriasis Neg Hx      Lupus Neg Hx      Eczema Neg Hx      Acne Neg Hx      Colon cancer Neg Hx      Stomach cancer Neg Hx      Esophageal cancer Neg Hx      Liver disease Neg Hx      Crohn's disease Neg Hx      Ulcerative colitis Neg Hx      Celiac disease Neg Hx         REVIEW OF SYSTEMS:   Review of Systems   Constitutional: Positive for  "malaise/fatigue.   HENT: Negative.     Eyes: Negative.    Cardiovascular:  Positive for palpitations.   Endocrine: Negative.    Hematologic/Lymphatic: Negative.    Skin: Negative.    Musculoskeletal: Negative.    Gastrointestinal: Negative.    Genitourinary: Negative.    Neurological: Negative.    Psychiatric/Behavioral: Negative.     Allergic/Immunologic: Negative.        A 10 point review of systems was performed and all the pertinent positives have been mentioned. Rest of review of systems was negative.        PHYSICAL EXAM:     Vitals:    01/17/25 0853   BP: (!) 144/82   Pulse: 77   Resp: 15    Body mass index is 26.62 kg/m².  Weight: 77.1 kg (169 lb 15.6 oz)   Height: 5' 7" (170.2 cm)     Physical Exam  Constitutional:       Appearance: Normal appearance. She is well-developed.   HENT:      Head: Normocephalic.   Eyes:      Pupils: Pupils are equal, round, and reactive to light.   Cardiovascular:      Rate and Rhythm: Normal rate and regular rhythm.   Pulmonary:      Effort: Pulmonary effort is normal.      Breath sounds: Normal breath sounds.   Abdominal:      General: Bowel sounds are normal.      Palpations: Abdomen is soft.      Tenderness: There is no abdominal tenderness.   Musculoskeletal:         General: Normal range of motion.      Cervical back: Normal range of motion and neck supple.   Skin:     General: Skin is warm.   Neurological:      Mental Status: She is alert and oriented to person, place, and time.         DATA:     Laboratory:  CBC:  Recent Labs   Lab 01/19/22  0902 07/29/22  0845 11/18/24  0826   WBC 5.22 5.25 6.02   Hemoglobin 13.3 13.3 13.9   Hematocrit 42.6 40.7 42.6   Platelets 273 293 278       CHEMISTRIES:  Recent Labs   Lab 01/19/22  0902 07/29/22  0845 06/27/23  0934 02/12/24  1132 11/18/24  0826   Glucose 101 93 98 87 105   Sodium 137 141 138 137 140   Potassium 4.0 3.9 3.8 3.9 4.7   BUN 15 12 14 10 13   Creatinine 0.7 0.6 0.7 0.6 0.7   eGFR if  >60 >60  --   --   " --    eGFR if non  >60 >60  --   --   --    Calcium 8.7 8.7 9.2 9.2 9.0       CARDIAC BIOMARKERS:        COAGS:        LIPIDS/LFTS:  Recent Labs   Lab 06/27/23  0934 07/19/23  0914 02/12/24  1132 07/19/24  0945 11/18/24  0826   Cholesterol 224 H  --  242 H  --  238 H   Triglycerides 94  --  107  --  76   HDL 81 H  --  86 H  --  85 H   LDL Cholesterol 124.2  --  134.6  --  137.8   Non-HDL Cholesterol 143  --  156  --  153   AST 21   < > 21 30 30   ALT 29   < > 32 54 H 47 H    < > = values in this interval not displayed.       Hemoglobin A1C   Date Value Ref Range Status   07/21/2021 5.2 4.0 - 5.6 % Final     Comment:     ADA Screening Guidelines:  5.7-6.4%  Consistent with prediabetes  >or=6.5%  Consistent with diabetes    High levels of fetal hemoglobin interfere with the HbA1C  assay. Heterozygous hemoglobin variants (HbS, HgC, etc)do  not significantly interfere with this assay.   However, presence of multiple variants may affect accuracy.     07/28/2020 5.4 4.0 - 5.6 % Final     Comment:     ADA Screening Guidelines:  5.7-6.4%  Consistent with prediabetes  >or=6.5%  Consistent with diabetes  High levels of fetal hemoglobin interfere with the HbA1C  assay. Heterozygous hemoglobin variants (HbS, HgC, etc)do  not significantly interfere with this assay.   However, presence of multiple variants may affect accuracy.     07/31/2018 5.1 4.0 - 5.6 % Final     Comment:     ADA Screening Guidelines:  5.7-6.4%  Consistent with prediabetes  >or=6.5%  Consistent with diabetes  High levels of fetal hemoglobin interfere with the HbA1C  assay. Heterozygous hemoglobin variants (HbS, HgC, etc)do  not significantly interfere with this assay.   However, presence of multiple variants may affect accuracy.         TSH  Recent Labs   Lab 06/27/23  0934   TSH 0.671       The 10-year ASCVD risk score (Valente VILLARREAL, et al., 2019) is: 10.8%    Values used to calculate the score:      Age: 69 years      Sex: Female      Is  "Non- : No      Diabetic: No      Tobacco smoker: No      Systolic Blood Pressure: 144 mmHg      Is BP treated: Yes      HDL Cholesterol: 85 mg/dL      Total Cholesterol: 238 mg/dL       BNP    No results found for: "BNP"          ASSESSMENT AND PLAN     Patient Active Problem List   Diagnosis    Other migraine, not intractable, without status migrainosus    Dyslipidemia    Essential hypertension    Fibromyalgia    Palpitations    Obstructive sleep apnea    Fatty liver    Decreased strength of upper extremity    Class 1 obesity due to excess calories with serious comorbidity and body mass index (BMI) of 31.0 to 31.9 in adult    Early hepatic fibrosis    Osteopenia of lumbar spine    Seborrheic dermatitis       Assessment & Plan    IMPRESSION:  Considered sleep apnea as potential contributor to fatigue  Ordered event monitor and echo to further assess cardiac function  Increased metoprolol to address ongoing palpitations  Thyroid function test ordered to rule out thyroid-related causes of symptoms    PLAN SUMMARY:  - Recommend plant-based diet for weight loss  - Continue prescribed sleep apnea treatments (CPAP machine and mouth guard)  - Increase metoprolol from 100 mg to 150 mg daily  - Order month-long event monitor  - Order thyroid function test  - Order echo  - Follow up after completing ordered tests    Palpitations:  - Increased metoprolol from 100 mg to 150 mg daily.  - Take one 100 mg tablet and one 50 mg tablet.  - Ordered echo.  - Ordered month-long event monitor.    HYPERTENSION:  - Increased metoprolol from 100 mg to 150 mg daily.  - Take one 100 mg tablet and one 50 mg tablet.    PALPITATIONS:  - Ordered month-long event monitor.    SLEEP APNEA:  - Rachel to continue using prescribed sleep apnea treatments (CPAP machine and mouth guard).    WEIGHT MANAGEMENT:  - Discussed importance of weight management in overall health.  - Recommend following a plant-based diet for weight " loss.    Fatigue:  - Ordered thyroid function test.    FOLLOW-UP:  - Follow up after completing ordered tests.  - Wait for staff to provide AVS and schedule testing.           Thank you very much for involving me in the care of your patient.  Please do not hesitate to contact me if there are any questions.      Jess Grubbs MD, FAC, [facility]  Interventional Cardiologist, [facility].       Visit today included increased complexity associated with the care of the episodic problem hypertension, palpitations, addressed and managing the longitudinal care of the patient due to the serious and/or complex managed problem(s)   Patient Active Problem List   Diagnosis    Other migraine, not intractable, without status migrainosus    Dyslipidemia    Essential hypertension    Fibromyalgia    Palpitations    Obstructive sleep apnea    Fatty liver    Decreased strength of upper extremity    Class 1 obesity due to excess calories with serious comorbidity and body mass index (BMI) of 31.0 to 31.9 in adult    Early hepatic fibrosis    Osteopenia of lumbar spine    Seborrheic dermatitis     .    This note was dictated with the help of speech recognition software.  There might be un-intended errors and/or substitutions.

## 2025-01-22 ENCOUNTER — TELEPHONE (OUTPATIENT)
Dept: NEUROLOGY | Facility: CLINIC | Age: 70
End: 2025-01-22
Payer: MEDICARE

## 2025-01-24 LAB
OHS QRS DURATION: 92 MS
OHS QTC CALCULATION: 432 MS

## 2025-01-29 ENCOUNTER — TELEPHONE (OUTPATIENT)
Dept: NEUROLOGY | Facility: CLINIC | Age: 70
End: 2025-01-29
Payer: MEDICARE

## 2025-01-29 NOTE — TELEPHONE ENCOUNTER
2nd attempt to contact patient from cancelled appointment due to (snow)weather on 01/23/2024. Left voicemail for Ms. Ramos to contact our clinic to reschedule.

## 2025-02-03 ENCOUNTER — HOSPITAL ENCOUNTER (OUTPATIENT)
Dept: CARDIOLOGY | Facility: HOSPITAL | Age: 70
Discharge: HOME | End: 2025-02-03
Attending: INTERNAL MEDICINE
Payer: MEDICARE

## 2025-02-03 VITALS — BODY MASS INDEX: 26.53 KG/M2 | WEIGHT: 169 LBS | HEIGHT: 67 IN

## 2025-02-03 DIAGNOSIS — R00.2 PALPITATIONS: ICD-10-CM

## 2025-02-03 LAB
AV INDEX (PROSTH): 0.84
AV MEAN GRADIENT: 3 MMHG
AV PEAK GRADIENT: 5 MMHG
AV VALVE AREA BY VELOCITY RATIO: 3.7 CM²
AV VALVE AREA: 3.8 CM²
AV VELOCITY RATIO: 0.82
BSA FOR ECHO PROCEDURE: 1.9 M2
CV ECHO LV RWT: 0.43 CM
DOP CALC AO PEAK VEL: 1.1 M/S
DOP CALC AO VTI: 25.2 CM
DOP CALC LVOT AREA: 4.5 CM2
DOP CALC LVOT DIAMETER: 2.4 CM
DOP CALC LVOT PEAK VEL: 0.9 M/S
DOP CALC LVOT STROKE VOLUME: 95.4 CM3
DOP CALCLVOT PEAK VEL VTI: 21.1 CM
E WAVE DECELERATION TIME: 223 MSEC
E/A RATIO: 0.73
E/E' RATIO: 8 M/S
ECHO LV POSTERIOR WALL: 1 CM (ref 0.6–1.1)
FRACTIONAL SHORTENING: 47.8 % (ref 28–44)
INTERVENTRICULAR SEPTUM: 0.9 CM (ref 0.6–1.1)
IVC DIAMETER: 1.58 CM
LA MAJOR: 4.5 CM
LA MINOR: 5.2 CM
LA WIDTH: 3.8 CM
LEFT ATRIUM SIZE: 4.7 CM
LEFT ATRIUM VOLUME INDEX: 39 ML/M2
LEFT ATRIUM VOLUME: 73 CM3
LEFT INTERNAL DIMENSION IN SYSTOLE: 2.4 CM (ref 2.1–4)
LEFT VENTRICLE DIASTOLIC VOLUME INDEX: 51.89 ML/M2
LEFT VENTRICLE DIASTOLIC VOLUME: 97.55 ML
LEFT VENTRICLE MASS INDEX: 78.8 G/M2
LEFT VENTRICLE SYSTOLIC VOLUME INDEX: 10.4 ML/M2
LEFT VENTRICLE SYSTOLIC VOLUME: 19.5 ML
LEFT VENTRICULAR INTERNAL DIMENSION IN DIASTOLE: 4.6 CM (ref 3.5–6)
LEFT VENTRICULAR MASS: 148.1 G
LV LATERAL E/E' RATIO: 6.4 M/S
LV SEPTAL E/E' RATIO: 10 M/S
LVED V (TEICH): 97.55 ML
LVES V (TEICH): 19.5 ML
LVOT MG: 1.67 MMHG
LVOT MV: 0.61 CM/S
MV PEAK A VEL: 0.96 M/S
MV PEAK E VEL: 0.7 M/S
MV STENOSIS PRESSURE HALF TIME: 64.71 MS
MV VALVE AREA P 1/2 METHOD: 3.4 CM2
OHS CV RV/LV RATIO: 0.8 CM
PISA TR MAX VEL: 2.3 M/S
PULM VEIN S/D RATIO: 1.77
PV PEAK D VEL: 0.3 M/S
PV PEAK GRADIENT: 3 MMHG
PV PEAK S VEL: 0.53 M/S
PV PEAK VELOCITY: 0.8 M/S
RA MAJOR: 4.08 CM
RA PRESSURE ESTIMATED: 8 MMHG
RA WIDTH: 3.9 CM
RIGHT VENTRICLE DIASTOLIC BASEL DIMENSION: 3.7 CM
RIGHT VENTRICULAR END-DIASTOLIC DIMENSION: 3.67 CM
RV TB RVSP: 10 MMHG
RV TISSUE DOPPLER FREE WALL SYSTOLIC VELOCITY 1 (APICAL 4 CHAMBER VIEW): 11.39 CM/S
SINUS: 3.14 CM
STJ: 2.92 CM
TDI LATERAL: 0.11 M/S
TDI SEPTAL: 0.07 M/S
TDI: 0.09 M/S
TR MAX PG: 21 MMHG
TRICUSPID ANNULAR PLANE SYSTOLIC EXCURSION: 2.24 CM
TV REST PULMONARY ARTERY PRESSURE: 29 MMHG
Z-SCORE OF LEFT VENTRICULAR DIMENSION IN END DIASTOLE: -1.32
Z-SCORE OF LEFT VENTRICULAR DIMENSION IN END SYSTOLE: -2.37

## 2025-02-03 PROCEDURE — 93306 TTE W/DOPPLER COMPLETE: CPT | Mod: 26,,, | Performed by: INTERNAL MEDICINE

## 2025-02-03 PROCEDURE — 93306 TTE W/DOPPLER COMPLETE: CPT

## 2025-02-10 ENCOUNTER — CLINICAL SUPPORT (OUTPATIENT)
Dept: CARDIOLOGY | Facility: HOSPITAL | Age: 70
End: 2025-02-10
Attending: INTERNAL MEDICINE
Payer: MEDICARE

## 2025-02-10 ENCOUNTER — TELEPHONE (OUTPATIENT)
Dept: CARDIOLOGY | Facility: HOSPITAL | Age: 70
End: 2025-02-10
Payer: MEDICARE

## 2025-02-10 DIAGNOSIS — R00.2 PALPITATIONS: ICD-10-CM

## 2025-02-10 NOTE — TELEPHONE ENCOUNTER
Reached out to patient via phone to inform patient she did not need to come to clinic for event monitor appt today as the monitor will be mailed to patient.  No answer, LVM.

## 2025-02-19 ENCOUNTER — OFFICE VISIT (OUTPATIENT)
Dept: CARDIOLOGY | Facility: CLINIC | Age: 70
End: 2025-02-19
Payer: MEDICARE

## 2025-02-19 ENCOUNTER — LAB VISIT (OUTPATIENT)
Dept: LAB | Facility: HOSPITAL | Age: 70
End: 2025-02-19
Attending: FAMILY MEDICINE
Payer: MEDICARE

## 2025-02-19 VITALS
HEART RATE: 88 BPM | SYSTOLIC BLOOD PRESSURE: 132 MMHG | OXYGEN SATURATION: 99 % | HEIGHT: 66 IN | WEIGHT: 202.19 LBS | BODY MASS INDEX: 32.49 KG/M2 | DIASTOLIC BLOOD PRESSURE: 76 MMHG

## 2025-02-19 DIAGNOSIS — R00.2 PALPITATIONS: ICD-10-CM

## 2025-02-19 DIAGNOSIS — E78.5 DYSLIPIDEMIA: Chronic | ICD-10-CM

## 2025-02-19 DIAGNOSIS — I10 ESSENTIAL HYPERTENSION: ICD-10-CM

## 2025-02-19 DIAGNOSIS — E78.5 HYPERLIPIDEMIA, UNSPECIFIED HYPERLIPIDEMIA TYPE: Primary | ICD-10-CM

## 2025-02-19 DIAGNOSIS — I10 ESSENTIAL HYPERTENSION: Chronic | ICD-10-CM

## 2025-02-19 DIAGNOSIS — R73.9 HYPERGLYCEMIA: ICD-10-CM

## 2025-02-19 DIAGNOSIS — G47.33 OBSTRUCTIVE SLEEP APNEA: ICD-10-CM

## 2025-02-19 DIAGNOSIS — I10 PRIMARY HYPERTENSION: ICD-10-CM

## 2025-02-19 LAB
ALBUMIN SERPL BCP-MCNC: 3.8 G/DL (ref 3.5–5.2)
ALP SERPL-CCNC: 118 U/L (ref 40–150)
ALT SERPL W/O P-5'-P-CCNC: 42 U/L (ref 10–44)
ANION GAP SERPL CALC-SCNC: 7 MMOL/L (ref 8–16)
AST SERPL-CCNC: 24 U/L (ref 10–40)
BILIRUB SERPL-MCNC: 0.5 MG/DL (ref 0.1–1)
BUN SERPL-MCNC: 14 MG/DL (ref 8–23)
CALCIUM SERPL-MCNC: 8.7 MG/DL (ref 8.7–10.5)
CHLORIDE SERPL-SCNC: 107 MMOL/L (ref 95–110)
CHOLEST SERPL-MCNC: 231 MG/DL (ref 120–199)
CHOLEST/HDLC SERPL: 2.8 {RATIO} (ref 2–5)
CO2 SERPL-SCNC: 24 MMOL/L (ref 23–29)
CREAT SERPL-MCNC: 0.6 MG/DL (ref 0.5–1.4)
EST. GFR  (NO RACE VARIABLE): >60 ML/MIN/1.73 M^2
ESTIMATED AVG GLUCOSE: 108 MG/DL (ref 68–131)
GLUCOSE SERPL-MCNC: 94 MG/DL (ref 70–110)
HBA1C MFR BLD: 5.4 % (ref 4–5.6)
HDLC SERPL-MCNC: 83 MG/DL (ref 40–75)
HDLC SERPL: 35.9 % (ref 20–50)
LDLC SERPL CALC-MCNC: 125.8 MG/DL (ref 63–159)
NONHDLC SERPL-MCNC: 148 MG/DL
POTASSIUM SERPL-SCNC: 4.1 MMOL/L (ref 3.5–5.1)
PROT SERPL-MCNC: 7.1 G/DL (ref 6–8.4)
SODIUM SERPL-SCNC: 138 MMOL/L (ref 136–145)
TRIGL SERPL-MCNC: 111 MG/DL (ref 30–150)

## 2025-02-19 PROCEDURE — 83036 HEMOGLOBIN GLYCOSYLATED A1C: CPT | Performed by: FAMILY MEDICINE

## 2025-02-19 PROCEDURE — 80061 LIPID PANEL: CPT | Performed by: FAMILY MEDICINE

## 2025-02-19 PROCEDURE — 4010F ACE/ARB THERAPY RXD/TAKEN: CPT | Mod: CPTII,S$GLB,, | Performed by: INTERNAL MEDICINE

## 2025-02-19 PROCEDURE — 80053 COMPREHEN METABOLIC PANEL: CPT | Performed by: FAMILY MEDICINE

## 2025-02-19 PROCEDURE — 3288F FALL RISK ASSESSMENT DOCD: CPT | Mod: CPTII,S$GLB,, | Performed by: INTERNAL MEDICINE

## 2025-02-19 PROCEDURE — 1159F MED LIST DOCD IN RCRD: CPT | Mod: CPTII,S$GLB,, | Performed by: INTERNAL MEDICINE

## 2025-02-19 PROCEDURE — 36415 COLL VENOUS BLD VENIPUNCTURE: CPT | Mod: PN | Performed by: FAMILY MEDICINE

## 2025-02-19 PROCEDURE — 1101F PT FALLS ASSESS-DOCD LE1/YR: CPT | Mod: CPTII,S$GLB,, | Performed by: INTERNAL MEDICINE

## 2025-02-19 PROCEDURE — 1160F RVW MEDS BY RX/DR IN RCRD: CPT | Mod: CPTII,S$GLB,, | Performed by: INTERNAL MEDICINE

## 2025-02-19 PROCEDURE — 1126F AMNT PAIN NOTED NONE PRSNT: CPT | Mod: CPTII,S$GLB,, | Performed by: INTERNAL MEDICINE

## 2025-02-19 PROCEDURE — 3075F SYST BP GE 130 - 139MM HG: CPT | Mod: CPTII,S$GLB,, | Performed by: INTERNAL MEDICINE

## 2025-02-19 PROCEDURE — 3008F BODY MASS INDEX DOCD: CPT | Mod: CPTII,S$GLB,, | Performed by: INTERNAL MEDICINE

## 2025-02-19 PROCEDURE — 3078F DIAST BP <80 MM HG: CPT | Mod: CPTII,S$GLB,, | Performed by: INTERNAL MEDICINE

## 2025-02-19 RX ORDER — ATORVASTATIN CALCIUM 40 MG/1
40 TABLET, FILM COATED ORAL NIGHTLY
Qty: 90 TABLET | Refills: 3 | Status: SHIPPED | OUTPATIENT
Start: 2025-02-19 | End: 2025-02-19

## 2025-02-19 RX ORDER — ATORVASTATIN CALCIUM 20 MG/1
20 TABLET, FILM COATED ORAL NIGHTLY
Qty: 90 TABLET | Refills: 3 | Status: SHIPPED | OUTPATIENT
Start: 2025-02-19

## 2025-02-19 NOTE — PROGRESS NOTES
CARDIOVASCULAR CONSULTATION    REASON FOR CONSULT:   Rachel Conroy is a 69 y.o. female who presents for evaluation    HISTORY OF PRESENT ILLNESS:     Patient is a pleasant 67-year-old lady.  Nepalese speaking.  History obtained with the help of Candelaria .  Coming in complaining of dyspnea on exertion, occasional chest tightness on exertion as well as palpitations about once a week.  Denies orthopnea, PND, swelling of feet.    Aug 23: Patient here for follow-up.  Doing fine.  History obtained with the help of Candelaria .  Main complaint is tiredness.  No more chest pains.  Stress test did not show any significant arrhythmia, echo showed normal left ventricle systolic function.  Event monitor showed brief episodes of atrial tachycardia.    Sinus rhythm with very rare PACs/PVCs.  2 episodes of nonsustained atrial tachycardia lasting up to 5 beats in duration.    The left ventricle is normal in size with normal systolic function.  The estimated ejection fraction is 55%.  Normal right ventricular size with normal right ventricular systolic function.  The estimated PA systolic pressure is 32 mmHg.         The patient exercised for 7 minutes 17 seconds on a Andrea protocol, corresponding to a functional capacity of 9 METS, achieving a peak heart rate of 148 bpm, which is 99 % of the age predicted maximum heart rate.    Normal myocardial perfusion scan. There is no evidence of myocardial ischemia or infarction.    The visually estimated ejection fraction is normal during stress.    There is normal wall motion post stress.    The ECG portion of the study is negative for ischemia.    The patient reported no chest pain during the stress test.    During stress, occasional PVCs are noted.    Jan 25:  History obtained with the help of     History of Present Illness    CHIEF COMPLAINT:  Rachel presents today for follow-up of palpitations.    PALPITATIONS AND ASSOCIATED SYMPTOMS:  She experiences  "palpitations approximately 30 times per week, describing them as rapid heart "flips" . The severity has increased since 2023. These episodes are associated with fatigue and morning headaches. She reports increased fatigue compared to previous year    SLEEP APNEA:  She uses a CPAP machine for sleep apnea management.  occasionally removes it due to discomfort and sleep difficulties.    MEDICAL HISTORY:  She has a history of hypertension    WEIGHT MANAGEMENT:  She reports recent weight gain causing concern.           2/25:  History obtained with the help of Candelaria     History of Present Illness    CHIEF COMPLAINT:  Rachel presents today for follow-up on heart-related concerns    CARDIOVASCULAR:  She reports improvement in palpitations since increasing metoprolol dosage. Stress test was normal. Echocardiogram in January showed EF of 55-60% and pulmonary artery systolic pressure of 29 mmHg. Event monitor results are pending.    MEDICATIONS:  She takes metoprolol succinate 150 mg daily and metoprolol 40 mg daily for cardiac management.    SLEEP:  She reports difficulty sleeping due to limited time available for rest, resulting in decreased energy levels.    HEPATIC:  She has a history of elevated liver enzymes. Liver function tests showed improvement per liver specialist, though values can fluctuate.           PAST MEDICAL HISTORY:     Past Medical History:   Diagnosis Date    Arthritis     GERD (gastroesophageal reflux disease)     Hyperlipidemia     Hypertension     Irritable bowel syndrome     Joint pain     Knee injury     Migraine headache     Muscle pain, fibromyalgia 03/19/2014    NAFLD (nonalcoholic fatty liver disease)     Other physical therapy     Plantar fasciitis     Sleep apnea     Urinary incontinence        PAST SURGICAL HISTORY:     Past Surgical History:   Procedure Laterality Date    APPENDECTOMY  2018    COLONOSCOPY N/A 11/9/2017    Procedure: COLONOSCOPY;  Surgeon: Mariaelena Harrell MD;  " Location: [facility] (4TH FLR);  Service: Endoscopy;  Laterality: N/A;    COLONOSCOPY N/A 2018    Procedure: COLONOSCOPY;  Surgeon: Nicola Zhu MD;  Location: [facility] (2ND FLR);  Service: Endoscopy;  Laterality: N/A;    ESOPHAGOGASTRODUODENOSCOPY N/A 2023    Procedure: EGD (ESOPHAGOGASTRODUODENOSCOPY);  Surgeon: Hector Barnes MD;  Location: [facility];  Service: Endoscopy;  Laterality: N/A;  Procedure Timin-4 weeks    history of difficult airway    Referrd by Dr. Carranza/Prep instructions mailed to home and to portal. AS    HYSTERECTOMY      TOTAL KNEE ARTHROPLASTY Right 2020    Procedure: ARTHROPLASTY, KNEE, TOTAL-STEFANIE;  Surgeon: Gerardo Stein MD;  Location: [facility] OR;  Service: Orthopedics;  Laterality: Right;  spinal attempted       ALLERGIES AND MEDICATION:     Review of patient's allergies indicates:   Allergen Reactions    Tramadol Itching        Medication List            Accurate as of 2025  2:27 PM. If you have any questions, ask your nurse or doctor.                START taking these medications      atorvastatin 20 MG tablet  Commonly known as: LIPITOR  Take 1 tablet (20 mg total) by mouth every evening.  Started by: Jess Grubbs MD            CONTINUE taking these medications      clobetasoL 0.05 % shampoo  Commonly known as: CLOBEX  Apply 1 application  topically 2 (two) times daily. Apply to affected area     gabapentin 300 MG capsule  Commonly known as: NEURONTIN  Take 1 capsule (300 mg total) by mouth 2 (two) times daily.     ketoconazole 2 % shampoo  Commonly known as: NIZORAL  Apply topically twice a week. Apply small amount to scalp, lather, and leave on for 5 minutes, then rinse off. Use twice weekly     meloxicam 15 MG tablet  Commonly known as: MOBIC  Take 1 tablet (15 mg total) by mouth daily as needed for Pain.     methocarbamoL 500 MG Tab  Commonly known as: Robaxin  Take 1-2 tablets PO Q8 PRN for low back and leg pain     * metoprolol  succinate 100 MG 24 hr tablet  Commonly known as: TOPROL-XL  Take 1 tablet (100 mg total) by mouth once daily. With a meal     * metoprolol succinate 50 MG 24 hr tablet  Commonly known as: TOPROL-XL  Take 1 tablet (50 mg total) by mouth once daily.     olmesartan 40 MG tablet  Commonly known as: BENICAR  Take 1 tablet (40 mg total) by mouth once daily.     pramoxine 1 % Lotn  Commonly known as: SARNA SENSITIVE  Apply to arm 4-6x/day as needed for itching.     sumatriptan 100 MG tablet  Commonly known as: IMITREX  Take 1 tablet (100 mg total) by mouth every 2 (two) hours as needed for Migraine.     topiramate 25 MG tablet  Commonly known as: TOPAMAX  Take 1 tablet (25 mg total) by mouth 2 (two) times daily.     triamcinolone acetonide 0.1% 0.1 % cream  Commonly known as: KENALOG  Apply to affected areas of arm BID prn rash. Do not use on face, underarms, or groin.     vitamin D 1000 units Tab  Commonly known as: VITAMIN D3  Take 1 tablet (1,000 Units total) by mouth once daily.           * This list has 2 medication(s) that are the same as other medications prescribed for you. Read the directions carefully, and ask your doctor or other care provider to review them with you.                   Where to Get Your Medications        These medications were sent to "[id number] - [place], [place] - [address] AT [facility] [address]  [address] [place] [address] [address]      Phone: [phone]   atorvastatin 20 MG tablet         SOCIAL HISTORY:     Social History     Socioeconomic History    Marital status:    Occupational History    Occupation: [employer]     Employer: [employer]    Tobacco Use    Smoking status: Never     Passive exposure: Never    Smokeless tobacco: Never   Substance and Sexual Activity    Alcohol use: No    Drug use: No    Sexual activity: Not Currently     Partners: Male   Other Topics Concern    Are you pregnant or think you may be? No    Breast-feeding No   Social  History Narrative    ** Merged History Encounter **          Social Drivers of Health     Financial Resource Strain: Low Risk  (7/25/2024)    Overall Financial Resource Strain (CARDIA)     Difficulty of Paying Living Expenses: Not hard at all   Food Insecurity: No Food Insecurity (7/25/2024)    Hunger Vital Sign     Worried About Running Out of Food in the Last Year: Never true     Ran Out of Food in the Last Year: Never true   Transportation Needs: No Transportation Needs (8/18/2022)    PRAPARE - Transportation     Lack of Transportation (Medical): No     Lack of Transportation (Non-Medical): No   Physical Activity: Inactive (7/25/2024)    Exercise Vital Sign     Days of Exercise per Week: 0 days     Minutes of Exercise per Session: 0 min   Stress: No Stress Concern Present (7/25/2024)    [employer] [place] [employer] - Occupational Stress Questionnaire     Feeling of Stress : Only a little   Housing Stability: Unknown (8/18/2022)    Housing Stability Vital Sign     Unable to Pay for Housing in the Last Year: No     Unstable Housing in the Last Year: No       FAMILY HISTORY:     Family History   Problem Relation Name Age of Onset    Arthritis Mother Na     Alzheimer's disease Mother Na     Migraines Neg Hx      Melanoma Neg Hx      Psoriasis Neg Hx      Lupus Neg Hx      Eczema Neg Hx      Acne Neg Hx      Colon cancer Neg Hx      Stomach cancer Neg Hx      Esophageal cancer Neg Hx      Liver disease Neg Hx      Crohn's disease Neg Hx      Ulcerative colitis Neg Hx      Celiac disease Neg Hx         REVIEW OF SYSTEMS:   Review of Systems   Constitutional: Positive for malaise/fatigue.   HENT: Negative.     Eyes: Negative.    Cardiovascular:  Positive for palpitations.   Endocrine: Negative.    Hematologic/Lymphatic: Negative.    Skin: Negative.    Musculoskeletal: Negative.    Gastrointestinal: Negative.    Genitourinary: Negative.    Neurological: Negative.    Psychiatric/Behavioral: Negative.    "  Allergic/Immunologic: Negative.        A 10 point review of systems was performed and all the pertinent positives have been mentioned. Rest of review of systems was negative.        PHYSICAL EXAM:     Vitals:    02/19/25 1317   BP: 132/76   Pulse: 88    Body mass index is 32.63 kg/m².  Weight: 91.7 kg (202 lb 2.6 oz)   Height: 5' 6" (167.6 cm)     Physical Exam  Constitutional:       Appearance: Normal appearance. She is well-developed.   HENT:      Head: Normocephalic.   Eyes:      Pupils: Pupils are equal, round, and reactive to light.   Cardiovascular:      Rate and Rhythm: Normal rate and regular rhythm.   Pulmonary:      Effort: Pulmonary effort is normal.      Breath sounds: Normal breath sounds.   Abdominal:      General: Bowel sounds are normal.      Palpations: Abdomen is soft.      Tenderness: There is no abdominal tenderness.   Musculoskeletal:         General: Normal range of motion.      Cervical back: Normal range of motion and neck supple.   Skin:     General: Skin is warm.   Neurological:      Mental Status: She is alert and oriented to person, place, and time.           DATA:     Laboratory:  CBC:  Recent Labs   Lab 07/29/22  0845 11/18/24  0826   WBC 5.25 6.02   Hemoglobin 13.3 13.9   Hematocrit 40.7 42.6   Platelets 293 278       CHEMISTRIES:  Recent Labs   Lab 07/29/22  0845 06/27/23  0934 02/12/24  1132 08/24/24  2249 11/18/24  0826 02/19/25  0850   Glucose 93   < > 87  --  105 94   Sodium 141   < > 137 138 140 138   Potassium 3.9   < > 3.9 3.8 4.7 4.1   BUN 12   < > 10 12.7 13 14   Creatinine 0.6   < > 0.6 0.54 0.7 0.6   eGFR if  >60  --   --   --   --   --    eGFR if non  >60  --   --   --   --   --    Calcium 8.7   < > 9.2 8.9 9.0 8.7    < > = values in this interval not displayed.       CARDIAC BIOMARKERS:        COAGS:        LIPIDS/LFTS:  Recent Labs   Lab 02/12/24  1132 07/19/24  0945 11/18/24  0826 02/19/25  0850   Cholesterol 242 H  --  238 H 231 H " "  Triglycerides 107  --  76 111   HDL 86 H  --  85 H 83 H   LDL Cholesterol 134.6  --  137.8 125.8   Non-HDL Cholesterol 156  --  153 148   AST 21 30 30 24   ALT 32 54 H 47 H 42       Hemoglobin A1C   Date Value Ref Range Status   07/21/2021 5.2 4.0 - 5.6 % Final     Comment:     ADA Screening Guidelines:  5.7-6.4%  Consistent with prediabetes  >or=6.5%  Consistent with diabetes    High levels of fetal hemoglobin interfere with the HbA1C  assay. Heterozygous hemoglobin variants (HbS, HgC, etc)do  not significantly interfere with this assay.   However, presence of multiple variants may affect accuracy.     07/28/2020 5.4 4.0 - 5.6 % Final     Comment:     ADA Screening Guidelines:  5.7-6.4%  Consistent with prediabetes  >or=6.5%  Consistent with diabetes  High levels of fetal hemoglobin interfere with the HbA1C  assay. Heterozygous hemoglobin variants (HbS, HgC, etc)do  not significantly interfere with this assay.   However, presence of multiple variants may affect accuracy.     07/31/2018 5.1 4.0 - 5.6 % Final     Comment:     ADA Screening Guidelines:  5.7-6.4%  Consistent with prediabetes  >or=6.5%  Consistent with diabetes  High levels of fetal hemoglobin interfere with the HbA1C  assay. Heterozygous hemoglobin variants (HbS, HgC, etc)do  not significantly interfere with this assay.   However, presence of multiple variants may affect accuracy.         TSH  Recent Labs   Lab 06/27/23  0934 02/03/25  1040   TSH 0.671 0.719       The 10-year ASCVD risk score (Valente VILLARREAL, et al., 2019) is: 9.1%    Values used to calculate the score:      Age: 69 years      Sex: Female      Is Non- : No      Diabetic: No      Tobacco smoker: No      Systolic Blood Pressure: 132 mmHg      Is BP treated: Yes      HDL Cholesterol: 83 mg/dL      Total Cholesterol: 231 mg/dL       BNP    No results found for: "BNP"          ASSESSMENT AND PLAN     Patient Active Problem List   Diagnosis    Other migraine, not " intractable, without status migrainosus    Dyslipidemia    Essential hypertension    Fibromyalgia    Palpitations    Obstructive sleep apnea    Fatty liver    Decreased strength of upper extremity    Class 1 obesity due to excess calories with serious comorbidity and body mass index (BMI) of 31.0 to 31.9 in adult    Early hepatic fibrosis    Osteopenia of lumbar spine    Seborrheic dermatitis         Assessment & Plan    IMPRESSION:  BP has improved with current Lasix regimen  Cholesterol-lowering therapy needed based on elevated total cholesterol and 10-year cardiovascular risk score of 9%  Normal EF 55-60% and PASP 29 mmHg confirmed on recent cardiac imaging  Considered liver function history in medication management decisions    PLAN SUMMARY:  - Continue metoprolol succinate 150 mg daily  - Started Lipitor (atorvastatin) 20 mg daily  - Follow plant-based diet, avoid carbohydrates, red meats, and processed meats  - Reduce sugar intake  - Liver function tests ordered  - Continue follow-up with liver specialist  - Follow up in 3 months    HYPERLIPIDEMIA:  - Explained the difference between HDL and LDL cholesterol and their impact on overall cardiovascular health.  - Rachel to follow a plant-based diet, avoid carbohydrates, red meats, and processed meats, and reduce sugar intake.  - Started Lipitor (atorvastatin) 20 mg daily.  - Liver function tests ordered.    PALPITATIONS:  - Continued metoprolol succinate 150 mg daily.  Palpitations improved.  Event monitor results awaited    FATTY abnormality  - Continue follow-up with liver specialist while starting new Lipitor regimen.    HYPERTENSION:  Continue current therapy    SLEEP APNEA:  - Rachel to continue using prescribed sleep apnea treatments (CPAP machine and mouth guard).    WEIGHT MANAGEMENT:  - Discussed importance of weight management in overall health.  - Recommend following a plant-based diet for weight loss.      FOLLOW-UP:  - Follow up in 3 months.            Thank you very much for involving me in the care of your patient.  Please do not hesitate to contact me if there are any questions.      Jess Grubbs MD, FAC, [facility]  Interventional Cardiologist, [facility].       Visit today included increased complexity associated with the care of the episodic problem hypertension, palpitations, addressed and managing the longitudinal care of the patient due to the serious and/or complex managed problem(s)   Patient Active Problem List   Diagnosis    Other migraine, not intractable, without status migrainosus    Dyslipidemia    Essential hypertension    Fibromyalgia    Palpitations    Obstructive sleep apnea    Fatty liver    Decreased strength of upper extremity    Class 1 obesity due to excess calories with serious comorbidity and body mass index (BMI) of 31.0 to 31.9 in adult    Early hepatic fibrosis    Osteopenia of lumbar spine    Seborrheic dermatitis     .    This note was dictated with the help of speech recognition software.  There might be un-intended errors and/or substitutions.

## 2025-02-21 ENCOUNTER — RESULTS FOLLOW-UP (OUTPATIENT)
Dept: FAMILY MEDICINE | Facility: CLINIC | Age: 70
End: 2025-02-21
Payer: MEDICARE

## 2025-02-27 ENCOUNTER — OFFICE VISIT (OUTPATIENT)
Dept: FAMILY MEDICINE | Facility: CLINIC | Age: 70
End: 2025-02-27
Payer: MEDICARE

## 2025-02-27 VITALS
BODY MASS INDEX: 32.27 KG/M2 | WEIGHT: 200.81 LBS | DIASTOLIC BLOOD PRESSURE: 72 MMHG | RESPIRATION RATE: 18 BRPM | OXYGEN SATURATION: 98 % | TEMPERATURE: 98 F | SYSTOLIC BLOOD PRESSURE: 132 MMHG | HEART RATE: 81 BPM | HEIGHT: 66 IN

## 2025-02-27 DIAGNOSIS — I10 ESSENTIAL HYPERTENSION: Primary | Chronic | ICD-10-CM

## 2025-02-27 DIAGNOSIS — M79.7 FIBROMYALGIA: Chronic | ICD-10-CM

## 2025-02-27 DIAGNOSIS — E78.5 DYSLIPIDEMIA: Chronic | ICD-10-CM

## 2025-02-27 DIAGNOSIS — G43.009 MIGRAINE WITHOUT AURA AND WITHOUT STATUS MIGRAINOSUS, NOT INTRACTABLE: ICD-10-CM

## 2025-02-27 DIAGNOSIS — R00.2 PALPITATIONS: ICD-10-CM

## 2025-02-27 PROCEDURE — 3288F FALL RISK ASSESSMENT DOCD: CPT | Mod: CPTII,S$GLB,, | Performed by: FAMILY MEDICINE

## 2025-02-27 PROCEDURE — 3008F BODY MASS INDEX DOCD: CPT | Mod: CPTII,S$GLB,, | Performed by: FAMILY MEDICINE

## 2025-02-27 PROCEDURE — 3078F DIAST BP <80 MM HG: CPT | Mod: CPTII,S$GLB,, | Performed by: FAMILY MEDICINE

## 2025-02-27 PROCEDURE — 3044F HG A1C LEVEL LT 7.0%: CPT | Mod: CPTII,S$GLB,, | Performed by: FAMILY MEDICINE

## 2025-02-27 PROCEDURE — 99214 OFFICE O/P EST MOD 30 MIN: CPT | Mod: S$GLB,,, | Performed by: FAMILY MEDICINE

## 2025-02-27 PROCEDURE — 99999 PR PBB SHADOW E&M-EST. PATIENT-LVL IV: CPT | Mod: PBBFAC,,, | Performed by: FAMILY MEDICINE

## 2025-02-27 PROCEDURE — 1126F AMNT PAIN NOTED NONE PRSNT: CPT | Mod: CPTII,S$GLB,, | Performed by: FAMILY MEDICINE

## 2025-02-27 PROCEDURE — 1159F MED LIST DOCD IN RCRD: CPT | Mod: CPTII,S$GLB,, | Performed by: FAMILY MEDICINE

## 2025-02-27 PROCEDURE — 1160F RVW MEDS BY RX/DR IN RCRD: CPT | Mod: CPTII,S$GLB,, | Performed by: FAMILY MEDICINE

## 2025-02-27 PROCEDURE — 3075F SYST BP GE 130 - 139MM HG: CPT | Mod: CPTII,S$GLB,, | Performed by: FAMILY MEDICINE

## 2025-02-27 PROCEDURE — 1101F PT FALLS ASSESS-DOCD LE1/YR: CPT | Mod: CPTII,S$GLB,, | Performed by: FAMILY MEDICINE

## 2025-02-27 PROCEDURE — 4010F ACE/ARB THERAPY RXD/TAKEN: CPT | Mod: CPTII,S$GLB,, | Performed by: FAMILY MEDICINE

## 2025-02-27 PROCEDURE — G2211 COMPLEX E/M VISIT ADD ON: HCPCS | Mod: S$GLB,,, | Performed by: FAMILY MEDICINE

## 2025-02-27 RX ORDER — SUMATRIPTAN SUCCINATE 100 MG/1
TABLET ORAL
Qty: 9 TABLET | Refills: 5 | Status: SHIPPED | OUTPATIENT
Start: 2025-02-27

## 2025-02-27 RX ORDER — OLMESARTAN MEDOXOMIL 40 MG/1
40 TABLET ORAL DAILY
Qty: 90 TABLET | Refills: 3 | Status: SHIPPED | OUTPATIENT
Start: 2025-02-27 | End: 2026-02-27

## 2025-02-27 RX ORDER — GABAPENTIN 300 MG/1
300 CAPSULE ORAL 2 TIMES DAILY
Qty: 180 CAPSULE | Refills: 3 | Status: SHIPPED | OUTPATIENT
Start: 2025-02-27

## 2025-02-27 RX ORDER — METOPROLOL SUCCINATE 200 MG/1
200 TABLET, EXTENDED RELEASE ORAL DAILY
Qty: 90 TABLET | Refills: 3 | Status: SHIPPED | OUTPATIENT
Start: 2025-02-27

## 2025-03-24 DIAGNOSIS — Z00.00 ENCOUNTER FOR MEDICARE ANNUAL WELLNESS EXAM: ICD-10-CM

## 2025-04-04 ENCOUNTER — PATIENT MESSAGE (OUTPATIENT)
Dept: FAMILY MEDICINE | Facility: CLINIC | Age: 70
End: 2025-04-04

## 2025-04-04 ENCOUNTER — OFFICE VISIT (OUTPATIENT)
Dept: FAMILY MEDICINE | Facility: CLINIC | Age: 70
End: 2025-04-04
Payer: MEDICARE

## 2025-04-04 DIAGNOSIS — M72.2 PLANTAR FASCIITIS, LEFT: Primary | ICD-10-CM

## 2025-04-04 PROCEDURE — 1159F MED LIST DOCD IN RCRD: CPT | Mod: CPTII,95,, | Performed by: FAMILY MEDICINE

## 2025-04-04 PROCEDURE — 98005 SYNCH AUDIO-VIDEO EST LOW 20: CPT | Mod: 95,,, | Performed by: FAMILY MEDICINE

## 2025-04-04 PROCEDURE — 3044F HG A1C LEVEL LT 7.0%: CPT | Mod: CPTII,95,, | Performed by: FAMILY MEDICINE

## 2025-04-04 PROCEDURE — 4010F ACE/ARB THERAPY RXD/TAKEN: CPT | Mod: CPTII,95,, | Performed by: FAMILY MEDICINE

## 2025-04-04 PROCEDURE — 1160F RVW MEDS BY RX/DR IN RCRD: CPT | Mod: CPTII,95,, | Performed by: FAMILY MEDICINE

## 2025-04-04 RX ORDER — NAPROXEN 500 MG/1
500 TABLET ORAL 2 TIMES DAILY WITH MEALS
Qty: 60 TABLET | Refills: 0 | Status: SHIPPED | OUTPATIENT
Start: 2025-04-04 | End: 2025-05-04

## 2025-04-07 NOTE — PROGRESS NOTES
Patient Name: Rachel Conroy    : 1955  MRN: [id number]    The patient location is: [place], [place]  The chief complaint leading to consultation is: Foot Pain       Visit type: audiovisual    Face to Face time with patient: 6 minutes  8 minutes of total time spent on the encounter, which includes face to face time and non-face to face time preparing to see the patient (eg, review of tests), Obtaining and/or reviewing separately obtained history, Documenting clinical information in the electronic or other health record, Independently interpreting results (not separately reported) and communicating results to the patient/family/caregiver, or Care coordination (not separately reported).     Each patient to whom he or she provides medical services by telemedicine is:  (1) informed of the relationship between the physician and patient and the respective role of any other health care provider with respect to management of the patient; and (2) notified that he or she may decline to receive medical services by telemedicine and may withdraw from such care at any time.    Notes:     Subjective:     Patient ID: Rachel is a 69 y.o. female    Chief Complaint:  Foot Pain    History of Present Illness    Rachel presents today for left heel pain.    She reports left heel pain that started about a week ago, described as a pin or thorn-like sensation in the heel extending from the knees down. Pain is most noticeable in the morning with transitions and worsens with prolonged standing at work, becoming unbearable after 5-6 hours. She has difficulty maintaining her stance by the end of her work shift. She has a history of similar foot pain localized to the arch several years ago.    She takes Agafetín and Tylenol for pain management.      ROS:  General: -fever, -chills, -fatigue, -weight gain, -weight loss  Eyes: -vision changes, -redness, -discharge  ENT: -ear pain, -nasal congestion, -sore throat  Cardiovascular: -chest  pain, -palpitations, -lower extremity edema  Respiratory: -cough, -shortness of breath  Gastrointestinal: -abdominal pain, -nausea, -vomiting, -diarrhea, -constipation, -blood in stool  Genitourinary: -dysuria, -hematuria, -frequency  Musculoskeletal: +joint pain, -muscle pain, +limb pain, +pain with movement  Skin: -rash, -lesion  Neurological: -headache, -dizziness, -numbness, -tingling  Psychiatric: -anxiety, -depression, -sleep difficulty           Review of Systems     Objective:   There were no vitals taken for this visit.         Physical Exam  Pulmonary:      Effort: Pulmonary effort is normal.   Neurological:      Mental Status: She is alert.            Assessment        ICD-10-CM ICD-9-CM   1. Plantar fasciitis, left  M72.2 728.71         Plan:     Assessment & Plan    IMPRESSION:  Diagnosed plantar fasciitis based on reported symptoms of heel pain.  Recommend conservative treatment approach with anti-inflammatory medication and daily exercises for 4 weeks.  Discussed that most cases resolve within 4 weeks of treatment.    PLANTAR FASCIITIS:  Diagnosed the patient with plantar fasciitis, characterized by inflammation of the plantar fascia in the sole of the foot.  Prescribed Naproxen, to be taken twice daily with food for 2-4 weeks to manage inflammation and pain.  Will provide exercises to be performed daily.      FOLLOW-UP:  Instructed the patient to follow up in 4 weeks if symptoms persist for potential podiatrist referral.          ORDERS  1. Plantar fasciitis, left  -     naproxen (NAPROSYN) 500 MG tablet; Take 1 tablet (500 mg total) by mouth 2 (two) times daily with meals.  Dispense: 60 tablet; Refill: 0             -Maxwell Gonzalez Jr., MD, AAHIVS      This note was generated with the assistance of ambient listening technology. Verbal consent was obtained by the patient and accompanying visitor(s) for the recording of patient appointment to facilitate this note. I attest to having reviewed and  edited the generated note for accuracy, though some syntax or spelling errors may persist. Please contact the author of this note for any clarification.          There are no Patient Instructions on file for this visit.    Follow Up  No follow-ups on file.    Future Appointments   Date Time Provider Department Center   5/20/2025  1:40 PM Jess Grubbs MD [facility] CARDIO [facility]   8/20/2025  8:00 AM LAB, [facility] DRAW STATION [facility]   8/27/2025 10:00 AM Maxwell Gonzalez Jr., MD [facility]

## 2025-05-20 ENCOUNTER — OFFICE VISIT (OUTPATIENT)
Dept: CARDIOLOGY | Facility: CLINIC | Age: 70
End: 2025-05-20
Payer: MEDICARE

## 2025-05-20 VITALS
DIASTOLIC BLOOD PRESSURE: 77 MMHG | OXYGEN SATURATION: 97 % | RESPIRATION RATE: 15 BRPM | SYSTOLIC BLOOD PRESSURE: 127 MMHG | WEIGHT: 202.06 LBS | BODY MASS INDEX: 32.47 KG/M2 | HEART RATE: 73 BPM | HEIGHT: 66 IN

## 2025-05-20 DIAGNOSIS — I10 ESSENTIAL HYPERTENSION: ICD-10-CM

## 2025-05-20 DIAGNOSIS — R07.9 CHEST PAIN, UNSPECIFIED TYPE: ICD-10-CM

## 2025-05-20 DIAGNOSIS — E78.5 HYPERLIPIDEMIA, UNSPECIFIED HYPERLIPIDEMIA TYPE: Primary | ICD-10-CM

## 2025-05-20 PROCEDURE — 1101F PT FALLS ASSESS-DOCD LE1/YR: CPT | Mod: CPTII,S$GLB,, | Performed by: INTERNAL MEDICINE

## 2025-05-20 PROCEDURE — 3074F SYST BP LT 130 MM HG: CPT | Mod: CPTII,S$GLB,, | Performed by: INTERNAL MEDICINE

## 2025-05-20 PROCEDURE — G2211 COMPLEX E/M VISIT ADD ON: HCPCS | Mod: S$GLB,,, | Performed by: INTERNAL MEDICINE

## 2025-05-20 PROCEDURE — 3288F FALL RISK ASSESSMENT DOCD: CPT | Mod: CPTII,S$GLB,, | Performed by: INTERNAL MEDICINE

## 2025-05-20 PROCEDURE — 3044F HG A1C LEVEL LT 7.0%: CPT | Mod: CPTII,S$GLB,, | Performed by: INTERNAL MEDICINE

## 2025-05-20 PROCEDURE — 3008F BODY MASS INDEX DOCD: CPT | Mod: CPTII,S$GLB,, | Performed by: INTERNAL MEDICINE

## 2025-05-20 PROCEDURE — 3078F DIAST BP <80 MM HG: CPT | Mod: CPTII,S$GLB,, | Performed by: INTERNAL MEDICINE

## 2025-05-20 PROCEDURE — 4010F ACE/ARB THERAPY RXD/TAKEN: CPT | Mod: CPTII,S$GLB,, | Performed by: INTERNAL MEDICINE

## 2025-05-20 PROCEDURE — 93000 ELECTROCARDIOGRAM COMPLETE: CPT | Mod: S$GLB,,, | Performed by: INTERNAL MEDICINE

## 2025-05-20 PROCEDURE — 99999 PR PBB SHADOW E&M-EST. PATIENT-LVL IV: CPT | Mod: PBBFAC,,, | Performed by: INTERNAL MEDICINE

## 2025-05-20 PROCEDURE — 99214 OFFICE O/P EST MOD 30 MIN: CPT | Mod: S$GLB,,, | Performed by: INTERNAL MEDICINE

## 2025-05-20 PROCEDURE — 1126F AMNT PAIN NOTED NONE PRSNT: CPT | Mod: CPTII,S$GLB,, | Performed by: INTERNAL MEDICINE

## 2025-05-20 NOTE — PROGRESS NOTES
CARDIOVASCULAR CONSULTATION    REASON FOR CONSULT:   Rachel Conroy is a 69 y.o. female who presents for evaluation    HISTORY OF PRESENT ILLNESS:     Patient is a pleasant 67-year-old lady.  Italian speaking.  History obtained with the help of Candelaria .  Coming in complaining of dyspnea on exertion, occasional chest tightness on exertion as well as palpitations about once a week.  Denies orthopnea, PND, swelling of feet.    Aug 23: Patient here for follow-up.  Doing fine.  History obtained with the help of Candelaria .  Main complaint is tiredness.  No more chest pains.  Stress test did not show any significant arrhythmia, echo showed normal left ventricle systolic function.  Event monitor showed brief episodes of atrial tachycardia.    Sinus rhythm with very rare PACs/PVCs.  2 episodes of nonsustained atrial tachycardia lasting up to 5 beats in duration.    The left ventricle is normal in size with normal systolic function.  The estimated ejection fraction is 55%.  Normal right ventricular size with normal right ventricular systolic function.  The estimated PA systolic pressure is 32 mmHg.         The patient exercised for 7 minutes 17 seconds on a Andrea protocol, corresponding to a functional capacity of 9 METS, achieving a peak heart rate of 148 bpm, which is 99 % of the age predicted maximum heart rate.    Normal myocardial perfusion scan. There is no evidence of myocardial ischemia or infarction.    The visually estimated ejection fraction is normal during stress.    There is normal wall motion post stress.    The ECG portion of the study is negative for ischemia.    The patient reported no chest pain during the stress test.    During stress, occasional PVCs are noted.    Jan 25:  History obtained with the help of     History of Present Illness    CHIEF COMPLAINT:  Rachel presents today for follow-up of palpitations.    PALPITATIONS AND ASSOCIATED SYMPTOMS:  She experiences  "palpitations approximately 30 times per week, describing them as rapid heart "flips" . The severity has increased since 2023. These episodes are associated with fatigue and morning headaches. She reports increased fatigue compared to previous year    SLEEP APNEA:  She uses a CPAP machine for sleep apnea management.  occasionally removes it due to discomfort and sleep difficulties.    MEDICAL HISTORY:  She has a history of hypertension    WEIGHT MANAGEMENT:  She reports recent weight gain causing concern.           2/25:  History obtained with the help of Candelaria     History of Present Illness    CHIEF COMPLAINT:  Rachel presents today for follow-up on heart-related concerns    CARDIOVASCULAR:  She reports improvement in palpitations since increasing metoprolol dosage. Stress test was normal. Echocardiogram in January showed EF of 55-60% and pulmonary artery systolic pressure of 29 mmHg. Event monitor results are pending.    MEDICATIONS:  She takes metoprolol succinate 150 mg daily and metoprolol 40 mg daily for cardiac management.    SLEEP:  She reports difficulty sleeping due to limited time available for rest, resulting in decreased energy levels.    HEPATIC:  She has a history of elevated liver enzymes. Liver function tests showed improvement per liver specialist, though values can fluctuate.       May 25:    History of Present Illness    CHIEF COMPLAINT:  Rachel presents today for follow up on test results and reports dyspnea    CARDIOPULMONARY SYMPTOMS:  She experiences dyspnea primarily at night when lying down, occasionally waking up gasping for breath. She also becomes short of breath with exercise. She reports chest pain at night associated with the dyspnea.    CARDIAC TESTING:  Recent cardiac monitoring with implanted device showed no abnormal rhythm.    MEDICATIONS:  She takes two antihypertensive medications, including metoprolol, and a cholesterol medication.    SOCIAL HISTORY:  She denies " tobacco use, alcohol consumption, and recreational drug use.         PAST MEDICAL HISTORY:     Past Medical History:   Diagnosis Date    Arthritis     GERD (gastroesophageal reflux disease)     Hyperlipidemia     Hypertension     Irritable bowel syndrome     Joint pain     Knee injury     Migraine headache     Muscle pain, fibromyalgia 2014    NAFLD (nonalcoholic fatty liver disease)     Other physical therapy     Plantar fasciitis     Sleep apnea     Urinary incontinence        PAST SURGICAL HISTORY:     Past Surgical History:   Procedure Laterality Date    APPENDECTOMY  2018    COLONOSCOPY N/A 2017    Procedure: COLONOSCOPY;  Surgeon: Mariaelena Harrell MD;  Location: [facility] ENDO (4TH FLR);  Service: Endoscopy;  Laterality: N/A;    COLONOSCOPY N/A 2018    Procedure: COLONOSCOPY;  Surgeon: Nicola Zhu MD;  Location: [facility] ENDO (2ND FLR);  Service: Endoscopy;  Laterality: N/A;    ESOPHAGOGASTRODUODENOSCOPY N/A 2023    Procedure: EGD (ESOPHAGOGASTRODUODENOSCOPY);  Surgeon: Hector Barnes MD;  Location: [facility];  Service: Endoscopy;  Laterality: N/A;  Procedure Timin-4 weeks    history of difficult airway    Referrd by Dr. Carranza/Prep instructions mailed to home and to portal. AS    HYSTERECTOMY      TOTAL KNEE ARTHROPLASTY Right 2020    Procedure: ARTHROPLASTY, KNEE, TOTAL-STEFANIE;  Surgeon: Gerardo Stein MD;  Location: [facility] OR;  Service: Orthopedics;  Laterality: Right;  spinal attempted       ALLERGIES AND MEDICATION:     Review of patient's allergies indicates:   Allergen Reactions    Tramadol Itching        Medication List            Accurate as of May 20, 2025  2:58 PM. If you have any questions, ask your nurse or doctor.                CONTINUE taking these medications      atorvastatin 20 MG tablet  Commonly known as: LIPITOR  Take 1 tablet (20 mg total) by mouth every evening.     gabapentin 300 MG capsule  Commonly known as: NEURONTIN  Take 1 capsule (300 mg  total) by mouth 2 (two) times daily.     ketoconazole 2 % shampoo  Commonly known as: NIZORAL  Apply topically twice a week. Apply small amount to scalp, lather, and leave on for 5 minutes, then rinse off. Use twice weekly     metoprolol succinate 200 MG 24 hr tablet  Commonly known as: TOPROL-XL  Take 1 tablet (200 mg total) by mouth once daily. With a meal     olmesartan 40 MG tablet  Commonly known as: BENICAR  Take 1 tablet (40 mg total) by mouth once daily.     pramoxine 1 % Lotn  Commonly known as: SARNA SENSITIVE  Apply to arm 4-6x/day as needed for itching.     sumatriptan 100 MG tablet  Commonly known as: IMITREX  Take 1 tablet PO x 1 when needed for migraine. May repeat once after 2 hours if needed. Maximum dose of 200 mg in 24 hours.     topiramate 25 MG tablet  Commonly known as: TOPAMAX  Take 1 tablet (25 mg total) by mouth 2 (two) times daily.     triamcinolone acetonide 0.1% 0.1 % cream  Commonly known as: KENALOG  Apply to affected areas of arm BID prn rash. Do not use on face, underarms, or groin.     vitamin D 1000 units Tab  Commonly known as: VITAMIN D3  Take 1 tablet (1,000 Units total) by mouth once daily.              SOCIAL HISTORY:     Social History     Socioeconomic History    Marital status:    Occupational History    Occupation: [employer]     Employer: [employer]    Tobacco Use    Smoking status: Never     Passive exposure: Never    Smokeless tobacco: Never   Substance and Sexual Activity    Alcohol use: No    Drug use: No    Sexual activity: Not Currently     Partners: Male   Other Topics Concern    Are you pregnant or think you may be? No    Breast-feeding No   Social History Narrative    ** Merged History Encounter **          Social Drivers of Health     Financial Resource Strain: Low Risk  (7/25/2024)    Overall Financial Resource Strain (CARDIA)     Difficulty of Paying Living Expenses: Not hard at all   Food Insecurity: No Food Insecurity (7/25/2024)    Hunger Vital Sign      Worried About Running Out of Food in the Last Year: Never true     Ran Out of Food in the Last Year: Never true   Transportation Needs: No Transportation Needs (8/18/2022)    PRAPARE - Transportation     Lack of Transportation (Medical): No     Lack of Transportation (Non-Medical): No   Physical Activity: Inactive (7/25/2024)    Exercise Vital Sign     Days of Exercise per Week: 0 days     Minutes of Exercise per Session: 0 min   Stress: No Stress Concern Present (7/25/2024)    [employer] - Occupational Stress Questionnaire     Feeling of Stress : Only a little   Housing Stability: Unknown (8/18/2022)    Housing Stability Vital Sign     Unable to Pay for Housing in the Last Year: No     Unstable Housing in the Last Year: No       FAMILY HISTORY:     Family History   Problem Relation Name Age of Onset    Arthritis Mother Na     Alzheimer's disease Mother Na     Migraines Neg Hx      Melanoma Neg Hx      Psoriasis Neg Hx      Lupus Neg Hx      Eczema Neg Hx      Acne Neg Hx      Colon cancer Neg Hx      Stomach cancer Neg Hx      Esophageal cancer Neg Hx      Liver disease Neg Hx      Crohn's disease Neg Hx      Ulcerative colitis Neg Hx      Celiac disease Neg Hx         REVIEW OF SYSTEMS:   Review of Systems   Constitutional: Positive for malaise/fatigue.   HENT: Negative.     Eyes: Negative.    Cardiovascular:  Positive for palpitations.   Endocrine: Negative.    Hematologic/Lymphatic: Negative.    Skin: Negative.    Musculoskeletal: Negative.    Gastrointestinal: Negative.    Genitourinary: Negative.    Neurological: Negative.    Psychiatric/Behavioral: Negative.     Allergic/Immunologic: Negative.        A 10 point review of systems was performed and all the pertinent positives have been mentioned. Rest of review of systems was negative.        PHYSICAL EXAM:     Vitals:    05/20/25 1346   BP: 127/77   Pulse: 73   Resp: 15    Body mass index is 32.61 kg/m².  Weight: 91.7 kg (202 lb  "0.8 oz)   Height: 5' 6" (167.6 cm)     Physical Exam  Constitutional:       Appearance: Normal appearance. She is well-developed.   HENT:      Head: Normocephalic.   Eyes:      Pupils: Pupils are equal, round, and reactive to light.   Cardiovascular:      Rate and Rhythm: Normal rate and regular rhythm.   Pulmonary:      Effort: Pulmonary effort is normal.      Breath sounds: Normal breath sounds.   Abdominal:      General: Bowel sounds are normal.      Palpations: Abdomen is soft.      Tenderness: There is no abdominal tenderness.   Musculoskeletal:         General: Normal range of motion.      Cervical back: Normal range of motion and neck supple.   Skin:     General: Skin is warm.   Neurological:      Mental Status: She is alert and oriented to person, place, and time.           DATA:     Laboratory:  CBC:  Recent Labs   Lab 07/29/22  0845 11/18/24  0826   WBC 5.25 6.02   Hemoglobin 13.3 13.9   Hematocrit 40.7 42.6   Platelets 293 278       CHEMISTRIES:  Recent Labs   Lab 07/29/22  0845 06/27/23  0934 02/12/24  1132 08/24/24  2249 11/18/24  0826 02/19/25  0850   Glucose 93   < > 87  --  105 94   Sodium 141   < > 137 138 140 138   Potassium 3.9   < > 3.9 3.8 4.7 4.1   BUN 12   < > 10 12.7 13 14   Creatinine 0.6   < > 0.6 0.54 0.7 0.6   eGFR if  >60  --   --   --   --   --    eGFR if non  >60  --   --   --   --   --    Calcium 8.7   < > 9.2 8.9 9.0 8.7    < > = values in this interval not displayed.       CARDIAC BIOMARKERS:        COAGS:        LIPIDS/LFTS:  Recent Labs   Lab 02/12/24  1132 07/19/24  0945 11/18/24  0826 02/19/25  0850   Cholesterol 242 H  --  238 H 231 H   Triglycerides 107  --  76 111   HDL 86 H  --  85 H 83 H   LDL Cholesterol 134.6  --  137.8 125.8   Non-HDL Cholesterol 156  --  153 148   AST 21 30 30 24   ALT 32 54 H 47 H 42       Hemoglobin A1C   Date Value Ref Range Status   02/19/2025 5.4 4.0 - 5.6 % Final     Comment:     ADA Screening Guidelines:  5.7-6.4%  " "Consistent with prediabetes  >or=6.5%  Consistent with diabetes    High levels of fetal hemoglobin interfere with the HbA1C  assay. Heterozygous hemoglobin variants (HbS, HgC, etc)do  not significantly interfere with this assay.   However, presence of multiple variants may affect accuracy.     07/21/2021 5.2 4.0 - 5.6 % Final     Comment:     ADA Screening Guidelines:  5.7-6.4%  Consistent with prediabetes  >or=6.5%  Consistent with diabetes    High levels of fetal hemoglobin interfere with the HbA1C  assay. Heterozygous hemoglobin variants (HbS, HgC, etc)do  not significantly interfere with this assay.   However, presence of multiple variants may affect accuracy.     07/28/2020 5.4 4.0 - 5.6 % Final     Comment:     ADA Screening Guidelines:  5.7-6.4%  Consistent with prediabetes  >or=6.5%  Consistent with diabetes  High levels of fetal hemoglobin interfere with the HbA1C  assay. Heterozygous hemoglobin variants (HbS, HgC, etc)do  not significantly interfere with this assay.   However, presence of multiple variants may affect accuracy.         TSH  Recent Labs   Lab 06/27/23  0934 02/03/25  1040   TSH 0.671 0.719       The 10-year ASCVD risk score (Valente DK, et al., 2019) is: 8.4%    Values used to calculate the score:      Age: 69 years      Sex: Female      Is Non- : No      Diabetic: No      Tobacco smoker: No      Systolic Blood Pressure: 127 mmHg      Is BP treated: Yes      HDL Cholesterol: 83 mg/dL      Total Cholesterol: 231 mg/dL       BNP    No results found for: "BNP"          ASSESSMENT AND PLAN     Patient Active Problem List   Diagnosis    Other migraine, not intractable, without status migrainosus    Dyslipidemia    Essential hypertension    Fibromyalgia    Palpitations    Obstructive sleep apnea    Fatty liver    Decreased strength of upper extremity    Class 1 obesity due to excess calories with serious comorbidity and body mass index (BMI) of 32.0 to 32.9 in adult    Early " hepatic fibrosis    Osteopenia of lumbar spine    Seborrheic dermatitis     Orders Placed This Encounter   Procedures    Nuclear Stress - Cardiology Interpreted    IN OFFICE EKG 12-LEAD (to Muse)     Assessment & Plan    IMPRESSION:  Ordered stress test to rule out cardiac origin of chest pain.    PLAN SUMMARY:  - Continue atorvastatin for cholesterol management  - Continue olmesartan and metoprolol for blood pressure control  - Order liver function test  - Order stress test to evaluate chest pain  - Follow-up after stress test results are available    PRECORDIAL PAIN:  - Ordered stress test to rule out cardiac origin of chest pain.    HYPERTENSION:  - Continued metoprolol for blood pressure.  - Continued olmesartan for blood pressure.    HYPERLIPIDEMIA:  - Continued atorvastatin for cholesterol.    LIVER FUNCTION:  - Ordered liver function test.    FOLLOW-UP:  - Follow up after stress test results are available.         PALPITATIONS:  - Continued metoprolol succinate 150 mg daily.  Palpitations improved.  Event monitor did not reveal any significant abnormality    FATTY abnormality  - Continue follow-up with liver specialist while starting new Lipitor regimen.    HYPERTENSION:  Continue current therapy    SLEEP APNEA:  - Rachel to continue using prescribed sleep apnea treatments (CPAP machine and mouth guard).    WEIGHT MANAGEMENT:  - Discussed importance of weight management in overall health.  - Recommend following a plant-based diet for weight loss.            Thank you very much for involving me in the care of your patient.  Please do not hesitate to contact me if there are any questions.      Jess Grubbs MD, FACC, [facility]  Interventional Cardiologist, [facility].       Visit today included increased complexity associated with the care of the episodic problem hypertension, palpitations, addressed and managing the longitudinal care of the patient due to the serious and/or complex managed problem(s)    Patient Active Problem List   Diagnosis    Other migraine, not intractable, without status migrainosus    Dyslipidemia    Essential hypertension    Fibromyalgia    Palpitations    Obstructive sleep apnea    Fatty liver    Decreased strength of upper extremity    Class 1 obesity due to excess calories with serious comorbidity and body mass index (BMI) of 32.0 to 32.9 in adult    Early hepatic fibrosis    Osteopenia of lumbar spine    Seborrheic dermatitis     .    This note was dictated with the help of speech recognition software.  There might be un-intended errors and/or substitutions.

## 2025-05-21 LAB
OHS QRS DURATION: 90 MS
OHS QTC CALCULATION: 405 MS

## 2025-05-30 ENCOUNTER — TELEPHONE (OUTPATIENT)
Dept: CARDIOLOGY | Facility: CLINIC | Age: 70
End: 2025-05-30
Payer: MEDICARE

## 2025-06-16 ENCOUNTER — HOSPITAL ENCOUNTER (OUTPATIENT)
Dept: RADIOLOGY | Facility: HOSPITAL | Age: 70
Discharge: HOME | End: 2025-06-16
Attending: INTERNAL MEDICINE
Payer: MEDICARE

## 2025-06-16 ENCOUNTER — HOSPITAL ENCOUNTER (OUTPATIENT)
Dept: CARDIOLOGY | Facility: HOSPITAL | Age: 70
Discharge: HOME | End: 2025-06-16
Attending: INTERNAL MEDICINE
Payer: MEDICARE

## 2025-06-16 DIAGNOSIS — R07.9 CHEST PAIN, UNSPECIFIED TYPE: ICD-10-CM

## 2025-06-16 LAB
CV STRESS BASE HR: 54 BPM
DIASTOLIC BLOOD PRESSURE: 93 MMHG
NUC STRESS DIASTOLIC VOLUME INDEX: 61
NUC STRESS EJECTION FRACTION: 68 %
NUC STRESS SYSTOLIC VOLUME INDEX: 19
OHS CV CPX 1 MINUTE RECOVERY HEART RATE: 99 BPM
OHS CV CPX 85 PERCENT MAX PREDICTED HEART RATE MALE: 130
OHS CV CPX ESTIMATED METS: 7
OHS CV CPX MAX PREDICTED HEART RATE: 153
OHS CV CPX PATIENT IS FEMALE: 1
OHS CV CPX PATIENT IS MALE: 0
OHS CV CPX PEAK DIASTOLIC BLOOD PRESSURE: 89 MMHG
OHS CV CPX PEAK HEAR RATE: 133 BPM
OHS CV CPX PEAK RATE PRESSURE PRODUCT: NORMAL
OHS CV CPX PEAK SYSTOLIC BLOOD PRESSURE: 206 MMHG
OHS CV CPX PERCENT MAX PREDICTED HEART RATE ACHIEVED: 90
OHS CV CPX RATE PRESSURE PRODUCT PRESENTING: 6696
OHS CV INITIAL DOSE: 10.3 MCG/KG/MIN
OHS CV PEAK DOSE: 30.8 MCG/KG/MIN
STRESS ECHO POST EXERCISE DUR MIN: 6 MINUTES
STRESS ECHO POST EXERCISE DUR SEC: 26 SECONDS
STRESS ST DEPRESSION: 1 MM
SYSTOLIC BLOOD PRESSURE: 124 MMHG

## 2025-06-16 PROCEDURE — 78452 HT MUSCLE IMAGE SPECT MULT: CPT | Mod: 26,,, | Performed by: INTERNAL MEDICINE

## 2025-06-16 PROCEDURE — 93017 CV STRESS TEST TRACING ONLY: CPT

## 2025-06-16 PROCEDURE — 93018 CV STRESS TEST I&R ONLY: CPT | Mod: ,,, | Performed by: INTERNAL MEDICINE

## 2025-06-16 PROCEDURE — 93016 CV STRESS TEST SUPVJ ONLY: CPT | Mod: ,,, | Performed by: INTERNAL MEDICINE

## 2025-06-16 PROCEDURE — A9502 TC99M TETROFOSMIN: HCPCS | Performed by: INTERNAL MEDICINE

## 2025-06-16 PROCEDURE — 78452 HT MUSCLE IMAGE SPECT MULT: CPT

## 2025-06-16 RX ADMIN — TETROFOSMIN 10.3 MILLICURIE: 1.38 INJECTION, POWDER, LYOPHILIZED, FOR SOLUTION INTRAVENOUS at 07:06

## 2025-06-16 RX ADMIN — TETROFOSMIN 30.8 MILLICURIE: 1.38 INJECTION, POWDER, LYOPHILIZED, FOR SOLUTION INTRAVENOUS at 09:06

## 2025-06-20 ENCOUNTER — ON-DEMAND VIRTUAL (OUTPATIENT)
Dept: URGENT CARE | Facility: CLINIC | Age: 70
End: 2025-06-20
Payer: MEDICARE

## 2025-06-20 DIAGNOSIS — L21.9 SEBORRHEIC DERMATITIS: Primary | Chronic | ICD-10-CM

## 2025-06-20 DIAGNOSIS — L56.8 PHOTOSENSITIVITY DERMATITIS: ICD-10-CM

## 2025-06-20 RX ORDER — TRIAMCINOLONE ACETONIDE 1 MG/G
CREAM TOPICAL
Qty: 80 G | Refills: 2 | Status: SHIPPED | OUTPATIENT
Start: 2025-06-20

## 2025-06-20 RX ORDER — SELENIUM SULFIDE 22.5 MG/ML
SHAMPOO TOPICAL
Qty: 180 ML | Refills: 2 | Status: SHIPPED | OUTPATIENT
Start: 2025-06-20

## 2025-06-20 NOTE — PROGRESS NOTES
Subjective:      Patient ID: Rachel Conroy is a 69 y.o. female.    Vitals:  vitals were not taken for this visit.     Chief Complaint: Rash      Visit Type: TELE AUDIOVISUAL    Patient Location: [place] , [place]     Present with the patient at the time of consultation: TELEMED PRESENT WITH PATIENT: family member   Two patient identifiers obtained before virtual visit including name and    used: Ángela [id number]    Past Medical History:   Diagnosis Date    Arthritis     GERD (gastroesophageal reflux disease)     Hyperlipidemia     Hypertension     Irritable bowel syndrome     Joint pain     Knee injury     Migraine headache     Muscle pain, fibromyalgia 2014    NAFLD (nonalcoholic fatty liver disease)     Other physical therapy     Plantar fasciitis     Sleep apnea     Urinary incontinence      Past Surgical History:   Procedure Laterality Date    APPENDECTOMY      COLONOSCOPY N/A 2017    Procedure: COLONOSCOPY;  Surgeon: Mariaelena Harrell MD;  Location: [facility] (4TH FLR);  Service: Endoscopy;  Laterality: N/A;    COLONOSCOPY N/A 2018    Procedure: COLONOSCOPY;  Surgeon: Nicola Zhu MD;  Location: [facility] (2ND FLR);  Service: Endoscopy;  Laterality: N/A;    ESOPHAGOGASTRODUODENOSCOPY N/A 2023    Procedure: EGD (ESOPHAGOGASTRODUODENOSCOPY);  Surgeon: Hector Barnes MD;  Location: [facility];  Service: Endoscopy;  Laterality: N/A;  Procedure Timin-4 weeks    history of difficult airway    Referrd by Dr. Carranza/Prep instructions mailed to home and to portal. AS    HYSTERECTOMY      TOTAL KNEE ARTHROPLASTY Right 2020    Procedure: ARTHROPLASTY, KNEE, TOTAL-STEFANIE;  Surgeon: Gerardo Stein MD;  Location: [facility] OR;  Service: Orthopedics;  Laterality: Right;  spinal attempted     Review of patient's allergies indicates:   Allergen Reactions    Tramadol Itching     Medications Ordered Prior to Encounter[1]  Family History   Problem Relation Name Age of  Onset    Arthritis Mother Na     Alzheimer's disease Mother Na     Migraines Neg Hx      Melanoma Neg Hx      Psoriasis Neg Hx      Lupus Neg Hx      Eczema Neg Hx      Acne Neg Hx      Colon cancer Neg Hx      Stomach cancer Neg Hx      Esophageal cancer Neg Hx      Liver disease Neg Hx      Crohn's disease Neg Hx      Ulcerative colitis Neg Hx      Celiac disease Neg Hx         Medications Ordered                [id number] - [place], [place] - [address] AT [facility] [address]   [address] [address]    Telephone: [phone]   Fax: [phone]   Hours: Not open 24 hours                         E-Prescribed (2 of 2)              selenium sulfide 2.25 % Sham    Sig: Massage shampoo into wet scalp then rinse thoroughly. Use at least twice weekly for best results.       Start: 6/20/25     Quantity: 180 mL Refills: 2                         triamcinolone acetonide 0.1% (KENALOG) 0.1 % cream    Sig: Apply to affected areas of arm BID prn rash. Do not use on face, underarms, or groin.       Start: 6/20/25     Quantity: 80 g Refills: 2                           No questionnaires on file.    Patient presents virtually with family member     called during visit.  # [phone]    Patient reports ongoing seborrheic dermatitis and dermatitis to bilateral upper extremities  Recent flare in the last 3 days  Reports ketoconazole shampoo does not work well for her scalp  She also reports that triamcinolone cream to her arms work however when she stops using it , the rash returns  Patient has tried Vaseline for bilateral arm rash with mild relief  Patient has seen a dermatologist and her primary care provider for both concerns in the past  Patient denies any other symptoms today      Review of Systems   Skin:  Positive for rash.        Objective:   The physical exam was conducted virtually.  Physical Exam  Constitutional:       General: She is not in acute  distress.     Appearance: Normal appearance. She is not ill-appearing.   HENT:      Head: Normocephalic and atraumatic.   Eyes:      General:         Right eye: No discharge.         Left eye: No discharge.      Conjunctiva/sclera: Conjunctivae normal.   Pulmonary:      Effort: Pulmonary effort is normal.   Musculoskeletal:         General: No deformity.   Skin:     Coloration: Skin is not jaundiced or pale.   Neurological:      General: No focal deficit present.      Mental Status: She is alert and oriented to person, place, and time.   Psychiatric:         Mood and Affect: Mood normal.         Behavior: Behavior normal.          Assessment:     1. Seborrheic dermatitis    2. Photosensitivity dermatitis      Switch from ketoconazole to Selsun blue shampoo  Continue use of triamcinolone cream as needed  Emollients in between flares to prevent dry skin  Follow-up PCP and Dermatology if no improvement with treatment    Plan:       Seborrheic dermatitis  -     selenium sulfide 2.25 % Sham; Massage shampoo into wet scalp then rinse thoroughly. Use at least twice weekly for best results.  Dispense: 180 mL; Refill: 2    Photosensitivity dermatitis  -     triamcinolone acetonide 0.1% (KENALOG) 0.1 % cream; Apply to affected areas of arm BID prn rash. Do not use on face, underarms, or groin.  Dispense: 80 g; Refill: 2                          [1]   Current Outpatient Medications on File Prior to Visit   Medication Sig Dispense Refill    atorvastatin (LIPITOR) 20 MG tablet Take 1 tablet (20 mg total) by mouth every evening. 90 tablet 3    gabapentin (NEURONTIN) 300 MG capsule Take 1 capsule (300 mg total) by mouth 2 (two) times daily. 180 capsule 3    metoprolol succinate (TOPROL-XL) 200 MG 24 hr tablet Take 1 tablet (200 mg total) by mouth once daily. With a meal 90 tablet 3    olmesartan (BENICAR) 40 MG tablet Take 1 tablet (40 mg total) by mouth once daily. 90 tablet 3    pramoxine (SARNA SENSITIVE) 1 % Lotn Apply to arm  4-6x/day as needed for itching. 1 each 1    sumatriptan (IMITREX) 100 MG tablet Take 1 tablet PO x 1 when needed for migraine. May repeat once after 2 hours if needed. Maximum dose of 200 mg in 24 hours. 9 tablet 5    topiramate (TOPAMAX) 25 MG tablet Take 1 tablet (25 mg total) by mouth 2 (two) times daily. 180 tablet 0    vitamin D (VITAMIN D3) 1000 units Tab Take 1 tablet (1,000 Units total) by mouth once daily. 30 tablet 0    [DISCONTINUED] ketoconazole (NIZORAL) 2 % shampoo Apply topically twice a week. Apply small amount to scalp, lather, and leave on for 5 minutes, then rinse off. Use twice weekly 120 mL 11    [DISCONTINUED] triamcinolone acetonide 0.1% (KENALOG) 0.1 % cream Apply to affected areas of arm BID prn rash. Do not use on face, underarms, or groin. 80 g 0     No current facility-administered medications on file prior to visit.

## 2025-06-20 NOTE — PATIENT INSTRUCTIONS
Thank you for choosing [facility]!    Our goal in the [facility] to always provide outstanding medical care. You may receive a survey by mail or e-mail in the next week regarding your experience today. We would greatly appreciate you completing and returning the survey. Your feedback provides us with a way to recognize our staff who provide very good care, and it helps us learn how to improve when your experience was below our aspiration of excellence.         We appreciate you trusting us with your medical care. We hope you feel better soon. We will be happy to take care of you for all of your future medical needs.    You must understand that you've received Virtual  treatment only and that you may be released before all your medical problems are known or treated. You, the patient, will arrange for follow up care as instructed.    Follow up with your PCP or specialty clinic as directed in the next 1-2 weeks if not improved or as needed.  You can call [phone] to schedule an appointment with the appropriate provider.    If your condition worsens we recommend that you receive another evaluation in person, with your primary care provider, urgent care or at the emergency room immediately or contact your primary medical clinics after hours call service to discuss your concerns.

## 2025-07-25 ENCOUNTER — TELEPHONE (OUTPATIENT)
Dept: FAMILY MEDICINE | Facility: CLINIC | Age: 70
End: 2025-07-25
Payer: MEDICARE

## 2025-08-29 ENCOUNTER — LAB VISIT (OUTPATIENT)
Dept: LAB | Facility: HOSPITAL | Age: 70
End: 2025-08-29
Attending: FAMILY MEDICINE
Payer: MEDICARE

## 2025-08-29 DIAGNOSIS — I10 ESSENTIAL HYPERTENSION: Chronic | ICD-10-CM

## 2025-08-29 DIAGNOSIS — E78.5 DYSLIPIDEMIA: Chronic | ICD-10-CM

## 2025-08-29 LAB
ALBUMIN SERPL BCP-MCNC: 4.1 G/DL (ref 3.5–5.2)
ALP SERPL-CCNC: 123 UNIT/L (ref 40–150)
ALT SERPL W/O P-5'-P-CCNC: 48 UNIT/L (ref 10–44)
ANION GAP (OHS): 9 MMOL/L (ref 8–16)
AST SERPL-CCNC: 47 UNIT/L (ref 11–45)
BILIRUB SERPL-MCNC: 0.6 MG/DL (ref 0.1–1)
BUN SERPL-MCNC: 17 MG/DL (ref 8–23)
CALCIUM SERPL-MCNC: 9.3 MG/DL (ref 8.7–10.5)
CHLORIDE SERPL-SCNC: 106 MMOL/L (ref 95–110)
CHOLEST SERPL-MCNC: 200 MG/DL (ref 120–199)
CHOLEST/HDLC SERPL: 2.8 {RATIO} (ref 2–5)
CO2 SERPL-SCNC: 26 MMOL/L (ref 23–29)
CREAT SERPL-MCNC: 0.6 MG/DL (ref 0.5–1.4)
GFR SERPLBLD CREATININE-BSD FMLA CKD-EPI: >60 ML/MIN/1.73/M2
GLUCOSE SERPL-MCNC: 109 MG/DL (ref 70–110)
HDLC SERPL-MCNC: 72 MG/DL (ref 40–75)
HDLC SERPL: 36 % (ref 20–50)
LDLC SERPL CALC-MCNC: 111.4 MG/DL (ref 63–159)
NONHDLC SERPL-MCNC: 128 MG/DL
POTASSIUM SERPL-SCNC: 4.3 MMOL/L (ref 3.5–5.1)
PROT SERPL-MCNC: 7.3 GM/DL (ref 6–8.4)
SODIUM SERPL-SCNC: 141 MMOL/L (ref 136–145)
TRIGL SERPL-MCNC: 83 MG/DL (ref 30–150)

## 2025-08-29 PROCEDURE — 80053 COMPREHEN METABOLIC PANEL: CPT

## 2025-08-29 PROCEDURE — 36415 COLL VENOUS BLD VENIPUNCTURE: CPT

## 2025-08-29 PROCEDURE — 80061 LIPID PANEL: CPT

## (undated) DEVICE — NDL SAFETY 22G X 1.5 ECLIPSE

## (undated) DEVICE — SUT MCRYL PLUS 4-0 PS2 27IN

## (undated) DEVICE — TRAY MINOR GEN SURG

## (undated) DEVICE — KIT VIZADISC KNEE TRACKING

## (undated) DEVICE — PULSAVAC ZIMMER

## (undated) DEVICE — ADHESIVE MASTISOL VIAL 48/BX

## (undated) DEVICE — IRRIGATOR ENDOSCOPY DISP.

## (undated) DEVICE — CLOSURE SKIN STERI STRIP 1/4X3

## (undated) DEVICE — HOOD FLYTE PEELWY STERISHIELD

## (undated) DEVICE — ARMCRADLE BLUE POLY FOAM

## (undated) DEVICE — BONE PINS

## (undated) DEVICE — SUT ETHILON 3/0 18IN PS-1

## (undated) DEVICE — SCISSOR 5MMX35CM DIRECT DRIVE

## (undated) DEVICE — DRAPE STERI U-SHAPED 47X51IN

## (undated) DEVICE — SEE L#120831

## (undated) DEVICE — STAPLER ECHELON FLEX 45MM 34CM

## (undated) DEVICE — CORD SILICONE RETRACTOR

## (undated) DEVICE — SUT STRATAFIX PDS PLS 45CM

## (undated) DEVICE — BLADE SAW OSC 19.5 X 1.2 X 90

## (undated) DEVICE — SUT VICRYL PLUS 2-0 CT1 18

## (undated) DEVICE — ELECTRODE REM PLYHSV RETURN 9

## (undated) DEVICE — SEE MEDLINE ITEM 152622

## (undated) DEVICE — TUBING HF INSUFFLATION W/ FLTR

## (undated) DEVICE — SUT ENDOLOOP PDSII 18 LIGA

## (undated) DEVICE — KIT TRIATHLON CR FEM PREP SZ3

## (undated) DEVICE — APPLICATOR CHLORAPREP ORN 26ML

## (undated) DEVICE — DRAPE INCISE IOBAN 2 23X33IN

## (undated) DEVICE — KIT LEG POSITIONER DISPOSABLES

## (undated) DEVICE — SYR ONLY LUER LOCK 20CC

## (undated) DEVICE — NDL 18GA X1 1/2 REG BEVEL

## (undated) DEVICE — SEE MEDLINE ITEM 152487

## (undated) DEVICE — TROCAR ENDOPATH XCEL 5X100MM

## (undated) DEVICE — SOL NS 1000CC

## (undated) DEVICE — KIT TRIATHLON CR TIB PREP SZ4

## (undated) DEVICE — GAUZE SPONGE 4X4 12PLY

## (undated) DEVICE — TUBE SUCTION YANKAUER

## (undated) DEVICE — SOL IRR NACL .9% 3000ML

## (undated) DEVICE — SEE MEDLINE ITEM 146313

## (undated) DEVICE — UNGERGLOVE BIOGEL PI INDIC SZ9

## (undated) DEVICE — SEE MEDLINE ITEM 154981

## (undated) DEVICE — SEE MEDLINE ITEM 157117

## (undated) DEVICE — BLANKET UPPER BODY 78.7X29.9IN

## (undated) DEVICE — Device

## (undated) DEVICE — KIT CHECKPOINT MAKO

## (undated) DEVICE — RELOAD ECHELON ENDOPATH 45MM

## (undated) DEVICE — KIT DRAPE RIO ONE PIECE W/POCK

## (undated) DEVICE — SOL NACL 0.9% INJ PF/100 150

## (undated) DEVICE — TRAY FOLEY 16FR INFECTION CONT

## (undated) DEVICE — SUT 0 VICRYL / UR6 (J603)

## (undated) DEVICE — SYR 50CC LL

## (undated) DEVICE — COVER OVERHEAD SURG LT BLUE

## (undated) DEVICE — GLOVE BIOGEL SZ 8 1/2

## (undated) DEVICE — PAD COLD THERAPY KNEE WRAP ON

## (undated) DEVICE — SUT VICRYL+ 1 CT1 18IN

## (undated) DEVICE — SEE MEDLINE ITEM 146373

## (undated) DEVICE — TROCAR ENDOPATH XCEL 12MM 10CM

## (undated) DEVICE — SHEET DRAPE FAN-FOLDED 3/4

## (undated) DEVICE — GOWN SURGICAL X-LARGE

## (undated) DEVICE — SEE MEDLINE ITEM 157150

## (undated) DEVICE — DRAPE ABDOMINAL TIBURON 14X11

## (undated) DEVICE — TOURNIQUET SB QC DP 34X4IN